# Patient Record
Sex: MALE | Race: WHITE | Employment: FULL TIME | URBAN - METROPOLITAN AREA
[De-identification: names, ages, dates, MRNs, and addresses within clinical notes are randomized per-mention and may not be internally consistent; named-entity substitution may affect disease eponyms.]

---

## 2017-11-02 ENCOUNTER — TRANSFERRED RECORDS (OUTPATIENT)
Dept: HEALTH INFORMATION MANAGEMENT | Facility: CLINIC | Age: 42
End: 2017-11-02

## 2018-02-17 ASSESSMENT — ENCOUNTER SYMPTOMS
MUSCLE CRAMPS: 1
SEIZURES: 0
TREMORS: 0
CHILLS: 1
MYALGIAS: 1
ARTHRALGIAS: 0
DECREASED APPETITE: 0
HEADACHES: 0
STIFFNESS: 1
TINGLING: 1
WEIGHT LOSS: 0
JOINT SWELLING: 0
NIGHT SWEATS: 0
ALTERED TEMPERATURE REGULATION: 1
WEIGHT GAIN: 0
SPEECH CHANGE: 0
FEVER: 0
NUMBNESS: 1
DIZZINESS: 0
INCREASED ENERGY: 1
FATIGUE: 0
BACK PAIN: 1
HALLUCINATIONS: 0
WEAKNESS: 1
MEMORY LOSS: 0
LOSS OF CONSCIOUSNESS: 0
POLYDIPSIA: 0
POLYPHAGIA: 0
PARALYSIS: 0
NECK PAIN: 0
DISTURBANCES IN COORDINATION: 0
MUSCLE WEAKNESS: 1

## 2018-02-27 ENCOUNTER — OFFICE VISIT (OUTPATIENT)
Dept: INTERNAL MEDICINE | Facility: CLINIC | Age: 43
End: 2018-02-27
Payer: COMMERCIAL

## 2018-02-27 VITALS
OXYGEN SATURATION: 99 % | SYSTOLIC BLOOD PRESSURE: 125 MMHG | BODY MASS INDEX: 23.48 KG/M2 | HEIGHT: 70 IN | DIASTOLIC BLOOD PRESSURE: 84 MMHG | HEART RATE: 93 BPM | RESPIRATION RATE: 16 BRPM | WEIGHT: 164 LBS

## 2018-02-27 DIAGNOSIS — M54.5 CHRONIC MIDLINE LOW BACK PAIN, WITH SCIATICA PRESENCE UNSPECIFIED: ICD-10-CM

## 2018-02-27 DIAGNOSIS — Z00.00 HEALTH CARE MAINTENANCE: ICD-10-CM

## 2018-02-27 DIAGNOSIS — R05.9 COUGH: Primary | ICD-10-CM

## 2018-02-27 DIAGNOSIS — G89.29 CHRONIC MIDLINE LOW BACK PAIN, WITH SCIATICA PRESENCE UNSPECIFIED: ICD-10-CM

## 2018-02-27 PROBLEM — M54.50 LOW BACK PAIN RADIATING TO RIGHT LOWER EXTREMITY: Status: ACTIVE | Noted: 2017-08-19

## 2018-02-27 PROBLEM — M79.604 LOW BACK PAIN RADIATING TO RIGHT LOWER EXTREMITY: Status: ACTIVE | Noted: 2017-08-19

## 2018-02-27 RX ORDER — MONTELUKAST SODIUM 10 MG/1
10 TABLET ORAL AT BEDTIME
Qty: 90 TABLET | Refills: 3 | Status: SHIPPED | OUTPATIENT
Start: 2018-02-27 | End: 2018-04-02

## 2018-02-27 RX ORDER — ALBUTEROL SULFATE 0.83 MG/ML
1 SOLUTION RESPIRATORY (INHALATION) EVERY 6 HOURS PRN
Qty: 120 VIAL | Refills: 4 | Status: SHIPPED | OUTPATIENT
Start: 2018-02-27 | End: 2018-03-30

## 2018-02-27 ASSESSMENT — ENCOUNTER SYMPTOMS
BOWEL INCONTINENCE: 0
MEMORY LOSS: 0
SEIZURES: 0
NAUSEA: 0
RECTAL PAIN: 0
LEG SWELLING: 0
WHEEZING: 0
DIFFICULTY URINATING: 0
CHILLS: 1
EXERCISE INTOLERANCE: 0
EYE REDNESS: 0
RECTAL BLEEDING: 0
SYNCOPE: 0
DECREASED CONCENTRATION: 0
SPEECH CHANGE: 0
ABDOMINAL PAIN: 0
TACHYCARDIA: 0
ARTHRALGIAS: 0
FATIGUE: 0
NUMBNESS: 1
PARALYSIS: 0
INSOMNIA: 0
ORTHOPNEA: 0
STIFFNESS: 1
HYPERTENSION: 0
HYPOTENSION: 0
DISTURBANCES IN COORDINATION: 0
SORE THROAT: 0
CONSTIPATION: 0
WEIGHT GAIN: 0
MUSCLE WEAKNESS: 1
SINUS CONGESTION: 0
DYSURIA: 0
FEVER: 0
SWOLLEN GLANDS: 0
DIARRHEA: 0
INCREASED ENERGY: 1
NAIL CHANGES: 0
LOSS OF CONSCIOUSNESS: 0
HEMATURIA: 0
FLANK PAIN: 0
DOUBLE VISION: 0
RESPIRATORY PAIN: 0
PALPITATIONS: 0
ALTERED TEMPERATURE REGULATION: 1
DYSPNEA ON EXERTION: 0
TREMORS: 0
JAUNDICE: 0
HEADACHES: 0
NERVOUS/ANXIOUS: 0
JOINT SWELLING: 0
HEARTBURN: 0
POOR WOUND HEALING: 0
BLOATING: 0
MYALGIAS: 1
SHORTNESS OF BREATH: 0
WEAKNESS: 1
EYE WATERING: 0
COUGH: 0
LEG PAIN: 0
NECK PAIN: 0
HOARSE VOICE: 0
BACK PAIN: 1
TROUBLE SWALLOWING: 0
BRUISES/BLEEDS EASILY: 0
SMELL DISTURBANCE: 0
EYE IRRITATION: 0
TINGLING: 1
CLAUDICATION: 0
PANIC: 0
HEMOPTYSIS: 0
SLEEP DISTURBANCES DUE TO BREATHING: 0
POSTURAL DYSPNEA: 0
SINUS PAIN: 0
TASTE DISTURBANCE: 0
HALLUCINATIONS: 0
WEIGHT LOSS: 0
DEPRESSION: 0
LIGHT-HEADEDNESS: 0
POLYDIPSIA: 0
SKIN CHANGES: 0
SPUTUM PRODUCTION: 0
NECK MASS: 0
DECREASED APPETITE: 0
POLYPHAGIA: 0
SNORES LOUDLY: 0
EYE PAIN: 0
COUGH DISTURBING SLEEP: 0
NIGHT SWEATS: 0
BLOOD IN STOOL: 0
VOMITING: 0
DIZZINESS: 0
MUSCLE CRAMPS: 1

## 2018-02-27 NOTE — MR AVS SNAPSHOT
"              After Visit Summary   2/27/2018    Frederick Nguyen    MRN: [id number]           Patient Information     Date Of Birth          1975        Visit Information        Provider Department      2/27/2018 3:10 PM Liliana Murray MD [facility]        Today's Diagnoses     Cough    -  1    Chronic midline low back pain, with sciatica presence unspecified        Health care maintenance           Follow-ups after your visit        Additional Services     PHYSICAL THERAPY REFERRAL       *This therapy referral will be filtered to a centralized scheduling office at [facility] and the patient will receive a call to schedule an appointment at a [facility] location most convenient for them. *     [facility] provides Physical Therapy evaluation and treatment and many specialty services across the [facility] system.  If requesting a specialty program, please choose from the list below.    If you have not heard from the scheduling office within 2 business days, please call [phone] for all locations, with the exception of [place], please call [phone] and [facility], please call [phone]  Treatment: Evaluation & Treatment  Special Instructions/Modalities: Pt has chronic low back pain  Special Programs: None    Please be aware that coverage of these services is subject to the terms and limitations of your health insurance plan.  Call member services at your health plan with any benefit or coverage questions.      **Note to Provider:  If you are referring outside of [facility] for the therapy appointment, please list the name of the location in the \"special instructions\" above, print the referral and give to the patient to schedule the appointment.                  Follow-up notes from your care team     Return in about 4 weeks (around 3/27/2018).      Your next 10 appointments already scheduled     Feb 28, 2018  4:40 PM CST   (Arrive " by 4:25 PM)   CT CHEST W/O & W CONTRAST with UCCT2   [facility] CT ([facility] [place])    [address] [address] [phone]           Please bring any scans or X-rays taken at other hospitals, if similar tests were done. Also bring a list of your medicines, including vitamins, minerals and over-the-counter drugs. It is safest to leave personal items at home.  Be sure to tell your doctor:   If you have any allergies.   If there s any chance you are pregnant.   If you are breastfeeding.    If you have diabetes as your medication may need to be adjusted for this exam.  You will have contrast for this exam. To prepare:   Do not eat or drink for 2 hours before your exam. If you need to take medicine, you may take it with small sips of water. (We may ask you to take liquid medicine as well.)   The day before your exam, drink extra fluids at least six 8-ounce glasses (unless your doctor tells you to restrict your fluids).  Patients over 70 or patients with diabetes or kidney problems:   If you haven t had a blood test (creatinine test) within the last 30 days, the Cardiologist/Radiologist may require you to get this test prior to your exam.  Please wear loose clothing, such as a sweat suit or jogging clothes. Avoid snaps, zippers and other metal. We may ask you to undress and put on a hospital gown.  If you have any questions, please call the Imaging Department where you will have your exam.            Feb 28, 2018  5:00 PM CST   (Arrive by 4:45 PM)   XR LUMBAR SPINE 2/3 VIEWS with UCXR1   [facility] [place] [facility] ([facility] [place])    [address] [id number] [phone]           Please bring a list of your current medicines to your exam. (Include vitamins, minerals and over-thecounter medicines.) Leave your valuables at home.  Tell your doctor if there is a chance you may be pregnant.  You  do not need to do anything special for this exam.            Mar 01, 2018  5:00 PM CST   LAB with  LAB   [facility] ([facility])    [address]  1st Floor  [facility] [id number] [phone]           Please do not eat 10-12 hours before your appointment if you are coming in fasting for labs on lipids, cholesterol, or glucose (sugar). This does not apply to pregnant women. Water, hot tea and black coffee (with nothing added) are okay. Do not drink other fluids, diet soda or chew gum.            Mar 02, 2018  4:30 PM CST   FULL PULMONARY FUNCTION with  PFL D   [facility] Pulmonary Function Testing ([facility])    [address]  3rd Floor  [facility] [id number] [phone]              Future tests that were ordered for you today     Open Future Orders        Priority Expected Expires Ordered    HIV Antigen Antibody Combo Routine  2/27/2019 2/27/2018    CBC with platelets and differential Routine  2/27/2019 2/27/2018    XR Lumbar Spine 2-3 Views Routine 2/27/2018 2/27/2019 2/27/2018    General PFT Lab (Please always keep checked) Routine  2/27/2019 2/27/2018    Comprehensive metabolic panel Routine 2/27/2018 3/13/2018 2/27/2018    Erythrocyte sedimentation rate Routine 2/27/2018 2/27/2019 2/27/2018    CRP inflammation Routine 2/27/2018 2/27/2019 2/27/2018    M Tuberculosis by Quantiferon Routine 2/27/2018 2/27/2019 2/27/2018    Blood culture Routine 2/27/2018 3/13/2018 2/27/2018    Blood Culture AFB Routine  2/28/2019 2/27/2018    CT Chest w/o Contrast Routine  2/28/2019 2/27/2018            Who to contact     Please call your clinic at [phone] to:    Ask questions about your health    Make or cancel appointments    Discuss your medicines    Learn about your test results    Speak to your doctor            Additional Information About Your Visit        MyChart Information     [employer] gives you secure access to your electronic health  "record. If you see a primary care provider, you can also send messages to your care team and make appointments. If you have questions, please call your primary care clinic.  If you do not have a primary care provider, please call [phone] and they will assist you.      [employer] is an electronic gateway that provides easy, online access to your medical records. With [employer], you can request a clinic appointment, read your test results, renew a prescription or communicate with your care team.     To access your existing account, please contact your [facility] or call [phone] for assistance.        Care EveryWhere ID     This is your Care EveryWhere ID. This could be used by other organizations to access your [facility] medical records  [id number]        Your Vitals Were     Pulse Respirations Height Pulse Oximetry BMI (Body Mass Index)       93 16 1.765 m (5' 9.5\") 99% 23.87 kg/m2        Blood Pressure from Last 3 Encounters:   02/27/18 125/84    Weight from Last 3 Encounters:   02/27/18 74.4 kg (164 lb)              We Performed the Following     Bordetella per/paraper PCR     PHYSICAL THERAPY REFERRAL          Today's Medication Changes          These changes are accurate as of 2/27/18 11:59 PM.  If you have any questions, ask your nurse or doctor.               Start taking these medicines.        Dose/Directions    albuterol (2.5 MG/3ML) 0.083% neb solution   Used for:  Cough   Started by:  Liliana Murray MD        Dose:  1 vial   Take 1 vial (2.5 mg) by nebulization every 6 hours as needed for shortness of breath / dyspnea or wheezing   Quantity:  120 vial   Refills:  4       montelukast 10 MG tablet   Commonly known as:  SINGULAIR   Used for:  Cough   Started by:  Liliana Murray MD        Dose:  10 mg   Take 1 tablet (10 mg) by mouth At Bedtime   Quantity:  90 tablet   Refills:  3       order for DME   Used for:  Cough   Started by:  Liliana Murray" MD Ginger        Equipment being ordered: Nebulizer   Quantity:  1 Units   Refills:  0            Where to get your medicines      These medications were sent to [facility] - [place], [place] - [address]  [address], [facility] [address]    Hours:  TRANSPLANT PHONE NUMBER [phone] Phone:  [phone]     albuterol (2.5 MG/3ML) 0.083% neb solution    montelukast 10 MG tablet         Some of these will need a paper prescription and others can be bought over the counter.  Ask your nurse if you have questions.     Bring a paper prescription for each of these medications     order for DME                Primary Care Provider Office Phone # Fax #    Liliana Ginger Murray [phone]       [address] [address]        Equal Access to Services     HILTON QUINTERO : Hadii kathia mir hadasho Sokalyn, waaxda luqadaha, qaybta kaalmada adedorayanadja, keya melgar . So [facility] [phone].    ATENCIÓN: Si habla español, tiene a chaves disposición servicios gratuitos de asistencia lingüística. Llame al [phone].    We comply with applicable federal civil rights laws and [place] laws. We do not discriminate on the basis of race, color, national origin, age, disability, sex, sexual orientation, or gender identity.            Thank you!     Thank you for choosing [facility]  for your care. Our goal is always to provide you with excellent care. Hearing back from our patients is one way we can continue to improve our services. Please take a few minutes to complete the written survey that you may receive in the mail after your visit with us. Thank you!             Your Updated Medication List - Protect others around you: Learn how to safely use, store and throw away your medicines at www.disposemymeds.org.          This list is accurate as of 2/27/18 11:59 PM.  Always use your most recent  med list.                   Brand Name Dispense Instructions for use Diagnosis    albuterol (2.5 MG/3ML) 0.083% neb solution     120 vial    Take 1 vial (2.5 mg) by nebulization every 6 hours as needed for shortness of breath / dyspnea or wheezing    Cough       montelukast 10 MG tablet    SINGULAIR    90 tablet    Take 1 tablet (10 mg) by mouth At Bedtime    Cough       order for DME     1 Units    Equipment being ordered: Nebulizer    Cough

## 2018-02-27 NOTE — PROGRESS NOTES
PRIMARY CARE CENTER         HPI:       Frederick Nguyen is a 42 year old male w/PMHx of chronic back pain, cough and night sweats who presents due to evaluation of chronic lower back pain and return of chronic cough and night sweats and loss of appetite.     HPI:  The patient states that last summer(August), he woke up and stretched one morning, there was a pop, and since then he has had back pain that has been chronic. He was evaluated by physicians at [facility], had an MRI of the lumbar region of the spine that did show some chronic changes but did not have significant changes that would explain the chronic back pain. He did see a back specialist that recommended PT (but he could not attend this due to working with [employer]). He was also treated with meloxicam and gabapentin, which did not help his back pain. Since Aug, he has not had improvement of back pain and is limited physically. His other treatment included baclofen tabs, diclofenac, hydrocodone APAP, naproxen, methylprednisolone, tizandine, all of which did not help his back pain.       Around that time, he also developed cough and fevers/chills/night sweats, loss of appetite and a 15 lb weight loss in 2 weeks due to loss of appetite. He was extensively evaluated and found to be negative for pertussis, indeterminate TB (after which was found to be negative after ID evaluation), and two negative chest xrays. He was also treated with azithromycin, QVAR, ventolin, and vitrussin. He received one nebulizer treatment which he said helped the cough. Then his cough mysteriously stopped along with the fevers/night sweats. He says that he initially made the appointment to see us for back pain, but then recently in the past month, re-developed the cough, night sweats, and lack of appetite. He has severe pain with coughing due to his back pain and finds it hard to take deep breaths without coughing spasms.      He has never been diagnosed with asthma or COPD  and does not have a family hx of this.     He has not been homeless or been incarcerated, or had contact with people who have been homeless or incarcerated. He has no hx of IVDU. He does not have hemoptysis and cough is rarely productive.     PMHx:  Dwayes Syndrome  Brachial Neuritis/radiculitis  Cervical Spondylosis without myelopathy  Lumbago  MV prolapse and regurgitation-for repeat u/s in 2019  Neck pain  Muscle spasm  Tension headache    PSHx:  Appendectomy    Medications:  Not taking any medicines right now.     Family History:  Heart disease in uncle who was a heavy smoker  No fam Hx of lung problems.    Allergies:  NKDA    Social History:  Tob: never  EtOH: 1 glass per night in past, has not had anything to drink for a long time  Drugs: nothing  Work: [employer]; works for sponsored projects for grants.   Cat at home (healthy).  Used to work for [employer].     Problem, Medication and Allergy Lists were reviewed and are current.  Patient is a new patient to this clinic and so  I reviewed/updated the Past Medical History, the Family History and the Social History.          Review of Systems:   Review of Systems     Constitutional:  Positive for chills and increased energy. Negative for fever, weight loss, weight gain, fatigue, decreased appetite, night sweats, recent stressors, height loss, post-operative complications, incisional pain, hallucinations, hyperactivity and confused.   HENT:  Negative for ear pain, hearing loss, tinnitus, nosebleeds, trouble swallowing, hoarse voice, mouth sores, sore throat, ear discharge, tooth pain, gum tenderness, taste disturbance, smell disturbance, hearing aid, bleeding gums, dry mouth, sinus pain, sinus congestion and neck mass.    Eyes:  Negative for double vision, pain, redness, eye pain, decreased vision, eye watering, eye bulging, eye dryness, flashing lights, spots, floaters, strabismus, tunnel vision, jaundice and eye irritation.   Respiratory:   Negative for cough,  hemoptysis, sputum production, shortness of breath, wheezing, sleep disturbances due to breathing, snores loudly, respiratory pain, dyspnea on exertion, cough disturbing sleep and postural dyspnea.    Cardiovascular:  Negative for chest pain, dyspnea on exertion, palpitations, orthopnea, claudication, leg swelling, fingers/toes turn blue, hypertension, hypotension, syncope, history of heart murmur, chest pain on exertion, chest pain at rest, pacemaker, few scattered varicosities, leg pain, sleep disturbances due to breathing, tachycardia, light-headedness, exercise intolerance and edema.   Gastrointestinal:  Negative for heartburn, nausea, vomiting, abdominal pain, diarrhea, constipation, blood in stool, melena, rectal pain, bloating, hemorrhoids, bowel incontinence, jaundice, rectal bleeding, coffee ground emesis and change in stool.   Genitourinary:  Negative for bladder incontinence, dysuria, urgency, hematuria, flank pain, difficulty urinating, nocturia, voiding less frequently, scrotal pain, ulcerations, penile discharge, male genitourinary complaint and reduced libido.   Musculoskeletal:  Positive for myalgias, back pain, stiffness, muscle cramps and muscle weakness. Negative for joint swelling, arthralgias, neck pain, bone pain and fracture.   Skin:  Negative for nail changes, itching, poor wound healing, rash, hair changes, skin changes, acne, warts, poor wound healing, scarring, flaky skin, Raynaud's phenomenon, sensitivity to sunlight and skin thickening.   Neurological:  Positive for tingling, weakness, numbness and difficulty walking. Negative for dizziness, tremors, speech change, seizures, loss of consciousness, light-headedness, headaches, disturbances in coordination, memory loss and paralysis.   Endo/Heme:  Negative for anemia, swollen glands and bruises/bleeds easily.   Psychiatric/Behavioral:  Negative for depression, hallucinations, memory loss, decreased concentration, mood swings and panic  "attacks.    Endocrine:  Positive for altered temperature regulation.Negative for polyphagia, polydipsia, unwanted hair growth and change in facial hair.    I have personally reviewed and updated the complete ROS on the day of the visit.           Physical Exam:   /84 (BP Location: Right arm, Patient Position: Sitting, Cuff Size: Adult Regular)  Pulse 93  Resp 16  Ht 1.765 m (5' 9.5\")  Wt 74.4 kg (164 lb)  SpO2 99%  BMI 23.87 kg/m2  Body mass index is 23.87 kg/(m^2).  Vitals were reviewed       GENERAL APPEARANCE: healthy, alert and no distress     EYES: EOMI,  PERRL     HENT: mouth without ulcers or lesions     NECK: no adenopathy, no asymmetry, masses, or scars and thyroid normal to palpation     RESP: lungs clear to auscultation - no rales, rhonchi or wheezes, although was not able to deeply inspire due to cough.     CV: RRR; has holosystolic murmur grade III/VI     ABDOMEN:  soft, nontender, no HSM or masses and bowel sounds normal     MS: extremities normal- no gross deformities noted, no evidence of inflammation in joints, FROM in all extremities.     BACK: not tender to palpation along spine, no step offs. No parasternal muscle tenderness. There is limited ROM of back and pain with movement to the R and forward and     backwards.      SKIN: no suspicious lesions or rashes     NEURO: Normal strength and tone, sensory exam grossly normal, mentation intact and speech normal     PSYCH: mentation appears normal. and affect normal/bright     LYMPHATICS: No cervical adenopathy        Results:   Outside hospital imaging and lab results reviewed.     Assessment and Plan     Frederick was seen today for chronic back pain, cough, night sweats, loss of appetite.    Diagnoses and all orders for this visit:    Cough  Pt is having cough, night sweats, loss of appetite. This occurred in the past, also accompanied with a 15 lb weight loss in two weeks due to loss of appetite.   Differential includes interstitial " lung disease, versus TB or atypical mycobacterial infection, vs pertusis, versus other form of infection.  He has been worked up by OSH with two indeterminant TB gold quantiferon tests. He did have an elevated WBC count of 11.7 when one CBC was obtained.    Our plan is to start the patient on albuterol nebulizer (since he thinks this helped him in the past), and also monteleukast.     We will obtain PFTs and a chest CT with and without contrast to look for structural lung disease.    We will also obtain a blood culture, AFB blood culture, quantiferon gold, CBC with differential, CMP, ESR, CRP, bordetella pertusis, HIV ag/abody combo. We have asked the patient to RTC in one week; however, with his current employment, he says this would be challenging. He will try to get an after-hours appointment.     The following orders were placed.   -     albuterol (2.5 MG/3ML) 0.083% neb solution; Take 1 vial (2.5 mg) by nebulization every 6 hours as needed for shortness of breath / dyspnea or wheezing  -     order for DME; Equipment being ordered: Nebulizer  -     montelukast (SINGULAIR) 10 MG tablet; Take 1 tablet (10 mg) by mouth At Bedtime  -     General PFT Lab (Please always keep checked); Future  -     CT Chest w/o & w Contrast; Future  -     Cancel: CBC with platelets; Future  -     Erythrocyte sedimentation rate; Future  -     CRP inflammation; Future  -     M Tuberculosis by Quantiferon; Future  -     Blood culture; Future  -     Blood Culture AFB; Future  -     Bordetella per/paraper PCR  -     CBC with platelets and differential  -     HIV Antigen Antibody Combo    Chronic midline low back pain, with sciatica presence unspecified  It is possible that this is related to the cough and that he has an atypical mycobacterial infection of the spine. We are obtaining infectious work up as above (blood cultures, ESR, CRP). We will also obtain an Xray of lumbar spine to see if there are signs of infection. If there are, we  will obtain an MRI. We have also referred the patient to physical therapy. He did not want any pain medications for his spine, given these did not help him in the past.   -     XR Lumbar Spine 2-3 Views; Future  -     PHYSICAL THERAPY REFERRAL        Options for treatment and follow-up care were reviewed with the patient. Frederick Nguyen engaged in the decision making process and verbalized understanding of the options discussed and agreed with the final plan.    Liliana Murray MD  Feb 27, 2018    Pt was seen and plan of care discussed with Dr. Lopez.     Pt was seen and examined with Dr. Murray.  I agree with her documentation as noted above.    My additional comments: None    Gomez Lopez    Pt's one blood culture came back positive for gram + cocci in chains and groups. I have called the patient, left a voicemail, and asked him to return for repeat blood cultures x2. I let the patient know that if he feels unwell to go to the ED.

## 2018-02-27 NOTE — NURSING NOTE
Chief Complaint   Patient presents with     Gastrointestinal Problem     Patient is here to discuss GI concerns      Back Pain     Ongoing back pain     Lorraine Ram CMA 3:44 PM on 2/27/2018.

## 2018-02-28 ENCOUNTER — RADIANT APPOINTMENT (OUTPATIENT)
Dept: GENERAL RADIOLOGY | Facility: CLINIC | Age: 43
End: 2018-02-28
Attending: INTERNAL MEDICINE
Payer: COMMERCIAL

## 2018-02-28 ENCOUNTER — RADIANT APPOINTMENT (OUTPATIENT)
Dept: CT IMAGING | Facility: CLINIC | Age: 43
End: 2018-02-28
Attending: INTERNAL MEDICINE
Payer: COMMERCIAL

## 2018-02-28 DIAGNOSIS — G89.29 CHRONIC MIDLINE LOW BACK PAIN, WITH SCIATICA PRESENCE UNSPECIFIED: ICD-10-CM

## 2018-02-28 DIAGNOSIS — Z00.00 HEALTH CARE MAINTENANCE: ICD-10-CM

## 2018-02-28 DIAGNOSIS — R05.9 COUGH: ICD-10-CM

## 2018-02-28 DIAGNOSIS — M54.5 CHRONIC MIDLINE LOW BACK PAIN, WITH SCIATICA PRESENCE UNSPECIFIED: ICD-10-CM

## 2018-02-28 LAB
ALBUMIN SERPL-MCNC: 3.5 G/DL (ref 3.4–5)
ALP SERPL-CCNC: 52 U/L (ref 40–150)
ALT SERPL W P-5'-P-CCNC: 12 U/L (ref 0–70)
ANION GAP SERPL CALCULATED.3IONS-SCNC: 7 MMOL/L (ref 3–14)
AST SERPL W P-5'-P-CCNC: 13 U/L (ref 0–45)
B PERT+PARAPERT DNA PNL SPEC NAA+PROBE: NEGATIVE
BASOPHILS # BLD AUTO: 0.1 10E9/L (ref 0–0.2)
BASOPHILS NFR BLD AUTO: 0.7 %
BILIRUB SERPL-MCNC: 0.6 MG/DL (ref 0.2–1.3)
BUN SERPL-MCNC: 15 MG/DL (ref 7–30)
CALCIUM SERPL-MCNC: 8.8 MG/DL (ref 8.5–10.1)
CHLORIDE SERPL-SCNC: 103 MMOL/L (ref 94–109)
CO2 SERPL-SCNC: 25 MMOL/L (ref 20–32)
CREAT SERPL-MCNC: 0.94 MG/DL (ref 0.66–1.25)
CRP SERPL-MCNC: 54.6 MG/L (ref 0–8)
DIFFERENTIAL METHOD BLD: NORMAL
EOSINOPHIL # BLD AUTO: 0.1 10E9/L (ref 0–0.7)
EOSINOPHIL NFR BLD AUTO: 1 %
ERYTHROCYTE [DISTWIDTH] IN BLOOD BY AUTOMATED COUNT: 13.2 % (ref 10–15)
ERYTHROCYTE [SEDIMENTATION RATE] IN BLOOD BY WESTERGREN METHOD: 25 MM/H (ref 0–15)
GFR SERPL CREATININE-BSD FRML MDRD: 89 ML/MIN/1.7M2
GLUCOSE SERPL-MCNC: 85 MG/DL (ref 70–99)
HCT VFR BLD AUTO: 41.3 % (ref 40–53)
HGB BLD-MCNC: 13.5 G/DL (ref 13.3–17.7)
IMM GRANULOCYTES # BLD: 0.1 10E9/L (ref 0–0.4)
IMM GRANULOCYTES NFR BLD: 1 %
LYMPHOCYTES # BLD AUTO: 1.6 10E9/L (ref 0.8–5.3)
LYMPHOCYTES NFR BLD AUTO: 23.8 %
MCH RBC QN AUTO: 28.3 PG (ref 26.5–33)
MCHC RBC AUTO-ENTMCNC: 32.7 G/DL (ref 31.5–36.5)
MCV RBC AUTO: 87 FL (ref 78–100)
MONOCYTES # BLD AUTO: 0.6 10E9/L (ref 0–1.3)
MONOCYTES NFR BLD AUTO: 8.2 %
NEUTROPHILS # BLD AUTO: 4.5 10E9/L (ref 1.6–8.3)
NEUTROPHILS NFR BLD AUTO: 65.3 %
NRBC # BLD AUTO: 0 10*3/UL
NRBC BLD AUTO-RTO: 0 /100
PLATELET # BLD AUTO: 249 10E9/L (ref 150–450)
POTASSIUM SERPL-SCNC: 4.1 MMOL/L (ref 3.4–5.3)
PROT SERPL-MCNC: 7.9 G/DL (ref 6.8–8.8)
RBC # BLD AUTO: 4.77 10E12/L (ref 4.4–5.9)
SODIUM SERPL-SCNC: 136 MMOL/L (ref 133–144)
SPECIMEN SOURCE: NORMAL
WBC # BLD AUTO: 6.9 10E9/L (ref 4–11)

## 2018-02-28 NOTE — LETTER
Patient:  Frederick Nguyen  :   1975  MRN:     [id number]        Mr. Frederick Nguyen  [address] [place] [id number]        2018    Dear Mr. Nguyen,    Thank you for choosing the [facility] for your healthcare needs.  We appreciate the opportunity to serve you.    The following are your recent test results.     Dear Audi,     It was nice to see you in clinic.     Your blood culture is positive for bacteria. While this could be a contaminant, I am worried that this is not. I have put in an order for repeat blood cultures into the system. I would like you to return to the lab for a repeat blood draw for two blood cultures. If we see the same result, we would need you to come to the ED. If you feel ill (rapid hear rate, dizziness, fever, or overall worse) please go directly to the ED. I left a voicemail on your phone. Please do not hesitate to call me at the number I called you if you have questions or concerns.     Regarding your other results, you have an elevated CRP and ESR, indicating that you have elevated inflammation indicating potential infection vs autoimmune process. Let's figure out what's going on with the blood culture, and then come up with a plan to figure out why the inflammation markers are elevated.       Results for orders placed or performed in visit on 18   Comprehensive metabolic panel   Result Value Ref Range    Sodium 136 133 - 144 mmol/L    Potassium 4.1 3.4 - 5.3 mmol/L    Chloride 103 94 - 109 mmol/L    Carbon Dioxide 25 20 - 32 mmol/L    Anion Gap 7 3 - 14 mmol/L    Glucose 85 70 - 99 mg/dL    Urea Nitrogen 15 7 - 30 mg/dL    Creatinine 0.94 0.66 - 1.25 mg/dL    GFR Estimate 89 >60 mL/min/1.7m2    GFR Estimate If Black >90 >60 mL/min/1.7m2    Calcium 8.8 8.5 - 10.1 mg/dL    Bilirubin Total 0.6 0.2 - 1.3 mg/dL    Albumin 3.5 3.4 - 5.0 g/dL    Protein Total 7.9 6.8 - 8.8 g/dL    Alkaline Phosphatase 52 40 -  150 U/L    ALT 12 0 - 70 U/L    AST 13 0 - 45 U/L   Erythrocyte sedimentation rate   Result Value Ref Range    Sed Rate 25 (H) 0 - 15 mm/h   CRP inflammation   Result Value Ref Range    CRP Inflammation 54.6 (H) 0.0 - 8.0 mg/L   HIV Antigen Antibody Combo   Result Value Ref Range    HIV Antigen Antibody Combo Nonreactive NR^Nonreactive       CBC with platelets and differential   Result Value Ref Range    WBC 6.9 4.0 - 11.0 10e9/L    RBC Count 4.77 4.4 - 5.9 10e12/L    Hemoglobin 13.5 13.3 - 17.7 g/dL    Hematocrit 41.3 40.0 - 53.0 %    MCV 87 78 - 100 fl    MCH 28.3 26.5 - 33.0 pg    MCHC 32.7 31.5 - 36.5 g/dL    RDW 13.2 10.0 - 15.0 %    Platelet Count 249 150 - 450 10e9/L    Diff Method Automated Method     % Neutrophils 65.3 %    % Lymphocytes 23.8 %    % Monocytes 8.2 %    % Eosinophils 1.0 %    % Basophils 0.7 %    % Immature Granulocytes 1.0 %    Nucleated RBCs 0 0 /100    Absolute Neutrophil 4.5 1.6 - 8.3 10e9/L    Absolute Lymphocytes 1.6 0.8 - 5.3 10e9/L    Absolute Monocytes 0.6 0.0 - 1.3 10e9/L    Absolute Eosinophils 0.1 0.0 - 0.7 10e9/L    Absolute Basophils 0.1 0.0 - 0.2 10e9/L    Abs Immature Granulocytes 0.1 0 - 0.4 10e9/L    Absolute Nucleated RBC 0.0    Blood culture   Result Value Ref Range    Specimen Description Blood Left Hand     Culture Micro (A)      Cultured on the 1st day of incubation:  Gram positive cocci in pairs and chains      Culture Micro       Critical Value/Significant Value, preliminary result only, called to and read back by  Swati Oswald RN, @ 3125 3.1.2018 BL      Culture Micro       (Note)  POSITIVE for STREPTOCOCCUS SPECIES OTHER THAN pneumococcus, anginosus  group, S. pyogenes and S. agalactiae. Performed using [employer] nucleic acid test. Final identification and antimicrobial  susceptibility testing will be verified by standard methods.    Specimen tested with [employer] multiplex, gram-positive blood culture  nucleic acid test for the following targets: Staph  aureus, Staph  epidermidis, Staph lugdunensis, other Staph species, Enterococcus  faecalis, Enterococcus faecium, Streptococcus species, S. agalactiae,  S. anginosus grp., S. pneumoniae, S. pyogenes, Listeria sp., mecA  (methicillin resistance) and Faustina/B (vancomycin resistance).    Critical Value/Significant Value called to and read back by Dr. Murray on 3.1.18 at 1105. bw       Blood Culture AFB   Result Value Ref Range    Specimen Description Blood Unspecified Site     Special Requests SPS     Culture Micro Culture negative monitoring continues        Please contact your provider if you have any questions or concerns.  We look forward to serving your needs in the future.    Sincerely,   Dr. Murray/SAMANTHA

## 2018-03-01 DIAGNOSIS — R05.9 COUGH: ICD-10-CM

## 2018-03-01 LAB — HIV 1+2 AB+HIV1 P24 AG SERPL QL IA: NONREACTIVE

## 2018-03-01 NOTE — PROGRESS NOTES
Dear Audi,    It was nice to see you in clinic.    Your blood culture is positive for bacteria. While this could be a contaminant, I am worried that this is not. I have put in an order for repeat blood cultures into the system. I would like you to return to the lab for a repeat blood draw for two blood cultures. If we see the same result, we would need you to come to the ED. If you feel ill (rapid hear rate, dizziness, fever, or overall worse) please go directly to the ED. I left a voicemail on your phone. Please do not hesitate to call me at the number I called you if you have questions or concerns.    Regarding your other results, you have an elevated CRP and ESR, indicating that you have elevated inflammation indicating potential infection vs autoimmune process. Let's figure out what's going on with the blood culture, and then come up with a plan to figure out why the inflammation markers are elevated.    Sincerely,  Dr. Murray

## 2018-03-02 ENCOUNTER — NURSE TRIAGE (OUTPATIENT)
Dept: NURSING | Facility: CLINIC | Age: 43
End: 2018-03-02

## 2018-03-02 ENCOUNTER — TELEPHONE (OUTPATIENT)
Dept: INTERNAL MEDICINE | Facility: CLINIC | Age: 43
End: 2018-03-02

## 2018-03-02 ENCOUNTER — APPOINTMENT (OUTPATIENT)
Dept: GENERAL RADIOLOGY | Facility: CLINIC | Age: 43
DRG: 871 | End: 2018-03-02
Attending: EMERGENCY MEDICINE
Payer: COMMERCIAL

## 2018-03-02 ENCOUNTER — HOSPITAL ENCOUNTER (INPATIENT)
Facility: CLINIC | Age: 43
LOS: 5 days | Discharge: HOME HEALTH | DRG: 871 | End: 2018-03-07
Attending: EMERGENCY MEDICINE | Admitting: PEDIATRICS
Payer: COMMERCIAL

## 2018-03-02 ENCOUNTER — APPOINTMENT (OUTPATIENT)
Dept: CARDIOLOGY | Facility: CLINIC | Age: 43
DRG: 871 | End: 2018-03-02
Payer: COMMERCIAL

## 2018-03-02 DIAGNOSIS — A49.8 CLOSTRIDIUM DIFFICILE INFECTION: ICD-10-CM

## 2018-03-02 DIAGNOSIS — R78.81 GRAM-POSITIVE BACTEREMIA: ICD-10-CM

## 2018-03-02 DIAGNOSIS — M46.20 OSTEOMYELITIS OF SPINE (H): Primary | ICD-10-CM

## 2018-03-02 LAB
ALBUMIN SERPL-MCNC: 3.3 G/DL (ref 3.4–5)
ALP SERPL-CCNC: 45 U/L (ref 40–150)
ALT SERPL W P-5'-P-CCNC: 9 U/L (ref 0–70)
ANION GAP SERPL CALCULATED.3IONS-SCNC: 9 MMOL/L (ref 3–14)
AST SERPL W P-5'-P-CCNC: 16 U/L (ref 0–45)
BASOPHILS # BLD AUTO: 0 10E9/L (ref 0–0.2)
BASOPHILS NFR BLD AUTO: 0.2 %
BILIRUB SERPL-MCNC: 0.9 MG/DL (ref 0.2–1.3)
BUN SERPL-MCNC: 13 MG/DL (ref 7–30)
CALCIUM SERPL-MCNC: 8.2 MG/DL (ref 8.5–10.1)
CHLORIDE SERPL-SCNC: 103 MMOL/L (ref 94–109)
CO2 BLDCOV-SCNC: 20 MMOL/L (ref 21–28)
CO2 SERPL-SCNC: 21 MMOL/L (ref 20–32)
CREAT SERPL-MCNC: 1.06 MG/DL (ref 0.66–1.25)
DIFFERENTIAL METHOD BLD: ABNORMAL
EOSINOPHIL # BLD AUTO: 0 10E9/L (ref 0–0.7)
EOSINOPHIL NFR BLD AUTO: 0 %
ERYTHROCYTE [DISTWIDTH] IN BLOOD BY AUTOMATED COUNT: 13.8 % (ref 10–15)
GFR SERPL CREATININE-BSD FRML MDRD: 77 ML/MIN/1.7M2
GLUCOSE SERPL-MCNC: 94 MG/DL (ref 70–99)
HCT VFR BLD AUTO: 36.1 % (ref 40–53)
HGB BLD-MCNC: 12 G/DL (ref 13.3–17.7)
IMM GRANULOCYTES # BLD: 0.1 10E9/L (ref 0–0.4)
IMM GRANULOCYTES NFR BLD: 0.6 %
LACTATE BLD-SCNC: 0.8 MMOL/L (ref 0.7–2.1)
LYMPHOCYTES # BLD AUTO: 0.9 10E9/L (ref 0.8–5.3)
LYMPHOCYTES NFR BLD AUTO: 9.6 %
M TB TUBERC IFN-G BLD QL: NEGATIVE
M TB TUBERC IFN-G/MITOGEN IGNF BLD: 0 IU/ML
MCH RBC QN AUTO: 28.6 PG (ref 26.5–33)
MCHC RBC AUTO-ENTMCNC: 33.2 G/DL (ref 31.5–36.5)
MCV RBC AUTO: 86 FL (ref 78–100)
MONOCYTES # BLD AUTO: 0.6 10E9/L (ref 0–1.3)
MONOCYTES NFR BLD AUTO: 6.8 %
NEUTROPHILS # BLD AUTO: 7.4 10E9/L (ref 1.6–8.3)
NEUTROPHILS NFR BLD AUTO: 82.8 %
NRBC # BLD AUTO: 0 10*3/UL
NRBC BLD AUTO-RTO: 0 /100
PCO2 BLDV: 22 MM HG (ref 40–50)
PH BLDV: 7.58 PH (ref 7.32–7.43)
PLATELET # BLD AUTO: 168 10E9/L (ref 150–450)
PLATELET # BLD AUTO: 191 10E9/L (ref 150–450)
PO2 BLDV: 37 MM HG (ref 25–47)
POTASSIUM SERPL-SCNC: 4 MMOL/L (ref 3.4–5.3)
PROT SERPL-MCNC: 7.3 G/DL (ref 6.8–8.8)
RBC # BLD AUTO: 4.2 10E12/L (ref 4.4–5.9)
SAO2 % BLDV FROM PO2: 82 %
SODIUM SERPL-SCNC: 133 MMOL/L (ref 133–144)
WBC # BLD AUTO: 8.9 10E9/L (ref 4–11)

## 2018-03-02 PROCEDURE — 99285 EMERGENCY DEPT VISIT HI MDM: CPT | Mod: Z6 | Performed by: EMERGENCY MEDICINE

## 2018-03-02 PROCEDURE — 25000128 H RX IP 250 OP 636: Performed by: PEDIATRICS

## 2018-03-02 PROCEDURE — 12000001 ZZH R&B MED SURG/OB UMMC

## 2018-03-02 PROCEDURE — 93306 TTE W/DOPPLER COMPLETE: CPT

## 2018-03-02 PROCEDURE — 96366 THER/PROPH/DIAG IV INF ADDON: CPT | Performed by: EMERGENCY MEDICINE

## 2018-03-02 PROCEDURE — 99285 EMERGENCY DEPT VISIT HI MDM: CPT | Mod: 25 | Performed by: EMERGENCY MEDICINE

## 2018-03-02 PROCEDURE — 82803 BLOOD GASES ANY COMBINATION: CPT

## 2018-03-02 PROCEDURE — 80053 COMPREHEN METABOLIC PANEL: CPT | Performed by: EMERGENCY MEDICINE

## 2018-03-02 PROCEDURE — 12000008 ZZH R&B INTERMEDIATE UMMC

## 2018-03-02 PROCEDURE — 96365 THER/PROPH/DIAG IV INF INIT: CPT | Performed by: EMERGENCY MEDICINE

## 2018-03-02 PROCEDURE — 93306 TTE W/DOPPLER COMPLETE: CPT | Mod: 26 | Performed by: INTERNAL MEDICINE

## 2018-03-02 PROCEDURE — 99223 1ST HOSP IP/OBS HIGH 75: CPT | Mod: AI | Performed by: PEDIATRICS

## 2018-03-02 PROCEDURE — 87077 CULTURE AEROBIC IDENTIFY: CPT | Performed by: EMERGENCY MEDICINE

## 2018-03-02 PROCEDURE — 71046 X-RAY EXAM CHEST 2 VIEWS: CPT

## 2018-03-02 PROCEDURE — 83605 ASSAY OF LACTIC ACID: CPT

## 2018-03-02 PROCEDURE — 87040 BLOOD CULTURE FOR BACTERIA: CPT | Performed by: EMERGENCY MEDICINE

## 2018-03-02 PROCEDURE — 36415 COLL VENOUS BLD VENIPUNCTURE: CPT | Performed by: STUDENT IN AN ORGANIZED HEALTH CARE EDUCATION/TRAINING PROGRAM

## 2018-03-02 PROCEDURE — 93005 ELECTROCARDIOGRAM TRACING: CPT

## 2018-03-02 PROCEDURE — 85025 COMPLETE CBC W/AUTO DIFF WBC: CPT | Performed by: EMERGENCY MEDICINE

## 2018-03-02 PROCEDURE — 25000128 H RX IP 250 OP 636: Performed by: EMERGENCY MEDICINE

## 2018-03-02 PROCEDURE — 93010 ELECTROCARDIOGRAM REPORT: CPT | Performed by: INTERNAL MEDICINE

## 2018-03-02 PROCEDURE — 25000128 H RX IP 250 OP 636: Performed by: STUDENT IN AN ORGANIZED HEALTH CARE EDUCATION/TRAINING PROGRAM

## 2018-03-02 PROCEDURE — 85049 AUTOMATED PLATELET COUNT: CPT | Performed by: STUDENT IN AN ORGANIZED HEALTH CARE EDUCATION/TRAINING PROGRAM

## 2018-03-02 PROCEDURE — 25000132 ZZH RX MED GY IP 250 OP 250 PS 637: Performed by: STUDENT IN AN ORGANIZED HEALTH CARE EDUCATION/TRAINING PROGRAM

## 2018-03-02 RX ORDER — ONDANSETRON 4 MG/1
4 TABLET, ORALLY DISINTEGRATING ORAL EVERY 6 HOURS PRN
Status: DISCONTINUED | OUTPATIENT
Start: 2018-03-02 | End: 2018-03-07 | Stop reason: HOSPADM

## 2018-03-02 RX ORDER — CODEINE PHOSPHATE AND GUAIFENESIN 10; 100 MG/5ML; MG/5ML
5-10 SOLUTION ORAL EVERY 4 HOURS PRN
Status: DISCONTINUED | OUTPATIENT
Start: 2018-03-02 | End: 2018-03-07 | Stop reason: HOSPADM

## 2018-03-02 RX ORDER — MULTIVITAMIN,THERAPEUTIC
1 TABLET ORAL DAILY
Status: DISCONTINUED | OUTPATIENT
Start: 2018-03-02 | End: 2018-03-07 | Stop reason: HOSPADM

## 2018-03-02 RX ORDER — ONDANSETRON 2 MG/ML
4 INJECTION INTRAMUSCULAR; INTRAVENOUS EVERY 6 HOURS PRN
Status: DISCONTINUED | OUTPATIENT
Start: 2018-03-02 | End: 2018-03-07 | Stop reason: HOSPADM

## 2018-03-02 RX ORDER — CODEINE PHOSPHATE AND GUAIFENESIN 10; 100 MG/5ML; MG/5ML
1-2 SOLUTION ORAL EVERY 4 HOURS PRN
COMMUNITY
End: 2018-04-02

## 2018-03-02 RX ORDER — MULTIVITAMIN,THERAPEUTIC
1 TABLET ORAL DAILY
COMMUNITY
End: 2019-11-11

## 2018-03-02 RX ORDER — ALBUTEROL SULFATE 0.83 MG/ML
1 SOLUTION RESPIRATORY (INHALATION) EVERY 6 HOURS PRN
Status: DISCONTINUED | OUTPATIENT
Start: 2018-03-02 | End: 2018-03-07 | Stop reason: HOSPADM

## 2018-03-02 RX ORDER — LIDOCAINE 40 MG/G
CREAM TOPICAL
Status: DISCONTINUED | OUTPATIENT
Start: 2018-03-02 | End: 2018-03-02

## 2018-03-02 RX ORDER — AMOXICILLIN 250 MG
1 CAPSULE ORAL 2 TIMES DAILY PRN
Status: DISCONTINUED | OUTPATIENT
Start: 2018-03-02 | End: 2018-03-07 | Stop reason: HOSPADM

## 2018-03-02 RX ORDER — NALOXONE HYDROCHLORIDE 0.4 MG/ML
.1-.4 INJECTION, SOLUTION INTRAMUSCULAR; INTRAVENOUS; SUBCUTANEOUS
Status: DISCONTINUED | OUTPATIENT
Start: 2018-03-02 | End: 2018-03-07 | Stop reason: HOSPADM

## 2018-03-02 RX ORDER — HEPARIN SODIUM 5000 [USP'U]/.5ML
5000 INJECTION, SOLUTION INTRAVENOUS; SUBCUTANEOUS EVERY 12 HOURS
Status: DISCONTINUED | OUTPATIENT
Start: 2018-03-02 | End: 2018-03-07 | Stop reason: HOSPADM

## 2018-03-02 RX ORDER — AMOXICILLIN 250 MG
2 CAPSULE ORAL 2 TIMES DAILY PRN
Status: DISCONTINUED | OUTPATIENT
Start: 2018-03-02 | End: 2018-03-07 | Stop reason: HOSPADM

## 2018-03-02 RX ADMIN — VANCOMYCIN HYDROCHLORIDE 1500 MG: 10 INJECTION, POWDER, LYOPHILIZED, FOR SOLUTION INTRAVENOUS at 21:37

## 2018-03-02 RX ADMIN — HEPARIN SODIUM 5000 UNITS: 5000 INJECTION, SOLUTION INTRAVENOUS; SUBCUTANEOUS at 17:06

## 2018-03-02 RX ADMIN — SODIUM CHLORIDE 1000 ML: 9 INJECTION, SOLUTION INTRAVENOUS at 09:59

## 2018-03-02 RX ADMIN — THERA TABS 1 TABLET: TAB at 17:06

## 2018-03-02 RX ADMIN — VANCOMYCIN HYDROCHLORIDE 1500 MG: 10 INJECTION, POWDER, LYOPHILIZED, FOR SOLUTION INTRAVENOUS at 10:24

## 2018-03-02 RX ADMIN — SODIUM CHLORIDE 1000 ML: 9 INJECTION, SOLUTION INTRAVENOUS at 09:33

## 2018-03-02 ASSESSMENT — ENCOUNTER SYMPTOMS
COUGH: 1
VOMITING: 0
APPETITE CHANGE: 1
NAUSEA: 0
DIFFICULTY URINATING: 0
FATIGUE: 1
CONFUSION: 0
SHORTNESS OF BREATH: 0
ARTHRALGIAS: 0
ABDOMINAL PAIN: 0
HEADACHES: 0
EYE REDNESS: 0
NECK STIFFNESS: 0
COLOR CHANGE: 0
CHILLS: 1
FEVER: 1

## 2018-03-02 NOTE — SUMMARY OF CARE
Patient belongings: Black jacket, black rimmed glasses, black shoes, jeans, belt, button up shirt, black tshirt, two duffle bags - one tan with more clothing and one maroon, keys, medications - sent to security, heating pad, and wallet.

## 2018-03-02 NOTE — ED PROVIDER NOTES
History     Chief Complaint   Patient presents with     Cough     HPI  Frederick Nguyen is a 42 year old male with a history of chronic lower back pain, mitral valve prolapse, and chronic cough who presents for evaluation of a cough and abnormal laboratory results. Patient was instructed to present here by the Internal Medicine Clinic for further evaluation of two positive blood cultures. In review of clinic records, patient was seen and evaluated at the Internal Medicine Clinic on 02/27/18, three days ago for a dry cough with associated fevers, chills, and night sweats which have been ongoing for the past three weeks. Following this visit, patient had laboratory studies drawn including one blood culture on 02/28/18, two days ago, which was positive for gram positive cocci in pairs and chains. A second blood culture was drawn yesterday 03/01/18, for follow-up and was again positive for gram positive cocci in pairs and chains. Patient also had a Chest CT (results below).    Today, patient complains of a persistent dry cough as well as fatigue, subjective fevers, shaking chills, and night sweats. He also complains of decreased appetite, stating he has no hunger and has only been able to eat one meal today. He denies chest pain, abdominal pain, shortness of breath, nausea, or vomiting. On arrival he is febrile with a temperature of 100.5  F. Patient reports his current symptoms are similar to symptoms he had last August for which he was seen and evaluated through the [facility] system. He states his symptoms in August were somewhat worse, but did resolve after antibiotics. Per Care Everywhere, testing at that time including pertussis, TB and ID evaluation were all negative, and he had two negative chest x-rays. He denies known history of bacteremia, and he reports his blood cultures in August were negative.       CT Chest (02/28/18) Impression:  1. No acute findings in the chest to explain patient's cough.  2.  Scattered punctate 2 to 3 mm pulmonary nodules. No follow-up  required for low-risk patient. Recommend optional follow-up CT in 12  months if patient has history of smoking or cancer.  3. Sequela of prior granulomatous disease  4. Question splenomegaly in the partially visualized upper abdomen.     I have reviewed the Medications, Allergies, Past Medical and Surgical History, and Social History in the [facility] system.  History reviewed. No pertinent past medical history.    History reviewed. No pertinent surgical history.    No family history on file.    Social History   Substance Use Topics     Smoking status: Never Smoker     Smokeless tobacco: Never Used     Alcohol use No       Current Facility-Administered Medications   Medication     lidocaine 1 % 1 mL     lidocaine (LMX4) kit     sodium chloride (PF) 0.9% PF flush 3 mL     sodium chloride (PF) 0.9% PF flush 3 mL     vancomycin (VANCOCIN) 1,500 mg in sodium chloride 0.9 % 250 mL intermittent infusion     Current Outpatient Prescriptions   Medication     guaiFENesin-codeine (ROBITUSSIN AC) 100-10 MG/5ML SOLN solution     multivitamin, therapeutic (THERA-VIT) TABS tablet     Camphor-Menthol (TIGER BALM EX)     montelukast (SINGULAIR) 10 MG tablet     albuterol (2.5 MG/3ML) 0.083% neb solution     order for DME      No Known Allergies    Review of Systems   Constitutional: Positive for appetite change (decreased), chills, fatigue and fever (100.5 on arrival).   HENT: Negative for congestion.    Eyes: Negative for redness.   Respiratory: Positive for cough (dry). Negative for shortness of breath.    Cardiovascular: Negative for chest pain.   Gastrointestinal: Negative for abdominal pain, nausea and vomiting.   Genitourinary: Negative for difficulty urinating.   Musculoskeletal: Negative for arthralgias and neck stiffness.   Skin: Negative for color change.   Neurological: Negative for headaches.   Psychiatric/Behavioral: Negative for confusion.   All other systems  "reviewed and are negative.      Physical Exam   BP: 133/77  Pulse: 111  Heart Rate: 98  Temp: 100.5  F (38.1  C)  Resp: 22  Height: 175.3 cm (5' 9\")  Weight: 75.6 kg (166 lb 10.7 oz)  SpO2: 99 %      Physical Exam   Constitutional: No distress.   HENT:   Head: Atraumatic.   Mouth/Throat: Oropharynx is clear and moist. No oropharyngeal exudate.   Eyes: Pupils are equal, round, and reactive to light. No scleral icterus.   Cardiovascular: Intact distal pulses.    Murmur heard.   Decrescendo systolic murmur is present   Pulmonary/Chest: Breath sounds normal. No respiratory distress.   Abdominal: Soft. Bowel sounds are normal. There is no tenderness.   Musculoskeletal: He exhibits no edema or tenderness.   Skin: Skin is warm. No rash noted. He is not diaphoretic.   Nursing note and vitals reviewed.      ED Course     ED Course     Procedures       9:29 AM  The patient was seen and examined by Dr. Valenzuela in Room 21.          Critical Care time:  none             Labs Ordered and Resulted from Time of ED Arrival Up to the Time of Departure from the ED   COMPREHENSIVE METABOLIC PANEL - Abnormal; Notable for the following:        Result Value    Calcium 8.2 (*)     Albumin 3.3 (*)     All other components within normal limits   CBC WITH PLATELETS DIFFERENTIAL - Abnormal; Notable for the following:     RBC Count 4.20 (*)     Hemoglobin 12.0 (*)     Hematocrit 36.1 (*)     All other components within normal limits   ISTAT  GASES LACTATE LOUIS POCT - Abnormal; Notable for the following:     Ph Venous 7.58 (*)     PCO2 Venous 22 (*)     Bicarbonate Venous 20 (*)     All other components within normal limits   PERIPHERAL IV CATHETER   PULSE OXIMETRY NURSING   CARDIAC CONTINUOUS MONITORING   NURSING DRAW AND HOLD   NURSING DRAW AND HOLD   NURSING DRAW AND HOLD   ISTAT CG4 GASES LACTATE LOUIS NURSING POCT   BLOOD CULTURE   BLOOD CULTURE            Assessments & Plan (with Medical Decision Making)   42-year-old male presents with 2 week " history of cough, malaise, low-grade temperatures and anorexia.  Differential included medication side effect, infection, other.  Initial exam revealed blowing left sided systolic murmur.  Laboratories revealed a CBC with normal white count and slightly low hemoglobin and hematocrit revealing anemia.  Comprehensive metabolic panel revealed slightly low calcium and albumin.  Blood culture was obtained an i-STAT gases revealed respiratory alkalosis with elevated pH and low PCO2.  Blood cultures ×2 obtained yesterday and 2 days ago revealed gram-positive organism in cocci in chains.  Vancomycin was initiated and the case was discussed at length with internal medicine.  The patient will be admitted for further workup to include an echocardiogram, ongoing antibiotics and other treatment as deemed appropriate.    I have reviewed the nursing notes.    I have reviewed the findings, diagnosis, plan and need for follow up with the patient.    Current Discharge Medication List          Final diagnoses:   Gram-positive bacteremia   IIsabel, am serving as a trained medical scribe to document services personally performed by Sharad Valenzuela MD, based on the provider's statements to me.   Sharad RODRIGUEZ MD, was physically present and have reviewed and verified the accuracy of this note documented by Isabel Buckley.      3/2/2018   [facility], [place], EMERGENCY DEPARTMENT     Terry Valenzuela MD  03/02/18 3503

## 2018-03-02 NOTE — LETTER
Transition Communication Hand-off for Care Transitions to Next Level of Care Provider    Name: Frederick Nguyen  MRN #: [id number]  Primary Care Provider: Liliana Murray     Primary Clinic: [address] [address]     Reason for Hospitalization:  Gram-positive bacteremia [R78.81]  Admit Date/Time: 3/2/2018  9:09 AM  Discharge Date: 3/7/18  Payor Source: Payor: MEDICA / Plan: MEDICA VANTAGEPLUS / Product Type: HMO /            Reason for Communication Hand-off Referral: Fragility    Discharge Plan: home with [facility] for iv pcn at home       Concern for non-adherence with plan of care:   Y/N n  Discharge Needs Assessment:  Needs       Most Recent Value    Home Infusion Provider [facility] [phone], Fax: [phone]          Already enrolled in Tele-monitoring program and name of program:    Follow-up specialty is recommended: Yes    Follow-up plan:  Future Appointments  Date Time Provider Department Center   3/7/2018 11:30 AM Salena Spangler RN [facility]       Any outstanding tests or procedures:        Referrals     Future Labs/Procedures    Home infusion referral     Comments:    Your provider has referred you to: [facility]: [facility] [place] [phone]   [email or web address]    Local Address (if different from home address): N/A    Anticipated Length of Therapy: per md order    Home Infusion Pharmacist to adjust therapy based on labs and clinical assessments: Yes    Labs:  May draw labs from Venous Catheter: Yes  Home Infusion Pharmacist to order labs based on therapy type and clinical assessments: Yes  Call/Fax Lab Results to: pcp    Agency Staff to assess nursing needs for Infusion Therapy.    Access Device Management:  IV Access Type: PICC  Flush with Heparin and Normal Saline IVP PRN and routine site care (per agency protocol) to maintain access device? Yes            Key Recommendations:       Abena Lora    AVS/Discharge Summary is the source of truth; this is a helpful guide for improved communication of patient story

## 2018-03-02 NOTE — PROGRESS NOTES
Patient arrived to unit via stretcher, able to make needs known, call light within reach, admission process started, patient to be placed on telemetry, ECHO being done at this time, MD paged to clarify patient diet order. Patient denies pain, alert and oriented x 4. Continue to monitor.

## 2018-03-02 NOTE — PROGRESS NOTES
"  History and Physical  Internal Medicine      Date of Service: 18  Date of Admission: 3/2/2018  Patient Name: Frederick Nguyen  : 1975  MRN: [id number]  Att Prov: Kobi Bose       Chief complaint:   Sent from Clinic with Gram Positive Bacteremia - strep mitis      History of Present Illness:   Frederick Nguyenis a 42 year old year old with PMH of MVP, Dwaye's syndrome, Cervical spondylosis, Tension headache who presents with Gram positive bacteremia found to be strep mitis.     Patient  was seen in OP primary care clinic ion  initially for subacute shortness of breath and cough and at the time was treated for tuberculosis with quant gold as well as blood cultures, afb cultures, bordetella, hiv all of which were negative. In addition he was given a referral for PFTs. Ultimately, his initial set of blood cultures returned +2 days prior drawn on 18).  A second set of blood cultures was drawn on 18 and these 2 returned positive thus he was sent to the emergency department for further evaluation.      The patient himself states that he has overall thin feeling poorly for the past 3 weeks.  He endorses generalized fatigue, malaise, nonproductive cough, shortness of breath but denies any significant chest pain.  Ongoing shaking chills as well as night sweats which he describes as needing to \"tell off at night\".  Also endorses decreased appetite stating he has no interest in food and only eats approximately one meal per day.He cannot state a specific inciting factor that occurred 3 weeks prior that caused his cough and generalized fatigue.  Denies any travel, exposures to animals, plants, new substances or supplements used.  He denies any specific dental work that has occurred over the past month.  Does endorse he has poor dental hygiene has numerous cavities as well as one \"ingrown tooth.\" He last saw a dentist approximately 9 months prior for a cleaning.   Also endorses back " pain that began in august and has been worked up extensively and been trialled on numerous medications. Patient states exercise and PT has been helping. MRI obtained on 11/02/17 showing mild to moderate degenerative disease     Temperature 100.5, blood pressure 133/77. In the ED the patient's vitals were tachycardic to 111, respirations were 22 satting 99% on room air.  Patient underwent a CXR and was subsequenty admitted to medicine service. Repeat blood culture was obtained in the ED today as well.      Review of Symptoms:   Review Of Systems  Skin: negative for, rash  Eyes: negative for, visual blurring  Ears/Nose/Throat: negative for, nasal congestion  Respiratory: Cough- not overtly productive, No shortness of breath and No dyspnea on exertion  Cardiovascular: negative for, palpitations, tachycardia, irregular heart beat and chest pain, known heart murmur   Gastrointestinal: negative for, vomiting, heartburn, dyspepsia, reflux and abdominal pain  Genitourinary: negative for, dysuria, frequency, urgency, hesitancy and hematuria  Musculoskeletal: positive for back pain, negative for, joint pain and joint swelling  Neurologic: positive for Xhronic headaches      Past Medical History:   PMHx:  Dwayes Syndrome  Brachial Neuritis/radiculitis  Cervical Spondylosis without myelopathy  Lumbago  MV prolapse and regurgitation-for repeat u/s in 2019  Neck pain  Muscle spasm  Tension headache     PSHx:  Appendectomy     Medications:  Not on chronic medications      Family History:  Heart disease in uncle who was a heavy smoker  No fam Hx of lung issues     Allergies:     No Known Allergies     Outpatient Medications:       No current facility-administered medications on file prior to encounter.   Current Outpatient Prescriptions on File Prior to Encounter:  montelukast (SINGULAIR) 10 MG tablet Take 1 tablet (10 mg) by mouth At Bedtime   albuterol (2.5 MG/3ML) 0.083% neb solution Take 1 vial (2.5 mg) by nebulization every 6  "hours as needed for shortness of breath / dyspnea or wheezing   order for DME Equipment being ordered: Nebulizer        Family History:   No family history on file.     Social History:   Tobacco Use: denies using smokeless tobacco and smoking.  Illicit Drug Use: Denies  Alcohol Use: Denies   Sex and Protection: ; likely sexually active with wife. 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 rate 22, height 1.753 m (5' 9\"), weight 75.8 kg (167 lb 3.2 oz), SpO2 98 %.  Temp (24hrs), Av.5  F (38.1  C), Min:100.4  F (38  C), Max:100.5  F (38.1  C)    Gen: In no acute distress. Patient comfortably lying in bed  HEENT: No scleral icterus, Moist mucous membranes. No nasal discharge. Poor dentition with numerous cavities although most have been treated with crowns. One tooth in left posterior aspect appears embedded and another appears with cavity however without crown.  CV: Normal rate,3/6 blowing systolic murmus appreciable throughout precordium radiating to axilla   Resp: Clear to auscultation bilaterally. Without wheeze or crackles  Abdomen: Soft, non tender abdomen, normal active bowel sounds,   Extremities: No peripheral edema, warm, capillary refill < 2 seconds  Skin: without rash or trauma, noosler nodes or janeway lesions   Neuro:   Oriented to situation, moving all four extremities appropriately.      Data:   CMP  Recent Labs  Lab 18  0934 18  1732    136   POTASSIUM 4.0 4.1   CHLORIDE 103 103   CO2 21 25   ANIONGAP 9 7   GLC 94 85   BUN 13 15   CR 1.06 0.94   GFRESTIMATED 77 89   GFRESTBLACK >90 >90   JEANNINE 8.2* 8.8   PROTTOTAL 7.3 7.9   ALBUMIN 3.3* 3.5   BILITOTAL 0.9 0.6   ALKPHOS 45 52   AST 16 13   ALT 9 12     CBC  Recent Labs  Lab 18  1728 18  0934 18  1732   WBC  --  8.9 6.9   RBC  " --  4.20* 4.77   HGB  --  12.0* 13.5   HCT  --  36.1* 41.3   MCV  --  86 87   MCH  --  28.6 28.3   MCHC  --  33.2 32.7   RDW  --  13.8 13.2    191 249     INRNo lab results found in last 7 days.  Arterial Blood GasNo lab results found in last 7 days.     EKG:      Imaging:    CT CHest w/o contrast - 02/28/18   IMPRESSION:   1. No acute findings in the chest to explain patient's cough.  2. Scattered punctate 2 to 3 mm pulmonary nodules. No follow-up  required for low-risk patient. Recommend optional follow-up CT in 12  months if patient has history of smoking or cancer.  3. Sequela of prior granulomatous disease  4. Question splenomegaly in the partially visualized upper abdomen.     CXR - 03/02/18  IMPRESSION: Hazy right middle lobe and left lower lobe opacities.  Findings may indicate infection or atelectasis.    Echocardiogram - 2/2015  Moderate prolapse of the posterior mitral valve leaflet.   Mild to moderate , eccentric , anteriorly directed mitral regurgitation jet noted. ( due to   eccentricity of the jet, exact quantification is not   possible) .   Normal LV size and systolic function with estimated ejection fraction of 60-65%.   Normal left ventricular filling pressures (E/E' < 8).   When compared to the previous echocardiographic report of 11/2012 there has been no   significant change. DEEPAK is recomended to   quantify the MR   Estimated EF: 60-65%     Assessment/Plan:   Frederick LAMAS Arabella a 42 year old year old with PMH of MVP, Dwaye's syndrome, Cervical spondylosis, Tension headache who presents with Gram positive bacteremia found to be strep mitis.     Strep mitis bacteremia  Patient presenting with incidentally found strep mitis bacteremia to clinic appointment for subacute shortness of breath and generalized fatigue.  Sources include oropharyngeal in nature including dental abscess, however patient appears to have relatively good oral hygiene.    - Surveillance blood cultures  -  Vancomycin  -Transthoracic echocardiogram  - Surveillance EKG is every 2-3 days to ensure no heart block  - Infectious disease consult; appreciate recs     Subacute cough  ?prior granulomatous disease  CT scan showing evidence of such.  Patient not endorsing significant parenchymal lung disease in the past.  Not endorsing smoking either.  Does however endorse 3 weeks of subacute cough as well as fatigue.  Tuberculosis (quant gold) as well as bordetella were considered at primary clinic however both returned negative; AFB cultures are pending. IN addition pFTS were recommended. We will continue to monitor respiratory status however an acute workup or treatment indicated at this time.    Back Pain  Previously evaluated with MRI (care everywhere 11/2/17) and found to be degenerative disease. Patient endorsing significantly better than in AUgust at onset. States he went to PT and has been performing back exersices. Low suspicion for seeing in the setting of bacteremia however if were to develop acutely worsening, can consider repeat imaging.     Chronic Issues:  Mild Anemia ; normocytic: Will continue to monitor. No priors in chart.   MVP: Last echo as above showing prolapse and mild to moderate MR.     FEN: No maintenance fluids  WIll replete manually  regular diet   PPX: DVT:Heparin 5000 units SubQ q8h  , Bowel: Hold for now   CODE:  Full Code    A  was not used during this exam.    Patient seen and discussed with Dr Bose. Please see attending addendum for changes to plan.     Micaela SoumyaUNC Health Wayne   Internal Medicine PGY1  Pager: [phone]    Physician Attestation   I, Kobi Bose, saw this patient with the resident and agree with the resident s findings and plan of care as documented in the resident s note.      I personally reviewed vital signs, medications, labs and imaging.    Key findings:   42 year old male admitted with concern for endocarditis and strep mitis bacteremia. Will  continue vancomycin. Repeat and follow up blood cultures. TTE. ID consultation.     Kobi Bose  Date of Service (when I saw the patient): 3/2/18

## 2018-03-02 NOTE — ED NOTES
Pt presents ambulatory to triage from home. PT states has had productive cough, SOB, fatigue, and decreased appetite for past 3 weeks. Had CT and blood cultures done this past wednesday and was instructed  To come to ED for further tx and evaluation.

## 2018-03-02 NOTE — TELEPHONE ENCOUNTER
Left a detailed message to the pt regarding the result and recommendation or call me back with questions/concerns.    Soon-Mi  --------------------------------------------------------------------------    Notes Recorded by Liliana Murray MD on 3/2/2018 at 7:30 AM  Dear Audi,    Your second set of blood cultures is positive for the same bacteria. We need you to come to the Emergency Department. It is likely that you have a blood born infection that could be dangerous.    Please call if you have questions/concerns about this.    Sincerely,  Dr. Murray

## 2018-03-02 NOTE — ED NOTES
[facility], [place]   ED Nurse to Floor Handoff     Frederick Nguyen is a 42 year old male who speaks English and lives alone,  in a home  They arrived in the ED by car from home    ED Chief Complaint: Cough    ED Dx;   Final diagnoses:   Gram-positive bacteremia         Needed?: No    Allergies: No Known Allergies.  Past Medical Hx: History reviewed. No pertinent past medical history.   Baseline Mental status: WDL  Current Mental Status changes: at basesline    Infection: Yes  Sepsis suspected: No  Isolation type: No active isolations     Activity level - Baseline/Home:  Independent  Activity Level - Current:   Independent    Bariatric equipment needed?: No    In the ED these meds were given:   Medications   lidocaine 1 % 1 mL (not administered)   lidocaine (LMX4) kit (not administered)   sodium chloride (PF) 0.9% PF flush 3 mL (not administered)   sodium chloride (PF) 0.9% PF flush 3 mL (3 mLs Intravenous Given 3/2/18 0934)   vancomycin (VANCOCIN) 1,500 mg in sodium chloride 0.9 % 250 mL intermittent infusion (0 mg Intravenous Stopped 3/2/18 1211)   0.9% sodium chloride BOLUS (0 mLs Intravenous Stopped 3/2/18 1000)   0.9% sodium chloride BOLUS (0 mLs Intravenous Stopped 3/2/18 1210)       Drips running?  No    Home pump or pre-existing LDA's present? No    Labs results:   Labs Ordered and Resulted from Time of ED Arrival Up to the Time of Departure from the ED   COMPREHENSIVE METABOLIC PANEL - Abnormal; Notable for the following:        Result Value    Calcium 8.2 (*)     Albumin 3.3 (*)     All other components within normal limits   CBC WITH PLATELETS DIFFERENTIAL - Abnormal; Notable for the following:     RBC Count 4.20 (*)     Hemoglobin 12.0 (*)     Hematocrit 36.1 (*)     All other components within normal limits   ISTAT  GASES LACTATE LOUIS POCT - Abnormal; Notable for the following:     Ph Venous 7.58 (*)     PCO2 Venous 22 (*)     Bicarbonate Venous 20 (*)      "All other components within normal limits   PERIPHERAL IV CATHETER   PULSE OXIMETRY NURSING   CARDIAC CONTINUOUS MONITORING   NURSING DRAW AND HOLD   NURSING DRAW AND HOLD   NURSING DRAW AND HOLD   ISTAT CG4 GASES LACTATE LOUIS NURSING POCT   BLOOD CULTURE   BLOOD CULTURE       Imaging Studies:   Recent Results (from the past 24 hour(s))   XR Chest 2 Views    Narrative    Preliminary    Impression    IMPRESSION: Hazy right middle lobe and left lower lobe opacities.  Findings may indicate infection or atelectasis.       Recent vital signs:   /68  Pulse 111  Temp 100.5  F (38.1  C) (Oral)  Resp 22  Ht 1.753 m (5' 9\")  Wt 75.6 kg (166 lb 10.7 oz)  SpO2 96%  BMI 24.61 kg/m2    Cardiac Rhythm: Tachycardia  Pt needs tele? No  Skin/wound Issues: None    Code Status: Full Code    Pain control: good    Nausea control: pt had none    Abnormal labs/tests/findings requiring intervention:     Family present during ED course? No   Family Comments/Social Situation comments:     Tasks needing completion: None    Kobi Luna, RN    [phone] [facility]      "

## 2018-03-02 NOTE — PHARMACY-VANCOMYCIN DOSING SERVICE
Pharmacy Vancomycin Initial Note  Date of Service 2018  Patient's  1975  42 year old, male    Indication: Bacteremia    Current estimated CrCl = Estimated Creatinine Clearance: 111.7 mL/min (based on Cr of 0.94).    Creatinine for last 3 days  2018:  5:32 PM Creatinine 0.94 mg/dL    Recent Vancomycin Level(s) for last 3 days  No results found for requested labs within last 72 hours.      Vancomycin IV Administrations (past 72 hours)      No vancomycin orders with administrations in past 72 hours.                Nephrotoxins and other renal medications (Future)    Start     Dose/Rate Route Frequency Ordered Stop    18 0944  vancomycin (VANCOCIN) 1,500 mg in sodium chloride 0.9 % 250 mL intermittent infusion      1,500 mg  over 90 Minutes Intravenous EVERY 12 HOURS 18 0943            Contrast Orders - past 72 hours     None                Plan:  1.  Start vancomycin  1500 mg IV q12h. (20 mg/kg)   2.  Goal Trough Level: 15-20 mg/L   3.  Pharmacy will check trough levels as appropriate in 1-3 Days.    4. Serum creatinine levels will be ordered daily for the first week of therapy and at least twice weekly for subsequent weeks.    5. [place] method utilized to dose vancomycin therapy: Method 2    Kar Marques

## 2018-03-02 NOTE — IP AVS SNAPSHOT
"    [address] [facility]: [phone]                                              INTERAGENCY TRANSFER FORM - PHYSICIAN ORDERS   3/2/2018                    Hospital Admission Date: 3/2/2018  ALEKSANDER NEWTON   : 1975  Sex: Male        Attending Provider: Citlalli Panda MD     Allergies:  No Known Allergies    Infection:  None   Service:  INTERNAL MED    Ht:  1.753 m (5' 9\")   Wt:  74 kg (163 lb 2.3 oz)   Admission Wt:  75.6 kg (166 lb 10.7 oz)    BMI:  24.09 kg/m 2   BSA:  1.9 m 2            Patient PCP Information     Provider PCP Type    Liliana Murray MD General      ED Clinical Impression     Diagnosis Description Comment Added By Time Added    Gram-positive bacteremia [R78.81] Gram-positive bacteremia [R78.81]  Terry Valenzuela MD 3/2/2018 11:20 AM      Hospital Problems as of 3/7/2018              Priority Class Noted POA    Mitral valve prolapse Medium  12/3/2012 Yes    Mitral valve regurgitation Medium  12/3/2012 Yes    Low back pain Medium  2017 Yes    Bacteremia Medium  3/2/2018 Yes    * (Principal)Infective endocarditis Medium  3/3/2018 Yes    Infection by Streptococcus, viridans group Medium  3/3/2018 Yes      Non-Hospital Problems as of 3/7/2018              Priority Class Noted    Duane syndrome of left eye Medium  1975    Spasm of muscle Medium  2008    Cervicalgia Medium  2008    Cervical spondylosis without myelopathy Medium  2009    Nonallopathic lesion of thoracic region Medium  2009    Lumbago Medium  5/10/2011    Nonallopathic lesion of lumbar region Medium  5/10/2011      Code Status History     Date Active Date Inactive Code Status Order ID Comments User Context    This patient has a current code status but no historical code status.         Medication Review      START taking        Dose / Directions Comments    penicillin G potassium 4 Million Units   Indication:  Bacteria in the Blood   Used for:  Gram-positive bacteremia, " Osteomyelitis of spine (H)        Dose:  4 Million Units   Inject 4 Million Units into the vein every 4 hours   Quantity:  52 Dose   Refills:  0    For  8 weeks total course ending April 28         CONTINUE these medications which have NOT CHANGED        Dose / Directions Comments    albuterol (2.5 MG/3ML) 0.083% neb solution   Used for:  Cough        Dose:  1 vial   Take 1 vial (2.5 mg) by nebulization every 6 hours as needed for shortness of breath / dyspnea or wheezing   Quantity:  120 vial   Refills:  4        guaiFENesin-codeine 100-10 MG/5ML Soln solution   Commonly known as:  ROBITUSSIN AC        Dose:  1-2 tsp.   Take 1-2 tsp. by mouth every 4 hours as needed for cough   Refills:  0        montelukast 10 MG tablet   Commonly known as:  SINGULAIR   Used for:  Cough        Dose:  10 mg   Take 1 tablet (10 mg) by mouth At Bedtime   Quantity:  90 tablet   Refills:  3        multivitamin, therapeutic Tabs tablet        Dose:  1 tablet   Take 1 tablet by mouth daily   Refills:  0        order for DME   Used for:  Cough        Equipment being ordered: Nebulizer   Quantity:  1 Units   Refills:  0        TIGER BALM EX        As needed for back pain   Refills:  0                Summary of Visit     Reason for your hospital stay       Positive Blood Cultures             After Care     Discharge Instructions       You were admitted for positive blood cultures which was found to be Strep Oralis Bacteremia. This bacterial infection in your blood can cause pathologies like endocarditis which is when the bacteria attached to your heart valves. We had high suspicion for this given your known mitral valve prolapse and worsening of your mitral regurgitation on transthoracic echocardiogram but there is no way to confirm this unless we do a transesophageal ultrasound (DEEPAK) which you declined to do when offered. There is also a high possibility of you having an infection of your spine from this bacteria that was in your blood. We  are therefore treating you with penicillin G for a total course of 8-weeks, which we feel gives us the best chance of treating this adequately, after discussing with our Infectious disease consultants. Please be sure to be very compliant with your medications and follow up with your primary care provider (you should get bloodwork done weekly at the recommendation of the infectious disease doctors - CBC, CMP and CRP), infectious disease clinic and your cardiologist.             Referrals     Home infusion referral       Your provider has referred you to: [facility]: [facility] [place] [phone]   [email or web address]    Local Address (if different from home address): N/A    Anticipated Length of Therapy: per md order    Home Infusion Pharmacist to adjust therapy based on labs and clinical assessments: Yes    Labs:  May draw labs from Venous Catheter: Yes  Home Infusion Pharmacist to order labs based on therapy type and clinical assessments: Yes  Call/Fax Lab Results to: pcp    Agency Staff to assess nursing needs for Infusion Therapy.    Access Device Management:  IV Access Type: PICC  Flush with Heparin and Normal Saline IVP PRN and routine site care (per agency protocol) to maintain access device? Yes             Your next 10 appointments already scheduled     Mar 07, 2018 11:30 AM CST   Peripherally Inserted Central Catheter (PICC) & IV Med - [address] [place] [address] with Salena Spangler RN   [facility], [place], [facility] ([facility], [facility])    [address] [facility] [id number]              Appointment is located at [address] [place] [address]              Follow-Up Appointment Instructions     Future Labs/Procedures    Follow Up and recommended labs and tests     Comments:    Follow up with primary care provider, Liliana Murray, within 7 days for hospital follow up.  The  following labs/tests are recommended: CBC, CMP and CRP weekly.    Follow up with Infectious Disease clinic in 2-3 weeks   Please make an appointment to see your cardiologist as soon as you can, preferably in the next 1-2  Weeks.      Follow-Up Appointment Instructions     Follow Up and recommended labs and tests       Follow up with primary care provider, Liliana Murray, within 7 days for hospital follow up.  The following labs/tests are recommended: CBC, CMP and CRP weekly.    Follow up with Infectious Disease clinic in 2-3 weeks   Please make an appointment to see your cardiologist as soon as you can, preferably in the next 1-2  Weeks.             Statement of Approval     Ordered          03/07/18 1102  I have reviewed and agree with all the recommendations and orders detailed in this document.  EFFECTIVE NOW     Approved and electronically signed by:  Tristen Sanchez MD

## 2018-03-02 NOTE — IP AVS SNAPSHOT
MRN:[id number]                      After Visit Summary   3/2/2018    Frederick Nguyen    MRN: [id number]           Thank you!     Thank you for choosing [facility] for your care. Our goal is always to provide you with excellent care. Hearing back from our patients is one way we can continue to improve our services. Please take a few minutes to complete the written survey that you may receive in the mail after you visit with us. Thank you!        Patient Information     Date Of Birth          1975        Designated Caregiver       Most Recent Value    Caregiver    Will someone help with your care after discharge? yes    Name of designated caregiver Zohra Nguyen    Phone number of caregiver [phone]    Caregiver address [address] [facility]      About your hospital stay     You were admitted on:  March 2, 2018 You last received care in the:  [address] [facility]    You were discharged on:  March 7, 2018        Reason for your hospital stay       Positive Blood Cultures                  Who to Call     For medical emergencies, please call 911.  For non-urgent questions about your medical care, please call your primary care provider or clinic, [phone]          Attending Provider     Provider Specialty    Terry Valenzuela MD Emergency Medicine    [facility], Kobi Altman MD Internal Medicine    Citlalli Panda MD Internal Medicine       Primary Care Provider Office Phone # Fax #    Liliana Ginger Murray [phone]      After Care Instructions     Activity       Your activity upon discharge: activity as tolerated            Diet       Follow this diet upon discharge: Orders Placed This Encounter      Room Service      Regular Diet Adult            Discharge Instructions       You were admitted for positive blood cultures which was found to be Strep Oralis Bacteremia. This bacterial infection in your blood can cause pathologies like endocarditis which  is when the bacteria attached to your heart valves. We had high suspicion for this given your known mitral valve prolapse and worsening of your mitral regurgitation on transthoracic echocardiogram but there is no way to confirm this unless we do a transesophageal ultrasound (DEEPAK) which you declined to do when offered. There is also a high possibility of you having an infection of your spine from this bacteria that was in your blood. We are therefore treating you with Ceftriaxone for a total course of 8-weeks, which we feel gives us the best chance of treating this adequately, after discussing with our Infectious disease consultants. Please be sure to be very compliant with your medications and follow up with your primary care provider (you should get bloodwork done weekly at the recommendation of the infectious disease doctors - CBC, CMP and CRP), infectious disease clinic and your cardiologist.                  Follow-up Appointments     Follow Up and recommended labs and tests       Follow up with primary care provider, Liliana Murray, within 7 days for hospital follow up.  The following labs/tests are recommended: CBC, CMP and CRP weekly.    Follow up with Infectious Disease clinic in 2-3 weeks   Please make an appointment to see your cardiologist as soon as you can, preferably in the next 1-2  Weeks.                  Additional Services     Home infusion referral       Your provider has referred you to: [facility]: [facility] [phone]   [email or web address]    Local Address (if different from home address): N/A    Anticipated Length of Therapy: per md order    Home Infusion Pharmacist to adjust therapy based on labs and clinical assessments: Yes    Labs:  May draw labs from Venous Catheter: Yes  Home Infusion Pharmacist to order labs based on therapy type and clinical assessments: Yes  Call/Fax Lab Results to: pcp    Agency Staff to assess nursing  "needs for Infusion Therapy.    Access Device Management:  IV Access Type: PICC  Flush with Heparin and Normal Saline IVP PRN and routine site care (per agency protocol) to maintain access device? Yes                  Pending Results     Date and Time Order Name Status Description    3/7/2018 1417 XR Chest 1 View In process     3/7/2018 0001 Blood culture Preliminary     3/6/2018 0001 Blood culture Preliminary     3/5/2018 0000 Blood culture Preliminary     3/4/2018 0649 Blood culture Preliminary     3/4/2018 0649 Blood culture Preliminary     3/1/2018 1328 BLOOD CULTURE AFB Preliminary     2/28/2018 1704 BLOOD CULTURE AFB Preliminary             Statement of Approval     Ordered          03/07/18 1102  I have reviewed and agree with all the recommendations and orders detailed in this document.  EFFECTIVE NOW     Approved and electronically signed by:  Tristen Sanchez MD             Admission Information     Date & Time Provider Department Dept. Phone    3/2/2018 Citlalli Panda MD [address] [facility] [phone]      Your Vitals Were     Blood Pressure Pulse Temperature Respirations Height Weight    114/69 (BP Location: Right arm) 90 97.1  F (36.2  C) (Oral) 18 1.753 m (5' 9\") 74 kg (163 lb 2.3 oz)    Pulse Oximetry BMI (Body Mass Index)                99% 24.09 kg/m2          [employer] Information     [employer] gives you secure access to your electronic health record. If you see a primary care provider, you can also send messages to your care team and make appointments. If you have questions, please call your primary care clinic.  If you do not have a primary care provider, please call [phone] and they will assist you.        Care EveryWhere ID     This is your Care EveryWhere ID. This could be used by other organizations to access your [facility] medical records  [id number]        Equal Access to Services     HILTON QUINTERO AH: elza Acuna, keya lowry " pranay schroederdora winslow'aan ah. So [facility] [phone].    ATENCIÓN: Si jamey bass, tiene a chaves disposición servicios gratuitos de asistencia lingüística. [place] [phone].    We comply with applicable federal civil rights laws and [place] laws. We do not discriminate on the basis of race, color, national origin, age, disability, sex, sexual orientation, or gender identity.               Review of your medicines      START taking        Dose / Directions    cefTRIAXone 1 GM vial   Commonly known as:  ROCEPHIN   Used for:  Gram-positive bacteremia, Osteomyelitis of spine (H)        Dose:  2000 mg   Inject 2 g (2,000 mg) into the vein daily   Quantity:  260 mL   Refills:  1         CONTINUE these medicines which have NOT CHANGED        Dose / Directions    albuterol (2.5 MG/3ML) 0.083% neb solution   Used for:  Cough        Dose:  1 vial   Take 1 vial (2.5 mg) by nebulization every 6 hours as needed for shortness of breath / dyspnea or wheezing   Quantity:  120 vial   Refills:  4       guaiFENesin-codeine 100-10 MG/5ML Soln solution   Commonly known as:  ROBITUSSIN AC        Dose:  1-2 tsp.   Take 1-2 tsp. by mouth every 4 hours as needed for cough   Refills:  0       montelukast 10 MG tablet   Commonly known as:  SINGULAIR   Used for:  Cough        Dose:  10 mg   Take 1 tablet (10 mg) by mouth At Bedtime   Quantity:  90 tablet   Refills:  3       multivitamin, therapeutic Tabs tablet        Dose:  1 tablet   Take 1 tablet by mouth daily   Refills:  0       order for DME   Used for:  Cough        Equipment being ordered: Nebulizer   Quantity:  1 Units   Refills:  0       TIGER BALM EX        As needed for back pain   Refills:  0            Where to get your medicines      These medications were sent to [facility] - [place], [place] - [address]  [address], [facility] [address]     Phone:  [phone]     cefTRIAXone 1 GM vial                Protect others around you:  Learn how to safely use, store and throw away your medicines at www.disposemymeds.org.        ANTIBIOTIC INSTRUCTION     You've Been Prescribed an Antibiotic - Now What?  Your healthcare team thinks that you or your loved one might have an infection. Some infections can be treated with antibiotics, which are powerful, life-saving drugs. Like all medications, antibiotics have side effects and should only be used when necessary. There are some important things you should know about your antibiotic treatment.      Your healthcare team may run tests before you start taking an antibiotic.    Your team may take samples (e.g., from your blood, urine or other areas) to run tests to look for bacteria. These test can be important to determine if you need an antibiotic at all and, if you do, which antibiotic will work best.      Within a few days, your healthcare team might change or even stop your antibiotic.    Your team may start you on an antibiotic while they are working to find out what is making you sick.    Your team might change your antibiotic because test results show that a different antibiotic would be better to treat your infection.    In some cases, once your team has more information, they learn that you do not need an antibiotic at all. They may find out that you don't have an infection, or that the antibiotic you're taking won't work against your infection. For example, an infection caused by a virus can't be treated with antibiotics. Staying on an antibiotic when you don't need it is more likely to be harmful than helpful.      You may experience side effects from your antibiotic.    Like all medications, antibiotics have side effects. Some of these can be serious.    Let you healthcare team know if you have any known allergies when you are admitted to the hospital.    One significant side effect of nearly all antibiotics is the risk of severe and sometimes deadly diarrhea caused by Clostridium difficile (C.  Difficile). This occurs when a person takes antibiotics because some good germs are destroyed. Antibiotic use allows C. diificile to take over, putting patients at high risk for this serious infection.    As a patient or caregiver, it is important to understand your or your loved one's antibiotic treatment. It is especially important for caregivers to speak up when patients can't speak for themselves. Here are some important questions to ask your healthcare team.    What infection is this antibiotic treating and how do you know I have that infection?    What side effects might occur from this antibiotic?    How long will I need to take this antibiotic?    Is it safe to take this antibiotic with other medications or supplements (e.g., vitamins) that I am taking?     Are there any special directions I need to know about taking this antibiotic? For example, should I take it with food?    How will I be monitored to know whether my infection is responding to the antibiotic?    What tests may help to make sure the right antibiotic is prescribed for me?      Information provided by:  www.cdc.gov/getsmart  U.S. Department of Health and Human Services  Centers for disease Control and Prevention  National Center for Emerging and Zoonotic Infectious Diseases  Division of Healthcare Quality Promotion             Medication List: This is a list of all your medications and when to take them. Check marks below indicate your daily home schedule. Keep this list as a reference.      Medications           Morning Afternoon Evening Bedtime As Needed    albuterol (2.5 MG/3ML) 0.083% neb solution   Take 1 vial (2.5 mg) by nebulization every 6 hours as needed for shortness of breath / dyspnea or wheezing                                cefTRIAXone 1 GM vial   Commonly known as:  ROCEPHIN   Inject 2 g (2,000 mg) into the vein daily   Last time this was given:  2 g on 3/7/2018  2:37 PM                                guaiFENesin-codeine  100-10 MG/5ML Soln solution   Commonly known as:  ROBITUSSIN AC   Take 1-2 tsp. by mouth every 4 hours as needed for cough                                montelukast 10 MG tablet   Commonly known as:  SINGULAIR   Take 1 tablet (10 mg) by mouth At Bedtime                                multivitamin, therapeutic Tabs tablet   Take 1 tablet by mouth daily   Last time this was given:  1 tablet on 3/3/2018 10:07 AM                                order for DME   Equipment being ordered: Nebulizer                                TIGER BALM EX   As needed for back pain

## 2018-03-02 NOTE — IP AVS SNAPSHOT
[address] [address]    Phone:  [phone]                                       After Visit Summary   3/2/2018    Frederick Nguyen    MRN: [id number]           After Visit Summary Signature Page     I have received my discharge instructions, and my questions have been answered. I have discussed any challenges I see with this plan with the nurse or doctor.    ..........................................................................................................................................  Patient/Patient Representative Signature      ..........................................................................................................................................  Patient Representative Print Name and Relationship to Patient    ..................................................               ................................................  Date                                            Time    ..........................................................................................................................................  Reviewed by Signature/Title    ...................................................              ..............................................  Date                                                            Time

## 2018-03-02 NOTE — PHARMACY-ADMISSION MEDICATION HISTORY
Admission medication history interview status for the 3/2/2018 admission is complete. See Epic admission navigator for allergy information, pharmacy, prior to admission medications and immunization status.     Medication history interview sources:  patient    Changes made to PTA medication list (reason)  Added: Multivitamin, Tiger Balm  Deleted: none  Changed: none    Additional medication history information (including reliability of information, actions taken by pharmacist):  -Patient appeared to be a reliable historian. Confirmed no medication allergies and recevied flu shot 9/2017. Preferred pharmacy would be [facility]  -Patient has not yet started taking albuterol nebs, and took first dose of montelukast last night.     Prior to Admission medications    Medication Sig Last Dose Taking? Auth Provider   guaiFENesin-codeine (ROBITUSSIN AC) 100-10 MG/5ML SOLN solution Take 1-2 tsp. by mouth every 4 hours as needed for cough 3/2/2018 at AM Yes Reported, Patient   multivitamin, therapeutic (THERA-VIT) TABS tablet Take 1 tablet by mouth daily 3/1/2018 at AM Yes Unknown, Entered By History   Camphor-Menthol (TIGER BALM EX) As needed for back pain Past Month at Unknown time Yes Unknown, Entered By History   montelukast (SINGULAIR) 10 MG tablet Take 1 tablet (10 mg) by mouth At Bedtime 3/1/2018 at bedtime Yes Gomez Lopez MD   albuterol (2.5 MG/3ML) 0.083% neb solution Take 1 vial (2.5 mg) by nebulization every 6 hours as needed for shortness of breath / dyspnea or wheezing Unknown at not yet started  Gomez Lopez MD   order for DME Equipment being ordered: Nebulizer Unknown at Unknown time  Gomez Lopez MD     Medication history completed by:     Veronica Burns, JimyD

## 2018-03-02 NOTE — TELEPHONE ENCOUNTER
Anitha Boyd  is calling from Microbiology Lab in [facility] in [place].  Critical value is from blood culture drawn on 3/1/2018 from left arm gram positive cocci in pairs and chains from a blood culture.  [phone].  FNA paged on call MD Jasmyn Dolan at 6:50am to phone FNA back.  MD Dolan phoned back and is going to call clinic direct and speak with her nurse.   FNA tried  To contact clinic but no answer.  FNA  paged MD Dolan through page  at 7:05 am.    FNA repaged MD at 7:26 am.   MD phoned back at 7:31am and advised FNA to call Frederick and leave message if no answer to go  To ED.  FNA phoned patient back at 7:33 am and left message on voice mail.  MD Dolan is going to phone clinic back and inform clinic of this decision to send to ED also.

## 2018-03-03 PROBLEM — A49.1 INFECTION BY STREPTOCOCCUS, VIRIDANS GROUP: Status: ACTIVE | Noted: 2018-03-03

## 2018-03-03 PROBLEM — I33.0 INFECTIVE ENDOCARDITIS: Status: ACTIVE | Noted: 2018-03-03

## 2018-03-03 LAB
ALBUMIN SERPL-MCNC: 2.9 G/DL (ref 3.4–5)
ALP SERPL-CCNC: 43 U/L (ref 40–150)
ALT SERPL W P-5'-P-CCNC: 9 U/L (ref 0–70)
ANION GAP SERPL CALCULATED.3IONS-SCNC: 7 MMOL/L (ref 3–14)
AST SERPL W P-5'-P-CCNC: 18 U/L (ref 0–45)
BILIRUB SERPL-MCNC: 0.6 MG/DL (ref 0.2–1.3)
BUN SERPL-MCNC: 10 MG/DL (ref 7–30)
CALCIUM SERPL-MCNC: 8.2 MG/DL (ref 8.5–10.1)
CHLORIDE SERPL-SCNC: 107 MMOL/L (ref 94–109)
CO2 SERPL-SCNC: 25 MMOL/L (ref 20–32)
CREAT SERPL-MCNC: 0.91 MG/DL (ref 0.66–1.25)
ERYTHROCYTE [DISTWIDTH] IN BLOOD BY AUTOMATED COUNT: 14.2 % (ref 10–15)
GFR SERPL CREATININE-BSD FRML MDRD: >90 ML/MIN/1.7M2
GLUCOSE SERPL-MCNC: 106 MG/DL (ref 70–99)
HCT VFR BLD AUTO: 37.8 % (ref 40–53)
HGB BLD-MCNC: 11.9 G/DL (ref 13.3–17.7)
MCH RBC QN AUTO: 27.9 PG (ref 26.5–33)
MCHC RBC AUTO-ENTMCNC: 31.5 G/DL (ref 31.5–36.5)
MCV RBC AUTO: 89 FL (ref 78–100)
PLATELET # BLD AUTO: 190 10E9/L (ref 150–450)
POTASSIUM SERPL-SCNC: 4.1 MMOL/L (ref 3.4–5.3)
PROT SERPL-MCNC: 7.1 G/DL (ref 6.8–8.8)
RBC # BLD AUTO: 4.27 10E12/L (ref 4.4–5.9)
SODIUM SERPL-SCNC: 139 MMOL/L (ref 133–144)
WBC # BLD AUTO: 6.9 10E9/L (ref 4–11)

## 2018-03-03 PROCEDURE — 36415 COLL VENOUS BLD VENIPUNCTURE: CPT | Performed by: STUDENT IN AN ORGANIZED HEALTH CARE EDUCATION/TRAINING PROGRAM

## 2018-03-03 PROCEDURE — 80053 COMPREHEN METABOLIC PANEL: CPT | Performed by: STUDENT IN AN ORGANIZED HEALTH CARE EDUCATION/TRAINING PROGRAM

## 2018-03-03 PROCEDURE — 25000128 H RX IP 250 OP 636: Performed by: STUDENT IN AN ORGANIZED HEALTH CARE EDUCATION/TRAINING PROGRAM

## 2018-03-03 PROCEDURE — 85027 COMPLETE CBC AUTOMATED: CPT | Performed by: STUDENT IN AN ORGANIZED HEALTH CARE EDUCATION/TRAINING PROGRAM

## 2018-03-03 PROCEDURE — 87040 BLOOD CULTURE FOR BACTERIA: CPT | Performed by: STUDENT IN AN ORGANIZED HEALTH CARE EDUCATION/TRAINING PROGRAM

## 2018-03-03 PROCEDURE — 12000008 ZZH R&B INTERMEDIATE UMMC

## 2018-03-03 PROCEDURE — 12000001 ZZH R&B MED SURG/OB UMMC

## 2018-03-03 PROCEDURE — 99233 SBSQ HOSP IP/OBS HIGH 50: CPT | Mod: GC | Performed by: PEDIATRICS

## 2018-03-03 PROCEDURE — 87077 CULTURE AEROBIC IDENTIFY: CPT | Performed by: STUDENT IN AN ORGANIZED HEALTH CARE EDUCATION/TRAINING PROGRAM

## 2018-03-03 PROCEDURE — 25000132 ZZH RX MED GY IP 250 OP 250 PS 637: Performed by: STUDENT IN AN ORGANIZED HEALTH CARE EDUCATION/TRAINING PROGRAM

## 2018-03-03 RX ADMIN — PENICILLIN G SODIUM 4 MILLION UNITS: 5000000 INJECTION, POWDER, FOR SOLUTION INTRAMUSCULAR; INTRAVENOUS at 15:50

## 2018-03-03 RX ADMIN — Medication 1 MG: at 23:31

## 2018-03-03 RX ADMIN — PENICILLIN G SODIUM 4 MILLION UNITS: 5000000 INJECTION, POWDER, FOR SOLUTION INTRAMUSCULAR; INTRAVENOUS at 20:07

## 2018-03-03 RX ADMIN — HEPARIN SODIUM 5000 UNITS: 5000 INJECTION, SOLUTION INTRAVENOUS; SUBCUTANEOUS at 15:50

## 2018-03-03 RX ADMIN — THERA TABS 1 TABLET: TAB at 10:07

## 2018-03-03 RX ADMIN — PENICILLIN G SODIUM 4 MILLION UNITS: 5000000 INJECTION, POWDER, FOR SOLUTION INTRAMUSCULAR; INTRAVENOUS at 12:31

## 2018-03-03 RX ADMIN — HEPARIN SODIUM 5000 UNITS: 5000 INJECTION, SOLUTION INTRAVENOUS; SUBCUTANEOUS at 05:11

## 2018-03-03 RX ADMIN — PENICILLIN G SODIUM 4 MILLION UNITS: 5000000 INJECTION, POWDER, FOR SOLUTION INTRAMUSCULAR; INTRAVENOUS at 23:31

## 2018-03-03 NOTE — PLAN OF CARE
Problem: Patient Care Overview  Goal: Plan of Care/Patient Progress Review  Outcome: No Change  A:  Patient states resting well.  Up independently to bathroom.  Denies pain.  No changes.  R:  Continue to monitor and treat per plan of care.

## 2018-03-03 NOTE — PLAN OF CARE
Problem: Patient Care Overview  Goal: Plan of Care/Patient Progress Review  Outcome: No Change  Patient denies pain. Has flat affect. Not happy being in a double room (per MDs) and requesting private room. Will try to move in one when available. Up independently. IV Vanco discontinued and changed to IV Penicillin. Family came to visit. Will continue to assist and follow plan of care.

## 2018-03-03 NOTE — PLAN OF CARE
Problem: Patient Care Overview  Goal: Plan of Care/Patient Progress Review  A:  Patient denies pain.  Up to bathroom independently.  Vancomycin infusing at 2200 and reviewed with patient what to look for if IV not working.  Patient alert and oriented.  R:  Continue to monitor and treat per plan of care.

## 2018-03-03 NOTE — PROVIDER NOTIFICATION
Call received from lab.  Blood cultures drawn on 3/2  For left and right arms positive for gram positive cocci in pairs and chains.  [facility] team paged with info.

## 2018-03-03 NOTE — PROGRESS NOTES
[facility] - Internal Medicine Resident Progress Note  Date of Service: 03/03/2018    Patient: Frederick Nguyen  MRN: [id number]  Admission Date: 3/2/2018  Hospital Day # 1    Changes Today March 3, 2018:   - Continue surveillance blood cultures Qd  - Penicillin G, discontinue vancomycin   - Infectious disease consult; appreciate recs   -  Please attempt to move patient to private room; communicated to staff.     Dispo: Pending clearance of blood cultures for 48-72h, plan for IV Abx.   Follow up: PCP, Infectious Disease          Assessment and Plan:   Frederick Nguyen is a 42 year old year old with PMH of MVP, Dwaye's syndrome, Cervical spondylosis, Tension headache who presents with Gram positive bacteremia found to be strep mitis with high suspicion for endocarditis .      Strep mitis bacteremia  Suspicion for Mitral Valve Endocarditis   Patient presenting with incidentally found strep mitis bacteremia to clinic appointment for subacute shortness of breath and generalized fatigue. Likely etiology is oropharyngeal in nature.   - Surveillance blood cultures Qday  - Vancomycin (3/2 - 3/3), Penicillin G (3/3 - *)   - Cultures remain positive as of 3/2. Will need documented clearance for 48-72h  and subsequent plans for IV Abx   -Transthoracic echocardiogram showing moderate to severe MR and   - Surveillance EKG is every 2-3 days to ensure no heart block   - If evidence of blocks, juani purcell need to pursue DEEPAK.   - Infectious disease consult; appreciate recs       Back Pain  Previously evaluated with MRI (care everywhere 11/2/17) and found to be degenerative disease. Patient endorsing significantly better than in August at onset. States he went to PT and has been performing back exersices. Low suspicion for seeing in the setting of bacteremia however if were to develop acutely worsening, can consider repeat imaging.      Lung Granuloma  Per infectious disease may be self resolved prior histo  infection. No further diagnostic evaluation at this time.     Anemia ; normocytic: Will continue to monitor. Prior in care everywhere normal as of 2015 ~15-16 however significant downtrend now to ~11-12. May simply be in the setting of acute illness.   - Retic count and peripheral smear     Chronic Issues:  MVP: Last echo as above showing prolapse and mild to moderate MR, now with increased severity on comparison. Pt endorses has a cardiologist on outpatient basis; will need to follow up on outpatient basis.       FEN: No maintenance fluids  WIll replete manually  regular diet   PPX: DVT:Heparin 5000 units SubQ q8h , Bowel: Hold for now   CODE:  Full Code     A  was not used during this exam.     Patient seen and discussed with Dr Bose.       Micaela Villagomez   Internal Medicine PGY1  Pager: [phone]     Interval History:   Nursing notes reviewed.  Pt states he feels better, less night sweats and fevers. Not wishing to undergo DEEPAK as does not wish for sedation. Informed patient about plans for Infectious disease service to visit him today and he is amenable. Mentions desire for a private room.   Denies other issues.      Medications:     Current Facility-Administered Medications   Medication     penicillin G potassium 4 Million Units in sodium chloride 0.9 % 100 mL intermittent infusion     albuterol neb solution 2.5 mg     guaiFENesin-codeine (ROBITUSSIN AC) 100-10 MG/5ML solution 5-10 mL     multivitamin, therapeutic (THERA-VIT) tablet 1 tablet     naloxone (NARCAN) injection 0.1-0.4 mg     melatonin tablet 1 mg     heparin sodium PF injection 5,000 Units     senna-docusate (SENOKOT-S;PERICOLACE) 8.6-50 MG per tablet 1 tablet    Or     senna-docusate (SENOKOT-S;PERICOLACE) 8.6-50 MG per tablet 2 tablet     ondansetron (ZOFRAN-ODT) ODT tab 4 mg    Or     ondansetron (ZOFRAN) injection 4 mg         Physical Exam:   Vitals were reviewed  Blood pressure 116/76, pulse 111, temperature  "98.2  F (36.8  C), temperature source Oral, resp. rate 20, height 1.753 m (5' 9\"), weight 75.8 kg (167 lb 3.2 oz), SpO2 99 %.    Intake/Output Summary (Last 24 hours) at 03/03/18 1303  Last data filed at 03/02/18 2030   Gross per 24 hour   Intake              240 ml   Output                0 ml   Net              240 ml     Vitals:    03/02/18 0909 03/02/18 1610   Weight: 75.6 kg (166 lb 10.7 oz) 75.8 kg (167 lb 3.2 oz)       Physical Exam:   Gen: In no acute distress. Patient comfortably lying in bed  CV: Blowing systolic murmur appreciable throughout precordium   Resp: CTA   Abd: deferred          Data:   ROUTINE LABS (Last four results)  CMP  Recent Labs  Lab 03/03/18  0543 03/02/18  0934 02/28/18  1732    133 136   POTASSIUM 4.1 4.0 4.1   CHLORIDE 107 103 103   CO2 25 21 25   ANIONGAP 7 9 7   * 94 85   BUN 10 13 15   CR 0.91 1.06 0.94   GFRESTIMATED >90 77 89   GFRESTBLACK >90 >90 >90   JEANNINE 8.2* 8.2* 8.8   PROTTOTAL 7.1 7.3 7.9   ALBUMIN 2.9* 3.3* 3.5   BILITOTAL 0.6 0.9 0.6   ALKPHOS 43 45 52   AST 18 16 13   ALT 9 9 12     CBC  Recent Labs  Lab 03/03/18  0543 03/02/18  1728 03/02/18  0934 02/28/18  1732   WBC 6.9  --  8.9 6.9   RBC 4.27*  --  4.20* 4.77   HGB 11.9*  --  12.0* 13.5   HCT 37.8*  --  36.1* 41.3   MCV 89  --  86 87   MCH 27.9  --  28.6 28.3   MCHC 31.5  --  33.2 32.7   RDW 14.2  --  13.8 13.2    168 191 249     INRNo lab results found in last 7 days.  Arterial Blood GasNo lab results found in last 7 days.  I&O's: I/O last 3 completed shifts:  In: 240 [P.O.:240]  Out: -               "

## 2018-03-03 NOTE — CONSULTS
[facility] ID SERVICE: NEW CONSULTATION   Frederick Nguyen : 1975 Sex: male:   Medical record number [id number]  Date of Admission: 3/2/2018  Consult Requester:Kobi Bose MD  Date of Service: March 3, 2018    REASON FOR CONSULTATION: Strep mitis bacteremia    PROBLEM LIST: (New and established)  #1.  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  While he has moderate to severe mitral regurgitation, he fortunately lacks clinical symptoms of heart failure that would prompt urgent consideration for surgery.  I suspect the source of this infection was transient bacteremia from an oral source.  The presence of back pain is curious, and suggest that at some point during the likely prolonged prodrome he may have seated musculoskeletal areas in his back.  However, he currently lacks symptoms to prompt imaging, since the likelihood of abscess or other intervenable lesion is quite low, and thus our management would not be changed.  A GI source is also a possibility, however he lacks gastrointestinal symptoms and has no family members with a history of colon cancer.  #2.  History of mitral valve prolapse  #3.  Incidental finding of tiny granulomata in his lungs.  Given his outdoor exposures, this may represent self resolved prior infection to histoplasma.  No further diagnostic or therapeutic interventions indicated at this time.  RECOMMENDATIONS:   1.  Would change from vancomycin to IV penicillin.  While we do not have confirmation that his isolate is fully penicillin susceptible, the likelihood of reduced penicillin susceptibility is low in my assessment.    2.  Continue daily blood cultures as you are doing, after these are clear for 48-72 hours could place parenteral access for prolonged therapy.  3.  Agree with intermittent EKGs to assess intervals.  If interval prolongation or symptoms of heart failure appear, would pursue DEEPAK.  Otherwise, I do not believe that DEEPAK  "would  at this time.  4.  The patient disclosed to me his dissatisfaction with having a semiprivate room.  Appreciate efforts to move him into a private room.  Thanks for this consult.  Discussed with the [facility] medicine team.  Infectious disease service will continue to follow with you.    Deb Brito MD   of Medicine, Division of Infectious Diseases  [facility] [phone]     HPI: (Extended HPI: Requires four HPI elements or the status of three chronic problems)  This is a pleasant 42-year-old man with past history mitral valve prolapse who is presenting after several months intermittent malaise and back pain, found to have strep mitis bacteremia.  His symptoms began suddenly in August 2017.  He felt a sudden \"pop\" in his back while he was stretching.  About 2 weeks later, he noticed the onset of cough, fever, chills, anorexia with 15 pound unintentional weight loss, and general malaise.  He had a relatively extensive workup at that point at another healthcare facility which included MRI of his back which revealed degenerative changes, and blood cultures which were negative.  He reports that at some point during his evaluation he received steroids, and felt improved on this therapy.  However, his back pain persisted into 2018.  He made an appointment to be seen at [facility] on 2/27/18.  2 weeks prior to his appointment, he noted the return of malaise and night sweats.  In clinic on 2/27, blood cultures were checked and are growing strep mitis.  CRP was 54.6 and ESR was 25, white blood cell count was normal.  He was notified by telephone and admitted to the hospital on 3/2/18.  His blood cultures continue to show strep mitis.  Transthoracic echocardiogram showed moderate to severe mitral regurgitation and a thickened valve.  Comparing this study to TTE obtained at [facility] in 2015.  Mitral regurgitation is worse, previously having been mild to moderate.  Thickened mitral valve is " "also a new finding.  Chest CT was also performed which reveals 2-3 mm granulomas suggesting prior burden of disease that has resolved.  Questionable splenomegaly was also observed.  He was placed on empiric vancomycin.  Infectious disease consult was requested to assist with his management.  All available records were reviewed and summarized above.  This ID consultation question represents a new problem, with additional work-up planned and described in the recommendations section above.  ANTI-INFECTIVES:   Current: vancomycin   Previous: none PTA  Other current medications were reviewed with an eye on potential drug-drug interactions.  ROS: (Recommend ? 10 systems)   A ten point review of systems was obtained and was negative with the exception of that which is described in the HPI.  PMH: (At least ONE specific item from THREE of the three components of PFSH must be documented for a Complete PFSH)  MVP. Follows with cardiology, last TTE as above in 2015. Never any cardiac symptoms.  Medication allergies were reviewed. Notable allergies include: none  SOCIAL HISTORY AND RISK FACTORS   Social History   Substance Use Topics     Smoking status: Never Smoker     Smokeless tobacco: Never Used     Alcohol use No     History   Sexual Activity     Sexual activity: Not on file     .  Works at a desk job.  Denies illicit substances.  Has outdoor hobbies that were curtailed last year due to the onset of his back pain.  FAMILY HISTORY:   Reviewed and non-contributory  No family history on file.    EXAMINATION: (Recommend ? 9 systems; 2 items each)   /76 (BP Location: Left arm)  Pulse 111  Temp 98.2  F (36.8  C) (Oral)  Resp 20  Ht 1.753 m (5' 9\")  Wt 75.8 kg (167 lb 3.2 oz)  SpO2 99%  BMI 24.69 kg/m2  GENERAL:  well-developed, well-nourished, in bed in no acute distress.   EYES:  Eyes have anicteric sclerae without conjunctival injection or stigmata of endocarditis.    ENT:  Atraumatic. Oropharynx is moist " without exudates or ulcers. Normal hard and soft palate  NECK:  Supple. No cervical lymphadenopathy  LUNGS:  Clear to auscultation bilateral. Respiratory effort is normal  CARDIOVASCULAR:  Regular rate and rhythm.  4 out of 6 systolic murmur.  ABDOMEN:  Normal bowel sounds, soft, nontender. No appreciable hepatosplenomegaly  SKIN:  No acute rashes.  Line(s) are in place without any surrounding erythema or exudate. No stigmata of endocarditis.  NEUROLOGIC:  Grossly nonfocal. Active x4 extremities  PSYCH: Appropriate affect, alert and oriented to person, place and time  CURRENT LINES: Right upper extremity peripheral IV.  RELEVANT DATA:   Reviewed medicine test (PFTs, EKG, cardiac echo or cath): YES See HPI for review of ECHO findings  BASIC LABS   The following basic labs were personally reviewed:  Inflammatory Markers    Recent Labs   Lab Test  02/28/18   1732   SED  25*   CRP  54.6*       Hematology Studies    Recent Labs   Lab Test  03/03/18   0543  03/02/18   1728  03/02/18   0934  02/28/18   1732   WBC  6.9   --   8.9  6.9   ANEU   --    --   7.4  4.5   AEOS   --    --   0.0  0.1   HGB  11.9*   --   12.0*  13.5   MCV  89   --   86  87   PLT  190  168  191  249       Immune Globulin Studies  No lab results found.    Metabolic Studies     Recent Labs   Lab Test  03/03/18   0543  03/02/18   0934  02/28/18   1732   NA  139  133  136   POTASSIUM  4.1  4.0  4.1   CHLORIDE  107  103  103   CO2  25  21  25   BUN  10  13  15   CR  0.91  1.06  0.94   GFRESTIMATED  >90  77  89       Hepatic Studies    Recent Labs   Lab Test  03/03/18   0543  03/02/18   0934  02/28/18   1732   BILITOTAL  0.6  0.9  0.6   ALKPHOS  43  45  52   ALBUMIN  2.9*  3.3*  3.5   AST  18  16  13   ALT  9  9  12       Thyroid Studies  No lab results found.    Invalid input(s): FT2    MICROBIOLOGY LABS  The following microbiology studies were personally reviewed:  Culture Micro   Date Value Ref Range Status   03/03/2018 No growth after 1 hour   Preliminary   03/02/2018 (A)  Preliminary    Cultured on the 1st day of incubation:  Gram positive cocci in pairs and chains     03/02/2018   Preliminary    Critical Value/Significant Value, preliminary result only, called to and read back by  Sandy Moscoso RN 0050 3/3/18. MS     03/02/2018 (A)  Preliminary    Cultured on the 1st day of incubation:  Gram positive cocci in pairs and chains     03/02/2018   Preliminary    Critical Value/Significant Value, preliminary result only, called to and read back by  Sandy Moscoso RN 0050 3/3/18. MS     03/01/2018 (A)  Preliminary    Cultured on the 1st day of incubation:  Gram positive cocci in pairs and chains     03/01/2018   Preliminary    Critical Value/Significant Value, preliminary result only, called to and read back by  Maryam Duran RN from [facility]. 3.2.18 at 0649. GR.     03/01/2018 Culture negative monitoring continues  Preliminary   02/28/2018 (A)  Preliminary    Cultured on the 1st day of incubation:  Streptococcus mitis group  Speciation in progress     02/28/2018   Preliminary    Critical Value/Significant Value, preliminary result only, called to and read back by  Swati Oswald RN, @ 0827 3.1.2018 BL     02/28/2018 Susceptibility testing in progress  Preliminary   02/28/2018   Preliminary    (Note)  POSITIVE for STREPTOCOCCUS SPECIES OTHER THAN pneumococcus, anginosus  group, S. pyogenes and S. agalactiae. Performed using [place] nucleic acid test. Final identification and antimicrobial  susceptibility testing will be verified by standard methods.    Specimen tested with GeeYeeigene multiplex, gram-positive blood culture  nucleic acid test for the following targets: Staph aureus, Staph  epidermidis, Staph lugdunensis, other Staph species, Enterococcus  faecalis, Enterococcus faecium, Streptococcus species, S. agalactiae,  S. anginosus grp., S. pneumoniae, S. pyogenes, Listeria sp., mecA  (methicillin resistance) and Faustina/B (vancomycin  resistance).    Critical Value/Significant Value called to and read back by Dr. Murray on 3.1.18 at 1105. bw       02/28/2018 Culture negative monitoring continues  Preliminary       Urine Studies  No lab results found.    Vancomycin Levels  No lab results found.    Invalid input(s): VANCO    Serostatus:  No results found for: CMVG, CMIGG, CMIG, CMIM, CMVIGM, CMVM, CMLTX, HSVG1, HSVG2, HSVTP1, WT0389, HS12M, HS12GR, HS1GR, HS2GR, HERPES, HSIM, HSIG, HSIGR, HSVIGMAB, HSVG1, VARICZOSAB, EBVIGG, EBIGG, EBVAGN, UD0724  No results found for: EBIG2, EBIGM, TOXG  No lab results found.    Invalid input(s): HSV12, VZVG, JHG065    Toxoplasma Testing  No lab results found.    Invalid input(s): TOXPL, TOXABM, TOXPCR    Virology:  CMV viral loads  No lab results found.  No results found for: CMQNT, CMVQ  EBV viral loads   No lab results found.  No results found for: EBVDN, EBRES, EBVDN, EBVSP, EBVPC, EBVPCR  BK viral loads No lab results found.    Invalid input(s): BKDN, BKVPC, BKVPCR  HSV testing  No lab results found.    Hepatitis B Testing No lab results found.  Hepatitis C Testing   No results found for: HCVAB, HQTG, HCGENO, HCPCR, HQTRNA, HEPRNA  Respiratory Virus Testing    No results found for: RS, FLUAG    IMAGING RESULTS  The following imaging studies were independently reviewed:    CT chest - see HPI for description.

## 2018-03-04 LAB
BASOPHILS # BLD AUTO: 0 10E9/L (ref 0–0.2)
BASOPHILS NFR BLD AUTO: 0.6 %
DIFFERENTIAL METHOD BLD: ABNORMAL
EOSINOPHIL # BLD AUTO: 0.1 10E9/L (ref 0–0.7)
EOSINOPHIL NFR BLD AUTO: 1.3 %
ERYTHROCYTE [DISTWIDTH] IN BLOOD BY AUTOMATED COUNT: 14 % (ref 10–15)
GLUCOSE BLDC GLUCOMTR-MCNC: 129 MG/DL (ref 70–99)
HCT VFR BLD AUTO: 39.6 % (ref 40–53)
HGB BLD-MCNC: 12.7 G/DL (ref 13.3–17.7)
IMM GRANULOCYTES # BLD: 0.1 10E9/L (ref 0–0.4)
IMM GRANULOCYTES NFR BLD: 1.1 %
LYMPHOCYTES # BLD AUTO: 1.3 10E9/L (ref 0.8–5.3)
LYMPHOCYTES NFR BLD AUTO: 25 %
MCH RBC QN AUTO: 28 PG (ref 26.5–33)
MCHC RBC AUTO-ENTMCNC: 32.1 G/DL (ref 31.5–36.5)
MCV RBC AUTO: 87 FL (ref 78–100)
MONOCYTES # BLD AUTO: 0.5 10E9/L (ref 0–1.3)
MONOCYTES NFR BLD AUTO: 10.3 %
NEUTROPHILS # BLD AUTO: 3.2 10E9/L (ref 1.6–8.3)
NEUTROPHILS NFR BLD AUTO: 61.7 %
NRBC # BLD AUTO: 0 10*3/UL
NRBC BLD AUTO-RTO: 0 /100
PLATELET # BLD AUTO: 206 10E9/L (ref 150–450)
RBC # BLD AUTO: 4.54 10E12/L (ref 4.4–5.9)
RETICS # AUTO: 78.5 10E9/L (ref 25–95)
RETICS/RBC NFR AUTO: 1.7 % (ref 0.5–2)
WBC # BLD AUTO: 5.2 10E9/L (ref 4–11)

## 2018-03-04 PROCEDURE — 12000008 ZZH R&B INTERMEDIATE UMMC

## 2018-03-04 PROCEDURE — 00000146 ZZHCL STATISTIC GLUCOSE BY METER IP

## 2018-03-04 PROCEDURE — 25000128 H RX IP 250 OP 636: Performed by: STUDENT IN AN ORGANIZED HEALTH CARE EDUCATION/TRAINING PROGRAM

## 2018-03-04 PROCEDURE — 36415 COLL VENOUS BLD VENIPUNCTURE: CPT | Performed by: STUDENT IN AN ORGANIZED HEALTH CARE EDUCATION/TRAINING PROGRAM

## 2018-03-04 PROCEDURE — 87040 BLOOD CULTURE FOR BACTERIA: CPT | Performed by: STUDENT IN AN ORGANIZED HEALTH CARE EDUCATION/TRAINING PROGRAM

## 2018-03-04 PROCEDURE — 99233 SBSQ HOSP IP/OBS HIGH 50: CPT | Mod: GC | Performed by: PEDIATRICS

## 2018-03-04 PROCEDURE — 85045 AUTOMATED RETICULOCYTE COUNT: CPT | Performed by: STUDENT IN AN ORGANIZED HEALTH CARE EDUCATION/TRAINING PROGRAM

## 2018-03-04 PROCEDURE — 85025 COMPLETE CBC W/AUTO DIFF WBC: CPT | Performed by: STUDENT IN AN ORGANIZED HEALTH CARE EDUCATION/TRAINING PROGRAM

## 2018-03-04 PROCEDURE — 12000001 ZZH R&B MED SURG/OB UMMC

## 2018-03-04 PROCEDURE — 40000611 ZZHCL STATISTIC MORPHOLOGY W/INTERP HEMEPATH TC 85060: Performed by: STUDENT IN AN ORGANIZED HEALTH CARE EDUCATION/TRAINING PROGRAM

## 2018-03-04 RX ADMIN — PENICILLIN G SODIUM 4 MILLION UNITS: 5000000 INJECTION, POWDER, FOR SOLUTION INTRAMUSCULAR; INTRAVENOUS at 20:26

## 2018-03-04 RX ADMIN — HEPARIN SODIUM 5000 UNITS: 5000 INJECTION, SOLUTION INTRAVENOUS; SUBCUTANEOUS at 04:40

## 2018-03-04 RX ADMIN — PENICILLIN G SODIUM 4 MILLION UNITS: 5000000 INJECTION, POWDER, FOR SOLUTION INTRAMUSCULAR; INTRAVENOUS at 12:00

## 2018-03-04 RX ADMIN — HEPARIN SODIUM 5000 UNITS: 5000 INJECTION, SOLUTION INTRAVENOUS; SUBCUTANEOUS at 16:02

## 2018-03-04 RX ADMIN — PENICILLIN G SODIUM 4 MILLION UNITS: 5000000 INJECTION, POWDER, FOR SOLUTION INTRAMUSCULAR; INTRAVENOUS at 08:17

## 2018-03-04 RX ADMIN — PENICILLIN G SODIUM 4 MILLION UNITS: 5000000 INJECTION, POWDER, FOR SOLUTION INTRAMUSCULAR; INTRAVENOUS at 04:36

## 2018-03-04 RX ADMIN — PENICILLIN G SODIUM 4 MILLION UNITS: 5000000 INJECTION, POWDER, FOR SOLUTION INTRAMUSCULAR; INTRAVENOUS at 16:02

## 2018-03-04 NOTE — PLAN OF CARE
Problem: Patient Care Overview  Goal: Plan of Care/Patient Progress Review  Outcome: No Change  [id number]: Patient is alert and oriented x 4, up ad devon in room, patient quiet and flat at times but pleasant and able to make needs known, patient receiving IV antibiotics intermittently. Continue with plan of care.

## 2018-03-04 NOTE — PLAN OF CARE
Problem: Patient Care Overview  Goal: Plan of Care/Patient Progress Review  Outcome: No Change  A:  Patient resting with earphones on.  Denies pain.  Reviewed every 4 hour antibiotic.  Reminded melatonin available if needed for sleep.  Patient up independently in room.  States voiding well and had stool today.  R:  Continue to monitor and treat per plan of care.

## 2018-03-04 NOTE — PROGRESS NOTES
[facility] - Internal Medicine Resident Progress Note  Date of Service: 03/04/2018    Patient: Frederick Nguyen  MRN: [id number]  Admission Date: 3/2/2018  Hospital Day # 2    Changes Today March 3, 2018:   - Continue surveillance blood cultures Qd  - Penicillin G  -  Please attempt to move patient to private room; communicated to staff.     Dispo: Pending clearance of blood cultures for 48-72h, plan for IV Abx.   Follow up: PCP, Infectious Disease          Assessment and Plan:   Frederick Nguyen is a 42 year old year old with PMH of MVP, Dwaye's syndrome, Cervical spondylosis, Tension headache who presents with Gram positive bacteremia found to be strep mitis with high suspicion for endocarditis .      Strep mitis bacteremia  Suspicion for Mitral Valve Endocarditis   Patient presenting with incidentally found strep mitis bacteremia to clinic appointment for subacute shortness of breath and generalized fatigue. Likely etiology is oropharyngeal in nature.   - Surveillance blood cultures Qday  - Vancomycin (3/2 - 3/3), Penicillin G (3/3 - present)   - Cultures remain positive as of 3/3. Will need documented clearance for 48-72h  and subsequent plans for IV Abx   -Transthoracic echocardiogram showing moderate to severe MR and   - Surveillance EKG is every 2-3 days to ensure no heart block   - If evidence of blocks, juani purcell need to pursue DEEPAK.   - Infectious disease consult; appreciate recs       Back Pain  Previously evaluated with MRI (care everywhere 11/2/17) and found to be degenerative disease. Patient endorsing significantly better than in August at onset. States he went to PT and has been performing back exersices. Low suspicion for seeing in the setting of bacteremia however if were to develop acutely worsening, can consider repeat imaging.      Lung Granuloma  Per infectious disease may be self resolved prior histo infection. No further diagnostic evaluation at this time.     Anemia ;  "normocytic: Will continue to monitor. Prior in care everywhere normal as of 2015 ~15-16 however significant downtrend now to ~11-12. May simply be in the setting of acute illness.   - Retic count and peripheral smear     Chronic Issues:  MVP: Last echo as above showing prolapse and mild to moderate MR, now with increased severity on comparison. Pt endorses has a cardiologist on outpatient basis; will need to follow up on outpatient basis.       FEN: No maintenance fluids  WIll replete manually  regular diet   PPX: DVT:Heparin 5000 units SubQ q8h , Bowel: Hold for now   CODE:  Full Code     A  was not used during this exam.     Patient seen and discussed with Dr Bose.       Martinez Sanchez  PGY-2 Internal Medicine  Pager: [phone]       Interval History:   Nursing notes reviewed. Pt states he feels better, less night sweats and fevers. Denies other issues.      Medications:     Current Facility-Administered Medications   Medication     penicillin G potassium 4 Million Units in sodium chloride 0.9 % 100 mL intermittent infusion     albuterol neb solution 2.5 mg     guaiFENesin-codeine (ROBITUSSIN AC) 100-10 MG/5ML solution 5-10 mL     multivitamin, therapeutic (THERA-VIT) tablet 1 tablet     naloxone (NARCAN) injection 0.1-0.4 mg     melatonin tablet 1 mg     heparin sodium PF injection 5,000 Units     senna-docusate (SENOKOT-S;PERICOLACE) 8.6-50 MG per tablet 1 tablet    Or     senna-docusate (SENOKOT-S;PERICOLACE) 8.6-50 MG per tablet 2 tablet     ondansetron (ZOFRAN-ODT) ODT tab 4 mg    Or     ondansetron (ZOFRAN) injection 4 mg         Physical Exam:   Vitals were reviewed  Blood pressure 114/74, pulse 73, temperature 98.1  F (36.7  C), temperature source Oral, resp. rate 18, height 1.753 m (5' 9\"), weight 72.3 kg (159 lb 8 oz), SpO2 100 %.    Intake/Output Summary (Last 24 hours) at 03/03/18 1303  Last data filed at 03/02/18 2030   Gross per 24 hour   Intake              240 ml   Output     "            0 ml   Net              240 ml     Vitals:    03/02/18 0909 03/02/18 1610 03/03/18 1600   Weight: 75.6 kg (166 lb 10.7 oz) 75.8 kg (167 lb 3.2 oz) 72.3 kg (159 lb 8 oz)       Physical Exam:   Gen: In no acute distress. Patient comfortably lying in bed  CV: 3/6 systolic murmur appreciable throughout precordium   Resp: CTA   Abd: deferred          Data:   ROUTINE LABS (Last four results)  CMP    Recent Labs  Lab 03/03/18  0543 03/02/18  0934 02/28/18  1732    133 136   POTASSIUM 4.1 4.0 4.1   CHLORIDE 107 103 103   CO2 25 21 25   ANIONGAP 7 9 7   * 94 85   BUN 10 13 15   CR 0.91 1.06 0.94   GFRESTIMATED >90 77 89   GFRESTBLACK >90 >90 >90   JEANNINE 8.2* 8.2* 8.8   PROTTOTAL 7.1 7.3 7.9   ALBUMIN 2.9* 3.3* 3.5   BILITOTAL 0.6 0.9 0.6   ALKPHOS 43 45 52   AST 18 16 13   ALT 9 9 12     CBC    Recent Labs  Lab 03/04/18  0700 03/03/18  0543 03/02/18  1728 03/02/18  0934 02/28/18  1732   WBC 5.2 6.9  --  8.9 6.9   RBC 4.54 4.27*  --  4.20* 4.77   HGB 12.7* 11.9*  --  12.0* 13.5   HCT 39.6* 37.8*  --  36.1* 41.3   MCV 87 89  --  86 87   MCH 28.0 27.9  --  28.6 28.3   MCHC 32.1 31.5  --  33.2 32.7   RDW 14.0 14.2  --  13.8 13.2    190 168 191 249     INRNo lab results found in last 7 days.  Arterial Blood GasNo lab results found in last 7 days.  I&O's:

## 2018-03-04 NOTE — PLAN OF CARE
Problem: Patient Care Overview  Goal: Plan of Care/Patient Progress Review  Outcome: No Change  [id number]: Patient is alert and oriented x 4, up ad devon in room, able to make needs known, received IV antibiotic this shift, no new changes at this time. Continue with plan of care.

## 2018-03-04 NOTE — PROGRESS NOTES
[facility] ID SERVICE: ONGOING CONSULTATION   Frederick Nguyen : 1975 Sex: male:   Medical record number [id number] Attending Physician: Kobi Bose MD  Date of Service: 2018  PROBLEM LIST: (New and established)  #1.  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  While he has moderate to severe mitral regurgitation, he fortunately lacks clinical symptoms of heart failure that would prompt urgent consideration for surgery.  I suspect the source of this infection was transient bacteremia from an oral source.  The presence of back pain is curious, and suggest that at some point during the likely prolonged prodrome he may have seeded musculoskeletal areas in his back.  However, he currently lacks symptoms to prompt imaging, since the likelihood of abscess or other intervenable lesion is quite low, and thus our management would not be changed.  A GI source is also a possibility, however he lacks gastrointestinal symptoms and has no family members with a history of colon cancer.  #2.  History of mitral valve prolapse. Possibly progressive in recent years - follows with cards as outpt.  #3.  Incidental finding of tiny granulomata in his lungs.  Given his outdoor exposures, this may represent self resolved prior infection to histoplasma or other environmentally-occurring fungus.  No further diagnostic or therapeutic interventions indicated at this time.  RECOMMENDATIONS:   1.  Continue IV penicillin. .    2.  Continue daily blood cultures as you are doing, after these are clear for 48-72 hours could place parenteral access for prolonged therapy.  3.  Agree with intermittent EKGs to assess intervals.  If interval prolongation or symptoms of heart failure appear, would pursue DEEPAK. Could also pursue DEEPAK if cultures fail to clear in 7 days, though given how well he looks even persistent culture positivity would not be an indication for surgical consideration in my  "eyes.  4.  Appreciate ongoing efforts to move him into a private room.  Thanks for this consult. Dr. Giles will assume coverage tomorrow.     Deb Brito MD   of Medicine, Division of Infectious Diseases  [facility] [phone]    INTERVAL HISTORY: (Extended HPI requires four HPI elements or the status of three chronic problems)  Continues to feel well - at/near his baseline. Appears frustrated he is still in semiprivate room. No vision change. No pain.  ROS: (Recommend ? 2systems)   A five-point review of systems was obtained and was negative with the exception of that which is described above.  No Known Allergies  Allergies were reviewed.  No current outpatient prescriptions on file.     CURRENT ANTI-INFECTIVES:   Penicillin G  EXAMINATION: (Recommend at least 12 bullets from any organ systems)   Vital Signs: /81 (BP Location: Right arm)  Pulse 100  Temp 99  F (37.2  C) (Oral)  Resp 18  Ht 1.753 m (5' 9\")  Wt 72.4 kg (159 lb 11.2 oz)  SpO2 97%  BMI 23.58 kg/m2   Awake, alert. Interactive  Breathing normal  HR regular, normal  +Murmur  No abd distension  No skin rash when I saw him earlier  NEW DATA/RESULTS:   All interval basic labs, microbiology results and imaging were personally reviewed.  Reviewed medicine test (PFTs, EKG, cardiac echo or cath): NO    Culture Micro   Date Value Ref Range Status   03/04/2018 No growth after 4 hours  Preliminary   03/04/2018 No growth after 4 hours  Preliminary   03/03/2018 (A)  Preliminary    Cultured on the 1st day of incubation:  Streptococcus mitis group  Speciation in progress  Susceptibility testing done on previous specimen     03/03/2018   Preliminary    Critical Value/Significant Value, preliminary result only, called to and read back by  FCO MULLER RN (5B).  03.04.18 0533 GJS     03/02/2018 (A)  Preliminary    Cultured on the 1st day of incubation:  Streptococcus mitis group  Speciation in progress  Susceptibility testing done on previous " specimen     03/02/2018   Preliminary    Critical Value/Significant Value, preliminary result only, called to and read back by  Sandy Moscoso, RN 0050 3/3/18. MS         Recent Labs   Lab Test  02/28/18   1732   CRP  54.6*     Recent Labs   Lab Test  03/04/18   0700  03/03/18   0543  03/02/18   0934  02/28/18   1732   WBC  5.2  6.9  8.9  6.9     Recent Labs   Lab Test  03/03/18   0543  03/02/18   0934  02/28/18   1732   CR  0.91  1.06  0.94   GFRESTIMATED  >90  77  89       Hematology Studies  Recent Labs   Lab Test  03/04/18   0700  03/03/18   0543  03/02/18   1728  03/02/18   0934  02/28/18   1732   WBC  5.2  6.9   --   8.9  6.9   ANEU  3.2   --    --   7.4  4.5   AEOS  0.1   --    --   0.0  0.1   HCT  39.6*  37.8*   --   36.1*  41.3   PLT  206  190  168  191  249       Metabolic  Recent Labs   Lab Test  03/03/18   0543  03/02/18   0934  02/28/18   1732   NA  139  133  136   BUN  10  13  15   CO2  25  21  25   CR  0.91  1.06  0.94   GFRESTIMATED  >90  77  89       Hepatic Studies  Recent Labs   Lab Test  03/03/18   0543  03/02/18   0934  02/28/18   1732   BILITOTAL  0.6  0.9  0.6   ALKPHOS  43  45  52   ALBUMIN  2.9*  3.3*  3.5   AST  18  16  13   ALT  9  9  12       Immunologlobulins  Recent Labs   Lab Test  02/28/18   1732   SED  25*

## 2018-03-04 NOTE — PLAN OF CARE
Problem: Patient Care Overview  Goal: Plan of Care/Patient Progress Review  D: Temperature =99, other vitals stable.  Up ad devon to bathroom and showered this am.  IV antibiotics as ordered.  Denies pain.  Has rash on back of neck, patient not sure if it is new,  MD notified. P: continue with plan of care.

## 2018-03-04 NOTE — PROVIDER NOTIFICATION
[facility] GREGORIO paged: Pt has positive blood culture from 3/3/18 0539; growing gram + cocci in pairs and chains.

## 2018-03-04 NOTE — PLAN OF CARE
Problem: Patient Care Overview  Goal: Plan of Care/Patient Progress Review  Outcome: No Change  Pt alert and oriented X4. Vitals wnl on ra. Up ad devon. Denies pain. Scheduled heparin and antibiotics administered. Resulted positive blood culture (see previous note); CC team updated.  R: Continue to monitor and implement poc

## 2018-03-05 ENCOUNTER — HOME INFUSION (PRE-WILLOW HOME INFUSION) (OUTPATIENT)
Dept: PHARMACY | Facility: CLINIC | Age: 43
End: 2018-03-05

## 2018-03-05 LAB
BACTERIA SPEC CULT: ABNORMAL
COPATH REPORT: NORMAL
INTERPRETATION ECG - MUSE: NORMAL
PLATELET # BLD AUTO: 218 10E9/L (ref 150–450)
SPECIMEN SOURCE: ABNORMAL

## 2018-03-05 PROCEDURE — 12000001 ZZH R&B MED SURG/OB UMMC

## 2018-03-05 PROCEDURE — 40000141 ZZH STATISTIC PERIPHERAL IV START W/O US GUIDANCE

## 2018-03-05 PROCEDURE — 40000802 ZZH SITE CHECK

## 2018-03-05 PROCEDURE — 93010 ELECTROCARDIOGRAM REPORT: CPT | Performed by: INTERNAL MEDICINE

## 2018-03-05 PROCEDURE — 93005 ELECTROCARDIOGRAM TRACING: CPT

## 2018-03-05 PROCEDURE — 36415 COLL VENOUS BLD VENIPUNCTURE: CPT | Performed by: STUDENT IN AN ORGANIZED HEALTH CARE EDUCATION/TRAINING PROGRAM

## 2018-03-05 PROCEDURE — 99233 SBSQ HOSP IP/OBS HIGH 50: CPT | Mod: GC | Performed by: PEDIATRICS

## 2018-03-05 PROCEDURE — 25000128 H RX IP 250 OP 636: Performed by: STUDENT IN AN ORGANIZED HEALTH CARE EDUCATION/TRAINING PROGRAM

## 2018-03-05 PROCEDURE — 85049 AUTOMATED PLATELET COUNT: CPT | Performed by: STUDENT IN AN ORGANIZED HEALTH CARE EDUCATION/TRAINING PROGRAM

## 2018-03-05 PROCEDURE — 87040 BLOOD CULTURE FOR BACTERIA: CPT | Performed by: STUDENT IN AN ORGANIZED HEALTH CARE EDUCATION/TRAINING PROGRAM

## 2018-03-05 RX ADMIN — PENICILLIN G SODIUM 4 MILLION UNITS: 5000000 INJECTION, POWDER, FOR SOLUTION INTRAMUSCULAR; INTRAVENOUS at 08:30

## 2018-03-05 RX ADMIN — PENICILLIN G SODIUM 4 MILLION UNITS: 5000000 INJECTION, POWDER, FOR SOLUTION INTRAMUSCULAR; INTRAVENOUS at 05:28

## 2018-03-05 RX ADMIN — PENICILLIN G SODIUM 4 MILLION UNITS: 5000000 INJECTION, POWDER, FOR SOLUTION INTRAMUSCULAR; INTRAVENOUS at 15:55

## 2018-03-05 RX ADMIN — HEPARIN SODIUM 5000 UNITS: 5000 INJECTION, SOLUTION INTRAVENOUS; SUBCUTANEOUS at 05:28

## 2018-03-05 RX ADMIN — HEPARIN SODIUM 5000 UNITS: 5000 INJECTION, SOLUTION INTRAVENOUS; SUBCUTANEOUS at 15:55

## 2018-03-05 RX ADMIN — PENICILLIN G SODIUM 4 MILLION UNITS: 5000000 INJECTION, POWDER, FOR SOLUTION INTRAMUSCULAR; INTRAVENOUS at 20:17

## 2018-03-05 RX ADMIN — PENICILLIN G SODIUM 4 MILLION UNITS: 5000000 INJECTION, POWDER, FOR SOLUTION INTRAMUSCULAR; INTRAVENOUS at 12:12

## 2018-03-05 RX ADMIN — PENICILLIN G SODIUM 4 MILLION UNITS: 5000000 INJECTION, POWDER, FOR SOLUTION INTRAMUSCULAR; INTRAVENOUS at 00:25

## 2018-03-05 NOTE — PROGRESS NOTES
Therapy: IV ABX  Insurance: [employer]  No ded or oop - covered 100% as long as pt stays within [facility] network.    Please contact Intake with any questions, [phone] or In Basket pool, [facility] ([phone]).    [facility] 5B: in reference to admission date 03/02/2018 to check IV coverage.

## 2018-03-05 NOTE — PROGRESS NOTES
[facility] - Internal Medicine Resident Progress Note  Date of Service: 03/05/2018    Patient: Frederick Nguyen  MRN: [id number]  Admission Date: 3/2/2018  Hospital Day # 3    Changes Today March 5, 2018:   - Cultures negative since 03/04; pending clear cultures for 48-72h    Dispo: Pending negative cultures, PICC placement and IV Abx plan  Follow up: Infectious disease, PCP          Assessment and Plan:   Frederick Nguyen is a 42 year old year old with PMH of MVP, Dwaye's syndrome, Cervical spond ylosis, Tension headache who presents with Gram positive bacteremia found to be strep mitis with high suspicion for endocarditis .       Strep oralis bacteremia  Suspicion for Mitral Valve Endocarditis   Patient presenting with incidentally found strep mitis bacteremia to clinic appointment for subacute shortness of breath and generalized fatigue. Likely etiology is oropharyngeal in nature.   - Surveillance blood cultures Qday; negative as of 03/04   - Vancomycin (3/2 - 3/3), Penicillin G Q4h (3/3 - *)  -Transthoracic echocardiogram showing moderate to severe MR and   - Surveillance EKG is every 2-3 days to ensure no heart block. If evidence of heart block, will binh need to pursue DEEPAK.   - Infectious disease consult; appreciate recs        Chronic Issues:  MVP: Last echo as above showing prolapse and mild to moderate MR, now with increased severity on comparison. Pt endorses has a cardiologist on outpatient basis; will need to follow up on outpatient basis.        FEN: No maintenance fluids  WIll replete manually  regular diet   PPX: DVT:Heparin 5000 units SubQ q8h , Bowel: Hold for now   CODE:  Full Code       Patient seen and discussed with Dr Bose.        Micaela Villagomez   Internal Medicine PGY1  Pager: [phone]     Interval History:   Nursing notes reviewed.  Feels same as yesterday no other issues. Asking if his test is negative. Not wanting to speak any further. Wantng Abx  To be on  "track per nursing staff.      Medications:     Current Facility-Administered Medications   Medication     penicillin G potassium 4 Million Units in sodium chloride 0.9 % 100 mL intermittent infusion     albuterol neb solution 2.5 mg     guaiFENesin-codeine (ROBITUSSIN AC) 100-10 MG/5ML solution 5-10 mL     multivitamin, therapeutic (THERA-VIT) tablet 1 tablet     naloxone (NARCAN) injection 0.1-0.4 mg     melatonin tablet 1 mg     heparin sodium PF injection 5,000 Units     senna-docusate (SENOKOT-S;PERICOLACE) 8.6-50 MG per tablet 1 tablet    Or     senna-docusate (SENOKOT-S;PERICOLACE) 8.6-50 MG per tablet 2 tablet     ondansetron (ZOFRAN-ODT) ODT tab 4 mg    Or     ondansetron (ZOFRAN) injection 4 mg         Physical Exam:   Vitals were reviewed  Blood pressure 108/75, pulse 78, temperature 96.8  F (36  C), temperature source Oral, resp. rate 18, height 1.753 m (5' 9\"), weight 72.4 kg (159 lb 11.2 oz), SpO2 99 %.  No intake or output data in the 24 hours ending 03/05/18 0842  Vitals:    03/02/18 1610 03/03/18 1600 03/04/18 1630   Weight: 75.8 kg (167 lb 3.2 oz) 72.3 kg (159 lb 8 oz) 72.4 kg (159 lb 11.2 oz)       Physical Exam:   Gen: In no acute distress. Patient comfortably lying in bed  CV: Blowing systolic murmur appreciable throughout precordium   Lungs: CTA on anterior exam  Abd: deferred          Data:   ROUTINE LABS (Last four results)  CMP  Recent Labs  Lab 03/03/18  0543 03/02/18  0934 02/28/18  1732    133 136   POTASSIUM 4.1 4.0 4.1   CHLORIDE 107 103 103   CO2 25 21 25   ANIONGAP 7 9 7   * 94 85   BUN 10 13 15   CR 0.91 1.06 0.94   GFRESTIMATED >90 77 89   GFRESTBLACK >90 >90 >90   JEANNINE 8.2* 8.2* 8.8   PROTTOTAL 7.1 7.3 7.9   ALBUMIN 2.9* 3.3* 3.5   BILITOTAL 0.6 0.9 0.6   ALKPHOS 43 45 52   AST 18 16 13   ALT 9 9 12     CBC  Recent Labs  Lab 03/05/18  0601 03/04/18  0700 03/03/18  0543 03/02/18  1728 03/02/18  0934 02/28/18  1732   WBC  --  5.2 6.9  --  8.9 6.9   RBC  --  4.54 4.27*  --  " 4.20* 4.77   HGB  --  12.7* 11.9*  --  12.0* 13.5   HCT  --  39.6* 37.8*  --  36.1* 41.3   MCV  --  87 89  --  86 87   MCH  --  28.0 27.9  --  28.6 28.3   MCHC  --  32.1 31.5  --  33.2 32.7   RDW  --  14.0 14.2  --  13.8 13.2    206 190 168 191 249     INRNo lab results found in last 7 days.  Arterial Blood GasNo lab results found in last 7 days.  I&O's:

## 2018-03-05 NOTE — PLAN OF CARE
Problem: Patient Care Overview  Goal: Plan of Care/Patient Progress Review  Outcome: No Change  Alert and oriented X4. Vitally stable on ra. Up ad devon. PIV running tko following antibiotics administration. Denies pain and n/v. Continent of bowel and bladder. Slept through the night. Able to make needs known.  R: Continue to monitor. F/u with blood cultures/labs. Implement poc.

## 2018-03-05 NOTE — PLAN OF CARE
Problem: Patient Care Overview  Goal: Plan of Care/Patient Progress Review  Outcome: No Change  VSS, A&O x4. Independent. PIV replaced this shift, saline locked in RUE. Good intake, using bathroom independently. PLC appointment scheduled for 3/7/18 at 1130 for PICC/home antibiotics, no order to place PICC currently d/t previous positive blood cultures. Will continue to monitor.

## 2018-03-05 NOTE — PROGRESS NOTES
Care Coordinator Progress Note     Admission Date/Time:  3/2/2018  Attending MD:  Kobi Bose MD     Data  Chart reviewed, discussed with interdisciplinary team.   Patient was admitted for:    Gram-positive bacteremia  Clostridium difficile infection.    Concerns with insurance coverage for discharge needs: None.  Current Living Situation: Patient lives with spouse.  Support System: Supportive and Involved  Services Involved: none prior to admission  Transportation: Family or Friend will provide  Barriers to Discharge: medical course, PICC line placement    Coordination of Care and Referrals: Provided patient/family with options for Home Infusion.        Assessment  Patient admitted with gram positive bacteremia found to be strep mitis. History of MVP, Dwayes syndrome, cervical spondylosis, and tension headache. Per md, patient will need IV PCN every 4 hours continued at discharge. Met with patient at bedside to discuss home infusion. Patient agreeable to doing IVs at home. Benefit check completed with [facility] and as long as patient stays within , he will have 100% coverage. Referral placed. Order in for PLC if appointment available before discharge. PICC line to still be placed. [facility] liaison to meet with patient. Hopeful for discharge tomorrow. RNCC to continue to follow.     Plan  Anticipated Discharge Date:  Tomorrow  Anticipated Discharge Plan:  Home with home infusion    Abena Lora RN

## 2018-03-06 ENCOUNTER — APPOINTMENT (OUTPATIENT)
Dept: MRI IMAGING | Facility: CLINIC | Age: 43
DRG: 871 | End: 2018-03-06
Payer: COMMERCIAL

## 2018-03-06 LAB
ANION GAP SERPL CALCULATED.3IONS-SCNC: 6 MMOL/L (ref 3–14)
BACTERIA SPEC CULT: ABNORMAL
BACTERIA SPEC CULT: ABNORMAL
BACTERIA SPEC CULT: NORMAL
BASOPHILS # BLD AUTO: 0.1 10E9/L (ref 0–0.2)
BASOPHILS NFR BLD AUTO: 1 %
BUN SERPL-MCNC: 12 MG/DL (ref 7–30)
CALCIUM SERPL-MCNC: 8.7 MG/DL (ref 8.5–10.1)
CHLORIDE SERPL-SCNC: 107 MMOL/L (ref 94–109)
CO2 SERPL-SCNC: 27 MMOL/L (ref 20–32)
CREAT SERPL-MCNC: 0.94 MG/DL (ref 0.66–1.25)
DIFFERENTIAL METHOD BLD: NORMAL
EOSINOPHIL # BLD AUTO: 0.1 10E9/L (ref 0–0.7)
EOSINOPHIL NFR BLD AUTO: 1.4 %
ERYTHROCYTE [DISTWIDTH] IN BLOOD BY AUTOMATED COUNT: 13.8 % (ref 10–15)
GFR SERPL CREATININE-BSD FRML MDRD: 88 ML/MIN/1.7M2
GLUCOSE SERPL-MCNC: 96 MG/DL (ref 70–99)
HCT VFR BLD AUTO: 41.6 % (ref 40–53)
HGB BLD-MCNC: 13.6 G/DL (ref 13.3–17.7)
IMM GRANULOCYTES # BLD: 0.1 10E9/L (ref 0–0.4)
IMM GRANULOCYTES NFR BLD: 2.1 %
INTERPRETATION ECG - MUSE: NORMAL
LYMPHOCYTES # BLD AUTO: 1.7 10E9/L (ref 0.8–5.3)
LYMPHOCYTES NFR BLD AUTO: 35.1 %
MCH RBC QN AUTO: 28.6 PG (ref 26.5–33)
MCHC RBC AUTO-ENTMCNC: 32.7 G/DL (ref 31.5–36.5)
MCV RBC AUTO: 87 FL (ref 78–100)
MONOCYTES # BLD AUTO: 0.5 10E9/L (ref 0–1.3)
MONOCYTES NFR BLD AUTO: 10.1 %
NEUTROPHILS # BLD AUTO: 2.4 10E9/L (ref 1.6–8.3)
NEUTROPHILS NFR BLD AUTO: 50.3 %
NRBC # BLD AUTO: 0 10*3/UL
NRBC BLD AUTO-RTO: 0 /100
PLATELET # BLD AUTO: 251 10E9/L (ref 150–450)
POTASSIUM SERPL-SCNC: 4.5 MMOL/L (ref 3.4–5.3)
RBC # BLD AUTO: 4.76 10E12/L (ref 4.4–5.9)
SODIUM SERPL-SCNC: 140 MMOL/L (ref 133–144)
SPECIMEN SOURCE: ABNORMAL
SPECIMEN SOURCE: NORMAL
WBC # BLD AUTO: 4.9 10E9/L (ref 4–11)

## 2018-03-06 PROCEDURE — 80048 BASIC METABOLIC PNL TOTAL CA: CPT | Performed by: STUDENT IN AN ORGANIZED HEALTH CARE EDUCATION/TRAINING PROGRAM

## 2018-03-06 PROCEDURE — A9585 GADOBUTROL INJECTION: HCPCS | Performed by: HOSPITALIST

## 2018-03-06 PROCEDURE — 25000125 ZZHC RX 250: Performed by: STUDENT IN AN ORGANIZED HEALTH CARE EDUCATION/TRAINING PROGRAM

## 2018-03-06 PROCEDURE — 87040 BLOOD CULTURE FOR BACTERIA: CPT | Performed by: STUDENT IN AN ORGANIZED HEALTH CARE EDUCATION/TRAINING PROGRAM

## 2018-03-06 PROCEDURE — 99233 SBSQ HOSP IP/OBS HIGH 50: CPT | Mod: GC | Performed by: HOSPITALIST

## 2018-03-06 PROCEDURE — 36415 COLL VENOUS BLD VENIPUNCTURE: CPT | Performed by: STUDENT IN AN ORGANIZED HEALTH CARE EDUCATION/TRAINING PROGRAM

## 2018-03-06 PROCEDURE — 25000128 H RX IP 250 OP 636: Performed by: HOSPITALIST

## 2018-03-06 PROCEDURE — 25000128 H RX IP 250 OP 636: Performed by: STUDENT IN AN ORGANIZED HEALTH CARE EDUCATION/TRAINING PROGRAM

## 2018-03-06 PROCEDURE — 85025 COMPLETE CBC W/AUTO DIFF WBC: CPT | Performed by: STUDENT IN AN ORGANIZED HEALTH CARE EDUCATION/TRAINING PROGRAM

## 2018-03-06 PROCEDURE — 12000001 ZZH R&B MED SURG/OB UMMC

## 2018-03-06 PROCEDURE — 72158 MRI LUMBAR SPINE W/O & W/DYE: CPT

## 2018-03-06 RX ORDER — GADOBUTROL 604.72 MG/ML
7.5 INJECTION INTRAVENOUS ONCE
Status: COMPLETED | OUTPATIENT
Start: 2018-03-06 | End: 2018-03-06

## 2018-03-06 RX ADMIN — PENICILLIN G SODIUM 4 MILLION UNITS: 5000000 INJECTION, POWDER, FOR SOLUTION INTRAMUSCULAR; INTRAVENOUS at 08:17

## 2018-03-06 RX ADMIN — PENICILLIN G SODIUM 4 MILLION UNITS: 5000000 INJECTION, POWDER, FOR SOLUTION INTRAMUSCULAR; INTRAVENOUS at 16:35

## 2018-03-06 RX ADMIN — PENICILLIN G SODIUM 4 MILLION UNITS: 5000000 INJECTION, POWDER, FOR SOLUTION INTRAMUSCULAR; INTRAVENOUS at 20:16

## 2018-03-06 RX ADMIN — PENICILLIN G SODIUM 4 MILLION UNITS: 5000000 INJECTION, POWDER, FOR SOLUTION INTRAMUSCULAR; INTRAVENOUS at 12:16

## 2018-03-06 RX ADMIN — HEPARIN SODIUM 5000 UNITS: 5000 INJECTION, SOLUTION INTRAVENOUS; SUBCUTANEOUS at 04:28

## 2018-03-06 RX ADMIN — HEPARIN SODIUM 5000 UNITS: 5000 INJECTION, SOLUTION INTRAVENOUS; SUBCUTANEOUS at 18:49

## 2018-03-06 RX ADMIN — PENICILLIN G SODIUM 4 MILLION UNITS: 5000000 INJECTION, POWDER, FOR SOLUTION INTRAMUSCULAR; INTRAVENOUS at 00:15

## 2018-03-06 RX ADMIN — PENICILLIN G SODIUM 4 MILLION UNITS: 5000000 INJECTION, POWDER, FOR SOLUTION INTRAMUSCULAR; INTRAVENOUS at 04:28

## 2018-03-06 RX ADMIN — GADOBUTROL 7.5 ML: 604.72 INJECTION INTRAVENOUS at 22:27

## 2018-03-06 NOTE — PLAN OF CARE
Problem: Patient Care Overview  Goal: Plan of Care/Patient Progress Review  D: VSS.  Alert and oriented.  Up ad devon.  Denies pain.  IV antibiotics as ordered.  New PIV placed this evening.  P; Continue with antibiotics as ordered.  PICC placement after negative cultures.

## 2018-03-06 NOTE — PROGRESS NOTES
"  [facility] INFECTIOUS DISEASES : PROGRESS NOTE  Frederick Nguyen : 1975 Sex: male:   Medical record number [id number] Attending Physician: Citlalli Panda MD  Date of Service: 2018    ASSESSMENT :  42-year-old man with past history mitral valve prolapse who is presenting after several months intermittent malaise and back pain, found to have strep mitis bacteremia.  His symptoms began suddenly in 2017.  He felt a sudden \"pop\" in his back while he was stretching.  About 2 weeks later, he noticed the onset of cough, fever, chills, anorexia with 15 pound unintentional weight loss, and general malaise.  He had a relatively extensive workup at that point at another healthcare facility which included MRI of his back which revealed degenerative changes, and blood cultures which were negative.  He reports that at some point during his evaluation he received steroids, and felt improved on this therapy.  However, his back pain persisted into .  He made an appointment to be seen at [facility] on 18.  2 weeks prior to his appointment, he noted the return of malaise and night sweats.  In clinic on , blood cultures were checked and are growing strep mitis.  CRP was 54.6 and ESR was 25, white blood cell count was normal.  He was notified by telephone and admitted to the hospital on 3/2/18.  His blood cultures continue to show strep mitis.  Transthoracic echocardiogram showed moderate to severe mitral regurgitation and a thickened valve.  Comparing this study to TTE obtained at [facility] in 2015.  Mitral regurgitation is worse, previously having been mild to moderate.  Thickened mitral valve is also a new finding.  Chest CT was also performed which reveals 2-3 mm granulomas suggesting prior burden of disease that has resolved.  Questionable splenomegaly was also observed.     1. Streptococcus mitis bacteremia   - blood cx positive on 18, 3/1/18, 3/2/18,  3/3/18  (PCN sensitive <0.03)  - " "negative so far on 3/4, 3/5.   2. History of mitral valve prolapse   3. CRP 54.6, ESR 25  (2/28/18)     PLAN:  - refused DEEPAK at this time  - recommend getting MRI of lumbar spine to make sure no seeding of infection   - follow-up blood cultures   - if spine is involved, duration of antibiotic may need to be extended to 6 weeks     ID will continue to follow.     Mark Salamanca MD, M.Med.Sc.  Staff, Infectious Diseases  Pager: [phone]     SUBJECTIVE:   Feels better, no fever, chills, night sweats, shortness of breath. Appetite is good. Energy level is good. No diarrhea . Low back pain is better     ROS:  A five-point review of systems was obtained and was negative with the exception of that which is described above.  No Known Allergies  CURRENT ANTI-INFECTIVES:   PCN G 4 MU IV q4 hr (3/3/18-->     EXAMINATION: (Recommend ? 5 systems)   Vital Signs: /84 (BP Location: Right arm)  Pulse 83  Temp 97.8  F (36.6  C) (Oral)  Resp 16  Ht 1.753 m (5' 9\")  Wt 72.3 kg (159 lb 6.4 oz)  SpO2 98%  BMI 23.54 kg/m2   GENERAL:  Alert, oriented, in bed in no acute distress.  HEENT:  Head is normocephalic, atraumatic   EYES:  Eyes have anicteric sclerae without conjunctival injection  ENT:  Oropharynx is moist without exudates or ulcers. Tongue is midline  NECK:  Supple. No  Cervical lymphadenopathy  LUNGS:  Clear to auscultation bilateral.   CARDIOVASCULAR:  Regular rate and rhythm with holosystolic murmur loudest at apex  ABDOMEN:  Normal bowel sounds, soft, nontender. No appreciable hepatosplenomegaly  SKIN:  No acute rashes.  Line(s) are in place without any surrounding erythema or exudate. No stigmata of endocarditis.  NEUROLOGIC:  Grossly nonfocal. Active x4 extremities  Lumbar spine - low lumbar - mildly tender   CURRENT LINES: PIV     NEW DATA/RESULTS:   Culture Micro   Date Value Ref Range Status   03/06/2018 PENDING  Preliminary   03/05/2018 No growth after 1 day  Preliminary   03/04/2018 No " growth after 2 days  Preliminary   03/04/2018 No growth after 2 days  Preliminary   03/03/2018 (A)  Final    Cultured on the 1st day of incubation:  Streptococcus oralis  Susceptibility testing done on previous specimen     03/03/2018   Final    Critical Value/Significant Value, preliminary result only, called to and read back by  FCO MULLER RN (5B).  03.04.18 0533 GJS         Recent Labs   Lab Test  02/28/18   1732   CRP  54.6*     Recent Labs   Lab Test  03/06/18   0628  03/04/18   0700  03/03/18   0543  03/02/18   0934  02/28/18   1732   WBC  4.9  5.2  6.9  8.9  6.9     Recent Labs   Lab Test  03/06/18   0628 03/03/18   0543  03/02/18   0934  02/28/18   1732   CR  0.94  0.91  1.06  0.94   GFRESTIMATED  88  >90  77  89       Hematology Studies  Recent Labs   Lab Test  03/06/18   0628 03/05/18   0601  03/04/18   0700  03/03/18   0543   03/02/18   0934  02/28/18   1732   WBC  4.9   --   5.2  6.9   --   8.9  6.9   ANEU  2.4   --   3.2   --    --   7.4  4.5   AEOS  0.1   --   0.1   --    --   0.0  0.1   HCT  41.6   --   39.6*  37.8*   --   36.1*  41.3   PLT  251  218  206  190   < >  191  249    < > = values in this interval not displayed.       Metabolic  Recent Labs   Lab Test  03/06/18 0628 03/03/18   0543  03/02/18   0934   NA  140  139  133   BUN  12  10  13   CO2  27  25  21   CR  0.94  0.91  1.06   GFRESTIMATED  88  >90  77       Hepatic Studies  Recent Labs   Lab Test  03/03/18   0543  03/02/18   0934  02/28/18   1732   BILITOTAL  0.6  0.9  0.6   ALKPHOS  43  45  52   ALBUMIN  2.9*  3.3*  3.5   AST  18  16  13   ALT  9  9  12       Immunologlobulins  Recent Labs   Lab Test  02/28/18   1732   SED  25*

## 2018-03-06 NOTE — PLAN OF CARE
Problem: Patient Care Overview  Goal: Plan of Care/Patient Progress Review  Outcome: No Change  Pt alert and oriented X4. Vitally stable on ra. Denies pain. Ind in room. Slept through then the night. PIV SL post antibiotic administration. No significant change overnight.   R: Continue to monitor and implement poc

## 2018-03-06 NOTE — DISCHARGE SUMMARY
[facility]  Medicine Discharge Summary  Frederick Nguyen MRN: [id number]  1975  Primary care provider: Liliana Murray  ___________________________________          Date of Admission:  3/2/2018  Date of Discharge:  03/07/18  Admitting Physician:  Kobi Bose MD  Discharge Physician:  Citlalli Panda MD  Discharging Service:  [place] [id number]     Primary Provider: Liliana Murray         Reason for Admission:   Fever          Discharge Diagnosis:   Strep Oralis Bacteremia   High Suspicion for Endocarditis   Possible osteomyelitis         Follow Up:   Primary Care physician in 7-10 days for hospital follow up   Infectious disease in 2-3 weeks  Cardiology follow up in 1-3 weeks         Pending Results:   Last positive blood culture - 3/3  Blood cultures that are pending: no growth till date    03/04/2018 - 03/07/18         Hospital Course by Problem:    Frederick Nguyen is a 42 year old year old with PMH of MVP, Cervical spondylosis, tension headache who presents with Gram positive bacteremia found to be strep oralis with high suspicion for endocarditis.     Strep Oralis Bacteremia   High Suspicion for Native Mitral Valve Endocarditis   Possible discitis/osteomyelitis.  Patient presenting with subacute fatigue, weight loss and blood cultures which were positive from primary care clinic appointment. Etiology likely oropharyngeal in nature. Infectious disease was consulted on admission and recommended treatment with penicillin G. Surveillance cultures remain negative since 3/3/2018. The patient also underwent a TTE showing moderate to severe MR slightly worse than priors; concern for endocarditis remained very high however patient refused to undergo a DEEPAK as he did not want to undergo anesthesia. Surveillance EKGs showed no concern for heart blocks. In addition given history of subacute back pain, an MRI Limbar spine was completed for concern of  "seeding which was suggestive of a possible discitis/osteomyelitis.    A PICC was therefore placed and ID recommended discharge on Ceftriaxone 2g IV daily for 8 weeks through to Apil 28, 2018 (verbal instruction, despite documentation of 6 weeks). He will be followed by Dr. Brito or Dr. Salamanca in 2-r3 weeks in the infectious disease clinic. He was also advised to follow up closely with his cardiologist and with his primary care provider      Mitral Valve Prolapse   Mitral regurgitation  Previous echocardiograms showing mild to moderate prolapse however this admission mitral regurgitation appearing worse than previous imaging. Likely in the setting of endocarditis however will require close cardiology follow up. Patient has been advised to follow up with his cardiologist.     Physical Exam on day of Discharge:  Blood pressure 114/69, pulse 90, temperature 97.1  F (36.2  C), temperature source Oral, resp. rate 18, height 1.753 m (5' 9\"), weight 74 kg (163 lb 2.3 oz), SpO2 99 %.  General: Alert, not in respiratory or painful distress, Not toxic looking, afebrile, not pale, not dehydrated,   HEENT: anicteric sclera, PERRL, EOMI, MMM, OP unobstructed, w/o erythema/discharge; no cervical LAD, FROM  CV: RRR, systolic murmur, loudest at the apex. no S3/S4 appreciated, no r/g; intact & symmetric 3+ pulses (radial, DP); JVD   Lungs: CTAB, no wheezing/crackles, no cough  Abd: Obese/full/flat, soft/tense, moves with respiration, BS+/-, not tender, not distended, no palpable organomegaly, no masses   Ext: WWP,  BLE edema  Skin: no rashes, cyanosis, or jaundice  Neuro:  AOx3, CN II-XII intact and symmetric, No focal neurologic deficits    Lines/Tubes:  PICC line placed on 3/7 prior to discharge         Procedures & Significant Findings:   Transthoracic Echocardiogram - 03/02/18  Global and regional left ventricular function is normal with an EF of 60-65%.  Global right ventricular function is normal. The right " ventricle is normal  size.  Significant prolapse of the posterior mitral valve leaflet is present.  It is difficult to quantify MR severity due to tachycardia, but based on ERO  (0.4 cm2) moderate to severe anteriorly directed eccentric mitral  insufficiency is present.  Pulmonary artery systolic pressure is normal.  The inferior vena cava was normal in size with preserved respiratory  variability.  Estimated mean right atrial pressure is 3 mmHg.  No pericardial effusion is present.  No vegetation seen. DEEPAK is recommended to quantify MR severity.  Previous study not available for comparison.    MRI Lumbar Spine With and Without Contrast - 03/07/18  Bone marrow edema involving L5 and S1 with enhancement of the  vertebral bodies as well as the intervertebral disc, given patient  history these findings are concerning for discitis/osteomyelitis.         Consultations:   Infectious Disease          Discharge Medications:     Current Discharge Medication List      START taking these medications    Details   cefTRIAXone (ROCEPHIN) 1 GM vial Inject 2 g (2,000 mg) into the vein daily  Qty: 260 mL, Refills: 1    Associated Diagnoses: Gram-positive bacteremia; Osteomyelitis of spine (H)         CONTINUE these medications which have NOT CHANGED    Details   guaiFENesin-codeine (ROBITUSSIN AC) 100-10 MG/5ML SOLN solution Take 1-2 tsp. by mouth every 4 hours as needed for cough      multivitamin, therapeutic (THERA-VIT) TABS tablet Take 1 tablet by mouth daily      Camphor-Menthol (TIGER BALM EX) As needed for back pain      montelukast (SINGULAIR) 10 MG tablet Take 1 tablet (10 mg) by mouth At Bedtime  Qty: 90 tablet, Refills: 3    Associated Diagnoses: Cough      albuterol (2.5 MG/3ML) 0.083% neb solution Take 1 vial (2.5 mg) by nebulization every 6 hours as needed for shortness of breath / dyspnea or wheezing  Qty: 120 vial, Refills: 4    Associated Diagnoses: Cough      order for DME Equipment being ordered: Nebulizer  Qty: 1  Units, Refills: 0    Associated Diagnoses: Cough                  Discharge Instructions and Follow-Up:     Discharge Procedure Orders  Home infusion referral     Reason for your hospital stay   Order Comments: Positive Blood Cultures     Follow Up and recommended labs and tests   Order Comments: Follow up with primary care provider, Liliana Murray, within 7 days for hospital follow up.  The following labs/tests are recommended: CBC, CMP and CRP weekly.    Follow up with Infectious Disease clinic in 2-3 weeks   Please make an appointment to see your cardiologist as soon as you can, preferably in the next 1-2  Weeks.     Discharge Instructions   Order Comments: You were admitted for positive blood cultures which was found to be Strep Oralis Bacteremia. This bacterial infection in your blood can cause pathologies like endocarditis which is when the bacteria attached to your heart valves. We had high suspicion for this given your known mitral valve prolapse and worsening of your mitral regurgitation on transthoracic echocardiogram but there is no way to confirm this unless we do a transesophageal ultrasound (DEEPAK) which you declined to do when offered. There is also a high possibility of you having an infection of your spine from this bacteria that was in your blood. We are therefore treating you with Ceftriaxone for a total course of 8-weeks, which we feel gives us the best chance of treating this adequately, after discussing with our Infectious disease consultants. Please be sure to be very compliant with your medications and follow up with your primary care provider (you should get bloodwork done weekly at the recommendation of the infectious disease doctors - CBC, CMP and CRP), infectious disease clinic and your cardiologist.     Activity   Order Comments: Your activity upon discharge: activity as tolerated   Order Specific Question Answer Comments   Is discharge order? Yes      Diet   Order Comments: Follow  this diet upon discharge: Orders Placed This Encounter     Room Service     Regular Diet Adult   Order Specific Question Answer Comments   Is discharge order? Yes         IV access PICC line placed on 3/7/2018         Discharge Disposition:   Home with home infusion    PCP follow up  Infectious disease follow up  Encouraged to make Cardiology follow up appointment.         Condition on Discharge:   Discharge condition: Stable   Code status on discharge: Full Code        Date of service: 3/7/2018    The patient was discussed with Dr. Amarilys Sanchez  PGY-2 Internal Medicine  Pager: [phone]    Physician Attestation     I, Citlalli Panda, personally examined and evaluated this patient.  I discussed the patient with the resident and care team, and agree with the assessment and plan of care as documented in the resident s note of 3/7/18 [date].       I personally reviewed vital signs, medications, labs and imaging.     Whaley findings: Mr. Nguyen is doing well today.  His MRI this morning shows evidence of osteomyelitis/ discitis in addition to the known streptococcus oralis blood stream infection and endocarditis.  Thus we will treat with 6 - 8 weeks of antibiotic therapy.  Appreciate ID assistance.  We will arrange for follow up in clinic along with labs.  The patient is otherwise stable to dismiss from the hospital today.    Citlalli Panda  Date of Service (when I saw the patient): 03/07/18

## 2018-03-06 NOTE — PLAN OF CARE
Problem: Patient Care Overview  Goal: Plan of Care/Patient Progress Review  Outcome: No Change  VSS, A&O x4. Up independently to bathroom, in halls. PIV intact, infusing TKO. Pt to receive MRI of lumbar spine this evening pending availability, checklist completed and tubed to MRI around 1400. Will continue to monitor.

## 2018-03-06 NOTE — PROGRESS NOTES
[facility] - Internal Medicine Resident Progress Note  Date of Service: 03/06/2018    Patient: Frederick Nguyen  MRN: [id number]  Admission Date: 3/2/2018  Hospital Day # 4    Changes Today March 6, 2018:   - MRI L spine to ensure no seeding    Dispo: Likely PICC placement on 3/7 and subsequent discharge to home with home IV Abx . WIll touch base regarding course of Abx in AM on 3/7 based on MRI Lspine.   Follow up: PCP and Infectious Disease          Assessment and Plan:   Frederick Nguyen is a 42 year old year old with PMH of MVP, Cervical spondylosis, Tension headache who presents with Gram positive bacteremia found to be strep mitis with high suspicion for endocarditis .       Strep oralis bacteremia  Suspicion for Mitral Valve Endocarditis   Patient presenting with incidentally found strep mitis bacteremia to clinic appointment for subacute shortness of breath and generalized fatigue. Likely etiology is oropharyngeal in nature.   - Surveillance blood cultures Qday; negative as of 03/04   - Vancomycin (3/2 - 3/3), Penicillin G Q4h (3/3 - *)  -Transthoracic echocardiogram showing moderate to severe MR (interval worsening from prior echocardiogram in system); patient on initial encounter with providers refusing DEEPAK stating he does not want to undergo anesthesia.    - Surveillance EKG is every 2-3 days to ensure no heart block. If evidence of heart block, will binh need to pursue DEEPAK more aggressively.   - Given chronic back pain which is slowly improving, will obtain MRI L spine to ensure no significant seeing. Prior image in care everywhere for comparison.   - Infectious disease consult; appreciate recs       Chronic Issues:  MVP: Last echo as above showing prolapse and mild to moderate MR, now with increased severity on comparison. Pt endorses has a cardiologist on outpatient basis; will need to follow up on outpatient basis.        FEN: No maintenance fluids  Will replete manually   "regular diet   PPX: DVT:Heparin 5000 units SubQ q8h , Bowel: Hold for now   CODE:  Full Code      Micaela Villagomez   Internal Medicine PGY1  Pager: [phone]     Interval History:   Nursing notes reviewed.  Patient stating no new symptoms today. Denies new fevers or chills, stating back pain is improving and when explained need for an MRI, patient agreeable to undergoing study however endorsing he had one previously. I explained that the need for a repeat was to assess interval change in the setting of his bacterial infection; he agreed to undergo the scan to me.      Medications:     Current Facility-Administered Medications   Medication     penicillin G potassium 4 Million Units in sodium chloride 0.9 % 100 mL intermittent infusion     albuterol neb solution 2.5 mg     guaiFENesin-codeine (ROBITUSSIN AC) 100-10 MG/5ML solution 5-10 mL     multivitamin, therapeutic (THERA-VIT) tablet 1 tablet     naloxone (NARCAN) injection 0.1-0.4 mg     melatonin tablet 1 mg     heparin sodium PF injection 5,000 Units     senna-docusate (SENOKOT-S;PERICOLACE) 8.6-50 MG per tablet 1 tablet    Or     senna-docusate (SENOKOT-S;PERICOLACE) 8.6-50 MG per tablet 2 tablet     ondansetron (ZOFRAN-ODT) ODT tab 4 mg    Or     ondansetron (ZOFRAN) injection 4 mg         Physical Exam:   Vitals were reviewed  Blood pressure 120/84, pulse 83, temperature 97.8  F (36.6  C), temperature source Oral, resp. rate 16, height 1.753 m (5' 9\"), weight 72.3 kg (159 lb 6.4 oz), SpO2 98 %.  No intake or output data in the 24 hours ending 03/06/18 1317  Vitals:    03/03/18 1600 03/04/18 1630 03/05/18 2106   Weight: 72.3 kg (159 lb 8 oz) 72.4 kg (159 lb 11.2 oz) 72.3 kg (159 lb 6.4 oz)       Physical Exam:   Gen: In no acute distress. Patient comfortably lying in bed  CV: blowing systolic murmur appreciable throughout precordium extending to axilla.   Lung: CTA on anterior exam only   Abd: Deferred          Data:   ROUTINE LABS (Last four " results)  CMP  Recent Labs  Lab 03/06/18  0628 03/03/18  0543 03/02/18  0934 02/28/18  1732    139 133 136   POTASSIUM 4.5 4.1 4.0 4.1   CHLORIDE 107 107 103 103   CO2 27 25 21 25   ANIONGAP 6 7 9 7   GLC 96 106* 94 85   BUN 12 10 13 15   CR 0.94 0.91 1.06 0.94   GFRESTIMATED 88 >90 77 89   GFRESTBLACK >90 >90 >90 >90   JEANNINE 8.7 8.2* 8.2* 8.8   PROTTOTAL  --  7.1 7.3 7.9   ALBUMIN  --  2.9* 3.3* 3.5   BILITOTAL  --  0.6 0.9 0.6   ALKPHOS  --  43 45 52   AST  --  18 16 13   ALT  --  9 9 12     CBC  Recent Labs  Lab 03/06/18  0628 03/05/18  0601 03/04/18  0700 03/03/18  0543  03/02/18  0934   WBC 4.9  --  5.2 6.9  --  8.9   RBC 4.76  --  4.54 4.27*  --  4.20*   HGB 13.6  --  12.7* 11.9*  --  12.0*   HCT 41.6  --  39.6* 37.8*  --  36.1*   MCV 87  --  87 89  --  86   MCH 28.6  --  28.0 27.9  --  28.6   MCHC 32.7  --  32.1 31.5  --  33.2   RDW 13.8  --  14.0 14.2  --  13.8    218 206 190  < > 191   < > = values in this interval not displayed.  INRNo lab results found in last 7 days.  Arterial Blood GasNo lab results found in last 7 days.  I&O's:

## 2018-03-07 ENCOUNTER — HOME INFUSION (PRE-WILLOW HOME INFUSION) (OUTPATIENT)
Dept: PHARMACY | Facility: CLINIC | Age: 43
End: 2018-03-07

## 2018-03-07 ENCOUNTER — APPOINTMENT (OUTPATIENT)
Dept: GENERAL RADIOLOGY | Facility: CLINIC | Age: 43
DRG: 871 | End: 2018-03-07
Attending: HOSPITALIST
Payer: COMMERCIAL

## 2018-03-07 VITALS
HEIGHT: 69 IN | OXYGEN SATURATION: 99 % | TEMPERATURE: 97.1 F | BODY MASS INDEX: 24.16 KG/M2 | DIASTOLIC BLOOD PRESSURE: 69 MMHG | RESPIRATION RATE: 18 BRPM | HEART RATE: 90 BPM | SYSTOLIC BLOOD PRESSURE: 114 MMHG | WEIGHT: 163.14 LBS

## 2018-03-07 LAB
BASOPHILS # BLD AUTO: 0.1 10E9/L (ref 0–0.2)
BASOPHILS NFR BLD AUTO: 0.8 %
CRP SERPL-MCNC: 12 MG/L (ref 0–8)
DIFFERENTIAL METHOD BLD: NORMAL
EOSINOPHIL # BLD AUTO: 0.1 10E9/L (ref 0–0.7)
EOSINOPHIL NFR BLD AUTO: 1.7 %
ERYTHROCYTE [DISTWIDTH] IN BLOOD BY AUTOMATED COUNT: 13.8 % (ref 10–15)
HCT VFR BLD AUTO: 41.4 % (ref 40–53)
HGB BLD-MCNC: 13.3 G/DL (ref 13.3–17.7)
IMM GRANULOCYTES # BLD: 0.2 10E9/L (ref 0–0.4)
IMM GRANULOCYTES NFR BLD: 4 %
LYMPHOCYTES # BLD AUTO: 2.1 10E9/L (ref 0.8–5.3)
LYMPHOCYTES NFR BLD AUTO: 34.9 %
MCH RBC QN AUTO: 28.1 PG (ref 26.5–33)
MCHC RBC AUTO-ENTMCNC: 32.1 G/DL (ref 31.5–36.5)
MCV RBC AUTO: 88 FL (ref 78–100)
MONOCYTES # BLD AUTO: 0.6 10E9/L (ref 0–1.3)
MONOCYTES NFR BLD AUTO: 9.2 %
NEUTROPHILS # BLD AUTO: 3 10E9/L (ref 1.6–8.3)
NEUTROPHILS NFR BLD AUTO: 49.4 %
NRBC # BLD AUTO: 0 10*3/UL
NRBC BLD AUTO-RTO: 0 /100
PLATELET # BLD AUTO: 235 10E9/L (ref 150–450)
RBC # BLD AUTO: 4.73 10E12/L (ref 4.4–5.9)
WBC # BLD AUTO: 6 10E9/L (ref 4–11)

## 2018-03-07 PROCEDURE — 87040 BLOOD CULTURE FOR BACTERIA: CPT | Performed by: STUDENT IN AN ORGANIZED HEALTH CARE EDUCATION/TRAINING PROGRAM

## 2018-03-07 PROCEDURE — 27210995 ZZH RX 272: Performed by: STUDENT IN AN ORGANIZED HEALTH CARE EDUCATION/TRAINING PROGRAM

## 2018-03-07 PROCEDURE — 27210577 ZZ H INTRODUCER MICRO SET

## 2018-03-07 PROCEDURE — 40000986 XR CHEST 1 VW

## 2018-03-07 PROCEDURE — 99238 HOSP IP/OBS DSCHRG MGMT 30/<: CPT | Mod: GC | Performed by: HOSPITALIST

## 2018-03-07 PROCEDURE — 36569 INSJ PICC 5 YR+ W/O IMAGING: CPT

## 2018-03-07 PROCEDURE — 36415 COLL VENOUS BLD VENIPUNCTURE: CPT | Performed by: STUDENT IN AN ORGANIZED HEALTH CARE EDUCATION/TRAINING PROGRAM

## 2018-03-07 PROCEDURE — 25000125 ZZHC RX 250: Performed by: HOSPITALIST

## 2018-03-07 PROCEDURE — 25000128 H RX IP 250 OP 636: Performed by: STUDENT IN AN ORGANIZED HEALTH CARE EDUCATION/TRAINING PROGRAM

## 2018-03-07 PROCEDURE — 86140 C-REACTIVE PROTEIN: CPT | Performed by: STUDENT IN AN ORGANIZED HEALTH CARE EDUCATION/TRAINING PROGRAM

## 2018-03-07 PROCEDURE — 85025 COMPLETE CBC W/AUTO DIFF WBC: CPT | Performed by: STUDENT IN AN ORGANIZED HEALTH CARE EDUCATION/TRAINING PROGRAM

## 2018-03-07 PROCEDURE — 27210196 ZZH KIT POWER PICC SINGLE LUMEN

## 2018-03-07 RX ORDER — CEFTRIAXONE 1 G/1
2000 INJECTION, POWDER, FOR SOLUTION INTRAMUSCULAR; INTRAVENOUS DAILY
Qty: 260 ML | Refills: 1 | Status: SHIPPED | OUTPATIENT
Start: 2018-03-07 | End: 2018-04-24

## 2018-03-07 RX ORDER — HEPARIN SODIUM,PORCINE 10 UNIT/ML
2-5 VIAL (ML) INTRAVENOUS
Status: DISCONTINUED | OUTPATIENT
Start: 2018-03-07 | End: 2018-03-07 | Stop reason: HOSPADM

## 2018-03-07 RX ORDER — CEFTRIAXONE 1 G/1
2000 INJECTION, POWDER, FOR SOLUTION INTRAMUSCULAR; INTRAVENOUS DAILY
Qty: 20 ML | Refills: 0 | Status: SHIPPED | OUTPATIENT
Start: 2018-03-07 | End: 2018-03-07

## 2018-03-07 RX ORDER — LIDOCAINE 40 MG/G
CREAM TOPICAL
Status: DISCONTINUED | OUTPATIENT
Start: 2018-03-07 | End: 2018-03-07 | Stop reason: HOSPADM

## 2018-03-07 RX ADMIN — PENICILLIN G SODIUM 4 MILLION UNITS: 5000000 INJECTION, POWDER, FOR SOLUTION INTRAMUSCULAR; INTRAVENOUS at 11:55

## 2018-03-07 RX ADMIN — PENICILLIN G SODIUM 4 MILLION UNITS: 5000000 INJECTION, POWDER, FOR SOLUTION INTRAMUSCULAR; INTRAVENOUS at 08:53

## 2018-03-07 RX ADMIN — PENICILLIN G SODIUM 4 MILLION UNITS: 5000000 INJECTION, POWDER, FOR SOLUTION INTRAMUSCULAR; INTRAVENOUS at 04:02

## 2018-03-07 RX ADMIN — PENICILLIN G SODIUM 4 MILLION UNITS: 5000000 INJECTION, POWDER, FOR SOLUTION INTRAMUSCULAR; INTRAVENOUS at 00:11

## 2018-03-07 RX ADMIN — LIDOCAINE HYDROCHLORIDE 5 ML: 10 INJECTION, SOLUTION INFILTRATION; PERINEURAL at 14:16

## 2018-03-07 RX ADMIN — HEPARIN SODIUM 5000 UNITS: 5000 INJECTION, SOLUTION INTRAVENOUS; SUBCUTANEOUS at 04:03

## 2018-03-07 RX ADMIN — CEFTRIAXONE SODIUM 2 G: 10 INJECTION, POWDER, FOR SOLUTION INTRAVENOUS at 14:37

## 2018-03-07 NOTE — PLAN OF CARE
Problem: Patient Care Overview  Goal: Discharge Needs Assessment  03/07/18 2021     Salena Spangler-Registered Nurse (Nursing)  Patient seen at bedside for PICC/IV medication class. PICC not placed at time of class. IV med changed to Rocephin qd. Pt. RD correctly on model with flushing, cap change and IV push method of administration. Received written material related to all content taught. Asked many relevant questions. Answered all teach-back questions appropriately. States he feels safe going home with all skills. Explained difference be valved and non-valved PICC. Home care support appreciated for review of all skills.

## 2018-03-07 NOTE — PROGRESS NOTES
Pt discharged from unit to home per own transport. Discharge instructions given and explained, questions answered.

## 2018-03-07 NOTE — PROGRESS NOTES
Care Coordinator- Discharge Planning     Admission Date/Time:  3/2/2018  Attending MD:  Citlalli Panda MD     Data  Date of initial CC assessment:    Chart reviewed, discussed with interdisciplinary team.   Patient was admitted for:   1. Osteomyelitis of spine (H)    2. Gram-positive bacteremia    3. Clostridium difficile infection         Assessment  Full assessment completed in previous note     1212: Patient will now be discharged on IV Rocephin daily. To get todays dose prior to discharge. Home infusion to visit him at home tomorrow.    Coordination of Care and Referrals: Provided patient/family with options for Home Infusion.  Patient medically ready to discharge home today after PICC line placed. [facility] liaison notified and will meet with patient. Orders completed. Will need to be hooked up to CAD pump prior to discharge as he is on every 4 hour PCN.       Plan  Anticipated Discharge Date:  Today  Anticipated Discharge Plan:  Home with home infusion        Abena Lora RN

## 2018-03-07 NOTE — PLAN OF CARE
Problem: Patient Care Overview  Goal: Plan of Care/Patient Progress Review  Outcome: No Change  Alert and oriented. Vitally stable on ra. Slept through the night. Scheduled antibiotics administered. No significant changes overnight. Continue to monitor and implement poc.

## 2018-03-07 NOTE — PLAN OF CARE
Problem: Patient Care Overview  Goal: Plan of Care/Patient Progress Review  Pt A&O x4. VSS. Pt independently walks to bathroom and in halls. PIV SL. Pt had MRI today for lumber spine. Results in process. Pt has penicillin every four hours.

## 2018-03-07 NOTE — PROGRESS NOTES
"  [facility] INFECTIOUS DISEASES : PROGRESS NOTE  Frederick Nguyen : 1975 Sex: male:   Medical record number [id number] Attending Physician: Citlalli Panda MD  Date of Service: 2018    ASSESSMENT :  42-year-old man with past history mitral valve prolapse who is presenting after several months intermittent malaise and back pain, found to have strep mitis bacteremia.  His symptoms began suddenly in 2017.  He felt a sudden \"pop\" in his back while he was stretching.  About 2 weeks later, he noticed the onset of cough, fever, chills, anorexia with 15 pound unintentional weight loss, and general malaise.  He had a relatively extensive workup at that point at another healthcare facility which included MRI of his back which revealed degenerative changes, and blood cultures which were negative.  He reports that at some point during his evaluation he received steroids, and felt improved on this therapy.  However, his back pain persisted into .  He made an appointment to be seen at [facility] on 18.  2 weeks prior to his appointment, he noted the return of malaise and night sweats.  In clinic on , blood cultures were checked and are growing strep mitis.  CRP was 54.6 and ESR was 25, white blood cell count was normal.  He was notified by telephone and admitted to the hospital on 3/2/18.  His blood cultures continue to show strep mitis.  Transthoracic echocardiogram showed moderate to severe mitral regurgitation and a thickened valve.  Comparing this study to TTE obtained at [facility] in 2015.  Mitral regurgitation is worse, previously having been mild to moderate.  Thickened mitral valve is also a new finding.  Chest CT was also performed which reveals 2-3 mm granulomas suggesting prior burden of disease that has resolved.  Questionable splenomegaly was also observed.     1. Streptococcus mitis bacteremia   - blood cx positive on 18, 3/1/18, 3/2/18,  3/3/18  (PCN sensitive <0.03)  - " "negative so far on 3/4, 3/5.   2. History of mitral valve prolapse   3. CRP 54.6, ESR 25  (2/28/18)   4. L5-S1 discitis /osteomyelitis - MRI (3/6/18)   MRI   Impression:   Bone marrow edema involving L5 and S1 with enhancement of the vertebral bodies as well as the intervertebral disc, given patient history these findings are concerning for discitis/osteomyelitis.    PLAN:  - refused DEEPAK at this time  - follow-up blood cultures   - due to persistent bacteremia and L5-S1 discitis/osteomyelitis - will need at least 6 weeks of antibiotic Penicillin  IV or Ceftriaxone 2 gram IV qday   - weekly lab - CMP, CBC , CRP  - follow-up with Dr Brito  Or me in 2 -3 weeks   - recommend probiotic daily. Discussed potential side effects of antibiotic/s    ID will sign off. Plan discussed with primary team     Mark Salamanca MD, M.Med.Sc.  Staff, Infectious Diseases  Pager: [phone]     SUBJECTIVE:   Feels better, no fever, chills, night sweats, shortness of breath. Appetite is good. Energy level is good. No diarrhea . Low back pain is better     ROS:  A five-point review of systems was obtained and was negative with the exception of that which is described above.  No Known Allergies  CURRENT ANTI-INFECTIVES:   PCN G 4 MU IV q4 hr (3/3/18-->     EXAMINATION: (Recommend ? 5 systems)   Vital Signs: /69 (BP Location: Right arm)  Pulse 90  Temp 97.1  F (36.2  C) (Oral)  Resp 18  Ht 1.753 m (5' 9\")  Wt 74 kg (163 lb 2.3 oz)  SpO2 99%  BMI 24.09 kg/m2   GENERAL:  Alert, oriented, in bed in no acute distress.  HEENT:  Head is normocephalic, atraumatic   EYES:  Eyes have anicteric sclerae without conjunctival injection  ENT:  Oropharynx is moist without exudates or ulcers. Tongue is midline  NECK:  Supple. No  Cervical lymphadenopathy  LUNGS:  Clear to auscultation bilateral.   CARDIOVASCULAR:  Regular rate and rhythm with 3/6 holosystolic murmur loudest at apex  ABDOMEN:  Normal bowel sounds, soft, nontender. No " appreciable hepatosplenomegaly  SKIN:  No acute rashes.  Line(s) are in place without any surrounding erythema or exudate. No stigmata of endocarditis.  NEUROLOGIC:  Grossly nonfocal. Active x4 extremities  Lumbar spine - low lumbar - non tender today   CURRENT LINES: PIV     NEW DATA/RESULTS:   Culture Micro   Date Value Ref Range Status   03/07/2018 No growth after 3 hours  Preliminary   03/06/2018 No growth after 1 day  Preliminary   03/05/2018 No growth after 2 days  Preliminary   03/04/2018 No growth after 3 days  Preliminary   03/04/2018 No growth after 3 days  Preliminary     Recent Labs   Lab Test  03/07/18 0523 02/28/18   1732   CRP  12.0*  54.6*     Recent Labs   Lab Test  03/07/18 0523 03/06/18 0628 03/04/18   0700 03/03/18 0543  03/02/18 0934  02/28/18   1732   WBC  6.0  4.9  5.2  6.9  8.9  6.9     Recent Labs   Lab Test  03/06/18 0628 03/03/18 0543  03/02/18 0934  02/28/18   1732   CR  0.94  0.91  1.06  0.94   GFRESTIMATED  88  >90  77  89     Hematology Studies  Recent Labs   Lab Test  03/07/18 0523 03/06/18 0628 03/05/18   0601  03/04/18   0700 03/03/18   0543   03/02/18 0934  02/28/18   1732   WBC  6.0  4.9   --   5.2  6.9   --   8.9  6.9   ANEU  3.0  2.4   --   3.2   --    --   7.4  4.5   AEOS  0.1  0.1   --   0.1   --    --   0.0  0.1   HCT  41.4  41.6   --   39.6*  37.8*   --   36.1*  41.3   PLT  235  251  218  206  190   < >  191  249    < > = values in this interval not displayed.     Metabolic  Recent Labs   Lab Test  03/06/18 0628 03/03/18 0543  03/02/18   0934   NA  140  139  133   BUN  12  10  13   CO2  27  25  21   CR  0.94  0.91  1.06   GFRESTIMATED  88  >90  77     Hepatic Studies  Recent Labs   Lab Test  03/03/18 0543  03/02/18   0934  02/28/18   1732   BILITOTAL  0.6  0.9  0.6   ALKPHOS  43  45  52   ALBUMIN  2.9*  3.3*  3.5   AST  18  16  13   ALT  9  9  12     Immunologlobulins  Recent Labs   Lab Test  02/28/18   1732   SED  25*

## 2018-03-08 ENCOUNTER — HOME INFUSION (PRE-WILLOW HOME INFUSION) (OUTPATIENT)
Dept: PHARMACY | Facility: CLINIC | Age: 43
End: 2018-03-08

## 2018-03-08 ENCOUNTER — CARE COORDINATION (OUTPATIENT)
Dept: CARE COORDINATION | Facility: CLINIC | Age: 43
End: 2018-03-08

## 2018-03-09 ENCOUNTER — HOME INFUSION (PRE-WILLOW HOME INFUSION) (OUTPATIENT)
Dept: PHARMACY | Facility: CLINIC | Age: 43
End: 2018-03-09

## 2018-03-10 LAB
BACTERIA SPEC CULT: NO GROWTH
BACTERIA SPEC CULT: NO GROWTH
SPECIMEN SOURCE: NORMAL
SPECIMEN SOURCE: NORMAL

## 2018-03-11 LAB
BACTERIA SPEC CULT: NO GROWTH
SPECIMEN SOURCE: NORMAL

## 2018-03-12 LAB
BACTERIA SPEC CULT: NO GROWTH
SPECIMEN SOURCE: NORMAL

## 2018-03-13 ENCOUNTER — OFFICE VISIT (OUTPATIENT)
Dept: INTERNAL MEDICINE | Facility: CLINIC | Age: 43
End: 2018-03-13
Payer: COMMERCIAL

## 2018-03-13 VITALS
SYSTOLIC BLOOD PRESSURE: 129 MMHG | WEIGHT: 164 LBS | BODY MASS INDEX: 24.22 KG/M2 | HEART RATE: 82 BPM | RESPIRATION RATE: 20 BRPM | OXYGEN SATURATION: 98 % | TEMPERATURE: 98.4 F | DIASTOLIC BLOOD PRESSURE: 84 MMHG

## 2018-03-13 DIAGNOSIS — I33.0 ACUTE BACTERIAL ENDOCARDITIS: Primary | ICD-10-CM

## 2018-03-13 DIAGNOSIS — M54.50 ACUTE LOW BACK PAIN WITHOUT SCIATICA, UNSPECIFIED BACK PAIN LATERALITY: ICD-10-CM

## 2018-03-13 LAB
BACTERIA SPEC CULT: NO GROWTH
SPECIMEN SOURCE: NORMAL

## 2018-03-13 RX ORDER — CYCLOBENZAPRINE HCL 10 MG
10 TABLET ORAL 3 TIMES DAILY PRN
Qty: 42 TABLET | Refills: 0 | Status: SHIPPED | OUTPATIENT
Start: 2018-03-13 | End: 2018-04-02

## 2018-03-13 RX ORDER — HYDROCODONE BITARTRATE AND ACETAMINOPHEN 5; 325 MG/1; MG/1
1 TABLET ORAL EVERY 4 HOURS PRN
Qty: 12 TABLET | Refills: 0 | Status: SHIPPED | OUTPATIENT
Start: 2018-03-13 | End: 2018-04-09

## 2018-03-13 ASSESSMENT — ENCOUNTER SYMPTOMS
COUGH: 1
JOINT SWELLING: 0
POLYPHAGIA: 0
NECK PAIN: 0
SNORES LOUDLY: 0
FEVER: 1
WEIGHT LOSS: 1
COUGH DISTURBING SLEEP: 1
INCREASED ENERGY: 1
SPUTUM PRODUCTION: 0
MUSCLE WEAKNESS: 0
ALTERED TEMPERATURE REGULATION: 0
HALLUCINATIONS: 0
STIFFNESS: 0
POLYDIPSIA: 0
HEMOPTYSIS: 0
NIGHT SWEATS: 1
DECREASED APPETITE: 1
WEIGHT GAIN: 0
FATIGUE: 1
POSTURAL DYSPNEA: 0
BACK PAIN: 1
DYSPNEA ON EXERTION: 0
SHORTNESS OF BREATH: 0
MYALGIAS: 0
WHEEZING: 0
CHILLS: 1
MUSCLE CRAMPS: 0
ARTHRALGIAS: 0

## 2018-03-13 ASSESSMENT — PAIN SCALES - GENERAL: PAINLEVEL: MODERATE PAIN (5)

## 2018-03-13 NOTE — PROGRESS NOTES
[facility]  Primary Care Center   Nolvia Nguyen MD  03/13/2018      Chief Complaint:   Hospital F/U (hospital follow up ( 3/2/18-3/7/18) strep oralis bacteremia, endocarditis) and Pain (extreme back pain ( became worse because of being in bed in hospital))       History of Present Illness:   Frederick Nguyen is a 42 year old male with a history of MVP, Cervical spondylosis, tension headache, and recent strep oralis bacteremia with infective endocarditis of the mitral valve and probable L5-S1 osteomyelitis who presents for a hospital follow up and evaluation of back pain.    Hospitalization:  The patient presented after several months of low back pain that had been improving somewhat, but had a fever and cough.  He was admitted from the primary care clinic where blood cultures were positive for Streptococcus oralis.  He had a trans-thoracic echocardiogram while in the hospital, which showed possible endocarditis on the mitral valve which previously had shown mitral valve prolapse. He has an appointment this Saturday with the cardiologist to check for this and because he was told that his mitral valve regurgitation had possibly gotten worse. Moreover, because of his extreme back pain while in the hospital, he had an MRI which showed possible osteomyelitis.  He was treated with broad-spectrum antibiotics in the hospital and discharged home.  He now has a PICC line and is receiving ceftriaxone 2 g IV daily at home. He states that now, all the symptoms that sent him to the hospital are gone but he has new, worse back pain.  He attributes this to lying in the hospital on his back for 5 days.   He does not have numbness tingling or weakness of the legs.  He has no bowel or bladder symptoms.  He denies wheezing, shortness of breath, fevers, chills, diaphoresis, cough, chest tightness, palpitations, and chest pain.    His home care nurse will be seeing him tomorrow, but has been having troubles getting in to see an  "Infectious Disease doctor as when he calls to set this up they do not know who he is.  He has a cardiology appointment on Saturday    Back pain:  Frederick complains of \"extreme\" back pain, aggravated after being in bed in the hospital for five days. He now is experiencing a lack of mobility and is unable to do much. After being in the hospital, he describes his current situation as a \"step backwards\" in his recovery from his back injury in August 2017. When he first injured his back, he needed a cane for any kind of movement and was in PT with muscle relaxer's and pain medications. He had improved but reached a plateau until his hospitalization. Originally, he had presented to the hospital, prior to admission, because his cough was aggravating his back pain. Still, at that time, his pain was a 1-2/10. Now, his pain is much worse again at a 7/10 with his decreased mobility secondary to being confined to a bed in the hospital.  He denies weakness in his legs, but the pain in the right flank and hip is causing him an inability to walk. There is no radiation of pain, or numbness in his legs.     He has tried heat and ice at home with a TENS unit without alleviation for his back pain. His pain is aggravated the most with laying on a flat surface, so sleeping is difficult. He had two Oxycodone left over from September, which he took two days ago to sleep. He has no other medications that he has been using.      Review of Systems:   10 point ROS negative other than the symptoms noted above in the HPI.     Active Medications:      cefTRIAXone (ROCEPHIN) 1 GM vial, Inject 2 g (2,000 mg) into the vein daily, Disp: 260 mL, Rfl: 1     multivitamin, therapeutic (THERA-VIT) TABS tablet, Take 1 tablet by mouth daily, Disp: , Rfl:       Allergies:   No Known Drug Allergies     Past Medical History:  Cervical spondylosis without myelopathy  Cervicalgia  Duane syndrome of left eye  Low back pain  Lumbago  Mitral valve " prolapse  Mitral valve regurgitation  Nonallopathic lesion of lumbar region  Spasm of muscle  Nonallopathic lesion of thoracic region  Bacteremia  Infective endocarditis  Infection by Streptococcus, viridans group     Past Surgical History:  History reviewed. No pertinent past surgical history.    Family History:    History reviewed. No pertinent family history.      Social History:  The patient was alone.  Smoking Status: Never  Smokeless Tobacco: Never  Alcohol Use: No     Physical Exam:   /84 (BP Location: Right arm, Patient Position: Sitting, Cuff Size: Adult Regular)  Pulse 82  Resp 20  Wt 74.4 kg (164 lb)  SpO2 98%  BMI 24.22 kg/m2     Constitutional: Healthy, alert, no distress and cooperative  ENT:  throat normal without erythema or exudate, no cervical lymphadenopathy  Cardiovascular: JVP 8cm. RRR, S1,S2, clicks, rubs, or gallops. No pretibial edema.  No carotid bruits. Grade 3 or 4 holosystolic murmur heard best at the lower left sternal border and radiating to the left axilla, and no S3 or S4 .   Respiratory: Clear to auscultation and percussion bilaterally.   Gastrointestinal: BS NA. Soft, nontender, no hepatosplenomegaly or mass. Liver is 8 centimeters at the midclavicular line.  There is no CVA tenderness bilaterally.  Back:  Tenderness in the right superior-posterior iliac crest. No tenderness to the center of his back.  No paraspinous muscle tenderness but the right paraspinous muscle is very stiff.  Skin: No splinter hemorrhages, purpura or rashes  Neurologic: Gait limited and stiff secondary to his back pain. Reflexes knees and ankles normal bilaterally and symmetric. Strength is intact bilaterally in lower extremities.      Laboratory Tests (3/7/18):  Component      Latest Ref Rng & Units 3/7/2018   WBC      4.0 - 11.0 10e9/L 6.0   RBC Count      4.4 - 5.9 10e12/L 4.73   Hemoglobin      13.3 - 17.7 g/dL 13.3   Hematocrit      40.0 - 53.0 % 41.4   MCV      78 - 100 fl 88   MCH      26.5  - 33.0 pg 28.1   MCHC      31.5 - 36.5 g/dL 32.1   RDW      10.0 - 15.0 % 13.8   Platelet Count      150 - 450 10e9/L 235   Diff Method       Automated Method   % Neutrophils      % 49.4   % Lymphocytes      % 34.9   % Monocytes      % 9.2   % Eosinophils      % 1.7   % Basophils      % 0.8   % Immature Granulocytes      % 4.0   Nucleated RBCs      0 /100 0   Absolute Neutrophil      1.6 - 8.3 10e9/L 3.0   Absolute Lymphocytes      0.8 - 5.3 10e9/L 2.1   Absolute Monocytes      0.0 - 1.3 10e9/L 0.6   Absolute Eosinophils      0.0 - 0.7 10e9/L 0.1   Absolute Basophils      0.0 - 0.2 10e9/L 0.1   Abs Immature Granulocytes      0 - 0.4 10e9/L 0.2   Absolute Nucleated RBC       0.0   Specimen Description       Blood Left Arm   Culture Micro       No growth   CRP Inflammation      0.0 - 8.0 mg/L 12.0 (H)     Blood culture drawn March 7, 2018 is negative to date    Imaging at Hospital:    Exam:  Chest X-ray 3/7/2018 2:27 PM     History: RN placed PICC - verify tip placement;       Impression:   1. Left upper extremity PICC with tip projecting over the mid SVC.  2. No acute cardiopulmonary abnormalities.     I have personally reviewed the examination and initial interpretation  and I agree with the findings.     RAJINDER HUDSON MD    MR LUMBAR SPINE W/O & W CONTRAST 3/6/2018 10:27 PM     Provided History: Pt with back pain, now with strep oralis bacteremia  concerning: evaluation for seeding in lumbar spine; .     Impression:   Bone marrow edema involving L5 and S1 with enhancement of the  vertebral bodies as well as the intervertebral disc, given patient  history these findings are concerning for discitis/osteomyelitis.     I have personally reviewed the examination and initial interpretation  and I agree with the findings.     ANGELO NAIR MD    EXAMINATION: Chest CT  2/28/2018 7:39 PM     CLINICAL HISTORY: ; Cough      IMPRESSION:   1. No acute findings in the chest to explain patient's cough.  2. Scattered punctate 2  to 3 mm pulmonary nodules. No follow-up  required for low-risk patient. Recommend optional follow-up CT in 12  months if patient has history of smoking or cancer.  3. Sequela of prior granulomatous disease  4. Question splenomegaly in the partially visualized upper abdomen.     I have personally reviewed the examination and initial interpretation  and I agree with the findings.     JESUS CONNER MD ([place])    Procedure  Complete Portable Echo Adult. 03/02/18     Interpretation Summary  Global and regional left ventricular function is normal with an EF of 60-65%.  Global right ventricular function is normal. The right ventricle is normal  size.  Significant prolapse of the posterior mitral valve leaflet is present.  It is difficult to quantify MR severity due to tachycardia, but based on ERO  (0.4 cm2) moderate to severe anteriorly directed eccentric mitral  insufficiency is present.  Pulmonary artery systolic pressure is normal.  The inferior vena cava was normal in size with preserved respiratory  variability.  Estimated mean right atrial pressure is 3 mmHg.  No pericardial effusion is present.  No vegetation seen. DEEPAK is recommended to quantify MR severity.  Previous study not available for comparison.     Assessment and Plan:  Frederick was seen today for hospital f/u and pain.    Diagnoses and all orders for this visit:    Acute bacterial endocarditis secondary to Streptococcus oralis  He is received appropriate therapy to which the organism is sensitive.  His fevers, chills, and dyspnea have resolved.  He no longer has a cough.  His physical exam is remarkable for a grade 3-4 holosystolic murmur consistent with mitral regurgitation which sounds like what was heard in the hospital.  He has not had a transesophageal echocardiogram as he did not want to undergo anesthesia in the hospital.      His home health nurse is coming tomorrow and I will make sure that he has a CMP, CBC and blood culture drawn tomorrow.    He  has a cardiology appointment on Saturday but has been unable to make his ID appointment and therefore I will refer Frederick to the Infectious Disease clinic as he has been unable to follow up with them after his discharge from the hospital.   -     INFECTIOUS DISEASE REFERRAL    low back pain without sciatica, right-sided without neurologic compromise  Seems to be muscular rather than disc related as it is at the level of the posterior superior iliac crest on the right rather than L5-S1.  However, I did not repeat the straight leg test today which was positive in the hospital.  Infectious discitis or vertebral osteomyelitis remains a distinct possibility.      For the pain currently I will give him a muscle relaxant and a small dose of Hydrocodone for his pain. He was counseled on how to use these, and to not drive or drink alcohol while using his relaxants and Hydrocodone. Frederick agrees to this. He does not need an order for PT as he already has one in, and Frederick agrees that once he is able he will fulfill this order and go back to PT for his back pain. We also discussed gradually increasing his activity at home as he is able to help in his recovery; this including walking at work and outside. I encouraged him to keep moving as he can, and to continue his stretches on the yoga mat at home. I also recommended a foam mattress topper, which Frederick will look for at [facility] or [facility], to help with his sleeping.   -     cyclobenzaprine (FLEXERIL) 10 MG tablet; Take 1 tablet (10 mg) by mouth 3 times daily as needed for muscle spasms  -     HYDROcodone-acetaminophen (NORCO) 5-325 MG per tablet; Take 1 tablet by mouth every 4 hours as needed for pain maximum 3 tablet(s) per day       Frederick will follow up in 3-4 weeks for a recheck on his back.         Scribe Disclosure:   I, Sherita Lora, am serving as a scribe to document services personally performed by Nolvia Nguyen MD at this visit, based upon the  provider's statements to me. All documentation has been reviewed by the aforementioned provider prior to being entered into the official medical record.     Portions of this medical record were completed by a scribe. UPON MY REVIEW AND AUTHENTICATION BY ELECTRONIC SIGNATURE, this confirms (a) I performed the applicable clinical services, and (b) the record is accurate.     Nolvia Nguyen MD

## 2018-03-13 NOTE — NURSING NOTE
Chief Complaint   Patient presents with     Hospital F/U     hospital follow up ( 3/2/18-3/7/18) strep oralis bactermia, endocarditis     Pain     extreme back pain ( became worse because of being in bed in hospital)   Margareth Gaytan LPN 1:28 PM on 3/13/2018    Rooming Note  Health Maintenance   Health Maintenance Due   Topic Date Due     LIPID SCREEN Q5 YR MALE (SYSTEM ASSIGNED)  08/28/2012    All health maintenance items discussed and pended.  Margareth Gaytan LPN 1:29 PM on 3/13/2018

## 2018-03-13 NOTE — PATIENT INSTRUCTIONS
[facility]: [phone]     [facility] Medication Refill Request Information:  * Please contact your pharmacy regarding ANY request for medication refills.  ** [facility] Prescription Fax = [phone]  * Please allow 3 business days for routine medication refills.  * Please allow 5 business days for controlled substance medication refills.     [facility] Test Result notification information:  *You will be notified with in 7-10 days of your appointment day regarding the results of your test.  If you are on MyChart you will be notified as soon as the provider has reviewed the results and signed off on them.    Patient must get into the infectious disease clinic within the next week!  Post hospitalization follow-up for endocarditis and osteomyelitis of the L5-S1 vertebrae      Labs for home health nurse to draw weekly:  CBC, CMP and CRP

## 2018-03-14 ENCOUNTER — HOME INFUSION (PRE-WILLOW HOME INFUSION) (OUTPATIENT)
Dept: PHARMACY | Facility: CLINIC | Age: 43
End: 2018-03-14

## 2018-03-14 LAB
ALBUMIN SERPL-MCNC: 2.8 G/DL (ref 3.4–5)
ALP SERPL-CCNC: 48 U/L (ref 40–150)
ALT SERPL W P-5'-P-CCNC: 20 U/L (ref 0–70)
ANION GAP SERPL CALCULATED.3IONS-SCNC: 3 MMOL/L (ref 3–14)
AST SERPL W P-5'-P-CCNC: 16 U/L (ref 0–45)
BASOPHILS # BLD AUTO: 0.1 10E9/L (ref 0–0.2)
BASOPHILS NFR BLD AUTO: 1 %
BILIRUB SERPL-MCNC: 0.3 MG/DL (ref 0.2–1.3)
BUN SERPL-MCNC: 20 MG/DL (ref 7–30)
CALCIUM SERPL-MCNC: 8.9 MG/DL (ref 8.5–10.1)
CHLORIDE SERPL-SCNC: 109 MMOL/L (ref 94–109)
CO2 SERPL-SCNC: 30 MMOL/L (ref 20–32)
CREAT SERPL-MCNC: 0.78 MG/DL (ref 0.66–1.25)
CRP SERPL-MCNC: 4.5 MG/L (ref 0–8)
DIFFERENTIAL METHOD BLD: NORMAL
EOSINOPHIL # BLD AUTO: 0.2 10E9/L (ref 0–0.7)
EOSINOPHIL NFR BLD AUTO: 2.3 %
ERYTHROCYTE [DISTWIDTH] IN BLOOD BY AUTOMATED COUNT: 15 % (ref 10–15)
GFR SERPL CREATININE-BSD FRML MDRD: >90 ML/MIN/1.7M2
GLUCOSE SERPL-MCNC: 65 MG/DL (ref 70–99)
HCT VFR BLD AUTO: 41.5 % (ref 40–53)
HGB BLD-MCNC: 13.4 G/DL (ref 13.3–17.7)
IMM GRANULOCYTES # BLD: 0.1 10E9/L (ref 0–0.4)
IMM GRANULOCYTES NFR BLD: 0.7 %
LYMPHOCYTES # BLD AUTO: 2 10E9/L (ref 0.8–5.3)
LYMPHOCYTES NFR BLD AUTO: 29.7 %
MCH RBC QN AUTO: 28.7 PG (ref 26.5–33)
MCHC RBC AUTO-ENTMCNC: 32.3 G/DL (ref 31.5–36.5)
MCV RBC AUTO: 89 FL (ref 78–100)
MONOCYTES # BLD AUTO: 0.5 10E9/L (ref 0–1.3)
MONOCYTES NFR BLD AUTO: 6.9 %
NEUTROPHILS # BLD AUTO: 4.1 10E9/L (ref 1.6–8.3)
NEUTROPHILS NFR BLD AUTO: 59.4 %
NRBC # BLD AUTO: 0 10*3/UL
NRBC BLD AUTO-RTO: 0 /100
PLATELET # BLD AUTO: 219 10E9/L (ref 150–450)
POTASSIUM SERPL-SCNC: 4.4 MMOL/L (ref 3.4–5.3)
PROT SERPL-MCNC: 7.8 G/DL (ref 6.8–8.8)
RBC # BLD AUTO: 4.67 10E12/L (ref 4.4–5.9)
SODIUM SERPL-SCNC: 142 MMOL/L (ref 133–144)
WBC # BLD AUTO: 6.8 10E9/L (ref 4–11)

## 2018-03-14 PROCEDURE — 85025 COMPLETE CBC W/AUTO DIFF WBC: CPT | Performed by: INTERNAL MEDICINE

## 2018-03-14 PROCEDURE — 80053 COMPREHEN METABOLIC PANEL: CPT | Performed by: INTERNAL MEDICINE

## 2018-03-14 PROCEDURE — 86140 C-REACTIVE PROTEIN: CPT | Performed by: INTERNAL MEDICINE

## 2018-03-15 ENCOUNTER — HOME INFUSION (PRE-WILLOW HOME INFUSION) (OUTPATIENT)
Dept: PHARMACY | Facility: CLINIC | Age: 43
End: 2018-03-15

## 2018-03-16 NOTE — PROGRESS NOTES
This is a recent snapshot of the patient's [facility] medical record.  For current drug dose and complete information and questions, call [phone] or In Basket pool, [facility] home infusion ([id number])  CSN Number:  [id number]

## 2018-03-17 ENCOUNTER — OFFICE VISIT (OUTPATIENT)
Dept: CARDIOLOGY | Facility: CLINIC | Age: 43
End: 2018-03-17
Attending: INTERNAL MEDICINE
Payer: COMMERCIAL

## 2018-03-17 VITALS
OXYGEN SATURATION: 97 % | SYSTOLIC BLOOD PRESSURE: 129 MMHG | HEART RATE: 93 BPM | HEIGHT: 69 IN | DIASTOLIC BLOOD PRESSURE: 87 MMHG | BODY MASS INDEX: 24.62 KG/M2 | WEIGHT: 166.2 LBS

## 2018-03-17 DIAGNOSIS — I33.0 ACUTE BACTERIAL ENDOCARDITIS: ICD-10-CM

## 2018-03-17 DIAGNOSIS — I34.1 MVP (MITRAL VALVE PROLAPSE): Primary | ICD-10-CM

## 2018-03-17 PROCEDURE — G0463 HOSPITAL OUTPT CLINIC VISIT: HCPCS | Mod: ZF

## 2018-03-17 PROCEDURE — 99203 OFFICE O/P NEW LOW 30 MIN: CPT | Mod: 25 | Performed by: INTERNAL MEDICINE

## 2018-03-17 ASSESSMENT — PAIN SCALES - GENERAL: PAINLEVEL: NO PAIN (0)

## 2018-03-17 NOTE — MR AVS SNAPSHOT
After Visit Summary   3/17/2018    Frederick Nguyen    MRN: [id number]           Patient Information     Date Of Birth          1975        Visit Information        Provider Department      3/17/2018 8:30 AM CARLOS Bush MD [facility]        Today's Diagnoses     MVP (mitral valve prolapse)    -  1    Acute bacterial endocarditis          Care Instructions    Thank you for your visit today.  Please call me with any questions or concerns.   Yifan Hunter  RN  Cardiology Care Coordinator  [phone], press option 1 then option 3    Follow up in 2 months            Follow-ups after your visit        Additional Services     Follow-Up with Cardiologist                 Follow-up notes from your care team     Return in about 6 weeks (around 4/28/2018).      Your next 10 appointments already scheduled     Apr 13, 2018 11:00 AM CDT   (Arrive by 10:45 AM)   New Patient Visit with Deb Brito MD   [facility] Infectious Diseases ([facility])    [address] [place] [address]  [address]  [facility] [address] [phone]            May 22, 2018  3:30 PM CDT   (Arrive by 3:15 PM)   Return Visit with CARLOS Bush MD   [facility] ([facility])    [address] [place] [address]  [address]  [facility] [address] [phone]              Future tests that were ordered for you today     Open Future Orders        Priority Expected Expires Ordered    Follow-Up with Cardiologist Routine 3/17/2018 6/15/2018 3/17/2018    Transesophageal Echocardiogram Routine  3/17/2019 3/17/2018            Who to contact     If you have questions or need follow up information about today's clinic visit or your schedule please contact [facility] directly at [phone].  Normal or non-critical lab and imaging results will be communicated to you by MyChart, letter or phone within 4 business days after the clinic has received  "the results. If you do not hear from us within 7 days, please contact the clinic through [email or web address] or phone. If you have a critical or abnormal lab result, we will notify you by phone as soon as possible.  Submit refill requests through [email or web address] or call your pharmacy and they will forward the refill request to us. Please allow 3 business days for your refill to be completed.          Additional Information About Your Visit        [employer] Information     [email or web address] gives you secure access to your electronic health record. If you see a primary care provider, you can also send messages to your care team and make appointments. If you have questions, please call your primary care clinic.  If you do not have a primary care provider, please call [phone] and they will assist you.        Care EveryWhere ID     This is your Care EveryWhere ID. This could be used by other organizations to access your [facility] medical records  [id number]        Your Vitals Were     Pulse Height Pulse Oximetry BMI (Body Mass Index)          93 1.753 m (5' 9\") 97% 24.54 kg/m2         Blood Pressure from Last 3 Encounters:   03/17/18 129/87   03/13/18 129/84   03/07/18 114/69    Weight from Last 3 Encounters:   03/17/18 75.4 kg (166 lb 3.2 oz)   03/13/18 74.4 kg (164 lb)   03/06/18 74 kg (163 lb 2.3 oz)               Primary Care Provider Office Phone # Fax #    Liliana Ginger Murray [phone]       [place] [address]        Equal Access to Services     HILTON QUINTERO : Hadii kathia londonoo Sokalyn, waaxda luqadaha, qaybta kaalmada keya arnett. So [facility] [phone].    ATENCIÓN: Si habla español, tiene a chaves disposición servicios gratuitos de asistencia lingüística. New al [phone].    We comply with applicable federal civil rights laws and [place] laws. We do not discriminate on the basis of race, color, national origin, age, disability, sex, " sexual orientation, or gender identity.            Thank you!     Thank you for choosing [facility]  for your care. Our goal is always to provide you with excellent care. Hearing back from our patients is one way we can continue to improve our services. Please take a few minutes to complete the written survey that you may receive in the mail after your visit with us. Thank you!             Your Updated Medication List - Protect others around you: Learn how to safely use, store and throw away your medicines at www.disposemymeds.org.          This list is accurate as of 3/17/18  9:07 AM.  Always use your most recent med list.                   Brand Name Dispense Instructions for use Diagnosis    albuterol (2.5 MG/3ML) 0.083% neb solution     120 vial    Take 1 vial (2.5 mg) by nebulization every 6 hours as needed for shortness of breath / dyspnea or wheezing    Cough       cefTRIAXone 1 GM vial    ROCEPHIN    260 mL    Inject 2 g (2,000 mg) into the vein daily    Gram-positive bacteremia, Osteomyelitis of spine (H)       cyclobenzaprine 10 MG tablet    FLEXERIL    42 tablet    Take 1 tablet (10 mg) by mouth 3 times daily as needed for muscle spasms    Acute low back pain without sciatica, unspecified back pain laterality       guaiFENesin-codeine 100-10 MG/5ML Soln solution    ROBITUSSIN AC     Take 1-2 tsp. by mouth every 4 hours as needed for cough        HYDROcodone-acetaminophen 5-325 MG per tablet    NORCO    12 tablet    Take 1 tablet by mouth every 4 hours as needed for pain maximum 3 tablet(s) per day    Acute low back pain without sciatica, unspecified back pain laterality       montelukast 10 MG tablet    SINGULAIR    90 tablet    Take 1 tablet (10 mg) by mouth At Bedtime    Cough       multivitamin, therapeutic Tabs tablet      Take 1 tablet by mouth daily        order for DME     1 Units    Equipment being ordered: Nebulizer    Cough       TIGER BALM EX      As needed for back pain

## 2018-03-17 NOTE — NURSING NOTE
Chief Complaint   Patient presents with     New Patient     mitral valve      Vitals were taken and medications were reconciled.     Mary Beth Bustillo RMGERONIMO  8:22 AM

## 2018-03-17 NOTE — LETTER
3/17/2018    RE: Frederick Nguyen  [address] [facility] [address]       Dear Colleague,    Thank you for the opportunity to participate in the care of your patient, Frederick Nguyen, at the [facility] at [facility]. Please see a copy of my visit note below.       SUBJECTIVE:  Frederick Nguyen is a 42 year old male who presents for evaluation of MVP/worsening MR.    Work at the [email or web address] at [employer].  Had back ache after stretching. Came to clinic for a Physical therapy referral. Patient had a cough which was making back pain worse.By this time blood work showed Strp Oralis bacteriemia. MRI spine consistent with Osteomyelitis/Discitis.Finished 1 week of 6 week antibiotic treatment.    Known MVP with mild to moderate MR since 2012. No symptoms..  No significant cardiac risk factors.    Currently starting second week of 6 week Ceftriaxone IV.Patient being treated as endocarditis-Strep viridans.  Patient Active Problem List    Diagnosis Date Noted     Infective endocarditis 03/03/2018     Priority: Medium     Infection by Streptococcus, viridans group 03/03/2018     Priority: Medium     Bacteremia 03/02/2018     Priority: Medium     Low back pain 08/19/2017     Priority: Medium     Mitral valve prolapse 12/03/2012     Priority: Medium     Mitral valve regurgitation 12/03/2012     Priority: Medium     Lumbago 05/10/2011     Priority: Medium     Overview:   Latent effects of sprain.       Nonallopathic lesion of lumbar region 05/10/2011     Priority: Medium     Nonallopathic lesion of thoracic region 07/24/2009     Priority: Medium     Cervical spondylosis without myelopathy 07/21/2009     Priority: Medium     Cervicalgia 07/08/2008     Priority: Medium     Spasm of muscle 04/23/2008     Priority: Medium     Duane syndrome of left eye 1977     Priority: Medium    .  Current Outpatient Prescriptions   Medication Sig     cyclobenzaprine (FLEXERIL) 10  MG tablet Take 1 tablet (10 mg) by mouth 3 times daily as needed for muscle spasms     cefTRIAXone (ROCEPHIN) 1 GM vial Inject 2 g (2,000 mg) into the vein daily     multivitamin, therapeutic (THERA-VIT) TABS tablet Take 1 tablet by mouth daily     Camphor-Menthol (TIGER BALM EX) As needed for back pain     HYDROcodone-acetaminophen (NORCO) 5-325 MG per tablet Take 1 tablet by mouth every 4 hours as needed for pain maximum 3 tablet(s) per day (Patient not taking: Reported on 3/17/2018)     guaiFENesin-codeine (ROBITUSSIN AC) 100-10 MG/5ML SOLN solution Take 1-2 tsp. by mouth every 4 hours as needed for cough     albuterol (2.5 MG/3ML) 0.083% neb solution Take 1 vial (2.5 mg) by nebulization every 6 hours as needed for shortness of breath / dyspnea or wheezing (Patient not taking: Reported on 3/13/2018)     order for DME Equipment being ordered: Nebulizer (Patient not taking: Reported on 3/13/2018)     montelukast (SINGULAIR) 10 MG tablet Take 1 tablet (10 mg) by mouth At Bedtime (Patient not taking: Reported on 3/13/2018)     No current facility-administered medications for this visit.      No past medical history on file.  No past surgical history on file.  No Known Allergies  Social History     Social History     Marital status:      Spouse name: N/A     Number of children: N/A     Years of education: N/A     Occupational History     Not on file.     Social History Main Topics     Smoking status: Never Smoker     Smokeless tobacco: Never Used     Alcohol use No     Drug use: No     Sexual activity: Not on file     Other Topics Concern     Not on file     Social History Narrative     No family history on file.       REVIEW OF SYSTEMS:  General: negative, fever, chills, night sweats  Skin: negative, acne, rash and scaling  Eyes: negative, double vision, eye pain and photophobia  Ears/Nose/Throat: negative, nasal congestion and purulent rhinorrhea  Respiratory: No dyspnea on exertion, No cough, No hemoptysis  "and negative  Cardiovascular: negative, palpitations, tachycardia, irregular heart beat, chest pain, exertional chest pain or pressure, paroxysmal nocturnal dyspnea, dyspnea on exertion and orthopnea  Gastrointestinal: negative, dysphagia, nausea and vomiting  Genitourinary: negative, nocturia, dysuria and frequency  Musculoskeletal: negative, fracture, arthritis, joint pain and joint swelling  Neurologic: negative, headaches, syncope, stroke and seizures  Psychiatric: negative, nervous breakdown, thoughts of self-harm and thoughts of hurting someone else  Hematologic/Lymphatic/Immunologic: negative, bleeding disorder, chills and fever  Endocrine: negative, cold intolerance, heat intolerance and hot flashes       OBJECTIVE:  Blood pressure 129/87, pulse 93, height 1.753 m (5' 9\"), weight 75.4 kg (166 lb 3.2 oz), SpO2 97 %.  General Appearance: healthy, alert, active and no distress  Head: Normocephalic. No masses, lesions, tenderness or abnormalities  Eyes: conjuctiva clear, PERRL, EOM intact  Ears: External ears normal. Canals clear. TM's normal.  Nose: Nares normal  Mouth: normal  Neck: Supple, no cervical adenopathy, no thyromegaly  Lungs: clear to auscultation  Cardiac: regular rate and rhythm, normal S1 and S2, PSM.  Abdomen: Soft, nontender.  Normal bowel sounds.  No hepatosplenomegaly or abnormal masses  Extremities: no peripheral edema, peripheral pulses normal  Musculoskeletal: negative  Neurological: Cranial nerves 2-12 intact, motor strength intact       ASSESSMENT/PLAN:  42 yr old male with known Myxomatous mitral valve,MVP and mild to moderate MR.  Recent strep Viridans bacteremia ,Lumbar Osteomyelitis and possible endocarditis.  Reviewed TTE. Severe MT. Possible flail posterior leaflet. Dat 40.  Patient asymptomatic.  Will plan for a DEEPAK to assess mitral valve.  If valve flail/MR severe, will get surgical consideration if cultures negative and Osteomyelitis treated adequately.  Plan discussed with " patient.   Per orders.   Return to Clinic 6 weeks.  Answers for HPI/ROS submitted by the patient on 3/13/2018   General Symptoms: Yes  Skin Symptoms: No  HENT Symptoms: No  EYE SYMPTOMS: No  HEART SYMPTOMS: No  LUNG SYMPTOMS: Yes  INTESTINAL SYMPTOMS: No  URINARY SYMPTOMS: No  REPRODUCTIVE SYMPTOMS: No  SKELETAL SYMPTOMS: Yes  BLOOD SYMPTOMS: No  NERVOUS SYSTEM SYMPTOMS: No  MENTAL HEALTH SYMPTOMS: No  Fever: Yes  Loss of appetite: Yes  Weight loss: Yes  Weight gain: No  Fatigue: Yes  Night sweats: Yes  Chills: Yes  Increased stress: No  Excessive hunger: No  Excessive thirst: No  Feeling hot or cold when others believe the temperature is normal: No  Loss of height: No  Post-operative complications: No  Surgical site pain: No  Hallucinations: No  Change in or Loss of Energy: Yes  Hyperactivity: No  Confusion: No  Cough: Yes  Sputum or phlegm: No  Coughing up blood: No  Difficulty breating or shortness of breath: No  Snoring: No  Wheezing: No  Difficulty breathing on exertion: No  Nighttime Cough: Yes  Difficulty breathing when lying flat: No  Back pain: Yes  Muscle aches: No  Neck pain: No  Swollen joints: No  Joint pain: No  Bone pain: No  Muscle cramps: No  Muscle weakness: No  Joint stiffness: No  Bone fracture: No    Please do not hesitate to contact me if you have any questions/concerns.     Sincerely,     CARLOS Bush MD

## 2018-03-17 NOTE — PROGRESS NOTES
SUBJECTIVE:  Frederick Nguyen is a 42 year old male who presents for evaluation of MVP/worsening MR.    Work at the [employer] at [employer].  Had back ache after stretching. Came to clinic for a Physical therapy referral. Patient had a cough which was making back pain worse.By this time blood work showed Strp Oralis bacteriemia. MRI spine consistent with Osteomyelitis/Discitis.Finished 1 week of 6 week antibiotic treatment.    Known MVP with mild to moderate MR since 2012. No symptoms..  No significant cardiac risk factors.    Currently starting second week of 6 week Ceftriaxone IV.Patient being treated as endocarditis-Strep viridans.  Patient Active Problem List    Diagnosis Date Noted     Infective endocarditis 03/03/2018     Priority: Medium     Infection by Streptococcus, viridans group 03/03/2018     Priority: Medium     Bacteremia 03/02/2018     Priority: Medium     Low back pain 08/19/2017     Priority: Medium     Mitral valve prolapse 12/03/2012     Priority: Medium     Mitral valve regurgitation 12/03/2012     Priority: Medium     Lumbago 05/10/2011     Priority: Medium     Overview:   Latent effects of sprain.       Nonallopathic lesion of lumbar region 05/10/2011     Priority: Medium     Nonallopathic lesion of thoracic region 07/24/2009     Priority: Medium     Cervical spondylosis without myelopathy 07/21/2009     Priority: Medium     Cervicalgia 07/08/2008     Priority: Medium     Spasm of muscle 04/23/2008     Priority: Medium     Duane syndrome of left eye 1977     Priority: Medium    .  Current Outpatient Prescriptions   Medication Sig     cyclobenzaprine (FLEXERIL) 10 MG tablet Take 1 tablet (10 mg) by mouth 3 times daily as needed for muscle spasms     cefTRIAXone (ROCEPHIN) 1 GM vial Inject 2 g (2,000 mg) into the vein daily     multivitamin, therapeutic (THERA-VIT) TABS tablet Take 1 tablet by mouth daily     Camphor-Menthol (TIGER BALM EX) As needed for back pain      HYDROcodone-acetaminophen (NORCO) 5-325 MG per tablet Take 1 tablet by mouth every 4 hours as needed for pain maximum 3 tablet(s) per day (Patient not taking: Reported on 3/17/2018)     guaiFENesin-codeine (ROBITUSSIN AC) 100-10 MG/5ML SOLN solution Take 1-2 tsp. by mouth every 4 hours as needed for cough     albuterol (2.5 MG/3ML) 0.083% neb solution Take 1 vial (2.5 mg) by nebulization every 6 hours as needed for shortness of breath / dyspnea or wheezing (Patient not taking: Reported on 3/13/2018)     order for DME Equipment being ordered: Nebulizer (Patient not taking: Reported on 3/13/2018)     montelukast (SINGULAIR) 10 MG tablet Take 1 tablet (10 mg) by mouth At Bedtime (Patient not taking: Reported on 3/13/2018)     No current facility-administered medications for this visit.      No past medical history on file.  No past surgical history on file.  No Known Allergies  Social History     Social History     Marital status:      Spouse name: N/A     Number of children: N/A     Years of education: N/A     Occupational History     Not on file.     Social History Main Topics     Smoking status: Never Smoker     Smokeless tobacco: Never Used     Alcohol use No     Drug use: No     Sexual activity: Not on file     Other Topics Concern     Not on file     Social History Narrative     No family history on file.       REVIEW OF SYSTEMS:  General: negative, fever, chills, night sweats  Skin: negative, acne, rash and scaling  Eyes: negative, double vision, eye pain and photophobia  Ears/Nose/Throat: negative, nasal congestion and purulent rhinorrhea  Respiratory: No dyspnea on exertion, No cough, No hemoptysis and negative  Cardiovascular: negative, palpitations, tachycardia, irregular heart beat, chest pain, exertional chest pain or pressure, paroxysmal nocturnal dyspnea, dyspnea on exertion and orthopnea  Gastrointestinal: negative, dysphagia, nausea and vomiting  Genitourinary: negative, nocturia, dysuria and  "frequency  Musculoskeletal: negative, fracture, arthritis, joint pain and joint swelling  Neurologic: negative, headaches, syncope, stroke and seizures  Psychiatric: negative, nervous breakdown, thoughts of self-harm and thoughts of hurting someone else  Hematologic/Lymphatic/Immunologic: negative, bleeding disorder, chills and fever  Endocrine: negative, cold intolerance, heat intolerance and hot flashes       OBJECTIVE:  Blood pressure 129/87, pulse 93, height 1.753 m (5' 9\"), weight 75.4 kg (166 lb 3.2 oz), SpO2 97 %.  General Appearance: healthy, alert, active and no distress  Head: Normocephalic. No masses, lesions, tenderness or abnormalities  Eyes: conjuctiva clear, PERRL, EOM intact  Ears: External ears normal. Canals clear. TM's normal.  Nose: Nares normal  Mouth: normal  Neck: Supple, no cervical adenopathy, no thyromegaly  Lungs: clear to auscultation  Cardiac: regular rate and rhythm, normal S1 and S2, PSM.  Abdomen: Soft, nontender.  Normal bowel sounds.  No hepatosplenomegaly or abnormal masses  Extremities: no peripheral edema, peripheral pulses normal  Musculoskeletal: negative  Neurological: Cranial nerves 2-12 intact, motor strength intact       ASSESSMENT/PLAN:  42 yr old male with known Myxomatous mitral valve,MVP and mild to moderate MR.  Recent strep Viridans bacteremia ,Lumbar Osteomyelitis and possible endocarditis.  Reviewed TTE. Severe MT. Possible flail posterior leaflet. Dat 40.  Patient asymptomatic.  Will plan for a DEEPAK to assess mitral valve.  If valve flail/MR severe, will get surgical consideration if cultures negative and Osteomyelitis treated adequately.  Plan discussed with patient.   Per orders.   Return to Clinic 6 weeks.  Answers for HPI/ROS submitted by the patient on 3/13/2018   General Symptoms: Yes  Skin Symptoms: No  HENT Symptoms: No  EYE SYMPTOMS: No  HEART SYMPTOMS: No  LUNG SYMPTOMS: Yes  INTESTINAL SYMPTOMS: No  URINARY SYMPTOMS: No  REPRODUCTIVE SYMPTOMS: " No  SKELETAL SYMPTOMS: Yes  BLOOD SYMPTOMS: No  NERVOUS SYSTEM SYMPTOMS: No  MENTAL HEALTH SYMPTOMS: No  Fever: Yes  Loss of appetite: Yes  Weight loss: Yes  Weight gain: No  Fatigue: Yes  Night sweats: Yes  Chills: Yes  Increased stress: No  Excessive hunger: No  Excessive thirst: No  Feeling hot or cold when others believe the temperature is normal: No  Loss of height: No  Post-operative complications: No  Surgical site pain: No  Hallucinations: No  Change in or Loss of Energy: Yes  Hyperactivity: No  Confusion: No  Cough: Yes  Sputum or phlegm: No  Coughing up blood: No  Difficulty breating or shortness of breath: No  Snoring: No  Wheezing: No  Difficulty breathing on exertion: No  Nighttime Cough: Yes  Difficulty breathing when lying flat: No  Back pain: Yes  Muscle aches: No  Neck pain: No  Swollen joints: No  Joint pain: No  Bone pain: No  Muscle cramps: No  Muscle weakness: No  Joint stiffness: No  Bone fracture: No

## 2018-03-17 NOTE — PATIENT INSTRUCTIONS
Thank you for your visit today.  Please call me with any questions or concerns.   Yifan Hunter RN  Cardiology Care Coordinator  [phone], press option 1 then option 3    Follow up in 2 months

## 2018-03-19 ENCOUNTER — HOME INFUSION (PRE-WILLOW HOME INFUSION) (OUTPATIENT)
Dept: PHARMACY | Facility: CLINIC | Age: 43
End: 2018-03-19

## 2018-03-20 ENCOUNTER — THERAPY VISIT (OUTPATIENT)
Dept: PHYSICAL THERAPY | Facility: CLINIC | Age: 43
End: 2018-03-20
Payer: COMMERCIAL

## 2018-03-20 DIAGNOSIS — M54.50 LOW BACK PAIN: Primary | ICD-10-CM

## 2018-03-20 PROCEDURE — 97530 THERAPEUTIC ACTIVITIES: CPT | Mod: GP | Performed by: PHYSICAL THERAPIST

## 2018-03-20 PROCEDURE — 97162 PT EVAL MOD COMPLEX 30 MIN: CPT | Mod: GP | Performed by: PHYSICAL THERAPIST

## 2018-03-20 NOTE — PROGRESS NOTES
This is a recent snapshot of the patient's [facility] medical record.  For current drug dose and complete information and questions, call [phone] or In [facility] pool, fv home infusion ([id number])  CSN Number:  [id number]

## 2018-03-20 NOTE — PROGRESS NOTES
KEY PT FINDINGS:  The symptoms do not appear consistent with a musculoskeletal condition. His pain levels are unchanged with positioning and constant in nature. In supine which is a resting, unloaded position for the spine, he continues to have 7/10 pain. He does not have a change in symptoms with ice, heat, massage or TENS. Finally, he has a relatively benign mechanism - 5 days of supine lying in the hospital - to cause a high level of pain. It is recommended that patient return to his MD for further evaluation of the symptoms especially with imaging findings that may be consistent with a bone infection.     Physical Therapy Initial Evaluation: Subjective History     Injury/Condition Details:  Presenting Complaint Low back pain   Onset Timing/Date August 2017   Mechanism The cause is unknown.   Initial injury was unable to stand, unable to move - was managed with medication with stretches and exercises. Last til December.   Spent 5.5 days in the hospital due to an infection and has been getting worse every since.   Has been given flexeril and hydrocodone - does not notice any changes with the medication.   Has not tried the stretches or the exercises as previous.   Was given antibiotics in the hospital for the infection - 2 weeks ago.      Symptom Behavior Details    Primary Symptoms Constant symptoms; worsen with activity, pain (Location: Right side - all on the right side, Quality: Sharp and Aching/Throbbing), stiffness, denies all radicular symptoms.    Worst Pain 7/10 (with moving in general)   Symptom Provocators Reports sleep disturbances - on the side primarily.    Best Pain 7/10    Symptom Relievers No positions of relief, does not notice any difference with ice or heat. TENS does not provide relief.    Time of day dependent? No   Recent symptom change? 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   Has been active in the past   Orthopaedic history See Epic Chart   Notable medical history See [facility] Initial Evaluation  Subjective:  HPI                    Objective:  Standing Alignment:        Lumbar:  Lordosis decr            Gait:  Limited tolerance to weight bearing, limited stance phase on the right limb. No arm swing present.                    Lumbar/SI Evaluation  ROM:    AROM Lumbar:   Flexion:          To knees, used hands to return to neutral.   Ext:                    Severe limitation in motion   Side Bend:        Left:  Moderate limitation    Right:  Moderate limitation  Rotation:           Left:     Right:   Side Glide:        Left:     Right:         Strength: Unable to tolerate assessment to the TA  Lumbar Myotomes:  not assessed                Lumbar Dermtomes:  not assessed                Neural Tension/Mobility:      Left side:SLR or SLR w/DF  negative.   Right side:   SLR w/DF and SLR positive.  Lumbar Palpation:  Palpation (lumbar): Increased tone noted at right paraspinals.                                                         General     ROS    Assessment/Plan:    Patient is a 42 year old male with lumbar complaints.    Patient has the following significant findings with corresponding treatment plan.                Diagnosis 1:  Acute Low Back Pain  Pain -  hot/cold therapy, electric stimulation, self management and education  Decreased ROM/flexibility - therapeutic exercise and home program  Impaired gait - home program  Impaired muscle performance - neuro re-education and home program  Decreased function - therapeutic activities and home program    Therapy Evaluation Codes:   1) History comprised of:   Personal factors that impact the plan of care:      Overall behavior pattern and Time since onset of  symptoms.    Comorbidity factors that impact the plan of care are:      Implanted device, Pain at night/rest and Weakness.     Medications impacting care: Pain and Antibiotics.  2) Examination of Body Systems comprised of:   Body structures and functions that impact the plan of care:      Lumbar spine.   Activity limitations that impact the plan of care are:      Bathing, Bending, Driving, Dressing, Lifting, Sitting, Squatting/kneeling, Stairs, Standing, Walking, Sleeping and Laying down.  3) Clinical presentation characteristics are:   Evolving/Changing.  4) Decision-Making    Moderate complexity using standardized patient assessment instrument and/or measureable assessment of functional outcome.  Cumulative Therapy Evaluation is: Moderate complexity.    Previous and current functional limitations:  (See Goal Flow Sheet for this information)    Short term and Long term goals: (See Goal Flow Sheet for this information)     Communication ability:  Patient appears to be able to clearly communicate and understand verbal and written communication and follow directions correctly.  Treatment Explanation - The following has been discussed with the patient:   RX ordered/plan of care  Anticipated outcomes  Possible risks and side effects  This patient would benefit from PT intervention to resume normal activities.   Rehab potential is good.    Frequency:  1 X week, once daily  Duration:  for 1 visits  Discharge Plan:  Achieve all LTG.  Independent in home treatment program.  Reach maximal therapeutic benefit.    Please refer to the daily flowsheet for treatment today, total treatment time and time spent performing 1:1 timed codes.

## 2018-03-21 ENCOUNTER — HOME INFUSION (PRE-WILLOW HOME INFUSION) (OUTPATIENT)
Dept: PHARMACY | Facility: CLINIC | Age: 43
End: 2018-03-21

## 2018-03-21 PROBLEM — M54.50 LOW BACK PAIN: Status: RESOLVED | Noted: 2017-08-19 | Resolved: 2018-03-21

## 2018-03-21 LAB
ALBUMIN SERPL-MCNC: 3.7 G/DL (ref 3.4–5)
ALP SERPL-CCNC: 50 U/L (ref 40–150)
ALT SERPL W P-5'-P-CCNC: 22 U/L (ref 0–70)
ANION GAP SERPL CALCULATED.3IONS-SCNC: 5 MMOL/L (ref 3–14)
AST SERPL W P-5'-P-CCNC: 24 U/L (ref 0–45)
BASOPHILS # BLD AUTO: 0.1 10E9/L (ref 0–0.2)
BASOPHILS NFR BLD AUTO: 0.9 %
BILIRUB SERPL-MCNC: 0.3 MG/DL (ref 0.2–1.3)
BUN SERPL-MCNC: 21 MG/DL (ref 7–30)
CALCIUM SERPL-MCNC: 9 MG/DL (ref 8.5–10.1)
CHLORIDE SERPL-SCNC: 107 MMOL/L (ref 94–109)
CO2 SERPL-SCNC: 27 MMOL/L (ref 20–32)
CREAT SERPL-MCNC: 0.85 MG/DL (ref 0.66–1.25)
CRP SERPL-MCNC: 4 MG/L (ref 0–8)
DIFFERENTIAL METHOD BLD: NORMAL
EOSINOPHIL # BLD AUTO: 0.2 10E9/L (ref 0–0.7)
EOSINOPHIL NFR BLD AUTO: 2.7 %
ERYTHROCYTE [DISTWIDTH] IN BLOOD BY AUTOMATED COUNT: 14.9 % (ref 10–15)
GFR SERPL CREATININE-BSD FRML MDRD: >90 ML/MIN/1.7M2
GLUCOSE SERPL-MCNC: 109 MG/DL (ref 70–99)
HCT VFR BLD AUTO: 42.7 % (ref 40–53)
HGB BLD-MCNC: 13.7 G/DL (ref 13.3–17.7)
IMM GRANULOCYTES # BLD: 0 10E9/L (ref 0–0.4)
IMM GRANULOCYTES NFR BLD: 0.3 %
LYMPHOCYTES # BLD AUTO: 1.8 10E9/L (ref 0.8–5.3)
LYMPHOCYTES NFR BLD AUTO: 27.3 %
MCH RBC QN AUTO: 28.7 PG (ref 26.5–33)
MCHC RBC AUTO-ENTMCNC: 32.1 G/DL (ref 31.5–36.5)
MCV RBC AUTO: 90 FL (ref 78–100)
MONOCYTES # BLD AUTO: 0.5 10E9/L (ref 0–1.3)
MONOCYTES NFR BLD AUTO: 7.5 %
NEUTROPHILS # BLD AUTO: 3.9 10E9/L (ref 1.6–8.3)
NEUTROPHILS NFR BLD AUTO: 61.3 %
NRBC # BLD AUTO: 0 10*3/UL
NRBC BLD AUTO-RTO: 0 /100
PLATELET # BLD AUTO: 189 10E9/L (ref 150–450)
POTASSIUM SERPL-SCNC: 4.2 MMOL/L (ref 3.4–5.3)
PROT SERPL-MCNC: 7.9 G/DL (ref 6.8–8.8)
RBC # BLD AUTO: 4.77 10E12/L (ref 4.4–5.9)
SODIUM SERPL-SCNC: 139 MMOL/L (ref 133–144)
WBC # BLD AUTO: 6.4 10E9/L (ref 4–11)

## 2018-03-21 PROCEDURE — 85025 COMPLETE CBC W/AUTO DIFF WBC: CPT | Performed by: INTERNAL MEDICINE

## 2018-03-21 PROCEDURE — 86140 C-REACTIVE PROTEIN: CPT | Performed by: INTERNAL MEDICINE

## 2018-03-21 PROCEDURE — 80053 COMPREHEN METABOLIC PANEL: CPT | Performed by: INTERNAL MEDICINE

## 2018-03-22 ENCOUNTER — HOME INFUSION (PRE-WILLOW HOME INFUSION) (OUTPATIENT)
Dept: PHARMACY | Facility: CLINIC | Age: 43
End: 2018-03-22

## 2018-03-22 RX ORDER — KETOROLAC TROMETHAMINE 10 MG/1
10 TABLET, FILM COATED ORAL 3 TIMES DAILY PRN
Qty: 30 TABLET | Refills: 1 | Status: SHIPPED | OUTPATIENT
Start: 2018-03-22 | End: 2018-04-02

## 2018-03-22 NOTE — PROGRESS NOTES
This is a recent snapshot of the patient's [facility] medical record.  For current drug dose and complete information and questions, call [phone] or In Basket pool, fv home infusion ([id number])  CSN Number:  [id number]

## 2018-03-23 NOTE — PROGRESS NOTES
This is a recent snapshot of the patient's [facility] medical record.  For current drug dose and complete information and questions, call [phone] or In Basket pool, fv home infusion ([id number])  CSN Number:  [id number]

## 2018-03-27 ENCOUNTER — HOME INFUSION (PRE-WILLOW HOME INFUSION) (OUTPATIENT)
Dept: PHARMACY | Facility: CLINIC | Age: 43
End: 2018-03-27

## 2018-03-27 LAB
ALBUMIN SERPL-MCNC: 3.8 G/DL (ref 3.4–5)
ALP SERPL-CCNC: 56 U/L (ref 40–150)
ALT SERPL W P-5'-P-CCNC: 33 U/L (ref 0–70)
ANION GAP SERPL CALCULATED.3IONS-SCNC: 5 MMOL/L (ref 3–14)
AST SERPL W P-5'-P-CCNC: 26 U/L (ref 0–45)
BASOPHILS # BLD AUTO: 0.1 10E9/L (ref 0–0.2)
BASOPHILS NFR BLD AUTO: 1.2 %
BILIRUB SERPL-MCNC: 0.3 MG/DL (ref 0.2–1.3)
BUN SERPL-MCNC: 19 MG/DL (ref 7–30)
CALCIUM SERPL-MCNC: 8.7 MG/DL (ref 8.5–10.1)
CHLORIDE SERPL-SCNC: 107 MMOL/L (ref 94–109)
CO2 SERPL-SCNC: 28 MMOL/L (ref 20–32)
CREAT SERPL-MCNC: 0.91 MG/DL (ref 0.66–1.25)
CRP SERPL-MCNC: <2.9 MG/L (ref 0–8)
DIFFERENTIAL METHOD BLD: NORMAL
EOSINOPHIL # BLD AUTO: 0.2 10E9/L (ref 0–0.7)
EOSINOPHIL NFR BLD AUTO: 2.7 %
ERYTHROCYTE [DISTWIDTH] IN BLOOD BY AUTOMATED COUNT: 14.4 % (ref 10–15)
GFR SERPL CREATININE-BSD FRML MDRD: >90 ML/MIN/1.7M2
GLUCOSE SERPL-MCNC: 80 MG/DL (ref 70–99)
HCT VFR BLD AUTO: 42.5 % (ref 40–53)
HGB BLD-MCNC: 14 G/DL (ref 13.3–17.7)
IMM GRANULOCYTES # BLD: 0 10E9/L (ref 0–0.4)
IMM GRANULOCYTES NFR BLD: 0.6 %
LYMPHOCYTES # BLD AUTO: 1.7 10E9/L (ref 0.8–5.3)
LYMPHOCYTES NFR BLD AUTO: 26 %
MCH RBC QN AUTO: 29 PG (ref 26.5–33)
MCHC RBC AUTO-ENTMCNC: 32.9 G/DL (ref 31.5–36.5)
MCV RBC AUTO: 88 FL (ref 78–100)
MONOCYTES # BLD AUTO: 0.5 10E9/L (ref 0–1.3)
MONOCYTES NFR BLD AUTO: 7.8 %
NEUTROPHILS # BLD AUTO: 4.1 10E9/L (ref 1.6–8.3)
NEUTROPHILS NFR BLD AUTO: 61.7 %
NRBC # BLD AUTO: 0 10*3/UL
NRBC BLD AUTO-RTO: 0 /100
PLATELET # BLD AUTO: 170 10E9/L (ref 150–450)
POTASSIUM SERPL-SCNC: 4.2 MMOL/L (ref 3.4–5.3)
PROT SERPL-MCNC: 7.7 G/DL (ref 6.8–8.8)
RBC # BLD AUTO: 4.82 10E12/L (ref 4.4–5.9)
SODIUM SERPL-SCNC: 140 MMOL/L (ref 133–144)
WBC # BLD AUTO: 6.7 10E9/L (ref 4–11)

## 2018-03-27 PROCEDURE — 80053 COMPREHEN METABOLIC PANEL: CPT | Performed by: INTERNAL MEDICINE

## 2018-03-27 PROCEDURE — 86140 C-REACTIVE PROTEIN: CPT | Performed by: INTERNAL MEDICINE

## 2018-03-27 PROCEDURE — 85025 COMPLETE CBC W/AUTO DIFF WBC: CPT | Performed by: INTERNAL MEDICINE

## 2018-03-28 NOTE — PROGRESS NOTES
This is a recent snapshot of the patient's [facility] medical record.  For current drug dose and complete information and questions, call [phone] or In Basket pool, fv home infusion ([id number])  CSN Number:  [id number]

## 2018-03-29 ENCOUNTER — HOME INFUSION (PRE-WILLOW HOME INFUSION) (OUTPATIENT)
Dept: PHARMACY | Facility: CLINIC | Age: 43
End: 2018-03-29

## 2018-03-29 NOTE — PROGRESS NOTES
This is a recent snapshot of the patient's [facility] medical record.  For current drug dose and complete information and questions, call [phone] or In Basket pool, fv home infusion ([id number])  CSN Number:  [id number]

## 2018-03-30 ENCOUNTER — HOSPITAL ENCOUNTER (OUTPATIENT)
Dept: CARDIOLOGY | Facility: CLINIC | Age: 43
Discharge: HOME | End: 2018-03-30
Attending: INTERNAL MEDICINE | Admitting: INTERNAL MEDICINE
Payer: COMMERCIAL

## 2018-03-30 VITALS
DIASTOLIC BLOOD PRESSURE: 95 MMHG | OXYGEN SATURATION: 97 % | RESPIRATION RATE: 16 BRPM | HEART RATE: 97 BPM | SYSTOLIC BLOOD PRESSURE: 137 MMHG

## 2018-03-30 DIAGNOSIS — I34.1 MVP (MITRAL VALVE PROLAPSE): ICD-10-CM

## 2018-03-30 PROCEDURE — 93325 DOPPLER ECHO COLOR FLOW MAPG: CPT | Mod: 26 | Performed by: INTERNAL MEDICINE

## 2018-03-30 PROCEDURE — 25000125 ZZHC RX 250: Performed by: INTERNAL MEDICINE

## 2018-03-30 PROCEDURE — 93320 DOPPLER ECHO COMPLETE: CPT | Mod: 26 | Performed by: INTERNAL MEDICINE

## 2018-03-30 PROCEDURE — 99152 MOD SED SAME PHYS/QHP 5/>YRS: CPT

## 2018-03-30 PROCEDURE — 25000128 H RX IP 250 OP 636: Performed by: INTERNAL MEDICINE

## 2018-03-30 PROCEDURE — 99153 MOD SED SAME PHYS/QHP EA: CPT

## 2018-03-30 PROCEDURE — 93312 ECHO TRANSESOPHAGEAL: CPT | Mod: 26 | Performed by: INTERNAL MEDICINE

## 2018-03-30 PROCEDURE — 93312 ECHO TRANSESOPHAGEAL: CPT

## 2018-03-30 PROCEDURE — 76376 3D RENDER W/INTRP POSTPROCES: CPT

## 2018-03-30 RX ORDER — NALOXONE HYDROCHLORIDE 0.4 MG/ML
.1-.4 INJECTION, SOLUTION INTRAMUSCULAR; INTRAVENOUS; SUBCUTANEOUS
Status: DISCONTINUED | OUTPATIENT
Start: 2018-03-30 | End: 2018-03-31 | Stop reason: HOSPADM

## 2018-03-30 RX ORDER — FENTANYL CITRATE 50 UG/ML
25-50 INJECTION, SOLUTION INTRAMUSCULAR; INTRAVENOUS
Status: DISCONTINUED | OUTPATIENT
Start: 2018-03-30 | End: 2018-03-31 | Stop reason: HOSPADM

## 2018-03-30 RX ORDER — SODIUM CHLORIDE 9 MG/ML
INJECTION, SOLUTION INTRAVENOUS CONTINUOUS PRN
Status: DISCONTINUED | OUTPATIENT
Start: 2018-03-30 | End: 2018-03-31 | Stop reason: HOSPADM

## 2018-03-30 RX ORDER — FLUMAZENIL 0.1 MG/ML
0.2 INJECTION, SOLUTION INTRAVENOUS
Status: DISCONTINUED | OUTPATIENT
Start: 2018-03-30 | End: 2018-03-31 | Stop reason: HOSPADM

## 2018-03-30 RX ORDER — FENTANYL CITRATE 50 UG/ML
50-100 INJECTION, SOLUTION INTRAMUSCULAR; INTRAVENOUS
Status: DISCONTINUED | OUTPATIENT
Start: 2018-03-30 | End: 2018-03-31 | Stop reason: HOSPADM

## 2018-03-30 RX ORDER — LIDOCAINE 40 MG/G
CREAM TOPICAL
Status: DISCONTINUED | OUTPATIENT
Start: 2018-03-30 | End: 2018-03-31 | Stop reason: HOSPADM

## 2018-03-30 RX ADMIN — SODIUM CHLORIDE 200 ML: 9 INJECTION, SOLUTION INTRAVENOUS at 11:52

## 2018-03-30 RX ADMIN — TOPICAL ANESTHETIC 0.5 ML: 200 SPRAY DENTAL; PERIODONTAL at 10:42

## 2018-03-30 RX ADMIN — MIDAZOLAM 6 MG: 1 INJECTION INTRAMUSCULAR; INTRAVENOUS at 11:11

## 2018-03-30 RX ADMIN — LIDOCAINE HYDROCHLORIDE 30 ML: 20 SOLUTION ORAL; TOPICAL at 10:40

## 2018-03-30 RX ADMIN — FENTANYL CITRATE 200 MCG: 50 INJECTION, SOLUTION INTRAMUSCULAR; INTRAVENOUS at 11:11

## 2018-03-30 NOTE — PROGRESS NOTES
Pt arrived in ECHO department for scheduled DEEPAK.   Procedure explained, questions answered and consent signed. Discharge instructions discussed with patient and given written copy.  Pt's throat sprayed at 10:40, therefore pt will not be able to eat or drink until 2 hours after at 12:40pm. Informed pt of this time and encouraged to start with warm fluids and soft foods.    Pt tolerated procedure well, and was given a total of 200 mcg IV fentanyl and 6 mg IV versed for conscious sedation. Pt denied throat or chest pain after DEEPAK complete. VSS throughout procedure.   DEEPAK probe #52 used for procedure and inserted without difficulty.  Pt monitored until fully awake and discharged to home with spouse. Escorted out to [facility] waiting room via wheelchair where spouse, Zohra, was waiting.

## 2018-04-02 ENCOUNTER — OFFICE VISIT (OUTPATIENT)
Dept: INTERNAL MEDICINE | Facility: CLINIC | Age: 43
End: 2018-04-02
Payer: COMMERCIAL

## 2018-04-02 VITALS
SYSTOLIC BLOOD PRESSURE: 143 MMHG | TEMPERATURE: 98 F | WEIGHT: 166.7 LBS | BODY MASS INDEX: 24.62 KG/M2 | RESPIRATION RATE: 18 BRPM | DIASTOLIC BLOOD PRESSURE: 92 MMHG | HEART RATE: 79 BPM

## 2018-04-02 DIAGNOSIS — M54.50 ACUTE LOW BACK PAIN WITHOUT SCIATICA, UNSPECIFIED BACK PAIN LATERALITY: ICD-10-CM

## 2018-04-02 DIAGNOSIS — M46.20 OSTEOMYELITIS OF SPINE (H): Primary | ICD-10-CM

## 2018-04-02 RX ORDER — CYCLOBENZAPRINE HCL 10 MG
10 TABLET ORAL 3 TIMES DAILY PRN
Qty: 42 TABLET | Refills: 0 | Status: SHIPPED | OUTPATIENT
Start: 2018-04-02 | End: 2018-04-25

## 2018-04-02 ASSESSMENT — PAIN SCALES - GENERAL: PAINLEVEL: MILD PAIN (2)

## 2018-04-02 NOTE — NURSING NOTE
Chief Complaint   Patient presents with     Home Infusion     Patient is here for [facility] follow up     Back Pain     Also here to follow up on back pain     Atif Mayfield CMA ([facility]) at 1:41 PM on 4/2/2018

## 2018-04-02 NOTE — PATIENT INSTRUCTIONS
[facility] Medication Refill Request Information:  * Please contact your pharmacy regarding ANY request for medication refills.  ** [facility] Prescription Fax = [phone]  * Please allow 3 business days for routine medication refills.  * Please allow 5 business days for controlled substance medication refills.     [facility] Test Result notification information:  *You will be notified with in 7-10 days of your appointment day regarding the results of your test.  If you are on MyChart you will be notified as soon as the provider has reviewed the results and signed off on them.    [facility] [phone]     Take alleve 2 tablets before bed at night  Take Ibuprofen 200 mg, two tablets every 4-6  Hours during the day    Frederick was seen today for home infusion and back pain.    Diagnoses and all orders for this visit:    Osteomyelitis of spine (H)  -     NEUROSURGERY REFERRAL    Acute low back pain without sciatica, unspecified back pain laterality  -     cyclobenzaprine (FLEXERIL) 10 MG tablet; Take 1 tablet (10 mg) by mouth 3 times daily as needed for muscle spasms

## 2018-04-02 NOTE — PROGRESS NOTES
[facility]  Primary Care Center   Nolvia Nguyen MD  04/02/2018      Chief Complaint:   Home Infusion and Back Pain       History of Present Illness:   Frederick Nguyen is a 42 year old male with a history of MVP, Cervical spondylosis, tension headache, and recent strep oralis bacteremia with infective endocarditis of the mitral valve and probable L5-S1 osteomyelitis who presents for complaints of back pain. The patient was hospitalized on 3/2 for acute bacterial endocarditis. He has about 2-3 weeks of home infusion left and his tests reveal the infection is subsiding. Since we last saw him on 3/13, he has had a DEEPAK  on 3/30 and since then has been having more and more back pain. He states that is has gotten worse over the past few days especially when he tries to get up after lying down on his back. He says his pain can go between a 2-7, getting up all the way to a 7 when he is moving a walking around a lot. He states that the pain goes all the way from his right lower back, right anterior superior iliac crest, to his mid thigh posteriorly.  This is relieved when he gets up.  In the past I have given him Toradol which was tremendously helpful but were unable to continue that.  He has no leg weakness, numbness or tingling.  He has no bowel or bladder symptoms.  In regard to his endocarditis, we reviewed the results of his echocardiogram which preliminarily showed severe mitral regurgitation see the report for details.  There were no vegetations.  Interestingly, the LVEF was 75%.  He denies shortness of breath, cough, wheeze, orthopnea and edema.  He has no chest pain.  He said no fevers chills or night sweats.    He continues to receive ceftriaxone intravenously daily per home health nurse and will complete his course for treatment of endocarditis in 2 and half weeks.  His infectious disease appointment is scheduled for late April.  He will be seeing cardiology this week.      Health Maintenance:  [place]  Cancer screening (age 50 - 75): yearly FIT or colonoscopy every 5 - 10 years - Not applicable  Dexa (females age ? 65, males age ? 70): every 2 years - Not applicable  Glucose: every 5 years, yearly if risk factors? - Up to Date 3/27/18 (within normal limits)  Lipid panel: every 5 years, yearly if risk factors - never done  HIV (ages 13 - 64): once per lifetime, yearly if MSM or other risk factors - Not applicable  Immunizations (Tetanus every 10 years, Influenza yearly, Pneumonia PPV-23 and PCV-13 age ? 65 or sooner if risk factors) - Up to Date   Immunization History   Administered Date(s) Administered     Influenza (IIV3) PF 12/20/2004, 01/28/2015     Influenza Vaccine IM 3yrs+ 4 Valent IIV4 10/31/2016, 09/11/2017     Pneumococcal 23 valent 06/07/2013     TDAP Vaccine (Boostrix) 03/08/2010       The following health maintenance issues were also reviewed and addressed as needed:  Recent Comprehensive labs done on 3/27/18       Review of Systems:   Pertinent items are noted in HPI, remainder of complete ROS is negative.      Active Medications:     Current Outpatient Prescriptions:      cyclobenzaprine (FLEXERIL) 10 MG tablet, Take 1 tablet (10 mg) by mouth 3 times daily as needed for muscle spasms, Disp: 42 tablet, Rfl: 0     cefTRIAXone (ROCEPHIN) 1 GM vial, Inject 2 g (2,000 mg) into the vein daily, Disp: 260 mL, Rfl: 1     multivitamin, therapeutic (THERA-VIT) TABS tablet, Take 1 tablet by mouth daily, Disp: , Rfl:      HYDROcodone-acetaminophen (NORCO) 5-325 MG per tablet, Take 1 tablet by mouth every 4 hours as needed for pain maximum 3 tablet(s) per day (Patient not taking: Reported on 3/17/2018), Disp: 12 tablet, Rfl: 0     order for DME, Equipment being ordered: Nebulizer (Patient not taking: Reported on 4/2/2018), Disp: 1 Units, Rfl: 0      Allergies:   No drug allergies to note.     Past Medical History:  Cervical spondylosis without myelopathy   Cervicalgia  Duane syndrome of left eye  Lumbago   Mitral  valve prolapse and regurgitation  Nonallopathic lesion of lumbar region  Spasm of muscle   Nonallopathic lesion of thoracic region  Bacteremia  Infective endocarditis  Infection of streptococcus      Past Surgical History:  No past medical surgeries to note.     Family History:   No family history on file.      Social History:   Tobacco Use: none  Alcohol Use: none  PCP: Liliana Murray      Physical Exam:   BP (!) 143/92 (BP Location: Right arm, Patient Position: Chair, Cuff Size: Adult Regular)  Pulse 79  Temp 98  F (36.7  C) (Oral)  Resp 18  Wt 75.6 kg (166 lb 11.2 oz)  BMI 24.62 kg/m2   Constitutional: Healthy, alert, no distress and cooperative  Cardiovascular: JVP 7 cm. RRR, S1,S2 4/6 systolic murmur, clicks, rubs, or gallops. No pretibial edema.  No carotid bruits.  Respiratory: Clear to auscultation and percussion bilaterally.   Back: Pain on palpation of the right anterior superior iliac crest, but no paraspinous muscle tenderness.  No posterior vertebral process tenderness.  No L5 or S1 localized tenderness or warmth.  Straight leg test normal bilaterally,  JENN test on right induces right low back pain.  Strength is intact.  I did not recheck the reflexes.  Skin: No petechiae or splinter hemorrhages     LABS:  C-reactive protein <2.7  CBC normal  Last Basic Metabolic Panel:  Lab Results   Component Value Date     03/27/2018      Lab Results   Component Value Date    POTASSIUM 4.2 03/27/2018     Lab Results   Component Value Date    CHLORIDE 107 03/27/2018     Lab Results   Component Value Date    JEANNINE 8.7 03/27/2018     Lab Results   Component Value Date    CO2 28 03/27/2018     Lab Results   Component Value Date    BUN 19 03/27/2018     Lab Results   Component Value Date    CR 0.91 03/27/2018     Lab Results   Component Value Date    GLC 80 03/27/2018     Last blood culture March 7 is negative.  Assessment and Plan:  Acute bacterial endocarditis, mitral valve   This isdue to Streptococcus  oralis, and is being treated with ceftriaxone successfully.  Origin is on the mitral valve, patient asymptomatic.  He is followed up with cardiology this week.  We will continue the ceftriaxone and continue to monitor CBC, C-reactive protein and BMP.  I am awaiting the infectious disease consultation for any additional advice.    Severe low back pain  The patient has L5-S1 discitis/osteomyelitis and is being treated.  As noted above, the inflammatory markers and CBC are have normalized.  Treatment is appropriate however he he continues to have quite severe low back pain.  He is seen physical therapy once who recommended against continuing physical therapy for now.    By physical examination today, the pain localizes into the right lower back and superior iliac crest, I believe this is musculoskeletal pain of the ligamentum flavum and erector spinae muscles.  In the past he is had some response to Flexeril and NSAIDs so we will try that again for now.  Because of the active osteomyelitis however I will refer him to neurosurgery for opinion about management of his low back pain.  - cyclobenzaprine (FLEXERIL) 10 MG tablet  Dispense: 42 tablet; Refill: 0  -Take Aleve 2 tablets at night before bed, and ibuprofen 400 mg every 4-6 hours during the day for back pain as needed.  Watch for epigastric pain.    Osteomyelitis of spine (H)  Due to the persistent pain of the lower back a referral to neurosurgery was given for another opinion of what is causing this pain.   - NEUROSURGERY REFERRAL        Follow-up: Follow up in 4 weeks.         Scribe Disclosure:   IDeya, am serving as a scribe to document services personally performed by Nolvia Nguyen MD at this visit, based upon the provider's statements to me. All documentation has been reviewed by the aforementioned provider prior to being entered into the official medical record.     Portions of this medical record were completed by a scribe. UPON MY REVIEW AND  AUTHENTICATION BY ELECTRONIC SIGNATURE, this confirms (a) I performed the applicable clinical services, and (b) the record is accurate.     Nolvia Nguyen

## 2018-04-02 NOTE — MR AVS SNAPSHOT
After Visit Summary   4/2/2018    Frederick Nguyen    MRN: [id number]           Patient Information     Date Of Birth          1975        Visit Information        Provider Department      4/2/2018 1:40 PM Nolvia Nguyen MD [facility]        Today's Diagnoses     Osteomyelitis of spine (H)    -  1    Acute low back pain without sciatica, unspecified back pain laterality          Care Instructions    Primary Care Center Medication Refill Request Information:  * Please contact your pharmacy regarding ANY request for medication refills.  ** [facility] Prescription Fax = [phone]  * Please allow 3 business days for routine medication refills.  * Please allow 5 business days for controlled substance medication refills.     Primary Care Center Test Result notification information:  *You will be notified with in 7-10 days of your appointment day regarding the results of your test.  If you are on MyChart you will be notified as soon as the provider has reviewed the results and signed off on them.    Primary Care Center [phone]     Take alleve 2 tablets before bed at night  Take Ibuprofen 200 mg, two tablets every 4-6  Hours during the day    Frederick was seen today for home infusion and back pain.    Diagnoses and all orders for this visit:    Osteomyelitis of spine (H)  -     NEUROSURGERY REFERRAL    Acute low back pain without sciatica, unspecified back pain laterality  -     cyclobenzaprine (FLEXERIL) 10 MG tablet; Take 1 tablet (10 mg) by mouth 3 times daily as needed for muscle spasms                  Follow-ups after your visit        Additional Services     NEUROSURGERY REFERRAL       Your provider has referred you to: [facility]: Neurosurgery Clinic [facility] [phone]   [email or web address]    Please be aware that coverage of these services is subject to the terms and limitations of your health insurance plan.  Call member  services at your health plan with any benefit or coverage questions.      Please bring the following with you to your appointment:    (1) Any X-Rays, CTs or MRIs which have been performed.  Contact the facility where they were done to arrange for  prior to your scheduled appointment.   (2) List of current medications  (3) This referral request   (4) Any documents/labs given to you for this referral                  Follow-up notes from your care team     Return in about 4 weeks (around 4/30/2018).      Your next 10 appointments already scheduled     Apr 13, 2018 11:00 AM CDT   (Arrive by 10:45 AM)   New Patient Visit with Deb Brito MD   [facility] ([facility])    [address] [place] [address]  [address]  [facility] [address] [phone]            May 22, 2018  3:30 PM CDT   (Arrive by 3:15 PM)   Return Visit with CARLOS Bush MD   [facility] ([facility])    [address] [place] [address]  [address]  [facility] [address] [phone]              Who to contact     Please call your clinic at [phone] to:    Ask questions about your health    Make or cancel appointments    Discuss your medicines    Learn about your test results    Speak to your doctor            Additional Information About Your Visit        [employer] Information     [employer] gives you secure access to your electronic health record. If you see a primary care provider, you can also send messages to your care team and make appointments. If you have questions, please call your primary care clinic.  If you do not have a primary care provider, please call [phone] and they will assist you.      [employer] is an electronic gateway that provides easy, online access to your medical records. With [employer], you can request a clinic appointment, read your test results, renew a prescription or communicate with your care team.     To access your  existing account, please contact your [facility] or call [phone] for assistance.        Care EveryWhere ID     This is your Care EveryWhere ID. This could be used by other organizations to access your [facility] medical records  [id number]        Your Vitals Were     Pulse Temperature Respirations BMI (Body Mass Index)          79 98  F (36.7  C) (Oral) 18 24.62 kg/m2         Blood Pressure from Last 3 Encounters:   04/02/18 (!) 143/92   03/30/18 (!) 137/95   03/17/18 129/87    Weight from Last 3 Encounters:   04/02/18 75.6 kg (166 lb 11.2 oz)   03/17/18 75.4 kg (166 lb 3.2 oz)   03/13/18 74.4 kg (164 lb)              We Performed the Following     NEUROSURGERY REFERRAL          Today's Medication Changes          These changes are accurate as of 4/2/18 11:59 PM.  If you have any questions, ask your nurse or doctor.               Stop taking these medicines if you haven't already. Please contact your care team if you have questions.     guaiFENesin-codeine 100-10 MG/5ML Soln solution   Commonly known as:  ROBITUSSIN AC   Stopped by:  Nolvia Nguyen MD           ketorolac 10 MG tablet   Commonly known as:  TORADOL   Stopped by:  Nolvia Nguyen MD           montelukast 10 MG tablet   Commonly known as:  SINGULAIR   Stopped by:  Nolvia Nguyen MD           TIGER BALM EX   Stopped by:  Nolvia Nguyen MD                Where to get your medicines      These medications were sent to [facility] [phone]  [address] [address]    Hours:  TRANSPLANT PHONE NUMBER [phone] Phone:  [phone]     cyclobenzaprine 10 MG tablet                Primary Care Provider Office Phone # Fax #    Liliana Murray [phone]       [address] [address]        Equal Access to Services     HILTON QUINTERO AH: Farooq Coelho,  wajulianada lufilipeadaha, qaybta kalena arnett, keya fernandezaanena ah. So [facility] [phone].    ATENCIÓN: Si jamey bass, tiene a chaves disposición servicios gratuitos de asistencia lingüística. New al [phone].    We comply with applicable federal civil rights laws and [place] laws. We do not discriminate on the basis of race, color, national origin, age, disability, sex, sexual orientation, or gender identity.            Thank you!     Thank you for choosing [facility]  for your care. Our goal is always to provide you with excellent care. Hearing back from our patients is one way we can continue to improve our services. Please take a few minutes to complete the written survey that you may receive in the mail after your visit with us. Thank you!             Your Updated Medication List - Protect others around you: Learn how to safely use, store and throw away your medicines at www.disposemymeds.org.          This list is accurate as of 4/2/18 11:59 PM.  Always use your most recent med list.                   Brand Name Dispense Instructions for use Diagnosis    cefTRIAXone 1 GM vial    ROCEPHIN    260 mL    Inject 2 g (2,000 mg) into the vein daily    Gram-positive bacteremia, Osteomyelitis of spine (H)       cyclobenzaprine 10 MG tablet    FLEXERIL    42 tablet    Take 1 tablet (10 mg) by mouth 3 times daily as needed for muscle spasms    Acute low back pain without sciatica, unspecified back pain laterality       HYDROcodone-acetaminophen 5-325 MG per tablet    NORCO    12 tablet    Take 1 tablet by mouth every 4 hours as needed for pain maximum 3 tablet(s) per day    Acute low back pain without sciatica, unspecified back pain laterality       multivitamin, therapeutic Tabs tablet      Take 1 tablet by mouth daily        order for DME     1 Units    Equipment being ordered: Nebulizer    Cough

## 2018-04-03 ASSESSMENT — ENCOUNTER SYMPTOMS
MUSCLE WEAKNESS: 0
ARTHRALGIAS: 0
JOINT SWELLING: 0
MYALGIAS: 1
STIFFNESS: 0
MUSCLE CRAMPS: 0
NECK PAIN: 0
BACK PAIN: 1

## 2018-04-04 ENCOUNTER — HOME INFUSION (PRE-WILLOW HOME INFUSION) (OUTPATIENT)
Dept: PHARMACY | Facility: CLINIC | Age: 43
End: 2018-04-04

## 2018-04-04 ENCOUNTER — PRE VISIT (OUTPATIENT)
Dept: NEUROSURGERY | Facility: CLINIC | Age: 43
End: 2018-04-04

## 2018-04-04 ENCOUNTER — HOSPITAL ENCOUNTER (OUTPATIENT)
Facility: CLINIC | Age: 43
Setting detail: SPECIMEN
Discharge: HOME | End: 2018-04-04
Admitting: INTERNAL MEDICINE
Payer: COMMERCIAL

## 2018-04-04 LAB
ALBUMIN SERPL-MCNC: 3.9 G/DL (ref 3.4–5)
ALP SERPL-CCNC: 55 U/L (ref 40–150)
ALT SERPL W P-5'-P-CCNC: 17 U/L (ref 0–70)
ANION GAP SERPL CALCULATED.3IONS-SCNC: 7 MMOL/L (ref 3–14)
AST SERPL W P-5'-P-CCNC: 19 U/L (ref 0–45)
BASOPHILS # BLD AUTO: 0.1 10E9/L (ref 0–0.2)
BASOPHILS NFR BLD AUTO: 2.4 %
BILIRUB SERPL-MCNC: 0.3 MG/DL (ref 0.2–1.3)
BUN SERPL-MCNC: 18 MG/DL (ref 7–30)
CALCIUM SERPL-MCNC: 8.8 MG/DL (ref 8.5–10.1)
CHLORIDE SERPL-SCNC: 108 MMOL/L (ref 94–109)
CO2 SERPL-SCNC: 25 MMOL/L (ref 20–32)
CREAT SERPL-MCNC: 0.82 MG/DL (ref 0.66–1.25)
CRP SERPL-MCNC: <2.9 MG/L (ref 0–8)
DIFFERENTIAL METHOD BLD: NORMAL
EOSINOPHIL # BLD AUTO: 0.2 10E9/L (ref 0–0.7)
EOSINOPHIL NFR BLD AUTO: 3.4 %
ERYTHROCYTE [DISTWIDTH] IN BLOOD BY AUTOMATED COUNT: 14 % (ref 10–15)
GFR SERPL CREATININE-BSD FRML MDRD: >90 ML/MIN/1.7M2
GLUCOSE SERPL-MCNC: 68 MG/DL (ref 70–99)
HCT VFR BLD AUTO: 42.8 % (ref 40–53)
HGB BLD-MCNC: 14.1 G/DL (ref 13.3–17.7)
IMM GRANULOCYTES # BLD: 0 10E9/L (ref 0–0.4)
IMM GRANULOCYTES NFR BLD: 0.7 %
LYMPHOCYTES # BLD AUTO: 1.8 10E9/L (ref 0.8–5.3)
LYMPHOCYTES NFR BLD AUTO: 29.9 %
MCH RBC QN AUTO: 28.9 PG (ref 26.5–33)
MCHC RBC AUTO-ENTMCNC: 32.9 G/DL (ref 31.5–36.5)
MCV RBC AUTO: 88 FL (ref 78–100)
MONOCYTES # BLD AUTO: 0.5 10E9/L (ref 0–1.3)
MONOCYTES NFR BLD AUTO: 8 %
NEUTROPHILS # BLD AUTO: 3.3 10E9/L (ref 1.6–8.3)
NEUTROPHILS NFR BLD AUTO: 55.6 %
NRBC # BLD AUTO: 0 10*3/UL
NRBC BLD AUTO-RTO: 0 /100
PLATELET # BLD AUTO: 177 10E9/L (ref 150–450)
POTASSIUM SERPL-SCNC: 4.3 MMOL/L (ref 3.4–5.3)
PROT SERPL-MCNC: 7.3 G/DL (ref 6.8–8.8)
RBC # BLD AUTO: 4.88 10E12/L (ref 4.4–5.9)
SODIUM SERPL-SCNC: 140 MMOL/L (ref 133–144)
WBC # BLD AUTO: 5.9 10E9/L (ref 4–11)

## 2018-04-04 PROCEDURE — 80053 COMPREHEN METABOLIC PANEL: CPT | Performed by: INTERNAL MEDICINE

## 2018-04-04 PROCEDURE — 85025 COMPLETE CBC W/AUTO DIFF WBC: CPT | Performed by: INTERNAL MEDICINE

## 2018-04-04 PROCEDURE — 86140 C-REACTIVE PROTEIN: CPT | Performed by: INTERNAL MEDICINE

## 2018-04-05 ENCOUNTER — HOME INFUSION (PRE-WILLOW HOME INFUSION) (OUTPATIENT)
Dept: PHARMACY | Facility: CLINIC | Age: 43
End: 2018-04-05

## 2018-04-05 ASSESSMENT — ENCOUNTER SYMPTOMS
JOINT SWELLING: 0
BACK PAIN: 1
MYALGIAS: 0
NECK PAIN: 0
STIFFNESS: 0
MUSCLE CRAMPS: 0
ARTHRALGIAS: 0
MUSCLE WEAKNESS: 1

## 2018-04-05 NOTE — PROGRESS NOTES
This is a recent snapshot of the patient's [facility] medical record.  For current drug dose and complete information and questions, call [phone] or In Basket pool, [facility] home infusion ([id number])  CSN Number:  [id number]

## 2018-04-06 NOTE — PROGRESS NOTES
This is a recent snapshot of the patient's [facility] medical record.  For current drug dose and complete information and questions, call [phone] or In Basket pool, [facility] home infusion ([id number])  CSN Number:  [id number]

## 2018-04-09 ENCOUNTER — OFFICE VISIT (OUTPATIENT)
Dept: NEUROSURGERY | Facility: CLINIC | Age: 43
End: 2018-04-09
Payer: COMMERCIAL

## 2018-04-09 VITALS
BODY MASS INDEX: 25.07 KG/M2 | HEIGHT: 70 IN | WEIGHT: 175.1 LBS | HEART RATE: 101 BPM | DIASTOLIC BLOOD PRESSURE: 89 MMHG | SYSTOLIC BLOOD PRESSURE: 134 MMHG

## 2018-04-09 DIAGNOSIS — M53.3 CHRONIC RIGHT SACROILIAC PAIN: ICD-10-CM

## 2018-04-09 DIAGNOSIS — G89.29 CHRONIC RIGHT SACROILIAC PAIN: ICD-10-CM

## 2018-04-09 DIAGNOSIS — M46.46 DISCITIS OF LUMBAR REGION: Primary | ICD-10-CM

## 2018-04-09 ASSESSMENT — PAIN SCALES - GENERAL: PAINLEVEL: MILD PAIN (3)

## 2018-04-09 NOTE — LETTER
4/9/2018       RE: Frederick Nguyen  [address] [facility] [address]     Dear Colleague,    Thank you for referring your patient, Frederick Nguyen, to the [facility] NEUROSURGERY at [facility]. Please see a copy of my visit note below.      Neurosurgery Clinic Consult  Date of Visit: 4/9/2018    Referring Provider: Nolvia Nugyen    Dear Dr. Nguyen    We were happy to see Frederick Nguyen, a pleasant 42 year old year old male for right sided back pain    HPI:  Mr. 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    His current history begins in August of last year when he developed a midline and eccentric to the right low back pain without an inciting incident.  He was treated and evaluated by Dr. Olivarez,  An MRI of the lumbar spine was done in November indicating Mobic changes at L5/S1.  Dr. Olivarez believed his issues were primarily right SI arthropathy and pain.    He was hospitalized on 3/2/18 for the endocarditis.  Streptococcus oralis, and is being treated with ceftriaxone successfully.  Origin is on the mitral valve, patient asymptomatic.  He will be on antibiotics until about mid April.  His back pain was evaluated as part of his endocarditis workup. An MRI has been repeated and the findings from November have been worsened.  Given his history the radiologist declared this could be consistent with osteomyelitis/discitis but had no prior images to compare as they were not in our system.  The pain has been waxing and waning, but consistently stays on the  Right low back, sometimes radiating to the buttock, sometimes to the thigh. It is worse with moving around. Worse if he lies on his back and then tries to move around after that. He denies weakness numbness or tingling or bowel or bladder symptoms. Because of his back pain he was  referred for neurosurgical evaluation. There are plans to start injection therapy for him once his active infection is fully treated.    Current Symptom List:   Patient Supplied Answers To the [facility] Pain Questionnaire  [facility] Pain -  Patient Entered Questionnaire/Answers 4/5/2018   What number best describes your pain right now:  0 = No pain  to  10 = Worst pain imaginable 4   How would you describe the pain? sharp, dull, aching, throbbing   Which of the following worsen your pain? lying down, walking, coughing / sneezing   Which of the following improve or reduce your pain?  medication   What number best describes your average pain for the past week:  0 = No pain  to  10 = Worst pain imaginable 5   What number best describes your LOWEST pain in past 24 hours:  0 = No pain  to  10 = Worst pain imaginable 2   What number best describes your WORST pain in past 24 hours:  0 = No pain  to  10 = Worst pain imaginable 7   When is your pain worst? AM, PM   What non-medicine treatments have you already had for your pain? physical therapy, TENS (electrical stimulator), spine injections (shots), exercise, other   Have you tried treating your pain with medication?  Yes   Are you currently taking medications for your pain? Yes       Current Outpatient Prescriptions:      Naproxen Sodium (ALEVE PO), Take 220 mg by mouth daily, Disp: , Rfl:      cyclobenzaprine (FLEXERIL) 10 MG tablet, Take 1 tablet (10 mg) by mouth 3 times daily as needed for muscle spasms, Disp: 42 tablet, Rfl: 0     cefTRIAXone (ROCEPHIN) 1 GM vial, Inject 2 g (2,000 mg) into the vein daily, Disp: 260 mL, Rfl: 1     multivitamin, therapeutic (THERA-VIT) TABS tablet, Take 1 tablet by mouth daily, Disp: , Rfl:     No Known Allergies    History reviewed. No pertinent past medical history.    History reviewed. No pertinent surgical history.    History reviewed. No pertinent family history.    Social History     Social History     Marital status:      Spouse name:  "N/A     Number of children: N/A     Years of education: N/A     Occupational History     Not on file.     Social History Main Topics     Smoking status: Never Smoker     Smokeless tobacco: Never Used     Alcohol use No     Drug use: No     Sexual activity: Not on file     Other Topics Concern     Not on file     Social History Narrative     Problem list and 13 point review of systems: is reviewed in Epic and is negative with the exception of those symptoms associated with HPI and PMH.      OBJECTIVE:   /89 (BP Location: Right arm, Patient Position: Chair, Cuff Size: Adult Regular)  Pulse 101  Ht 1.79 m (5' 10.47\")  Wt 79.4 kg (175 lb 1.6 oz)  BMI 24.79 kg/m2    Imaging:  These are the pertinent radiologist's findings from:  MRI Lumbar W/WO 3/6/18 @ [facility].  Bone marrow edema involving L5 and S1 with enhancement of the  vertebral bodies as well as the intervertebral disc, given patient  history these findings are concerning for discitis/osteomyelitis.     There is a radiologist's report of an MRI from November 2, 2017 indicating mild to moderate Mobic changes at the endplates of L5/S1.  The images are not in our system  See the full report in [email or web address]/Media tab.  I personally reviewed the images with the patient.      Exam:  *   Well developed, well nourished male found seated comfortably in exam room chair.  Is able to sit and rise independently.    Unaccompanied.    *  Mental Status  A&O X3.  Bright, alert, affable, interactive. Language fluid, fund of knowledge intact. Good historian.  Mood and affect congruent and WNL.  *  Cranial Nerves  II: Able to read printed forms, VF full to gross confrontation.  III: IV, VI:  PERRLA, EOMI, No nystagmus, no ptosis.  V: Sensation intact in bilateral V1, V2, and V3. Jaw clench symmetric.    VII:  Intact to voice bilaterally.  IX:  Pushes tongue against bilateral cheeks.  X:  Palate elevates, uvula midline, phonation intact.  XI: Elevates shoulders, head turn intact.  XII: " Tongue midline. No fasciculations.  *  Lumbar Spine:    DTR's Patellar and Achilles 1/4 and symmetric.   No fasciculations.  Muscle bulk and tone WNL.  No Clonus.  Sensation intact.    Lower Extremity Strength                   RIGHT                   LEFT     Iliopsoas                    5/5                      5/5       Quad                    5/5                        5/5       Hamstring                      5/5                        5/5         Gastrocs                    5/5                        5/5       Tib. Anterior                     5/5                        5/5       EHL                      5/5                        5/5       Babinski                 Down                                      Down                   *  Structural Exam:   Head is balanced over the pelvis in the coronal and sagittal plane.    Lumbar ROM full. No spasming, no tenderness, no step offs. No SLR.  No SI tenderness. No trochanteric bursal tenderness. Positive right Carlin's.   Gait narrow, smooth, no scissoring. No antalgia.     ASSESSMENT/PLAN:  1. Discitis of lumbar region    2. Chronic right sacroiliac pain      He certainly has radiographic evidence for possible osteomyelitis discitis.   However his back pain has never been particularly severe and it appeared in August centered on the right.  All of that argues against infection which tends to be severe, axial, and aggressive.  He also had Mobic changes, although less severe, on an MRI in November of last year. This is by report only.   I did not have those images to review I will ask for the images to be pushed over.   But a smoldering discitis over the course of 5 or 6 months is extremely unlikely.    His back pain is actually more typical of SI joint pain, and Dr. Olivarez also felt this was the case. Injection therapy is useful once infection is gone.  Other diagnoses on the differential are facet pain.  I am less convinced.    At this point there are no  neurosurgical indications. His primary already has him on track with pain management, and is thinking of injection therapy, presumably the SI joint, and so the treatment plan is absolutely appropriate.   At most neurosurgery would recommend repeating the MRI if he declined neurologically. Otherwise we have offered a p.r.n. follow-up.     I answered all of his questions, which indicated good understanding of the situation.  He is willing to move forward with the recommendations. I supplied him with business cards and telephone numbers for ease of contact.   It's been a pleasure participating in the care of this nice patient. We thank you for your confidence in the [facility], Department of Neurosurgery.    Total time: 60 minutes with more than 30 minutes spent in direct face to face contact reviewing films, providing education, counseling, non-operative therapies,and indications for surgery as well as further follow up.    This note was generated using voice recognition software. While edited for content some inaccurate phrasing may be found.      Again, thank you for allowing me to participate in the care of your patient.      Sincerely,    Alessandra Brandt PA-C

## 2018-04-09 NOTE — MR AVS SNAPSHOT
After Visit Summary   4/9/2018    Frederick Nguyen    MRN: [id number]           Patient Information     Date Of Birth          1975        Visit Information        Provider Department      4/9/2018 3:00 PM Alessandra Brandt PA-C [facility] Neurosurgery        Today's Diagnoses     Discitis of lumbar region    -  1    Chronic right sacroiliac pain           Follow-ups after your visit        Follow-up notes from your care team     Return if symptoms worsen or fail to improve.      Your next 10 appointments already scheduled     Apr 13, 2018 11:00 AM CDT   (Arrive by 10:45 AM)   New Patient Visit with Deb Brito MD   [facility] ([facility])    [address] [place] [address]  [address]  [facility] [address] [phone]            May 22, 2018  3:30 PM CDT   (Arrive by 3:15 PM)   Return Visit with CARLOS Bush MD   [facility] ([facility])    [address] [place] [address]  [address]  [facility] [address] [phone]              Who to contact     Please call your clinic at [phone] to:    Ask questions about your health    Make or cancel appointments    Discuss your medicines    Learn about your test results    Speak to your doctor            Additional Information About Your Visit        [employer] Information     [employer] gives you secure access to your electronic health record. If you see a primary care provider, you can also send messages to your care team and make appointments. If you have questions, please call your primary care clinic.  If you do not have a primary care provider, please call [phone] and they will assist you.      [employer] is an electronic gateway that provides easy, online access to your medical records. With [employer], you can request a clinic appointment, read your test results, renew a prescription or communicate with your care team.     To access your existing  "account, please contact your [facility] or call [phone] for assistance.        Care EveryWhere ID     This is your Care EveryWhere ID. This could be used by other organizations to access your [facility] medical records  [id number]        Your Vitals Were     Pulse Height BMI (Body Mass Index)             101 1.79 m (5' 10.47\") 24.79 kg/m2          Blood Pressure from Last 3 Encounters:   04/09/18 134/89   04/02/18 (!) 143/92   03/30/18 (!) 137/95    Weight from Last 3 Encounters:   04/09/18 79.4 kg (175 lb 1.6 oz)   04/02/18 75.6 kg (166 lb 11.2 oz)   03/17/18 75.4 kg (166 lb 3.2 oz)              Today, you had the following     No orders found for display         Today's Medication Changes          These changes are accurate as of 4/9/18 11:59 PM.  If you have any questions, ask your nurse or doctor.               Stop taking these medicines if you haven't already. Please contact your care team if you have questions.     HYDROcodone-acetaminophen 5-325 MG per tablet   Commonly known as:  NORCO   Stopped by:  Alessandra Brandt PA-C           order for DME   Stopped by:  Alessandra Brandt PA-C                    Primary Care Provider Office Phone # Fax #    Liliana Ginger Murray [phone]       [place] [address]        Equal Access to Services     HILTON QUINTERO : Hadii kathia londonoo Sokalyn, waaxda luqadaha, qaybta kaalmada keya arnett. So [facility] [phone].    ATENCIÓN: Si habla shelia, tiene a chaves disposición servicios gratuitos de asistencia lingüística. Llame al [phone].    We comply with applicable federal civil rights laws and [place] laws. We do not discriminate on the basis of race, color, national origin, age, disability, sex, sexual orientation, or gender identity.            Thank you!     Thank you for choosing [facility]  for your care. Our goal is always " to provide you with excellent care. Hearing back from our patients is one way we can continue to improve our services. Please take a few minutes to complete the written survey that you may receive in the mail after your visit with us. Thank you!             Your Updated Medication List - Protect others around you: Learn how to safely use, store and throw away your medicines at www.disposemymeds.org.          This list is accurate as of 4/9/18 11:59 PM.  Always use your most recent med list.                   Brand Name Dispense Instructions for use Diagnosis    ALEVE PO      Take 220 mg by mouth daily        cefTRIAXone 1 GM vial    ROCEPHIN    260 mL    Inject 2 g (2,000 mg) into the vein daily    Gram-positive bacteremia, Osteomyelitis of spine (H)       cyclobenzaprine 10 MG tablet    FLEXERIL    42 tablet    Take 1 tablet (10 mg) by mouth 3 times daily as needed for muscle spasms    Acute low back pain without sciatica, unspecified back pain laterality       multivitamin, therapeutic Tabs tablet      Take 1 tablet by mouth daily

## 2018-04-09 NOTE — PROGRESS NOTES
Neurosurgery Clinic Consult  Date of Visit: 4/9/2018    Referring Provider: Nolvia Nguyen    Dear Dr. Nguyen    We were happy to see Frederick Nguyen, a pleasant 42 year old year old male for right sided back pain    HPI:  Mr. 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    His current history begins in August of last year when he developed a midline and eccentric to the right low back pain without an inciting incident.  He was treated and evaluated by Dr. Olivarez,  An MRI of the lumbar spine was done in November indicating Mobic changes at L5/S1.  Dr. Olivarez believed his issues were primarily right SI arthropathy and pain.    He was hospitalized on 3/2/18 for the endocarditis.  Streptococcus oralis, and is being treated with ceftriaxone successfully.  Origin is on the mitral valve, patient asymptomatic.  He will be on antibiotics until about mid April.  His back pain was evaluated as part of his endocarditis workup. An MRI has been repeated and the findings from November have been worsened.  Given his history the radiologist declared this could be consistent with osteomyelitis/discitis but had no prior images to compare as they were not in our system.  The pain has been waxing and waning, but consistently stays on the  Right low back, sometimes radiating to the buttock, sometimes to the thigh. It is worse with moving around. Worse if he lies on his back and then tries to move around after that. He denies weakness numbness or tingling or bowel or bladder symptoms. Because of his back pain he was referred for neurosurgical evaluation. There are plans to start injection therapy for him once his active infection is fully treated.    Current Symptom List:   Patient Supplied Answers To the [facility] Pain Questionnaire  [facility] Pain -  Patient Entered Questionnaire/Answers 4/5/2018   What number best  describes your pain right now:  0 = No pain  to  10 = Worst pain imaginable 4   How would you describe the pain? sharp, dull, aching, throbbing   Which of the following worsen your pain? lying down, walking, coughing / sneezing   Which of the following improve or reduce your pain?  medication   What number best describes your average pain for the past week:  0 = No pain  to  10 = Worst pain imaginable 5   What number best describes your LOWEST pain in past 24 hours:  0 = No pain  to  10 = Worst pain imaginable 2   What number best describes your WORST pain in past 24 hours:  0 = No pain  to  10 = Worst pain imaginable 7   When is your pain worst? AM, PM   What non-medicine treatments have you already had for your pain? physical therapy, TENS (electrical stimulator), spine injections (shots), exercise, other   Have you tried treating your pain with medication?  Yes   Are you currently taking medications for your pain? Yes       Current Outpatient Prescriptions:      Naproxen Sodium (ALEVE PO), Take 220 mg by mouth daily, Disp: , Rfl:      cyclobenzaprine (FLEXERIL) 10 MG tablet, Take 1 tablet (10 mg) by mouth 3 times daily as needed for muscle spasms, Disp: 42 tablet, Rfl: 0     cefTRIAXone (ROCEPHIN) 1 GM vial, Inject 2 g (2,000 mg) into the vein daily, Disp: 260 mL, Rfl: 1     multivitamin, therapeutic (THERA-VIT) TABS tablet, Take 1 tablet by mouth daily, Disp: , Rfl:     No Known Allergies    History reviewed. No pertinent past medical history.    History reviewed. No pertinent surgical history.    History reviewed. No pertinent family history.    Social History     Social History     Marital status:      Spouse name: N/A     Number of children: N/A     Years of education: N/A     Occupational History     Not on file.     Social History Main Topics     Smoking status: Never Smoker     Smokeless tobacco: Never Used     Alcohol use No     Drug use: No     Sexual activity: Not on file     Other Topics Concern  "    Not on file     Social History Narrative     Problem list and 13 point review of systems: is reviewed in Epic and is negative with the exception of those symptoms associated with HPI and PMH.      OBJECTIVE:   /89 (BP Location: Right arm, Patient Position: Chair, Cuff Size: Adult Regular)  Pulse 101  Ht 1.79 m (5' 10.47\")  Wt 79.4 kg (175 lb 1.6 oz)  BMI 24.79 kg/m2    Imaging:  These are the pertinent radiologist's findings from:  MRI Lumbar W/WO 3/6/18 @ [facility].  Bone marrow edema involving L5 and S1 with enhancement of the  vertebral bodies as well as the intervertebral disc, given patient  history these findings are concerning for discitis/osteomyelitis.     There is a radiologist's report of an MRI from November 2, 2017 indicating mild to moderate Mobic changes at the endplates of L5/S1.  The images are not in our system  See the full report in EPIC/Media tab.  I personally reviewed the images with the patient.      Exam:  *   Well developed, well nourished male found seated comfortably in exam room chair.  Is able to sit and rise independently.    Unaccompanied.    *  Mental Status  A&O X3.  Bright, alert, affable, interactive. Language fluid, fund of knowledge intact. Good historian.  Mood and affect congruent and WNL.  *  Cranial Nerves  II: Able to read printed forms, VF full to gross confrontation.  III: IV, VI:  PERRLA, EOMI, No nystagmus, no ptosis.  V: Sensation intact in bilateral V1, V2, and V3. Jaw clench symmetric.    VII:  Intact to voice bilaterally.  IX:  Pushes tongue against bilateral cheeks.  X:  Palate elevates, uvula midline, phonation intact.  XI: Elevates shoulders, head turn intact.  XII: Tongue midline. No fasciculations.  *  Lumbar Spine:    DTR's Patellar and Achilles 1/4 and symmetric.   No fasciculations.  Muscle bulk and tone WNL.  No Clonus.  Sensation intact.    Lower Extremity Strength                   RIGHT                   LEFT     Iliopsoas                    5/5 "                      5/5       Quad                    5/5                        5/5       Hamstring                      5/5                        5/5         Gastrocs                    5/5                        5/5       Tib. Anterior                     5/5                        5/5       EHL                      5/5                        5/5       Babinski                 Down                                      Down                   *  Structural Exam:   Head is balanced over the pelvis in the coronal and sagittal plane.    Lumbar ROM full. No spasming, no tenderness, no step offs. No SLR.  No SI tenderness. No trochanteric bursal tenderness. Positive right Carlin's.   Gait narrow, smooth, no scissoring. No antalgia.     ASSESSMENT/PLAN:  1. Discitis of lumbar region    2. Chronic right sacroiliac pain      He certainly has radiographic evidence for possible osteomyelitis discitis.   However his back pain has never been particularly severe and it appeared in August centered on the right.  All of that argues against infection which tends to be severe, axial, and aggressive.  He also had Mobic changes, although less severe, on an MRI in November of last year. This is by report only.   I did not have those images to review I will ask for the images to be pushed over.   But a smoldering discitis over the course of 5 or 6 months is extremely unlikely.    His back pain is actually more typical of SI joint pain, and Dr. Olivarez also felt this was the case. Injection therapy is useful once infection is gone.  Other diagnoses on the differential are facet pain.  I am less convinced.    At this point there are no neurosurgical indications. His primary already has him on track with pain management, and is thinking of injection therapy, presumably the SI joint, and so the treatment plan is absolutely appropriate.   At most neurosurgery would recommend repeating the MRI if he declined neurologically. Otherwise we  have offered a p.r.n. follow-up.     I answered all of his questions, which indicated good understanding of the situation.  He is willing to move forward with the recommendations. I supplied him with business cards and telephone numbers for ease of contact.   It's been a pleasure participating in the care of this nice patient. We thank you for your confidence in the [facility], Department of Neurosurgery.      Best Regards,    Alessandra Brandt PA-C  [facility]  Department of Neurosurgery  Phone: [phone]  Fax: [phone]        Total time: 60 minutes with more than 30 minutes spent in direct face to face contact reviewing films, providing education, counseling, non-operative therapies,and indications for surgery as well as further follow up.    This note was generated using voice recognition software. While edited for content some inaccurate phrasing may be found.

## 2018-04-11 ENCOUNTER — HOME INFUSION (PRE-WILLOW HOME INFUSION) (OUTPATIENT)
Dept: PHARMACY | Facility: CLINIC | Age: 43
End: 2018-04-11

## 2018-04-11 LAB
ALBUMIN SERPL-MCNC: 4 G/DL (ref 3.4–5)
ALP SERPL-CCNC: 55 U/L (ref 40–150)
ALT SERPL W P-5'-P-CCNC: 42 U/L (ref 0–70)
ANION GAP SERPL CALCULATED.3IONS-SCNC: 9 MMOL/L (ref 3–14)
AST SERPL W P-5'-P-CCNC: 37 U/L (ref 0–45)
BASOPHILS # BLD AUTO: 0.1 10E9/L (ref 0–0.2)
BASOPHILS NFR BLD AUTO: 1.6 %
BILIRUB SERPL-MCNC: 0.3 MG/DL (ref 0.2–1.3)
BUN SERPL-MCNC: 20 MG/DL (ref 7–30)
CALCIUM SERPL-MCNC: 9 MG/DL (ref 8.5–10.1)
CHLORIDE SERPL-SCNC: 107 MMOL/L (ref 94–109)
CO2 SERPL-SCNC: 24 MMOL/L (ref 20–32)
CREAT SERPL-MCNC: 0.85 MG/DL (ref 0.66–1.25)
CRP SERPL-MCNC: <2.9 MG/L (ref 0–8)
DIFFERENTIAL METHOD BLD: NORMAL
EOSINOPHIL # BLD AUTO: 0.1 10E9/L (ref 0–0.7)
EOSINOPHIL NFR BLD AUTO: 2.5 %
ERYTHROCYTE [DISTWIDTH] IN BLOOD BY AUTOMATED COUNT: 14 % (ref 10–15)
GFR SERPL CREATININE-BSD FRML MDRD: >90 ML/MIN/1.7M2
GLUCOSE SERPL-MCNC: 115 MG/DL (ref 70–99)
HCT VFR BLD AUTO: 45.4 % (ref 40–53)
HGB BLD-MCNC: 14.6 G/DL (ref 13.3–17.7)
IMM GRANULOCYTES # BLD: 0 10E9/L (ref 0–0.4)
IMM GRANULOCYTES NFR BLD: 0.4 %
LYMPHOCYTES # BLD AUTO: 1.4 10E9/L (ref 0.8–5.3)
LYMPHOCYTES NFR BLD AUTO: 25 %
MCH RBC QN AUTO: 28.6 PG (ref 26.5–33)
MCHC RBC AUTO-ENTMCNC: 32.2 G/DL (ref 31.5–36.5)
MCV RBC AUTO: 89 FL (ref 78–100)
MONOCYTES # BLD AUTO: 0.5 10E9/L (ref 0–1.3)
MONOCYTES NFR BLD AUTO: 8.2 %
NEUTROPHILS # BLD AUTO: 3.5 10E9/L (ref 1.6–8.3)
NEUTROPHILS NFR BLD AUTO: 62.3 %
NRBC # BLD AUTO: 0 10*3/UL
NRBC BLD AUTO-RTO: 0 /100
PLATELET # BLD AUTO: 158 10E9/L (ref 150–450)
POTASSIUM SERPL-SCNC: 4.6 MMOL/L (ref 3.4–5.3)
PROT SERPL-MCNC: 7.6 G/DL (ref 6.8–8.8)
RBC # BLD AUTO: 5.11 10E12/L (ref 4.4–5.9)
SODIUM SERPL-SCNC: 140 MMOL/L (ref 133–144)
WBC # BLD AUTO: 5.6 10E9/L (ref 4–11)

## 2018-04-11 PROCEDURE — 85025 COMPLETE CBC W/AUTO DIFF WBC: CPT | Performed by: INTERNAL MEDICINE

## 2018-04-11 PROCEDURE — 80053 COMPREHEN METABOLIC PANEL: CPT | Performed by: INTERNAL MEDICINE

## 2018-04-11 PROCEDURE — 86140 C-REACTIVE PROTEIN: CPT | Performed by: INTERNAL MEDICINE

## 2018-04-11 NOTE — PROGRESS NOTES
This is a recent snapshot of the patient's [facility] medical record.  For current drug dose and complete information and questions, call [phone] or In Basket pool, [facility] home infusion ([id number])  CSN Number:  [id number]

## 2018-04-12 ENCOUNTER — HOME INFUSION (PRE-WILLOW HOME INFUSION) (OUTPATIENT)
Dept: PHARMACY | Facility: CLINIC | Age: 43
End: 2018-04-12

## 2018-04-12 LAB
Lab: NORMAL
Lab: NORMAL
MYCOBACTERIUM SPEC CULT: NORMAL
MYCOBACTERIUM SPEC CULT: NORMAL
SPECIMEN SOURCE: NORMAL
SPECIMEN SOURCE: NORMAL

## 2018-04-12 NOTE — PROGRESS NOTES
This is a recent snapshot of the patient's [facility] medical record.  For current drug dose and complete information and questions, call [phone] or In Basket pool, [facility] home infusion ([id number])  CSN Number:  [id number]

## 2018-04-13 ENCOUNTER — OFFICE VISIT (OUTPATIENT)
Dept: INFECTIOUS DISEASES | Facility: CLINIC | Age: 43
End: 2018-04-13
Attending: INTERNAL MEDICINE
Payer: COMMERCIAL

## 2018-04-13 ENCOUNTER — HOME INFUSION (PRE-WILLOW HOME INFUSION) (OUTPATIENT)
Dept: PHARMACY | Facility: CLINIC | Age: 43
End: 2018-04-13

## 2018-04-13 VITALS
WEIGHT: 173.8 LBS | TEMPERATURE: 99 F | HEART RATE: 89 BPM | SYSTOLIC BLOOD PRESSURE: 136 MMHG | OXYGEN SATURATION: 96 % | DIASTOLIC BLOOD PRESSURE: 87 MMHG | HEIGHT: 70 IN | BODY MASS INDEX: 24.88 KG/M2

## 2018-04-13 DIAGNOSIS — I05.9 ENDOCARDITIS OF MITRAL VALVE: Primary | ICD-10-CM

## 2018-04-13 PROCEDURE — G0463 HOSPITAL OUTPT CLINIC VISIT: HCPCS | Mod: ZF

## 2018-04-13 ASSESSMENT — PAIN SCALES - GENERAL: PAINLEVEL: MILD PAIN (2)

## 2018-04-13 NOTE — LETTER
4/13/2018      RE: Frederick Dawsonkorina  [address] [facility] [address]       ID Clinic Return Patient Visit    Assessment:  1. 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 On 4/15 he will complete 6 weeks of therapy.  2. MV regurgitation. May require ultimate valve replacement. To see cardiology in several weeks. At this point I would not object to surgery when it is medically indicated. Could consider surveillance cultures pre-operatively looking specifically for S mitis (ie, if CONS grows would not treat this organism) but this would only be out of an abundance of caution. If surgery is pursued and tissues are questionable, would send operative cultures  3. Lumbago, pre-existing and likely now exacerbated. No objection to lumbar steroid injection as this will hopefully increase his mobility and make PT more possible    Plan  -stop ceftriaxone on 4/16 (last dose 4/15). Can pull PICC after this date.  -amoxicillin 2 g prior to dental procedures, now that he's had IE  -no need for ID f/u, but I am happy to field questions from his NSGY and cardiology providers if they arise    Visit length 45 min, >50% clinical counseling/care coordination.    Deb Brito MD   of Medicine, Division of Infectious Diseases  [facility] [phone]        HPI:   This is a 42-year-old man recently hospitalized from 3/1/18 until 3/7/18.  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  Please see my initial consult from 3/3/18 for additional detail.  He has underlying, pre-existing mitral valve prolapse, and the degree of his mitral regurgitation had progressed rapidly in the last several years.  Fortunately he lacked clinical symptoms of heart failure prompting urgent  "consideration for surgery.  He also had back pain and presumed osteomyelitis/discitis of L5-S1.  He received penicillin in the hospital and was discharged on ceftriaxone, to complete 6 weeks of therapy.  He has done well since discharge.  His primary complaint is of ongoing, but dramatically improved lumbar pain.  Particularly exacerbating maneuvers include lying flat on his back and attempting to rise from a recumbent position.  He has been seen by sports medicine, and is anxious to be off antibiotics so he can be eligible for lumbar steroid injection.  Specifically, he reports that his back pain is around 2 out of 10.  No diarrhea, urinary change.  No skin rash.    PMH:  Mitral valve prolapse as above, follows with cardiology.  DEEPAK on 3/30/18 reveals severe mitral regurgitation.  An upcoming appointment with cardiology is expected to entail discussion of surgical options.  Not having any symptoms of cardiac failure.      Current Outpatient Prescriptions on File Prior to Visit:  Naproxen Sodium (ALEVE PO) Take 220 mg by mouth daily   cyclobenzaprine (FLEXERIL) 10 MG tablet Take 1 tablet (10 mg) by mouth 3 times daily as needed for muscle spasms   cefTRIAXone (ROCEPHIN) 1 GM vial Inject 2 g (2,000 mg) into the vein daily   multivitamin, therapeutic (THERA-VIT) TABS tablet Take 1 tablet by mouth daily     No current facility-administered medications on file prior to visit.     Exam:  /87  Pulse 89  Temp 99  F (37.2  C)  Ht 1.79 m (5' 10.47\")  Wt 78.8 kg (173 lb 12.8 oz)  SpO2 96%  BMI 24.61 kg/m2  Awake, alert. Pleasant  4-5/6 systolic murmur  Lungs clear, breathing normal  Point tenderness at the paraspinous area just R of L5 area. No use of assistive devices for ambulation  No skin rash  LUE PICC with Secure-A-cath in place      Deb Brito MD      "

## 2018-04-13 NOTE — PROGRESS NOTES
ID Clinic Return Patient Visit    Assessment:  1. 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 On 4/15 he will complete 6 weeks of therapy.  2. MV regurgitation. May require ultimate valve replacement. To see cardiology in several weeks. At this point I would not object to surgery when it is medically indicated. Could consider surveillance cultures pre-operatively looking specifically for S mitis (ie, if CONS grows would not treat this organism) but this would only be out of an abundance of caution. If surgery is pursued and tissues are questionable, would send operative cultures  3. Lumbago, pre-existing and likely now exacerbated. No objection to lumbar steroid injection as this will hopefully increase his mobility and make PT more possible    Plan  -stop ceftriaxone on 4/16 (last dose 4/15). Can pull PICC after this date.  -amoxicillin 2 g prior to dental procedures, now that he's had IE  -no need for ID f/u, but I am happy to field questions from his NSGY and cardiology providers if they arise    Visit length 45 min, >50% clinical counseling/care coordination.    Deb Brito MD   of Medicine, Division of Infectious Diseases  [facility] [phone]        HPI:   This is a 42-year-old man recently hospitalized from 3/1/18 until 3/7/18.  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  Please see my initial consult from 3/3/18 for additional detail.  He has underlying, pre-existing mitral valve prolapse, and the degree of his mitral regurgitation had progressed rapidly in the last several years.  Fortunately he lacked clinical symptoms of heart failure prompting urgent consideration for surgery.  He also had back pain and presumed osteomyelitis/discitis  "of L5-S1.  He received penicillin in the hospital and was discharged on ceftriaxone, to complete 6 weeks of therapy.  He has done well since discharge.  His primary complaint is of ongoing, but dramatically improved lumbar pain.  Particularly exacerbating maneuvers include lying flat on his back and attempting to rise from a recumbent position.  He has been seen by sports medicine, and is anxious to be off antibiotics so he can be eligible for lumbar steroid injection.  Specifically, he reports that his back pain is around 2 out of 10.  No diarrhea, urinary change.  No skin rash.    PMH:  Mitral valve prolapse as above, follows with cardiology.  DEEPAK on 3/30/18 reveals severe mitral regurgitation.  An upcoming appointment with cardiology is expected to entail discussion of surgical options.  Not having any symptoms of cardiac failure.      Current Outpatient Prescriptions on File Prior to Visit:  Naproxen Sodium (ALEVE PO) Take 220 mg by mouth daily   cyclobenzaprine (FLEXERIL) 10 MG tablet Take 1 tablet (10 mg) by mouth 3 times daily as needed for muscle spasms   cefTRIAXone (ROCEPHIN) 1 GM vial Inject 2 g (2,000 mg) into the vein daily   multivitamin, therapeutic (THERA-VIT) TABS tablet Take 1 tablet by mouth daily     No current facility-administered medications on file prior to visit.     Exam:  /87  Pulse 89  Temp 99  F (37.2  C)  Ht 1.79 m (5' 10.47\")  Wt 78.8 kg (173 lb 12.8 oz)  SpO2 96%  BMI 24.61 kg/m2  Awake, alert. Pleasant  4-5/6 systolic murmur  Lungs clear, breathing normal  Point tenderness at the paraspinous area just R of L5 area. No use of assistive devices for ambulation  No skin rash  LUE PICC with Secure-A-cath in place      "

## 2018-04-13 NOTE — NURSING NOTE
"Chief Complaint   Patient presents with     New Patient     bacterial endocarditis       Initial /87  Pulse 89  Temp 99  F (37.2  C)  Ht 1.79 m (5' 10.47\")  Wt 78.8 kg (173 lb 12.8 oz)  SpO2 96%  BMI 24.61 kg/m2 Estimated body mass index is 24.61 kg/(m^2) as calculated from the following:    Height as of this encounter: 1.79 m (5' 10.47\").    Weight as of this encounter: 78.8 kg (173 lb 12.8 oz).  Medication Reconciliation: complete   KAELYN Maher    "

## 2018-04-13 NOTE — MR AVS SNAPSHOT
After Visit Summary   4/13/2018    Frederick Nguyen    MRN: [id number]           Patient Information     Date Of Birth          1975        Visit Information        Provider Department      4/13/2018 11:00 AM Deb Brito MD [facility] and Infectious Diseases        Today's Diagnoses     Endocarditis of mitral valve    -  1       Follow-ups after your visit        Your next 10 appointments already scheduled     May 22, 2018  3:30 PM CDT   (Arrive by 3:15 PM)   Return Visit with CARLOS Bush MD   [facility] ([email or web address] and Surgery [place])    [address] [place] [address]  [address] [place] [address] [phone]              Who to contact     If you have questions or need follow up information about today's clinic visit or your schedule please contact [facility] AND INFECTIOUS DISEASES directly at [phone].  Normal or non-critical lab and imaging results will be communicated to you by MyChart, letter or phone within 4 business days after the clinic has received the results. If you do not hear from us within 7 days, please contact the clinic through [employer] or phone. If you have a critical or abnormal lab result, we will notify you by phone as soon as possible.  Submit refill requests through [email or web address] or call your pharmacy and they will forward the refill request to us. Please allow 3 business days for your refill to be completed.          Additional Information About Your Visit        MyChart Information     [email or web address] gives you secure access to your electronic health record. If you see a primary care provider, you can also send messages to your care team and make appointments. If you have questions, please call your primary care clinic.  If you do not have a primary care provider, please call [phone] and they will assist you.        Care EveryWhere ID     This is your Care EveryWhere ID. This could be used by other organizations  "to access your [place] medical records  [id number]        Your Vitals Were     Pulse Temperature Height Pulse Oximetry BMI (Body Mass Index)       89 99  F (37.2  C) 1.79 m (5' 10.47\") 96% 24.61 kg/m2        Blood Pressure from Last 3 Encounters:   04/13/18 136/87   04/09/18 134/89   04/02/18 (!) 143/92    Weight from Last 3 Encounters:   04/13/18 78.8 kg (173 lb 12.8 oz)   04/09/18 79.4 kg (175 lb 1.6 oz)   04/02/18 75.6 kg (166 lb 11.2 oz)              Today, you had the following     No orders found for display       Primary Care Provider Office Phone # Fax #    Liliana Ginger Murray [phone]       [address] [address]        Equal Access to Services     HILTON QUINTERO : Hadii aad ku hadasho Sokalyn, waaxda luqadaha, qaybta kaalmada adeegyada, keya melgar . So [facility] [phone].    ATENCIÓN: Si habla español, tiene a chaves disposición servicios gratuitos de asistencia lingüística. New al [phone].    We comply with applicable federal civil rights laws and [place] laws. We do not discriminate on the basis of race, color, national origin, age, disability, sex, sexual orientation, or gender identity.            Thank you!     Thank you for choosing [facility] AND INFECTIOUS DISEASES  for your care. Our goal is always to provide you with excellent care. Hearing back from our patients is one way we can continue to improve our services. Please take a few minutes to complete the written survey that you may receive in the mail after your visit with us. Thank you!             Your Updated Medication List - Protect others around you: Learn how to safely use, store and throw away your medicines at www.disposemymeds.org.          This list is accurate as of 4/13/18 12:00 PM.  Always use your most recent med list.                   Brand Name Dispense Instructions for use Diagnosis    ALEVE PO      Take 220 mg by mouth " daily        cefTRIAXone 1 GM vial    ROCEPHIN    260 mL    Inject 2 g (2,000 mg) into the vein daily    Gram-positive bacteremia, Osteomyelitis of spine (H)       cyclobenzaprine 10 MG tablet    FLEXERIL    42 tablet    Take 1 tablet (10 mg) by mouth 3 times daily as needed for muscle spasms    Acute low back pain without sciatica, unspecified back pain laterality       multivitamin, therapeutic Tabs tablet      Take 1 tablet by mouth daily

## 2018-04-13 NOTE — PROGRESS NOTES
This is a recent snapshot of the patient's [facility] medical record.  For current drug dose and complete information and questions, call [phone] or In Basket pool, fv home infusion ([id number])  CSN Number:  [id number]

## 2018-04-16 ENCOUNTER — HOME INFUSION (PRE-WILLOW HOME INFUSION) (OUTPATIENT)
Dept: PHARMACY | Facility: CLINIC | Age: 43
End: 2018-04-16

## 2018-04-18 NOTE — PROGRESS NOTES
This is a recent snapshot of the patient's [facility] medical record.  For current drug dose and complete information and questions, call [phone] or In Basket pool, fv home infusion ([id number])  CSN Number:  [id number]

## 2018-04-20 ENCOUNTER — TELEPHONE (OUTPATIENT)
Dept: INTERNAL MEDICINE | Facility: CLINIC | Age: 43
End: 2018-04-20

## 2018-04-20 NOTE — TELEPHONE ENCOUNTER
[facility] Call Center    Phone Message    May a detailed message be left on voicemail: yes    Reason for Call: Other: Please call Pt back to schedule his injection ordered by Dr. Alonso.     Action Taken: Message routed to:  Other: Pain

## 2018-04-20 NOTE — PROGRESS NOTES
This is a recent snapshot of the patient's [facility] medical record.  For current drug dose and complete information and questions, call [phone] or In Basket pool, fv home infusion ([id number])  CSN Number:  [id number]

## 2018-04-23 DIAGNOSIS — M53.3 SACROILIAC JOINT PAIN: Primary | ICD-10-CM

## 2018-04-23 NOTE — TELEPHONE ENCOUNTER
Audi called today to set up his procedure.     I have him scheduled on 5/9/2018 with Dr. Williamson.     He is aware that he will need to have a .

## 2018-04-23 NOTE — PROGRESS NOTES
This is a recent snapshot of the patient's [facility] medical record.  For current drug dose and complete information and questions, call [phone] or In Basket pool, fv home infusion ([id number])  CSN Number:  [id number]

## 2018-04-24 ENCOUNTER — OFFICE VISIT (OUTPATIENT)
Dept: INTERNAL MEDICINE | Facility: CLINIC | Age: 43
End: 2018-04-24
Payer: COMMERCIAL

## 2018-04-24 VITALS
TEMPERATURE: 98 F | WEIGHT: 176.4 LBS | DIASTOLIC BLOOD PRESSURE: 89 MMHG | SYSTOLIC BLOOD PRESSURE: 134 MMHG | RESPIRATION RATE: 16 BRPM | BODY MASS INDEX: 24.97 KG/M2 | HEART RATE: 90 BPM

## 2018-04-24 DIAGNOSIS — R11.0 NAUSEA: ICD-10-CM

## 2018-04-24 DIAGNOSIS — Z86.79 HISTORY OF ENDOCARDITIS: Primary | ICD-10-CM

## 2018-04-24 DIAGNOSIS — Z86.79 HISTORY OF ENDOCARDITIS: ICD-10-CM

## 2018-04-24 DIAGNOSIS — I33.0 ACUTE BACTERIAL ENDOCARDITIS: ICD-10-CM

## 2018-04-24 LAB
ALBUMIN SERPL-MCNC: 4.1 G/DL (ref 3.4–5)
ALP SERPL-CCNC: 56 U/L (ref 40–150)
ALT SERPL W P-5'-P-CCNC: 43 U/L (ref 0–70)
ANION GAP SERPL CALCULATED.3IONS-SCNC: 7 MMOL/L (ref 3–14)
AST SERPL W P-5'-P-CCNC: 32 U/L (ref 0–45)
BASOPHILS # BLD AUTO: 0.1 10E9/L (ref 0–0.2)
BASOPHILS NFR BLD AUTO: 1.1 %
BILIRUB SERPL-MCNC: 0.4 MG/DL (ref 0.2–1.3)
BUN SERPL-MCNC: 19 MG/DL (ref 7–30)
CALCIUM SERPL-MCNC: 8.9 MG/DL (ref 8.5–10.1)
CHLORIDE SERPL-SCNC: 103 MMOL/L (ref 94–109)
CO2 SERPL-SCNC: 27 MMOL/L (ref 20–32)
CREAT SERPL-MCNC: 0.91 MG/DL (ref 0.66–1.25)
DIFFERENTIAL METHOD BLD: NORMAL
EOSINOPHIL # BLD AUTO: 0.1 10E9/L (ref 0–0.7)
EOSINOPHIL NFR BLD AUTO: 1.5 %
ERYTHROCYTE [DISTWIDTH] IN BLOOD BY AUTOMATED COUNT: 13.4 % (ref 10–15)
GFR SERPL CREATININE-BSD FRML MDRD: >90 ML/MIN/1.7M2
GLUCOSE SERPL-MCNC: 108 MG/DL (ref 70–99)
HCT VFR BLD AUTO: 47.2 % (ref 40–53)
HGB BLD-MCNC: 15.7 G/DL (ref 13.3–17.7)
IMM GRANULOCYTES # BLD: 0 10E9/L (ref 0–0.4)
IMM GRANULOCYTES NFR BLD: 0.5 %
LYMPHOCYTES # BLD AUTO: 2.3 10E9/L (ref 0.8–5.3)
LYMPHOCYTES NFR BLD AUTO: 26.9 %
MCH RBC QN AUTO: 28.9 PG (ref 26.5–33)
MCHC RBC AUTO-ENTMCNC: 33.3 G/DL (ref 31.5–36.5)
MCV RBC AUTO: 87 FL (ref 78–100)
MONOCYTES # BLD AUTO: 0.8 10E9/L (ref 0–1.3)
MONOCYTES NFR BLD AUTO: 9.6 %
NEUTROPHILS # BLD AUTO: 5.1 10E9/L (ref 1.6–8.3)
NEUTROPHILS NFR BLD AUTO: 60.4 %
NRBC # BLD AUTO: 0 10*3/UL
NRBC BLD AUTO-RTO: 0 /100
NT-PROBNP SERPL-MCNC: 38 PG/ML (ref 0–125)
PLATELET # BLD AUTO: 190 10E9/L (ref 150–450)
POTASSIUM SERPL-SCNC: 4 MMOL/L (ref 3.4–5.3)
PROT SERPL-MCNC: 7.8 G/DL (ref 6.8–8.8)
RBC # BLD AUTO: 5.44 10E12/L (ref 4.4–5.9)
SODIUM SERPL-SCNC: 136 MMOL/L (ref 133–144)
TSH SERPL DL<=0.005 MIU/L-ACNC: 2.53 MU/L (ref 0.4–4)
WBC # BLD AUTO: 8.4 10E9/L (ref 4–11)

## 2018-04-24 ASSESSMENT — PAIN SCALES - GENERAL: PAINLEVEL: MILD PAIN (3)

## 2018-04-24 NOTE — PATIENT INSTRUCTIONS
[facility]: [phone]     [facility] Medication Refill Request Information:  * Please contact your pharmacy regarding ANY request for medication refills.  ** [facility] Prescription Fax = [phone]  * Please allow 3 business days for routine medication refills.  * Please allow 5 business days for controlled substance medication refills.     [facility] Test Result notification information:  *You will be notified with in 7-10 days of your appointment day regarding the results of your test.  If you are on MyChart you will be notified as soon as the provider has reviewed the results and signed off on them.

## 2018-04-24 NOTE — NURSING NOTE
"Chief Complaint   Patient presents with     Consult     patient states he has had episodes of feeling 'flushed\" since Saturday     MILE DORADO at 1:48 PM on 4/24/2018.      "

## 2018-04-24 NOTE — MR AVS SNAPSHOT
After Visit Summary   4/24/2018    Frederick Nguyen    MRN: [id number]           Patient Information     Date Of Birth          1975        Visit Information        Provider Department      4/24/2018 1:30 PM Kim Oconnell MD [facility] Primary Care Clinic        Today's Diagnoses     History of endocarditis    -  1    Nausea          Care Instructions    Primary Care Center: [phone]     [facility] Medication Refill Request Information:  * Please contact your pharmacy regarding ANY request for medication refills.  ** [facility] Prescription Fax = [phone]  * Please allow 3 business days for routine medication refills.  * Please allow 5 business days for controlled substance medication refills.     Primary Care Center Test Result notification information:  *You will be notified with in 7-10 days of your appointment day regarding the results of your test.  If you are on MyChart you will be notified as soon as the provider has reviewed the results and signed off on them.            Follow-ups after your visit        Your next 10 appointments already scheduled     Apr 24, 2018  2:45 PM CDT   LAB with  LAB   [facility] Lab [facility])    [address] [place] [address] [phone]           Please do not eat 10-12 hours before your appointment if you are coming in fasting for labs on lipids, cholesterol, or glucose (sugar). This does not apply to pregnant women. Water, hot tea and black coffee (with nothing added) are okay. Do not drink other fluids, diet soda or chew gum.            Apr 30, 2018  3:40 PM CDT   (Arrive by 3:25 PM)   Return Visit with Nolvia Nguyen MD   [facility] Primary Care Clinic ([facility] [place])    [address] [place] [address] [phone]            May 09, 2018   Procedure with Thom Williamson MD   [facility] Surgery and Procedure Center (  [facility] and Surgery Center)    [address]  5th Floor  [facility] [id number] [phone]           Located in the Clinics and Surgery Center at [address], [place] [address].   parking is very convenient and highly recommended.  is a $6 flat rate fee.  Both  and self parkers should enter the main arrival plaza from [address] [facility] [facility]; parking attendants will direct you based on your parking preference.            May 22, 2018  3:30 PM CDT   (Arrive by 3:15 PM)   Return Visit with CARLOS Bush MD   [facility] ([facility] [place])    [address] [place] [address]  [address]  [facility] [id number] [phone]              Future tests that were ordered for you today     Open Future Orders        Priority Expected Expires Ordered    Blood culture Routine 4/26/2018 5/8/2018 4/24/2018    Blood culture Routine 4/25/2018 5/8/2018 4/24/2018    Blood culture Routine 4/24/2018 5/8/2018 4/24/2018    TSH with free T4 reflex Routine 4/24/2018 5/8/2018 4/24/2018    CBC with platelets differential Routine 4/24/2018 5/8/2018 4/24/2018    Comprehensive metabolic panel Routine 4/24/2018 5/8/2018 4/24/2018    N terminal pro BNP Routine 4/24/2018 4/24/2019 4/24/2018            Who to contact     Please call your clinic at [phone] to:    Ask questions about your health    Make or cancel appointments    Discuss your medicines    Learn about your test results    Speak to your doctor            Additional Information About Your Visit        [employer] Information     [employer] gives you secure access to your electronic health record. If you see a primary care provider, you can also send messages to your care team and make appointments. If you have questions, please call your primary care clinic.  If you do not have a primary care provider, please call [phone] and they will assist you.      [employer] is an electronic gateway that provides easy, online access to your  medical records. With [employer], you can request a clinic appointment, read your test results, renew a prescription or communicate with your care team.     To access your existing account, please contact your [facility] or call [phone] for assistance.        Care EveryWhere ID     This is your Care EveryWhere ID. This could be used by other organizations to access your [place] medical records  [id number]        Your Vitals Were     Pulse Temperature Respirations BMI (Body Mass Index)          90 98  F (36.7  C) 16 24.97 kg/m2         Blood Pressure from Last 3 Encounters:   04/24/18 134/89   04/13/18 136/87   04/09/18 134/89    Weight from Last 3 Encounters:   04/24/18 80 kg (176 lb 6.4 oz)   04/13/18 78.8 kg (173 lb 12.8 oz)   04/09/18 79.4 kg (175 lb 1.6 oz)                 Today's Medication Changes          These changes are accurate as of 4/24/18  2:25 PM.  If you have any questions, ask your nurse or doctor.               Start taking these medicines.        Dose/Directions    omeprazole 20 MG CR capsule   Commonly known as:  priLOSEC   Used for:  Nausea   Started by:  Kim Oconnell MD        Dose:  20 mg   Take 1 capsule (20 mg) by mouth daily   Quantity:  30 capsule   Refills:  3         Stop taking these medicines if you haven't already. Please contact your care team if you have questions.     cefTRIAXone 1 GM vial   Commonly known as:  ROCEPHIN   Stopped by:  Kim Oconnell MD                Where to get your medicines      These medications were sent to [facility] [place], [place] - [address]  [address] [address]    Hours:  TRANSPLANT PHONE NUMBER [phone] Phone:  [phone]     omeprazole 20 MG CR capsule                Primary Care Provider Office Phone # Fax #    Liliana Murray [phone]       [address]  [address]  [facility] [address]        Equal Access to Services     HILTON QUINTERO : Hadii aad ku hadsgamy Arakalyn, wajulianada luqadaha, qaandrésta kaalmanadja arnett, keya dixon. So [facility] [phone].    ATENCIÓN: Si habla español, tiene a chaves disposición servicios gratuitos de asistencia lingüística. Llame al [phone].    We comply with applicable federal civil rights laws and [place] laws. We do not discriminate on the basis of race, color, national origin, age, disability, sex, sexual orientation, or gender identity.            Thank you!     Thank you for choosing [facility] PRIMARY CARE CLINIC  for your care. Our goal is always to provide you with excellent care. Hearing back from our patients is one way we can continue to improve our services. Please take a few minutes to complete the written survey that you may receive in the mail after your visit with us. Thank you!             Your Updated Medication List - Protect others around you: Learn how to safely use, store and throw away your medicines at www.disposemymeds.org.          This list is accurate as of 4/24/18  2:25 PM.  Always use your most recent med list.                   Brand Name Dispense Instructions for use Diagnosis    ALEVE PO      Take 220 mg by mouth daily        cyclobenzaprine 10 MG tablet    FLEXERIL    42 tablet    Take 1 tablet (10 mg) by mouth 3 times daily as needed for muscle spasms    Acute low back pain without sciatica, unspecified back pain laterality       multivitamin, therapeutic Tabs tablet      Take 1 tablet by mouth daily        omeprazole 20 MG CR capsule    priLOSEC    30 capsule    Take 1 capsule (20 mg) by mouth daily    Nausea

## 2018-04-25 DIAGNOSIS — M54.50 ACUTE LOW BACK PAIN WITHOUT SCIATICA, UNSPECIFIED BACK PAIN LATERALITY: ICD-10-CM

## 2018-04-25 RX ORDER — CYCLOBENZAPRINE HCL 10 MG
10 TABLET ORAL 3 TIMES DAILY PRN
Qty: 42 TABLET | Refills: 0 | Status: SHIPPED | OUTPATIENT
Start: 2018-04-25 | End: 2018-09-18

## 2018-04-30 ENCOUNTER — OFFICE VISIT (OUTPATIENT)
Dept: INTERNAL MEDICINE | Facility: CLINIC | Age: 43
End: 2018-04-30
Payer: COMMERCIAL

## 2018-04-30 VITALS
DIASTOLIC BLOOD PRESSURE: 87 MMHG | SYSTOLIC BLOOD PRESSURE: 124 MMHG | BODY MASS INDEX: 25.51 KG/M2 | HEART RATE: 81 BPM | WEIGHT: 180.2 LBS

## 2018-04-30 DIAGNOSIS — M46.20 OSTEOMYELITIS OF SPINE (H): Primary | ICD-10-CM

## 2018-04-30 LAB
BACTERIA SPEC CULT: NO GROWTH
Lab: NORMAL
SPECIMEN SOURCE: NORMAL

## 2018-04-30 ASSESSMENT — PAIN SCALES - GENERAL: PAINLEVEL: MODERATE PAIN (4)

## 2018-04-30 NOTE — NURSING NOTE
Chief Complaint   Patient presents with     Hospital F/U     pt here following a hospital visit, bacterial infection     Back Pain     pt here to discuss back pain     Arianna Allen CMA at 3:42 PM on 4/30/2018.

## 2018-04-30 NOTE — MR AVS SNAPSHOT
After Visit Summary   4/30/2018    Frederick Nguyen    MRN: [id number]           Patient Information     Date Of Birth          1975        Visit Information        Provider Department      4/30/2018 3:40 PM Nolvia Nguyen MD [facility]        Today's Diagnoses     Osteomyelitis of spine (H)    -  1       Follow-ups after your visit        Your next 10 appointments already scheduled     May 09, 2018   Procedure with Thom Williamson MD   [facility] ([facility] [place])    [address] [place] [address]  5th Floor  [facility] [address] [phone]           Located in the Clinics and Surgery Center at [address] [place] [address].   parking is very convenient and highly recommended.  is a $6 flat rate fee.  Both  and self parkers should enter the main arrival plaza from [facility]; parking attendants will direct you based on your parking preference.            May 22, 2018  3:30 PM CDT   (Arrive by 3:15 PM)   Return Visit with CARLOS Bush MD   [facility] ([facility] [place])    [address] [place] [address]  [address] [place] [address] [phone]              Who to contact     Please call your clinic at [phone] to:    Ask questions about your health    Make or cancel appointments    Discuss your medicines    Learn about your test results    Speak to your doctor            Additional Information About Your Visit        MyChart Information     [employer] gives you secure access to your electronic health record. If you see a primary care provider, you can also send messages to your care team and make appointments. If you have questions, please call your primary care clinic.  If you do not have a primary care provider, please call [phone] and they will assist you.      [employer] is an electronic gateway that provides easy, online access to your medical records.  With [facility] [facility], you can request a clinic appointment, read your test results, renew a prescription or communicate with your care team.     To access your existing account, please contact your [facility] or call [phone] for assistance.        Care EveryWhere ID     This is your Care EveryWhere ID. This could be used by other organizations to access your [place] medical records  [id number]        Your Vitals Were     Pulse BMI (Body Mass Index)                81 25.51 kg/m2           Blood Pressure from Last 3 Encounters:   04/30/18 124/87   04/24/18 134/89   04/13/18 136/87    Weight from Last 3 Encounters:   04/30/18 81.7 kg (180 lb 3.2 oz)   04/24/18 80 kg (176 lb 6.4 oz)   04/13/18 78.8 kg (173 lb 12.8 oz)              Today, you had the following     No orders found for display       Primary Care Provider Office Phone # Fax #    Liliana Ginger Murray [phone]       [place] [address]        Equal Access to Services     HILTON QUINTERO : Hadii kathia mir hadasho Sokalyn, waaxda luqadaha, qaybta kaalmada melquiades, keya dixon. So [facility] [phone].    ATENCIÓN: Si habla español, tiene a chaves disposición servicios gratuitos de asistencia lingüística. New al [phone].    We comply with applicable federal civil rights laws and [place] laws. We do not discriminate on the basis of race, color, national origin, age, disability, sex, sexual orientation, or gender identity.            Thank you!     Thank you for choosing [facility]  for your care. Our goal is always to provide you with excellent care. Hearing back from our patients is one way we can continue to improve our services. Please take a few minutes to complete the written survey that you may receive in the mail after your visit with us. Thank you!             Your Updated Medication List - Protect others around you: Learn  how to safely use, store and throw away your medicines at www.disposemymeds.org.          This list is accurate as of 4/30/18  5:06 PM.  Always use your most recent med list.                   Brand Name Dispense Instructions for use Diagnosis    ALEVE PO      Take 220 mg by mouth daily        cyclobenzaprine 10 MG tablet    FLEXERIL    42 tablet    Take 1 tablet (10 mg) by mouth 3 times daily as needed for muscle spasms    Acute low back pain without sciatica, unspecified back pain laterality       multivitamin, therapeutic Tabs tablet      Take 1 tablet by mouth daily        omeprazole 20 MG CR capsule    priLOSEC    30 capsule    Take 1 capsule (20 mg) by mouth daily    Nausea

## 2018-04-30 NOTE — PROGRESS NOTES
"[facility]   Nolvia Nguyen MD  04/30/2018      Chief Complaint:   Hospital F/U and Back Pain     History of Present Illness:   Frederick Nguyen is a 42 year old male with a recent history of infective endocarditis of the mitral valve and osteomyelitis secondary to his endocarditis who presents for a hospital follow up and evaluation of back pain.     Hospital Follow up:  The patient presented to the hospital with gram positive bacteremia found to be strep oralis with a high suspicion for endocarditis, and L5-S1 discitis/osteomyelitis.  He was treated with intravenous Ceftriaxone 2g IV daily for eight weeks through to April 28th, 2018. He has been followed by Dr. Brito of infectious disease since then. He was recommended cardiologist follow up, as well, given the worsened mitral regurgitation on TTE.     As of about two weeks ago, he has finished his Ceftriaxone. Since then, he reports that he has been feeling fine. He has no cough, shortness of breath, fever, night sweats, or chills now. He still has back pain.  His PICC line was pulled 2 weeks ago.    Back pain:  Today, he describes his back pain as being \"different\" from previous. He does not have continuous pain in his back, but does have intermittent spasms; he had one with sitting today. These have not been alleviated by a muscle relaxer I prescribed; Aleve has not helped either. The spasms \"shoot across his entire back\" and his entire mid-section tightens in response. This lasts for a bout a second before his body relaxes. These spasms are movement based, and he finds that he becomes wholly tense with these and holds his breath. He denies radiation of pain to his legs, and has no numbness to the area. His pain alleviates with walking--he has been taking thirty minute walks outside, which have been good for his pain. However, he describes that his \"hips feel heavy\" with the spasms. These episodes occur everyday, but they are no " "longer waking him at night. Sleeping has become much better for him, overall. He has not started any new movement of activity.    Per his note from Neurosurgery on 04/09/2018, Alessandra Aviles, the PA, thought that his pain was \"more typical of SI joint pain\" She suggested injection therapy once infection is gone, and thought that, in terms of other diagnoses, facet pain was another possibility.      Review of Systems:   Pertinent items are noted in HPI, remainder of complete ROS is negative.      Active Medications:      cyclobenzaprine (FLEXERIL) 10 MG tablet, Take 1 tablet (10 mg) by mouth 3 times daily as needed for muscle spasms, Disp: 42 tablet, Rfl: 0     multivitamin, therapeutic (THERA-VIT) TABS tablet, Take 1 tablet by mouth daily, Disp: , Rfl:      Naproxen Sodium (ALEVE PO), Take 220 mg by mouth daily, Disp: , Rfl:      omeprazole (PRILOSEC) 20 MG CR capsule, Take 1 capsule (20 mg) by mouth daily, Disp: 30 capsule, Rfl: 3      Allergies:   Review of patient's allergies indicates no known allergies.      Past Medical History:  Endocarditis, mitral valve, strep mitis  Osteomyelitis secondary to endocarditis   Cervicalgia  Lumbago  Mitral valve prolapse   Nonallopathic lesion of lumbar region  Spasm of muscle  Bacteremia    Past Surgical History:  History reviewed. No pertinent past surgical history.     Family History:    History reviewed. No pertinent family history.       Social History:   The patient was alone.  Smoking Status: Never   Smokeless Tobacco: Never   Alcohol Use: No       Physical Exam:   /87  Pulse 81  Wt 81.7 kg (180 lb 3.2 oz)  BMI 25.51 kg/m2     Constitutional: Healthy, alert, no distress and cooperative  Head: Normocephalic.  Back:  No tenderness with percussion to the back. No insertional tenderness over the iliac crest, or over the Paraspinous muscles. No sacroiliac pain. JENN test with some pain to stretching of the iliopsoas muscle otherwise negative. Positive Dmitriy's on " the right. Straight leg test negative bilaterally.     Labs:  Lipids from [facility]  1/28/2015 6/7/2013     149 138   76 62   54 53   2.76 2.60   95 85   80 73   FASTING Fasting        Assessment and Plan:  L5-S1 osteomyelitis/discitis and streptococcal endocarditis, fully treated now and resolved.  He has residual back pain which is much improved.    I explained that his pain right now was not typical of L5-S1 pain, as this would typically radiate down his legs, and was not typical of pain from an infected bone. I performed a back exam today, at the patient's consent. I explained that I thought that he should stop his Flexeril and Aleve as these have not been helping. I offered to refer the patient to see Dr. Olivarez of pain medicine; the patient already has a pain clinic appointment on 05/09 for a corticosteroid injection. I encouraged him to keep this appointment. I reassured him that I had no concerns for infection at this time. I also encouraged him to continue with his walking and to try his physical therapy exercises as well.     I reviewed the patient's past lipid panels with him, and we deleted the standing order in the [employer] system for a lipid panel, as I was reassured from his past results taken from [facility] (see above).      Follow-up: As needed        Scribe Disclosure:   I, Sherita Lora, am serving as a scribe to document services personally performed by Nolvia Nguyen MD at this visit, based upon the provider's statements to me. All documentation has been reviewed by the aforementioned provider prior to being entered into the official medical record.     Portions of this medical record were completed by a scribe. UPON MY REVIEW AND AUTHENTICATION BY ELECTRONIC SIGNATURE, this confirms (a) I performed the applicable clinical services, and (b) the record is accurate.     Nolvia Nguyen

## 2018-05-01 NOTE — PROGRESS NOTES
Frederick is a 42 year old male with a past medical history of Endocarditis, mitral valve, strep mitis (03/15/2018) and Osteomyelitis, L5S1 (03/15/2018).     He was seen today for a constellation of symptoms, including fatigue, malaise, feeling flushed or warm, and nausea.      Total time spent 25 minutes.  More than 50% of the time spent with Mr. Nguyen on counseling / coordinating his care.      Symptoms began two days ago.  Low grade temps noted at home, 99.2.  He denies any ill contacts, no recent travel.  No chest pain, no shortness of breath.  No cough or URI symptoms. Denies any tooth pain, dental pain, recent dental work. No joint pain or skin rashes noted. He was able to go work.     On exam  B/P: 134/89, T: 98, P: 90, R: 16  Cor RRR, soft systolic murmur noted  Lungs CTA  Ext no rashes or active joints; no pedal edema        A/P  Symptoms are nonspecific but are somewhat concerned for a recurrence of endocarditis.  Given the fact that he will have upcoming valve surgery, blood cultures will be sent and as well as routine labs.  If all of this is negative, then a viral GI illness is more likely.   Will also recommend a PPI and consider EGD or gastric emptying study if persistent.    History of endocarditis  -     Blood culture; Future  -     TSH with free T4 reflex; Future  -     CBC with platelets differential; Future  -     Comprehensive metabolic panel; Future  -     N terminal pro BNP; Future  -     Blood culture; Future  -     Blood culture; Future    Nausea  -     omeprazole (PRILOSEC) 20 MG CR capsule; Take 1 capsule (20 mg) by mouth daily     Govind Johnson MD

## 2018-05-04 DIAGNOSIS — Z29.89 NEED FOR SBE (SUBACUTE BACTERIAL ENDOCARDITIS) PROPHYLAXIS: Primary | ICD-10-CM

## 2018-05-04 RX ORDER — AMOXICILLIN 500 MG/1
TABLET, FILM COATED ORAL
Qty: 4 TABLET | Refills: 3 | Status: SHIPPED | OUTPATIENT
Start: 2018-05-04 | End: 2018-05-22

## 2018-05-08 ASSESSMENT — ENCOUNTER SYMPTOMS
MUSCLE WEAKNESS: 0
RECTAL PAIN: 0
BOWEL INCONTINENCE: 0
MYALGIAS: 0
JAUNDICE: 0
ARTHRALGIAS: 1
HEARTBURN: 0
JOINT SWELLING: 0
VOMITING: 0
BLOATING: 0
DIARRHEA: 0
CONSTIPATION: 0
MUSCLE CRAMPS: 0
NAUSEA: 0
NECK PAIN: 0
STIFFNESS: 1
ABDOMINAL PAIN: 0
BACK PAIN: 1
BLOOD IN STOOL: 0

## 2018-05-09 ENCOUNTER — SURGERY (OUTPATIENT)
Age: 43
End: 2018-05-09

## 2018-05-09 ENCOUNTER — RADIANT APPOINTMENT (OUTPATIENT)
Dept: RADIOLOGY | Facility: AMBULATORY SURGERY CENTER | Age: 43
End: 2018-05-09
Payer: COMMERCIAL

## 2018-05-09 ENCOUNTER — HOSPITAL ENCOUNTER (OUTPATIENT)
Facility: AMBULATORY SURGERY CENTER | Age: 43
End: 2018-05-09
Payer: COMMERCIAL

## 2018-05-09 VITALS
DIASTOLIC BLOOD PRESSURE: 102 MMHG | HEIGHT: 69 IN | BODY MASS INDEX: 25.48 KG/M2 | SYSTOLIC BLOOD PRESSURE: 140 MMHG | WEIGHT: 172 LBS | TEMPERATURE: 98.5 F | RESPIRATION RATE: 16 BRPM | OXYGEN SATURATION: 97 %

## 2018-05-09 DIAGNOSIS — M54.50 LOW BACK PAIN: ICD-10-CM

## 2018-05-09 RX ORDER — LIDOCAINE HYDROCHLORIDE 10 MG/ML
INJECTION, SOLUTION EPIDURAL; INFILTRATION; INTRACAUDAL; PERINEURAL PRN
Status: DISCONTINUED | OUTPATIENT
Start: 2018-05-09 | End: 2018-05-09 | Stop reason: HOSPADM

## 2018-05-09 RX ORDER — BUPIVACAINE HYDROCHLORIDE 2.5 MG/ML
INJECTION, SOLUTION EPIDURAL; INFILTRATION; INTRACAUDAL PRN
Status: DISCONTINUED | OUTPATIENT
Start: 2018-05-09 | End: 2018-05-09 | Stop reason: HOSPADM

## 2018-05-09 RX ORDER — METHYLPREDNISOLONE ACETATE 40 MG/ML
INJECTION, SUSPENSION INTRA-ARTICULAR; INTRALESIONAL; INTRAMUSCULAR; SOFT TISSUE PRN
Status: DISCONTINUED | OUTPATIENT
Start: 2018-05-09 | End: 2018-05-09 | Stop reason: HOSPADM

## 2018-05-09 RX ADMIN — BUPIVACAINE HYDROCHLORIDE 2 ML: 2.5 INJECTION, SOLUTION EPIDURAL; INFILTRATION; INTRACAUDAL at 10:16

## 2018-05-09 RX ADMIN — LIDOCAINE HYDROCHLORIDE 3 ML: 10 INJECTION, SOLUTION EPIDURAL; INFILTRATION; INTRACAUDAL; PERINEURAL at 10:16

## 2018-05-09 RX ADMIN — METHYLPREDNISOLONE ACETATE 40 MG: 40 INJECTION, SUSPENSION INTRA-ARTICULAR; INTRALESIONAL; INTRAMUSCULAR; SOFT TISSUE at 10:16

## 2018-05-09 NOTE — IP AVS SNAPSHOT
[facility]    [address] [place] [address]    Phone:  [phone]    Fax:  [phone]                                       After Visit Summary   5/9/2018    Frederick Nguyen    MRN: [id number]           After Visit Summary Signature Page     I have received my discharge instructions, and my questions have been answered. I have discussed any challenges I see with this plan with the nurse or doctor.    ..........................................................................................................................................  Patient/Patient Representative Signature      ..........................................................................................................................................  Patient Representative Print Name and Relationship to Patient    ..................................................               ................................................  Date                                            Time    ..........................................................................................................................................  Reviewed by Signature/Title    ...................................................              ..............................................  Date                                                            Time

## 2018-05-09 NOTE — DISCHARGE INSTRUCTIONS
Home Care Instructions after a Sacroiliac Joint Injection    The sacroiliac joints lie next to the spine and connect the sacrum(the bottom of the spine) with the hip on both sides. There are two sacroiliac joints, one on the right and one on the left. Joint inflammation and/or dysfunction in this area can cause pain. In a sacroiliac joint injection, a local anesthetic (numbing medicine) is injected in or near the joint space. Steroids are often used to help with the anti-inflammatory process.     Activity  -You may resume most normal activity levels with the exception of strenuous activity. It is important for us to know if your pain with normal activity is relieved after this injection.  -DO NOT shower for 24 hours  -DO NOT remove bandaid for 24 hours    Pain  -You may experience soreness at the injection site for one or two days  -You may use an ice pack for 20 minutes every 2 hours for the first 24 hours  -You may use a heating pad after the first 24 hours  -You may use Tylenol (acetaminophen) every 4 hours or other pain medicines as     directed by your physician    You may experience numbness radiating into your legs. This numbness may last several hours. Until sensation returns to normal; please use caution in walking, climbing stairs, and stepping out of your vehicle, etc.    DID YOU RECEIVE SEDATION TODAY?  No    DID YOU RECEIVE STEROIDS TODAY?  Yes    Common side effects of steroids:  Not everyone will experience corticosteroid side effects. If side effects are experienced, they will gradually subside in the 7-10 day period following an injection. Most common side effects include:  -Flushed face and/or chest  -Feeling of warmth, particularly in the face but could be an overall feeling of warmth  -Increased blood sugar in diabetic patients  -Menstrual irregularities my occur. If taking hormone-based birth control an alternate method of birth control is recommended  -Sleep disturbances and/or mood swings are  possible  -Leg cramps      PLEASE KEEP TRACK OF YOUR SYMPTOMS AND NOTE YOUR IMPROVEMENT FOR YOUR DOCTOR.     Please contact us if you have:  -Severe pain  -Fever more than 101.5 degrees Fahrenheit  -Signs of infection at the injection site (redness, swelling, or drainage)    If you have questions, please contact our office at [phone] between the hours of 7:00 am and 3:00 pm Monday through Friday. After office hours you can contact the on call provider by dialing [phone]. If you need immediate attention, we recommend that you go to a hospital emergency room or dial [phone].

## 2018-05-09 NOTE — IP AVS SNAPSHOT
MRN:[id number]                      After Visit Summary   5/9/2018    Frederick Nguyen    MRN: [id number]           Thank you!     Thank you for choosing [facility] for your care. Our goal is always to provide you with excellent care. Hearing back from our patients is one way we can continue to improve our services. Please take a few minutes to complete the written survey that you may receive in the mail after you visit with us. Thank you!        Patient Information     Date Of Birth          1975        About your hospital stay     You were admitted on:  May 9, 2018 You last received care in the:  [facility]    You were discharged on:  May 9, 2018       Who to Call     For medical emergencies, please call 911.  For non-urgent questions about your medical care, please call your primary care provider or clinic, [phone]  For questions related to your surgery, please call your surgery clinic        Attending Provider     Provider Specialty    Thom Williamson MD Anesthesiology       Primary Care Provider Office Phone # Fax #    Liliana Ginger Murray [phone]      Your next 10 appointments already scheduled     May 22, 2018  3:30 PM CDT   (Arrive by 3:15 PM)   Return Visit with CARLOS Bush MD   [facility] ([facility])    [address] [place] [id number] [phone]              Further instructions from your care team       Home Care Instructions after a Sacroiliac Joint Injection    The sacroiliac joints lie next to the spine and connect the sacrum(the bottom of the spine) with the hip on both sides. There are two sacroiliac joints, one on the right and one on the left. Joint inflammation and/or dysfunction in this area can cause pain. In a sacroiliac joint injection, a local anesthetic (numbing medicine) is injected in or near the joint space. Steroids are often used  to help with the anti-inflammatory process.     Activity  -You may resume most normal activity levels with the exception of strenuous activity. It is important for us to know if your pain with normal activity is relieved after this injection.  -DO NOT shower for 24 hours  -DO NOT remove bandaid for 24 hours    Pain  -You may experience soreness at the injection site for one or two days  -You may use an ice pack for 20 minutes every 2 hours for the first 24 hours  -You may use a heating pad after the first 24 hours  -You may use Tylenol (acetaminophen) every 4 hours or other pain medicines as     directed by your physician    You may experience numbness radiating into your legs. This numbness may last several hours. Until sensation returns to normal; please use caution in walking, climbing stairs, and stepping out of your vehicle, etc.    DID YOU RECEIVE SEDATION TODAY?  No    DID YOU RECEIVE STEROIDS TODAY?  Yes    Common side effects of steroids:  Not everyone will experience corticosteroid side effects. If side effects are experienced, they will gradually subside in the 7-10 day period following an injection. Most common side effects include:  -Flushed face and/or chest  -Feeling of warmth, particularly in the face but could be an overall feeling of warmth  -Increased blood sugar in diabetic patients  -Menstrual irregularities my occur. If taking hormone-based birth control an alternate method of birth control is recommended  -Sleep disturbances and/or mood swings are possible  -Leg cramps      PLEASE KEEP TRACK OF YOUR SYMPTOMS AND NOTE YOUR IMPROVEMENT FOR YOUR DOCTOR.     Please contact us if you have:  -Severe pain  -Fever more than 101.5 degrees Fahrenheit  -Signs of infection at the injection site (redness, swelling, or drainage)    If you have questions, please contact our office at [phone] between the hours of 7:00 am and 3:00 pm Monday through Friday. After office hours you can contact the on call  "provider by dialing [phone]. If you need immediate attention, we recommend that you go to a hospital emergency room or dial 911.      Pending Results     Date and Time Order Name Status Description    5/9/2018 0950 XR SURGERY JULI FLUORO LESS THAN 5 MIN W STILLS In process             Admission Information     Date & Time Provider Department Dept. Phone    5/9/2018 Thom Williamson MD [facility] [phone]      Your Vitals Were     Blood Pressure Temperature Respirations Height Weight Pulse Oximetry    150/97 98.4  F (36.9  C) (Temporal) 16 1.753 m (5' 9\") 78 kg (172 lb) 98%    BMI (Body Mass Index)                   25.4 kg/m2           [employer] Information     [employer] gives you secure access to your electronic health record. If you see a primary care provider, you can also send messages to your care team and make appointments. If you have questions, please call your primary care clinic.  If you do not have a primary care provider, please call [phone] and they will assist you.      [employer] is an electronic gateway that provides easy, online access to your medical records. With [employer], you can request a clinic appointment, read your test results, renew a prescription or communicate with your care team.     To access your existing account, please contact your [facility] or call [phone] for assistance.        Care EveryWhere ID     This is your Care EveryWhere ID. This could be used by other organizations to access your [facility] medical records  [id number]        Equal Access to Services     HILTON QUINTERO : Hadii kathia londonoo Sokalyn, waaxda luqadaha, qaybta kaalmada jessikayanadja, keya melgar . So [facility] [phone].    ATENCIÓN: Si habla español, tiene a chaves disposición servicios gratuitos de asistencia lingüística. Llame al [phone].    We comply with applicable federal civil rights laws and [place] laws. " We do not discriminate on the basis of race, color, national origin, age, disability, sex, sexual orientation, or gender identity.               Review of your medicines      UNREVIEWED medicines. Ask your doctor about these medicines        Dose / Directions    ALEVE PO        Dose:  220 mg   Take 220 mg by mouth daily   Refills:  0       amoxicillin 500 MG tablet   Commonly known as:  AMOXIL   Used for:  Need for SBE (subacute bacterial endocarditis) prophylaxis        Take 4 tablets by mouth 30-60 minutes before dental procedures   Quantity:  4 tablet   Refills:  3       cyclobenzaprine 10 MG tablet   Commonly known as:  FLEXERIL   Used for:  Acute low back pain without sciatica, unspecified back pain laterality        Dose:  10 mg   Take 1 tablet (10 mg) by mouth 3 times daily as needed for muscle spasms   Quantity:  42 tablet   Refills:  0       multivitamin, therapeutic Tabs tablet        Dose:  1 tablet   Take 1 tablet by mouth daily   Refills:  0       omeprazole 20 MG CR capsule   Commonly known as:  priLOSEC   Used for:  Nausea        Dose:  20 mg   Take 1 capsule (20 mg) by mouth daily   Quantity:  30 capsule   Refills:  3                Protect others around you: Learn how to safely use, store and throw away your medicines at www.disposemymeds.org.             Medication List: This is a list of all your medications and when to take them. Check marks below indicate your daily home schedule. Keep this list as a reference.      Medications           Morning Afternoon Evening Bedtime As Needed    ALEVE PO   Take 220 mg by mouth daily                                amoxicillin 500 MG tablet   Commonly known as:  AMOXIL   Take 4 tablets by mouth 30-60 minutes before dental procedures                                cyclobenzaprine 10 MG tablet   Commonly known as:  FLEXERIL   Take 1 tablet (10 mg) by mouth 3 times daily as needed for muscle spasms                                multivitamin, therapeutic Tabs  tablet   Take 1 tablet by mouth daily                                omeprazole 20 MG CR capsule   Commonly known as:  priLOSEC   Take 1 capsule (20 mg) by mouth daily

## 2018-05-22 ENCOUNTER — OFFICE VISIT (OUTPATIENT)
Dept: CARDIOLOGY | Facility: CLINIC | Age: 43
End: 2018-05-22
Attending: INTERNAL MEDICINE
Payer: COMMERCIAL

## 2018-05-22 VITALS
HEART RATE: 86 BPM | BODY MASS INDEX: 25.92 KG/M2 | WEIGHT: 175 LBS | DIASTOLIC BLOOD PRESSURE: 93 MMHG | OXYGEN SATURATION: 100 % | HEIGHT: 69 IN | SYSTOLIC BLOOD PRESSURE: 130 MMHG

## 2018-05-22 DIAGNOSIS — I34.1 MVP (MITRAL VALVE PROLAPSE): ICD-10-CM

## 2018-05-22 PROCEDURE — G0463 HOSPITAL OUTPT CLINIC VISIT: HCPCS | Mod: ZF

## 2018-05-22 PROCEDURE — 99213 OFFICE O/P EST LOW 20 MIN: CPT | Mod: ZP | Performed by: INTERNAL MEDICINE

## 2018-05-22 ASSESSMENT — PAIN SCALES - GENERAL: PAINLEVEL: NO PAIN (0)

## 2018-05-22 NOTE — LETTER
5/22/2018      RE: Frederick Nguyen  [address] [facility] [address]       Dear Colleague,    Thank you for the opportunity to participate in the care of your patient, Frederick Nguyen, at the [facility]. Please see a copy of my visit note below.       SUBJECTIVE:  Frederick Nguyen is a 42 year old male who presents for follow up.  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  Recent strep Viridans bacteremia ,Lumbar Osteomyelitis and possible endocarditis. Remain asymptomatic.  Finished antibiotic course. Afebrile,cultures negative.  No complaints..    Patient Active Problem List    Diagnosis Date Noted     Infective endocarditis 03/03/2018     Priority: Medium     Infection by Streptococcus, viridans group 03/03/2018     Priority: Medium     Bacteremia 03/02/2018     Priority: Medium     Mitral valve prolapse 12/03/2012     Priority: Medium     Mitral valve regurgitation 12/03/2012     Priority: Medium     Lumbago 05/10/2011     Priority: Medium     Overview:   Latent effects of sprain.       Nonallopathic lesion of lumbar region 05/10/2011     Priority: Medium     Nonallopathic lesion of thoracic region 07/24/2009     Priority: Medium     Cervical spondylosis without myelopathy 07/21/2009     Priority: Medium     Cervicalgia 07/08/2008     Priority: Medium     Spasm of muscle 04/23/2008     Priority: Medium     Duane syndrome of left eye 1977     Priority: Medium    .  Current Outpatient Prescriptions   Medication Sig     multivitamin, therapeutic (THERA-VIT) TABS tablet Take 1 tablet by mouth daily     cyclobenzaprine (FLEXERIL) 10 MG tablet Take 1 tablet (10 mg) by mouth 3 times daily as needed for muscle spasms (Patient not taking: Reported on 5/22/2018)     No current facility-administered medications for this visit.      Past Medical History:   Diagnosis Date     Endocarditis, mitral valve, strep mitis  "03/15/2018     Heart murmur      Osteomyelitis, L5S1 03/15/2018    secondary to endocarditis     Past Surgical History:   Procedure Laterality Date     INJECT SACROILIAC JOINT Right 5/9/2018    Procedure: INJECT SACROILIAC JOINT;  Right Sacroiliac Joint Injection;  Surgeon: Thom Williamson MD;  Location:  OR     No Known Allergies  Social History     Social History     Marital status:      Spouse name: N/A     Number of children: N/A     Years of education: N/A     Occupational History     Not on file.     Social History Main Topics     Smoking status: Never Smoker     Smokeless tobacco: Never Used     Alcohol use No     Drug use: No     Sexual activity: Not on file     Other Topics Concern     Not on file     Social History Narrative     No family history on file.       REVIEW OF SYSTEMS:  General: negative, fever, chills, night sweats  Skin: negative, acne, rash and scaling  Eyes: negative, double vision, eye pain and photophobia  Ears/Nose/Throat: negative, nasal congestion and purulent rhinorrhea  Respiratory: No dyspnea on exertion, No cough, No hemoptysis and negative  Cardiovascular: negative, palpitations, tachycardia, irregular heart beat, chest pain, exertional chest pain or pressure, paroxysmal nocturnal dyspnea, dyspnea on exertion and orthopnea         OBJECTIVE:  Blood pressure (!) 130/93, pulse 86, height 1.753 m (5' 9\"), weight 79.4 kg (175 lb), SpO2 100 %.  General Appearance: healthy, alert, active and no distress  Head: Normocephalic. No masses, lesions, tenderness or abnormalities  Eyes: conjuctiva clear, PERRL, EOM intact  Ears: External ears normal. Canals clear. TM's normal.  Nose: Nares normal  Mouth: normal  Neck: Supple, no cervical adenopathy, no thyromegaly  Lungs: clear to auscultation  Cardiac: regular rate and rhythm, normal S1 and S2, no murmur       ASSESSMENT/PLAN:  Myxomatous Mitral valve,MVP/MR possible endocarditis.  Finished Abx course and cultures " negative.  DEEPAK-Severe MR. Flail posterior leaflet-P1P2.  Will need surgery.  Will discuss with Dr. Trinidad for minimally invasive MVR.  Per orders.   Return to Clinic Post Op.        Please do not hesitate to contact me if you have any questions/concerns.     Sincerely,     CARLOS Bush MD

## 2018-05-22 NOTE — MR AVS SNAPSHOT
After Visit Summary   5/22/2018    Frederick Nguyen    MRN: [id number]           Patient Information     Date Of Birth          1975        Visit Information        Provider Department      5/22/2018 3:30 PM CARLOS Bush MD [facility]        Today's Diagnoses     MVP (mitral valve prolapse)          Care Instructions    Thank you for your visit today.  Please call me with any questions or concerns.   Yifan Hunter RN  Cardiology Care Coordinator  [phone], press option 1 then option 3          Follow-ups after your visit        Follow-up notes from your care team     Return if symptoms worsen or fail to improve.      Your next 10 appointments already scheduled     Jun 06, 2018  4:00 PM CDT   [facility] Spine Follow Up with Teri Wilson PT   [facility] ([facility] [place])    [address] [place] [address] [phone]           If you have your physician's order in hand, please bring it with you to your appointment              Who to contact     If you have questions or need follow up information about today's clinic visit or your schedule please contact [facility] directly at [phone].  Normal or non-critical lab and imaging results will be communicated to you by MyChart, letter or phone within 4 business days after the clinic has received the results. If you do not hear from us within 7 days, please contact the clinic through [employer] or phone. If you have a critical or abnormal lab result, we will notify you by phone as soon as possible.  Submit refill requests through [employer] or call your pharmacy and they will forward the refill request to us. Please allow 3 business days for your refill to be completed.          Additional Information About Your Visit        MyChart Information     [employer] gives you secure access to your electronic health record. If you see a primary  "care provider, you can also send messages to your care team and make appointments. If you have questions, please call your primary care clinic.  If you do not have a primary care provider, please call [phone] and they will assist you.        Care EveryWhere ID     This is your Care EveryWhere ID. This could be used by other organizations to access your [facility] medical records  [id number]        Your Vitals Were     Pulse Height Pulse Oximetry BMI (Body Mass Index)          86 1.753 m (5' 9\") 100% 25.84 kg/m2         Blood Pressure from Last 3 Encounters:   05/22/18 (!) 130/93   05/09/18 (!) 140/102   04/30/18 124/87    Weight from Last 3 Encounters:   05/22/18 79.4 kg (175 lb)   05/09/18 78 kg (172 lb)   04/30/18 81.7 kg (180 lb 3.2 oz)              We Performed the Following     Follow-Up with Cardiologist          Today's Medication Changes          These changes are accurate as of 5/22/18  3:32 PM.  If you have any questions, ask your nurse or doctor.               Stop taking these medicines if you haven't already. Please contact your care team if you have questions.     ALEVE PO   Stopped by:  CARLOS Bush MD           amoxicillin 500 MG tablet   Commonly known as:  AMOXIL   Stopped by:  CARLOS Bush MD           omeprazole 20 MG CR capsule   Commonly known as:  priLOSEC   Stopped by:  CARLOS Bush MD                    Primary Care Provider Office Phone # Fax #    Liliana Ginger Murray [phone]       [place] [address]        Equal Access to Services     DAMION QUINTERO : Hadsharron Coelho, elza beal, keya lowry. So [facility] [phone].    ATENCIÓN: Si habla español, tiene a chaves disposición servicios gratuitos de asistencia lingüística. Llame al [phone].    We comply with applicable federal civil rights laws and [place] laws. We do not discriminate on " the basis of race, color, national origin, age, disability, sex, sexual orientation, or gender identity.            Thank you!     Thank you for choosing [facility]  for your care. Our goal is always to provide you with excellent care. Hearing back from our patients is one way we can continue to improve our services. Please take a few minutes to complete the written survey that you may receive in the mail after your visit with us. Thank you!             Your Updated Medication List - Protect others around you: Learn how to safely use, store and throw away your medicines at www.disposemymeds.org.          This list is accurate as of 5/22/18  3:32 PM.  Always use your most recent med list.                   Brand Name Dispense Instructions for use Diagnosis    cyclobenzaprine 10 MG tablet    FLEXERIL    42 tablet    Take 1 tablet (10 mg) by mouth 3 times daily as needed for muscle spasms    Acute low back pain without sciatica, unspecified back pain laterality       multivitamin, therapeutic Tabs tablet      Take 1 tablet by mouth daily

## 2018-05-23 NOTE — PROGRESS NOTES
SUBJECTIVE:  Frederick Nguyen is a 42 year old male who presents for follow up.  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  Recent strep Viridans bacteremia ,Lumbar Osteomyelitis and possible endocarditis. Remain asymptomatic.  Finished antibiotic course. Afebrile,cultures negative.  No complaints..    Patient Active Problem List    Diagnosis Date Noted     Infective endocarditis 03/03/2018     Priority: Medium     Infection by Streptococcus, viridans group 03/03/2018     Priority: Medium     Bacteremia 03/02/2018     Priority: Medium     Mitral valve prolapse 12/03/2012     Priority: Medium     Mitral valve regurgitation 12/03/2012     Priority: Medium     Lumbago 05/10/2011     Priority: Medium     Overview:   Latent effects of sprain.       Nonallopathic lesion of lumbar region 05/10/2011     Priority: Medium     Nonallopathic lesion of thoracic region 07/24/2009     Priority: Medium     Cervical spondylosis without myelopathy 07/21/2009     Priority: Medium     Cervicalgia 07/08/2008     Priority: Medium     Spasm of muscle 04/23/2008     Priority: Medium     Duane syndrome of left eye 1977     Priority: Medium    .  Current Outpatient Prescriptions   Medication Sig     multivitamin, therapeutic (THERA-VIT) TABS tablet Take 1 tablet by mouth daily     cyclobenzaprine (FLEXERIL) 10 MG tablet Take 1 tablet (10 mg) by mouth 3 times daily as needed for muscle spasms (Patient not taking: Reported on 5/22/2018)     No current facility-administered medications for this visit.      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     Heart murmur      Osteomyelitis, L5S1 03/15/2018    secondary to endocarditis     Past Surgical History:   Procedure Laterality Date     INJECT SACROILIAC JOINT Right 5/9/2018    Procedure: INJECT SACROILIAC JOINT;  Right Sacroiliac Joint Injection;  Surgeon: Thom Williamson MD;  Location: [facility] OR     No Known  "Allergies  Social History     Social History     Marital status:      Spouse name: N/A     Number of children: N/A     Years of education: N/A     Occupational History     Not on file.     Social History Main Topics     Smoking status: Never Smoker     Smokeless tobacco: Never Used     Alcohol use No     Drug use: No     Sexual activity: Not on file     Other Topics Concern     Not on file     Social History Narrative     No family history on file.       REVIEW OF SYSTEMS:  General: negative, fever, chills, night sweats  Skin: negative, acne, rash and scaling  Eyes: negative, double vision, eye pain and photophobia  Ears/Nose/Throat: negative, nasal congestion and purulent rhinorrhea  Respiratory: No dyspnea on exertion, No cough, No hemoptysis and negative  Cardiovascular: negative, palpitations, tachycardia, irregular heart beat, chest pain, exertional chest pain or pressure, paroxysmal nocturnal dyspnea, dyspnea on exertion and orthopnea         OBJECTIVE:  Blood pressure (!) 130/93, pulse 86, height 1.753 m (5' 9\"), weight 79.4 kg (175 lb), SpO2 100 %.  General Appearance: healthy, alert, active and no distress  Head: Normocephalic. No masses, lesions, tenderness or abnormalities  Eyes: conjuctiva clear, PERRL, EOM intact  Ears: External ears normal. Canals clear. TM's normal.  Nose: Nares normal  Mouth: normal  Neck: Supple, no cervical adenopathy, no thyromegaly  Lungs: clear to auscultation  Cardiac: regular rate and rhythm, normal S1 and S2, no murmur       ASSESSMENT/PLAN:  Myxomatous Mitral valve,MVP/MR possible endocarditis.  Finished Abx course and cultures negative.  DEEPAK-Severe MR. Flail posterior leaflet-P1P2.  Will need surgery.  Will discuss with Dr. Trinidad for minimally invasive MVR.  Per orders.   Return to Clinic Post Op.  Answers for HPI/ROS submitted by the patient on 5/8/2018   General Symptoms: No  Skin Symptoms: No  HENT Symptoms: No  EYE SYMPTOMS: No  HEART SYMPTOMS: No  LUNG SYMPTOMS: " No  INTESTINAL SYMPTOMS: Yes  URINARY SYMPTOMS: No  REPRODUCTIVE SYMPTOMS: No  SKELETAL SYMPTOMS: Yes  BLOOD SYMPTOMS: No  NERVOUS SYSTEM SYMPTOMS: No  MENTAL HEALTH SYMPTOMS: No  Heart burn or indigestion: No  Nausea: No  Vomiting: No  Abdominal pain: No  Bloating: No  Constipation: No  Diarrhea: No  Blood in stool: No  Black stools: No  Rectal or Anal pain: No  Fecal incontinence: No  Yellowing of skin or eyes: No  Vomit with blood: No  Change in stools: No  Back pain: Yes  Muscle aches: No  Neck pain: No  Swollen joints: No  Joint pain: Yes  Bone pain: No  Muscle cramps: No  Muscle weakness: No  Joint stiffness: Yes  Bone fracture: No

## 2018-05-24 NOTE — TELEPHONE ENCOUNTER
FUTURE VISIT INFORMATION      FUTURE VISIT INFORMATION:    Date: 6/7/18    Time: 11:30 AM    [place] [facility]  REFERRAL INFORMATION:    Referring provider:  Dr. Browning    Referring providers clinic:  [facility] [place]    Reason for visit/diagnosis: Minimally invasive mitral valve repair/replacement    RECORDS STATUS      NOTES STATUS DETAILS   OFFICE NOTE from referring provider (last 1 yr) Internal 5/22/18, 3/17/18   OFFICE NOTE from other specialist N/A    DISCHARGE SUMMARY from hospital Internal 3/2/18   DISCHARGE REPORT from the ER N/A    OPERATIVE REPORT N/A    MEDICATION LIST Internal    IMAGING     CALCIUM SCORE STUDY (REPORT ONLY) N/A    CAROTID ULTRASOUND (REPORT ONLY) N/A    CORONARY ANGIOGRAM (DISC & REPORT) N/A    CT CHEST (DISC & REPORT) Internal 2/28/18   ECHO (DEEPAK OR TTE) (DISC & REPORT) Internal DEEPAK 3/30/18  TTE 3/2/18    PFT RESULTS & VEIN MAPPING (ULTRASOUND) N/A    ANY OTHER CARDIAC IMAGING   (MRI, CT, ANGIOGRAM) (DISC & REPORT) Internal Chest XRay 3/2/18

## 2018-05-30 ASSESSMENT — ENCOUNTER SYMPTOMS
MUSCLE WEAKNESS: 0
BACK PAIN: 1
ARTHRALGIAS: 0
MUSCLE CRAMPS: 0
NECK PAIN: 0
STIFFNESS: 1
JOINT SWELLING: 0
MYALGIAS: 0

## 2018-05-30 NOTE — PROCEDURES
.Sacro-iliac Joint Injection    The patient s identity, the procedure to be performed and the specific site of the procedure was verified in accordance with [facility] Protocol.  Pre-procedure pain score: 4/10  Procedure Note:   Informed consent was obtained and the patient was positioned comfortably in the prone position.  There was no evidence of infection at the sites of needle insertion.  The patient was prepped and draped in a sterile fashion.  Skeletal landmarks were identified with the fluoroscope.  22 gauge spinal needle was then placed within the Sacro-iliac joint in the lower 1/3 of the joint.    The ligaments medial and superior to the joint were not infiltrated with local anesthetic and steroid.     SIDE:   Right  Medication: 40mg of Depo-Medrol  3ml of 0.25% Bupivacaine    The patient was given discharge instructions and verbalizes understanding, including understanding of those signs and symptoms that would require emergency care.   Post-procedure Pain Score 3/10  Counseling: Greater than 50% of this patient visit was spent in counseling the patient regarding the treatment of their pain, coordinating their overall treatment plan and assessing their progress.

## 2018-06-06 ENCOUNTER — THERAPY VISIT (OUTPATIENT)
Dept: PHYSICAL THERAPY | Facility: CLINIC | Age: 43
End: 2018-06-06
Payer: COMMERCIAL

## 2018-06-06 ENCOUNTER — PRE VISIT (OUTPATIENT)
Dept: CARDIOLOGY | Facility: CLINIC | Age: 43
End: 2018-06-06

## 2018-06-06 DIAGNOSIS — M54.50 BILATERAL LOW BACK PAIN WITHOUT SCIATICA, UNSPECIFIED CHRONICITY: Primary | ICD-10-CM

## 2018-06-06 PROCEDURE — 97530 THERAPEUTIC ACTIVITIES: CPT | Mod: GP | Performed by: PHYSICAL THERAPIST

## 2018-06-06 PROCEDURE — 97110 THERAPEUTIC EXERCISES: CPT | Mod: GP | Performed by: PHYSICAL THERAPIST

## 2018-06-06 PROCEDURE — 97164 PT RE-EVAL EST PLAN CARE: CPT | Mod: GP | Performed by: PHYSICAL THERAPIST

## 2018-06-06 NOTE — PROGRESS NOTES
Subjective:  HPI                    Objective:  System    Physical Exam    General     ROS    Assessment/Plan:    PROGRESS  REPORT    Progress reporting period is from 3/21/2018 to 6/6/2018.       SUBJECTIVE  Subjective changes noted by patient:  Patient returns to PT after a SI injection. Pain improved following the injection - current pain 1-2/10. There is no positional causes for his continued pain. Left side pain continues. Pain with bending forward and bending backwards. Has had back pain since August - has had set backs due to the bacterial infection.     Continued symptoms with prolonged sitting (especially if he has to sit all the way back in the chair), getting dressed (bending forward to put shoes on), has not tested his lifting capabilities. Also has pain if laying flat on his back and stretching out.   Current pain level is 2/10  .     Previous pain level was  10/10  .   Changes in function:  None  Adverse reaction to treatment or activity: None    OBJECTIVE  Changes noted in objective findings:  The objective findings below are from DOS 6/6/2018    Restricted lumbar flexion and extension - pain at end range flexion, extension, no change with repeated movements into flexion and extension. Hypomobile with PA pressure to lumbar segments.     Negative SLR for neural tension, + for hamstring tightness.     No myotome or dermatome findings.     Negative SIJ testing (compressure, distraction, JENN).     + response to traction with abolished pain at end range extension and decreased pain at end range flexion.       ASSESSMENT/PLAN  Updated problem list and treatment plan: Diagnosis 1:  Low back pain  Pain -  hot/cold therapy, manual therapy, STS, splint/taping/bracing/orthotics, self management and education  Decreased ROM/flexibility - manual therapy, therapeutic exercise and home program  Decreased joint mobility - manual therapy, therapeutic exercise and home program  Decreased strength - therapeutic  exercise, therapeutic activities and home program  Impaired muscle performance - neuro re-education and home program  Decreased function - therapeutic activities and home program  STG/LTGs have been met or progress has been made towards goals:  Yes (See Goal flow sheet completed today.)  Assessment of Progress: The patient's condition has potential to improve.  Self Management Plans:  Patient has been instructed in a home treatment program.  Patient  has been instructed in self management of symptoms.  I have re-evaluated this patient and find that the nature, scope, duration and intensity of the therapy is appropriate for the medical condition of the patient.  Frederick continues to require the following intervention to meet STG and LTG's:  PT    Recommendations:  This patient would benefit from continued therapy.     Frequency:  1 X week, once daily  Duration:  for 6 weeks        Please refer to the daily flowsheet for treatment today, total treatment time and time spent performing 1:1 timed codes.

## 2018-06-06 NOTE — MR AVS SNAPSHOT
After Visit Summary   6/6/2018    Frederick Nguyen    MRN: [id number]           Patient Information     Date Of Birth          1975        Visit Information        Provider Department      6/6/2018 4:00 PM Teri Wilson, PT [facility]        Today's Diagnoses     Bilateral low back pain without sciatica, unspecified chronicity    -  1       Follow-ups after your visit        Your next 10 appointments already scheduled     Jun 07, 2018 11:30 AM CDT   (Arrive by 11:15 AM)   New Patient Visit with Marin Trinidad MD   [facility] ([facility])    [address]  [address]  [facility] [id number] [phone]            Jun 12, 2018 12:50 PM CDT   [facility] Spine with Jalen Prakash PT   [facility] ([facility])    [address] [place] [id number] [phone]            Jun 18, 2018 12:40 PM CDT   CRYSTAL Spine with Jalen Prakash PT   [facility] ([facility])    [address] [place] [id number] [phone]            Jun 19, 2018  9:40 AM CDT   (Arrive by 9:25 AM)   Return Visit with Liliana Murray MD   [facility] ([facility])    [address] [place] [address]  4th Floor  [facility] [id number] [phone]              Who to contact     If you have questions or need follow up information about today's clinic visit or your schedule please contact [facility] directly at [phone].  Normal or non-critical lab and imaging results will be communicated to you by MyChart, letter or phone within 4 business days after the clinic has received the results. If you do not hear from us within 7 days, please contact the clinic through MyChart or phone. If you have a critical or abnormal lab result, we will notify you by phone as soon as  possible.  Submit refill requests through [employer] or call your pharmacy and they will forward the refill request to us. Please allow 3 business days for your refill to be completed.          Additional Information About Your Visit        [employer] Information     [employer] gives you secure access to your electronic health record. If you see a primary care provider, you can also send messages to your care team and make appointments. If you have questions, please call your primary care clinic.  If you do not have a primary care provider, please call [phone] and they will assist you.        Care EveryWhere ID     This is your Care EveryWhere ID. This could be used by other organizations to access your Frederick medical records  [id number]         Blood Pressure from Last 3 Encounters:   05/22/18 (!) 130/93   05/09/18 (!) 140/102   04/30/18 124/87    Weight from Last 3 Encounters:   05/22/18 79.4 kg (175 lb)   05/09/18 78 kg (172 lb)   04/30/18 81.7 kg (180 lb 3.2 oz)              We Performed the Following     HC PT RE-EVAL     [facility] PROGRESS NOTES REPORT     THERAPEUTIC ACTIVITIES     THERAPEUTIC EXERCISES        Primary Care Provider Office Phone # Fax #    Liliana Ginger Murray [phone]       [place] [address]        Equal Access to Services     HILTON QUINTERO : Hadii aad ku hadasho Soomaali, waaxda luqadaha, qaybta kaalmada adeegyada, keya pires hayblairen jessika dixon. So [facility] [phone].    ATENCIÓN: Si habla español, tiene a chaves disposición servicios gratuitos de asistencia lingüística. Llame al [phone].    We comply with applicable federal civil rights laws and [place] laws. We do not discriminate on the basis of race, color, national origin, age, disability, sex, sexual orientation, or gender identity.            Thank you!     Thank you for choosing [facility]  for your care. Our goal is always to provide you with  excellent care. Hearing back from our patients is one way we can continue to improve our services. Please take a few minutes to complete the written survey that you may receive in the mail after your visit with us. Thank you!             Your Updated Medication List - Protect others around you: Learn how to safely use, store and throw away your medicines at www.disposemymeds.org.          This list is accurate as of 6/6/18  6:12 PM.  Always use your most recent med list.                   Brand Name Dispense Instructions for use Diagnosis    cyclobenzaprine 10 MG tablet    FLEXERIL    42 tablet    Take 1 tablet (10 mg) by mouth 3 times daily as needed for muscle spasms    Acute low back pain without sciatica, unspecified back pain laterality       multivitamin, therapeutic Tabs tablet      Take 1 tablet by mouth daily

## 2018-06-06 NOTE — TELEPHONE ENCOUNTER
ASKED BY REFERRING PHYSICIAN: Dr LAURITA Bush    CHIEF COMPLAINT: Pre Visit Planning - Done (New consult for minimally invasive MVR/R)      HPI: Frederick is a 42 year old male who presents with known myxomatous mitral valve, mitral valve prolapse and mild to moderate mitral regurgitation.  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  The patient was treated with Ceftriazone 2000 mg IV daily beginning 03/02/18 and completing the course 04/16/18.  Last blood cultures drawn 04/24/18 showed no growth. Patient has no other medical history.       PROCEDURES/IMAGING:    ECHOCARDIOGRAM: 03/02/18  Interpretation Summary  Global and regional left ventricular function is normal with an EF of 60-65%.  Global right ventricular function is normal. The right ventricle is normal size.  Significant prolapse of the posterior mitral valve leaflet is present.  It is difficult to quantify MR severity due to tachycardia, but based on ERO (0.4 cm2) moderate to severe anteriorly directed eccentric mitral insufficiency is present.   Pulmonary artery systolic pressure is normal.  The inferior vena cava was normal in size with preserved respiratory variability.  Estimated mean right atrial pressure is 3 mmHg.  No pericardial effusion is present.  No vegetation seen. DEEPAK is recommended to quantify MR severity.  Previous study not available for comparison.      Left Ventricle  Global and regional left ventricular function is normal with an EF of 60-65%. Left ventricular wall thickness is normal. Left ventricular size is normal. Left ventricular diastolic function is normal.     Right Ventricle  The right ventricle is normal size. Global right ventricular function is normal.     Atria  The right atria appears normal. Moderate left atrial enlargement is present.      Mitral Valve  Mitral valve prolapse is present. Mitral leaflet thickness is  increased . The cause of the mitral insufficiency appears to be prolapse. Moderate to severe mitral insufficiency is present.     Aortic Valve  Aortic valve is normal in structure and function. The aortic valve is tricuspid.     Tricuspid Valve  The right ventricular systolic pressure is approximated at 14.0 mmHg plus the right atrial pressure. Pulmonary artery systolic pressure is normal.     Pulmonic Valve  The pulmonic valve is normal.     Vessels  The inferior vena cava was normal in size with preserved respiratory variability. Ascending aorta 3 cm. Estimated mean right atrial pressure is 3 mmHg.     Pericardium  No pericardial effusion is present.    MMode/2D Measurements & Calculations  IVSd: 0.94 cm  LVIDd: 5.2 cm  LVIDs: 3.2 cm  LVPWd: 0.94 cm  FS: 38.2 %  LV mass(C)d: 177.6 grams  LV mass(C)dI: 93.0 grams/m2  Ao root diam: 2.9 cm  LA dimension: 4.3 cm  asc Aorta Diam: 3.0 cm  LA/Ao: 1.5  LVOT diam: 2.0 cm  LVOT area: 3.1 cm2  LA Volume (BP): 78.4 ml  LA Volume Index (BP): 41.0 ml/m2  RWT: 0.36    Doppler Measurements & Calculations  MV E max lauren: 153.0 cm/sec  Ao V2 max: 124.0 cm/sec  Ao max P.0 mmHg  Ao V2 mean: 86.0 cm/sec  Ao mean PG: 3.0 mmHg  Ao V2 VTI: 17.5 cm  EDITH(I,D): 3.3 cm2  EDITH(V,D): 3.1 cm2  LV V1 max P.1 mmHg  LV V1 max: 123.0 cm/sec  LV V1 VTI: 18.5 cm  SV(LVOT): 58.1 ml  SI(LVOT): 30.5 ml/m2  PA acc time: 0.07 sec  TR max lauren: 187.0 cm/sec  TR max P.0 mmHg  EDITH Index (cm2/m2): 1.7  E/E' av.7  Lateral E/e': 9.3  Medial E/e': 14.0    TRANSESOPHAGEAL ECHO: 18  Interpretation Summary  No evidence of endocarditis.  The Ejection Fraction is estimated at 65-70%.  Global right ventricular function is normal.  Prolapse and flail of posterior mitral leaflet (P1/P2). Piece of chordae is seen attached to the flail segment.   Severe anteriorly directed mitral insufficiency is present. (Federico class II etiology).There is systolic flow reversal in most of the pulmonary veins. No  pericardial effusion is present.       Left Ventricle  The Ejection Fraction is estimated at 65-70%. No regional wall motion abnormalities are seen.     Right Ventricle  The right ventricle is normal size. Global right ventricular function is normal.     Atria  The right atria appears normal. Moderate left atrial enlargement is present. No TIMI clot.     Mitral Valve  A flail posterior mitral leaflet is present. Mitral valve prolapse is present. Severe mitral insufficiency is present. The cause of the mitral insufficiency appears to be a flail leaflet. Regurgitation originates from cusps P1 and P2.      Aortic Valve  Aortic valve is normal in structure and function.     Tricuspid Valve  Mild tricuspid valve prolapse.     Pulmonic Valve  Trace pulmonic insufficiency is present.     Vessels  Catheter in superior vena cava. The LUPV Doppler shows systolic reversal . The RUPV Doppler shows systolic reversal . The RLPV Doppler shows systolic reversal . The left lower pulmonary vein cannot be assessed.      Pericardium  No pericardial effusion is present.     Miscellaneous  Unable to obtain transgastric views     ASSESSMENT/PLAN:   Frederick is a 42 year old male who presents with       Risks and benefits of surgery were discussed with patient (and all present) including risk of death, stroke, bleeding, cardiac ischemia, wound infection, renal failure, arrhythmias and possible pacemaker implantation. Patient verbalized understanding and accepts these risks.     Valve choices were discussed with the patient, mechanical and bioprosthetic. Risks and benefits of both explained.       Approximately 60 minutes spent with this case including review of the clinical history and data; discussion with the patient and his family and coordination of the care    Thank you for including me in the care of this kind patient. Do not hesitate to contact me with any questions.    Dr. Marin Trinidad     Cardiothoracic Surgeon  [facility]  [facility]  Division of Cardiothoracic Surgery    CC  Patient Care Team:  Liliana Murray MD as PCP - General (Student in organized health care education/training program)  Gomez Lopez MD as Referring Physician (Internal Medicine)

## 2018-06-07 ENCOUNTER — OFFICE VISIT (OUTPATIENT)
Dept: CARDIOLOGY | Facility: CLINIC | Age: 43
End: 2018-06-07
Attending: INTERNAL MEDICINE
Payer: COMMERCIAL

## 2018-06-07 ENCOUNTER — TELEPHONE (OUTPATIENT)
Dept: CARDIOLOGY | Facility: CLINIC | Age: 43
End: 2018-06-07

## 2018-06-07 ENCOUNTER — PRE VISIT (OUTPATIENT)
Dept: CARDIOLOGY | Facility: CLINIC | Age: 43
End: 2018-06-07

## 2018-06-07 VITALS
HEART RATE: 91 BPM | SYSTOLIC BLOOD PRESSURE: 138 MMHG | WEIGHT: 175.4 LBS | OXYGEN SATURATION: 95 % | HEIGHT: 69 IN | DIASTOLIC BLOOD PRESSURE: 88 MMHG | BODY MASS INDEX: 25.98 KG/M2

## 2018-06-07 DIAGNOSIS — I34.1 MVP (MITRAL VALVE PROLAPSE): Primary | ICD-10-CM

## 2018-06-07 PROCEDURE — G0463 HOSPITAL OUTPT CLINIC VISIT: HCPCS | Mod: ZF

## 2018-06-07 PROCEDURE — 40000809 ZZH STATISTIC NO DOCUMENTATION TO SUPPORT CHARGE

## 2018-06-07 ASSESSMENT — PAIN SCALES - GENERAL: PAINLEVEL: NO PAIN (0)

## 2018-06-07 NOTE — TELEPHONE ENCOUNTER
Replied to patient's [employer] message and called and left a voicemail message on his cell phone at ~ 1:00 pm asking him to call and reschedule his appointment.      By 5:30 pm no message was returned in [employer] and patient did not answer his cell phone, tried spouse cell and she did not answer, did not leave message.

## 2018-06-07 NOTE — NURSING NOTE
Chief Complaint   Patient presents with     New Patient     Records in Epic, Images in PACs     Vitals were taken and medications were reconciled.     Mary Beth Bustillo RMA  11:17 AM

## 2018-06-07 NOTE — MR AVS SNAPSHOT
After Visit Summary   6/7/2018    Frederick Nguyen    MRN: [id number]           Patient Information     Date Of Birth          1975        Visit Information        Provider Department      6/7/2018 11:30 AM Marin Trinidad MD [facility]        Today's Diagnoses     MVP (mitral valve prolapse)    -  1       Follow-ups after your visit        Your next 10 appointments already scheduled     Jun 12, 2018 12:50 PM CDT   CRYSTAL Spine with Jalen Prakash PT   [facility] ([facility])    [address] [place] [address]   [phone]            Jun 18, 2018 12:40 PM CDT   CRYSTAL Spine with Jalen Prakash PT   [facility] [facility] ([facility])    [address] [place] [address] [phone]            Jun 19, 2018  9:40 AM CDT   (Arrive by 9:25 AM)   Return Visit with Liliana Murray MD   [facility] ([facility])    [address] [address] [phone]              Who to contact     If you have questions or need follow up information about today's clinic visit or your schedule please contact [facility] directly at [phone].  Normal or non-critical lab and imaging results will be communicated to you by MyChart, letter or phone within 4 business days after the clinic has received the results. If you do not hear from us within 7 days, please contact the clinic through [employer] or phone. If you have a critical or abnormal lab result, we will notify you by phone as soon as possible.  Submit refill requests through [employer] or call your pharmacy and they will forward the refill request to us. Please allow 3 business days for your refill to be completed.          Additional Information About Your Visit        [employer] Information     [employer] gives you secure access to your  "electronic health record. If you see a primary care provider, you can also send messages to your care team and make appointments. If you have questions, please call your primary care clinic.  If you do not have a primary care provider, please call [phone] and they will assist you.        Care EveryWhere ID     This is your Care EveryWhere ID. This could be used by other organizations to access your [facility] medical records  [id number]        Your Vitals Were     Pulse Height Pulse Oximetry BMI (Body Mass Index)          91 1.753 m (5' 9\") 95% 25.9 kg/m2         Blood Pressure from Last 3 Encounters:   06/07/18 138/88   05/22/18 (!) 130/93   05/09/18 (!) 140/102    Weight from Last 3 Encounters:   06/07/18 79.6 kg (175 lb 6.4 oz)   05/22/18 79.4 kg (175 lb)   05/09/18 78 kg (172 lb)              Today, you had the following     No orders found for display       Primary Care Provider Office Phone # Fax #    Liliana Ginger Murray [phone]       [address] [address]        Equal Access to Services     HILTON QUINTERO AH: Hadii kathia Coelho, wajulianada lian, qaybta kaalmada adekimberlyda, keya dixon. So [facility] [phone].    ATENCIÓN: Si habla español, tiene a chaves disposición servicios gratuitos de asistencia lingüística. Llame al [phone].    We comply with applicable federal civil rights laws and [place] laws. We do not discriminate on the basis of race, color, national origin, age, disability, sex, sexual orientation, or gender identity.            Thank you!     Thank you for choosing [facility]  for your care. Our goal is always to provide you with excellent care. Hearing back from our patients is one way we can continue to improve our services. Please take a few minutes to complete the written survey that you may receive in the mail after your visit with us. Thank you!             Your Updated Medication List " - Protect others around you: Learn how to safely use, store and throw away your medicines at www.disposemymeds.org.          This list is accurate as of 6/7/18  1:57 PM.  Always use your most recent med list.                   Brand Name Dispense Instructions for use Diagnosis    cyclobenzaprine 10 MG tablet    FLEXERIL    42 tablet    Take 1 tablet (10 mg) by mouth 3 times daily as needed for muscle spasms    Acute low back pain without sciatica, unspecified back pain laterality       multivitamin, therapeutic Tabs tablet      Take 1 tablet by mouth daily        NAPROXEN PO      Take 770 mg by mouth as needed for moderate pain

## 2018-06-07 NOTE — LETTER
6/7/2018      RE: Frederick Nguyen  [address] [facility] [address]       Dear Colleague,    Thank you for the opportunity to participate in the care of your patient, Frederick Nguyen, at the [facility]. Please see a copy of my visit note below.    Patient left clinic without being seen.  He was upset at having to wait too long to be seen.      Please do not hesitate to contact me if you have any questions/concerns.     Sincerely,     Marin Trinidad MD

## 2018-06-12 ENCOUNTER — THERAPY VISIT (OUTPATIENT)
Dept: PHYSICAL THERAPY | Facility: CLINIC | Age: 43
End: 2018-06-12
Payer: COMMERCIAL

## 2018-06-12 DIAGNOSIS — M54.50 BILATERAL LOW BACK PAIN WITHOUT SCIATICA, UNSPECIFIED CHRONICITY: ICD-10-CM

## 2018-06-12 PROCEDURE — 97530 THERAPEUTIC ACTIVITIES: CPT | Mod: GP | Performed by: PHYSICAL THERAPIST

## 2018-06-12 PROCEDURE — 97012 MECHANICAL TRACTION THERAPY: CPT | Mod: GP | Performed by: PHYSICAL THERAPIST

## 2018-06-12 PROCEDURE — 97110 THERAPEUTIC EXERCISES: CPT | Mod: GP | Performed by: PHYSICAL THERAPIST

## 2018-06-18 ENCOUNTER — TELEPHONE (OUTPATIENT)
Dept: CARDIOLOGY | Facility: CLINIC | Age: 43
End: 2018-06-18

## 2018-06-18 ENCOUNTER — THERAPY VISIT (OUTPATIENT)
Dept: PHYSICAL THERAPY | Facility: CLINIC | Age: 43
End: 2018-06-18
Payer: COMMERCIAL

## 2018-06-18 DIAGNOSIS — M54.50 BILATERAL LOW BACK PAIN WITHOUT SCIATICA, UNSPECIFIED CHRONICITY: ICD-10-CM

## 2018-06-18 PROCEDURE — 97530 THERAPEUTIC ACTIVITIES: CPT | Mod: GP | Performed by: PHYSICAL THERAPIST

## 2018-06-18 PROCEDURE — 97110 THERAPEUTIC EXERCISES: CPT | Mod: GP | Performed by: PHYSICAL THERAPIST

## 2018-06-18 PROCEDURE — 97012 MECHANICAL TRACTION THERAPY: CPT | Mod: GP | Performed by: PHYSICAL THERAPIST

## 2018-06-18 NOTE — TELEPHONE ENCOUNTER
Call patient and left a voicemail message asking him to call and discuss the appointment with Dr Trinidad that the patient could not wait as he needed to return to work.  Left message and am waiting for return call.

## 2018-06-19 ENCOUNTER — OFFICE VISIT (OUTPATIENT)
Dept: INTERNAL MEDICINE | Facility: CLINIC | Age: 43
End: 2018-06-19
Payer: COMMERCIAL

## 2018-06-19 VITALS
DIASTOLIC BLOOD PRESSURE: 85 MMHG | WEIGHT: 177.6 LBS | HEART RATE: 77 BPM | SYSTOLIC BLOOD PRESSURE: 137 MMHG | BODY MASS INDEX: 26.23 KG/M2

## 2018-06-19 DIAGNOSIS — G89.29 CHRONIC LEFT-SIDED LOW BACK PAIN WITHOUT SCIATICA: Primary | ICD-10-CM

## 2018-06-19 DIAGNOSIS — M54.50 CHRONIC LEFT-SIDED LOW BACK PAIN WITHOUT SCIATICA: Primary | ICD-10-CM

## 2018-06-19 RX ORDER — CYCLOBENZAPRINE HCL 10 MG
10 TABLET ORAL 3 TIMES DAILY PRN
Qty: 180 TABLET | Refills: 0 | Status: SHIPPED | OUTPATIENT
Start: 2018-06-19 | End: 2018-08-18

## 2018-06-19 ASSESSMENT — PAIN SCALES - GENERAL: PAINLEVEL: NO PAIN (0)

## 2018-06-19 NOTE — PROGRESS NOTES
PRIMARY CARE CENTER       SUBJECTIVE:  Frederick Nguyen is a 42 year old male with a PMHx of chronic lower back pain, S oralis bacteremia and endocarditis, who comes in for followup of his lower back pain.     He states that he is no longer having fevers/chills or cough like previously (prior to antibiotic treatment of bacteremia). Per discharge summary, there was some concern for osteomyelitis/discitis in L5-S1 region. He completed treatment with 6 weeks of IV ceftriaxone. He has followed up with IM since, with Dr. Nguyen. At that time he was having R lower back pain and was given a prescription for flexeril and hydrocodone. That was greater than 8 weeks ago.      Now, he reports that he is having back spasms in his lower back bilaterally. This is helped with PT, but PT has not completely resolved it. He also notes lower back pain on the L next to the spine. This is worse with movement, primary flexion of the spine and also with laying down on hard surfaces. Previously he had some R lower back pain on previous visit and was seen by neurosurgery who injected his SI joint. This helped the pain and he is not having significant R lower    Originally the spasm was on the R side and had SI injection 5 weeks ago and that pain went away. Now there is still stiffness. Now he has pain on his lower L spine. And there is inflammation on his L spine. Has pain w/bending forward in his back in the spot next to the spine on the L. He has not noted any shooting pains down the leg or leg tingling or bowel/bladder symptoms or saddle anesthesia.     Medications and allergies reviewed by me today.     ROS:   Comprehensive ROS completed and are negative unless otherwise noted in HPI.     OBJECTIVE:    /85  Pulse 77  Wt 80.6 kg (177 lb 9.6 oz)  BMI 26.23 kg/m2   Wt Readings from Last 1 Encounters:   06/19/18 80.6 kg (177 lb 9.6 oz)     Gen: pt in NAD, well groomed, pleasant  Spine: no tenderness with  palpation of the spine, and no abnormality on palpation. No tenderness of paraspinal muscles bilaterally or SI joint. Has pain with forward flexion and ROM of forward flexion limited to 90 degrees. Pain with laying on exam table but no pain in back with leg raise test. There is subjective muscle tightness noted with abduction of both legs.   Resp: unlabored breathing on room air  Skin: no visible rashes  HEENT: PERRL and EOMI     ASSESSMENT/PLAN:    Frederick was seen today for back pain.    Diagnoses and all orders for this visit:    Chronic left-sided low back pain without sciatica  The differential includes osteomyelitis/discitis and with this seen on prior imaging, and although the patient completed abx treatment and is no longer febrile or with chills, we would like to get MRI with and without contrast to rule out spine infection and will also obtain a CRP. Given that the pain is likely muscular in origin, we will plan to continue PT and also prescribe flexeril. Given that he has chronic degenerative changes in his spine seen on previous imaging, we will refer to orthopedics for evaluation.     Orders:  -     MRI Lumbar spine w/o & w contrast; Future  -     cyclobenzaprine (FLEXERIL) 10 MG tablet; Take 1 tablet (10 mg) by mouth 3 times daily as needed for muscle spasms  -     ORTHOPEDICS ADULT REFERRAL  -     CRP inflammation; Future       Pt should return to clinic for f/u with me as needed if symptoms do not improve.   Liliana Murray MD  Jun 19, 2018    Plan of care discussed with Dr. Morocho.     Teaching Physician Note:    While the patient was in clinic, I reviewed the patient's medical history and results, the resident's findings on physical examination, and the patient's diagnosis and treatment plan with the resident.  I agree with the information documented with the following exceptions: none.    Joselin Morocho M.D.  Internal Medicine  Primary Care Center   pager [phone]

## 2018-06-19 NOTE — NURSING NOTE
Chief Complaint   Patient presents with     Back Pain     Muscle spasms in back.       Marisabel Stephens LPN at 9:31 AM on June 19, 2018

## 2018-06-19 NOTE — PATIENT INSTRUCTIONS
MRI [phone] [facility] [place]  [phone] [facility]  [phone] [facility]    Orthopedics [phone]

## 2018-06-19 NOTE — MR AVS SNAPSHOT
After Visit Summary   2018    Frederick Nguyen    MRN: [id number]           Patient Information     Date Of Birth          1975        Visit Information        Provider Department      2018 9:40 AM Liliana Murray MD [facility]        Today's Diagnoses     Chronic left-sided low back pain without sciatica    -  1      Care Instructions    MRI [phone] [facility] [place]  [phone] [facility]  [phone] [facility]    Orthopedics [phone]          Follow-ups after your visit        Additional Services     ORTHOPEDICS ADULT REFERRAL       Your provider has referred you to: UMP: [facility] [phone]   [email or web address]  GICH: [facility] [phone] [email or web address]    Please be aware that coverage of these services is subject to the terms and limitations of your health insurance plan.  Call member services at your health plan with any benefit or coverage questions.      Please bring the following to your appointment:    >>   Any x-rays, CTs or MRIs which have been performed.  Contact the facility where they were done to arrange for  prior to your scheduled appointment.    >>   List of current medications   >>   This referral request   >>   Any documents/labs given to you for this referral                  Your next 10 appointments already scheduled     2018 12:00 PM CDT   [facility] with Jalen Prakash PT   [facility] [place] ([address] [id number] [phone]              Future tests that were ordered for you today     Open Future Orders        Priority Expected Expires Ordered    MRI Lumbar spine w/o & w contrast Routine  2019            Who to contact     Please call  your clinic at [phone] to:    Ask questions about your health    Make or cancel appointments    Discuss your medicines    Learn about your test results    Speak to your doctor            Additional Information About Your Visit        Pass[employer] Information     My COI gives you secure access to your electronic health record. If you see a primary care provider, you can also send messages to your care team and make appointments. If you have questions, please call your primary care clinic.  If you do not have a primary care provider, please call [phone] and they will assist you.      My COI is an electronic gateway that provides easy, online access to your medical records. With My COI, you can request a clinic appointment, read your test results, renew a prescription or communicate with your care team.     To access your existing account, please contact your [facility] or call [phone] for assistance.        Care EveryWhere ID     This is your Care EveryWhere ID. This could be used by other organizations to access your [place] medical records  [id number]        Your Vitals Were     Pulse BMI (Body Mass Index)                77 26.23 kg/m2           Blood Pressure from Last 3 Encounters:   06/19/18 137/85   06/07/18 138/88   05/22/18 (!) 130/93    Weight from Last 3 Encounters:   06/19/18 80.6 kg (177 lb 9.6 oz)   06/07/18 79.6 kg (175 lb 6.4 oz)   05/22/18 79.4 kg (175 lb)              We Performed the Following     ORTHOPEDICS ADULT REFERRAL          Today's Medication Changes          These changes are accurate as of 6/19/18 10:09 AM.  If you have any questions, ask your nurse or doctor.               These medicines have changed or have updated prescriptions.        Dose/Directions    * cyclobenzaprine 10 MG tablet   Commonly known as:  FLEXERIL   This may have changed:  Another medication with the same name was added. Make sure you understand how and when to take each.    Used for:  Acute low back pain without sciatica, unspecified back pain laterality   Changed by:  Liliana Murray MD        Dose:  10 mg   Take 1 tablet (10 mg) by mouth 3 times daily as needed for muscle spasms   Quantity:  42 tablet   Refills:  0       * cyclobenzaprine 10 MG tablet   Commonly known as:  FLEXERIL   This may have changed:  You were already taking a medication with the same name, and this prescription was added. Make sure you understand how and when to take each.   Used for:  Chronic left-sided low back pain without sciatica   Changed by:  Liliana Murray MD        Dose:  10 mg   Take 1 tablet (10 mg) by mouth 3 times daily as needed for muscle spasms   Quantity:  180 tablet   Refills:  0       * Notice:  This list has 2 medication(s) that are the same as other medications prescribed for you. Read the directions carefully, and ask your doctor or other care provider to review them with you.         Where to get your medicines      These medications were sent to [facility] [id number] [place] [address] [address] [place] [address] [address] AT [facility] [address] [facility]  [address] [facility] [address]     Phone:  [phone]     cyclobenzaprine 10 MG tablet                Primary Care Provider Office Phone # Fax #    Liliana Murray [phone]       [address] [address]        Equal Access to Services     HILTON QUINTERO AH: Hadsharron londonoo Sokalyn, waaxda luqadaha, qaybta kaalmada adeegyada, keya melgar . So [facility] [phone].    ATENCIÓN: Si habla español, tiene a chaves disposición servicios gratuitos de asistencia lingüística. Llame al [phone].    We comply with applicable federal civil rights laws and [place] laws. We do not discriminate on the basis of race, color, national origin, age, disability, sex, sexual orientation, or gender identity.            Thank you!     Thank you for  [facility]  for your care. Our goal is always to provide you with excellent care. Hearing back from our patients is one way we can continue to improve our services. Please take a few minutes to complete the written survey that you may receive in the mail after your visit with us. Thank you!             Your Updated Medication List - Protect others around you: Learn how to safely use, store and throw away your medicines at www.disposemymeds.org.          This list is accurate as of 6/19/18 10:09 AM.  Always use your most recent med list.                   Brand Name Dispense Instructions for use Diagnosis    * cyclobenzaprine 10 MG tablet    FLEXERIL    42 tablet    Take 1 tablet (10 mg) by mouth 3 times daily as needed for muscle spasms    Acute low back pain without sciatica, unspecified back pain laterality       * cyclobenzaprine 10 MG tablet    FLEXERIL    180 tablet    Take 1 tablet (10 mg) by mouth 3 times daily as needed for muscle spasms    Chronic left-sided low back pain without sciatica       multivitamin, therapeutic Tabs tablet      Take 1 tablet by mouth daily        NAPROXEN PO      Take 770 mg by mouth as needed for moderate pain        * Notice:  This list has 2 medication(s) that are the same as other medications prescribed for you. Read the directions carefully, and ask your doctor or other care provider to review them with you.

## 2018-06-20 NOTE — TELEPHONE ENCOUNTER
Patient sent Only [place] message regarding rescheduling his clinic appointment with Dr Trinidad.  He states he has multiple appointments set up for his back and will let us know when he is ready to make an appointment.  Gave patient contact information to call when he is ready.

## 2018-06-23 NOTE — NURSING NOTE
Chief Complaint   Patient presents with     Follow Up For     F/U MVP     Vitals were taken and medications were reconciled.    Dahlia Kelly,KAELYN  3:21 PM         Patient/Caregiver provided printed discharge information.

## 2018-06-28 ENCOUNTER — HOSPITAL ENCOUNTER (OUTPATIENT)
Dept: MRI IMAGING | Facility: CLINIC | Age: 43
Discharge: HOME | End: 2018-06-28
Attending: INTERNAL MEDICINE | Admitting: INTERNAL MEDICINE
Payer: COMMERCIAL

## 2018-06-28 DIAGNOSIS — M54.50 CHRONIC LEFT-SIDED LOW BACK PAIN WITHOUT SCIATICA: ICD-10-CM

## 2018-06-28 DIAGNOSIS — G89.29 CHRONIC LEFT-SIDED LOW BACK PAIN WITHOUT SCIATICA: ICD-10-CM

## 2018-06-28 PROCEDURE — A9585 GADOBUTROL INJECTION: HCPCS | Performed by: INTERNAL MEDICINE

## 2018-06-28 PROCEDURE — 25000128 H RX IP 250 OP 636: Performed by: INTERNAL MEDICINE

## 2018-06-28 PROCEDURE — 72158 MRI LUMBAR SPINE W/O & W/DYE: CPT

## 2018-06-28 RX ORDER — GADOBUTROL 604.72 MG/ML
10 INJECTION INTRAVENOUS ONCE
Status: COMPLETED | OUTPATIENT
Start: 2018-06-28 | End: 2018-06-28

## 2018-06-28 RX ADMIN — GADOBUTROL 8 ML: 604.72 INJECTION INTRAVENOUS at 08:35

## 2018-06-28 NOTE — TELEPHONE ENCOUNTER
FUTURE VISIT INFORMATION      FUTURE VISIT INFORMATION:    Date: 7/03/18    Time: 7:00    Location:   REFERRAL INFORMATION:    Referring provider:  Bailee Olivera    Referring providers clinic:  [facility]    Reason for visit/diagnosis: Chronic left-sided low back pain without sciatica        RECORDS STATUS      ALL RECORDS AND IMAGING INTERNAL

## 2018-06-29 NOTE — PROGRESS NOTES
This is a recent snapshot of the patient's [facility] medical record.  For current drug dose and complete information and questions, call [phone] or In [facility] pool, fv home infusion ([id number])  CSN Number:  [id number]

## 2018-07-03 ENCOUNTER — PRE VISIT (OUTPATIENT)
Dept: ORTHOPEDICS | Facility: CLINIC | Age: 43
End: 2018-07-03

## 2018-07-03 ENCOUNTER — OFFICE VISIT (OUTPATIENT)
Dept: ORTHOPEDICS | Facility: CLINIC | Age: 43
End: 2018-07-03
Payer: COMMERCIAL

## 2018-07-03 VITALS — RESPIRATION RATE: 16 BRPM | WEIGHT: 177 LBS | HEIGHT: 69 IN | BODY MASS INDEX: 26.22 KG/M2

## 2018-07-03 DIAGNOSIS — M51.369 LUMBAR DEGENERATIVE DISC DISEASE: ICD-10-CM

## 2018-07-03 DIAGNOSIS — M53.3 SACROILIAC JOINT DYSFUNCTION: Primary | ICD-10-CM

## 2018-07-03 NOTE — PROGRESS NOTES
"Sports Medicine Clinic Visit    PCP: Liliana Murray Patrick is a 42 year old male who is seen  in consultation at the request of Dr. Morocho presenting with low back pain. The pt reports stretching his body out last August when he woke up in the morning when he felt a pop and immediate spasm. He recently had a SI joint injection 2 months that alleviated the right sided pain.     Injury: August 2017    Location of Pain: left low back  Duration of Pain: 11 month(s)  Rating of Pain: 6/10 at worst  Pain is better with: SI joint injection  Pain is worse with: Bending forward  Additional Features: None  Treatment so far consists of: PT, SI joint injection   Prior History of related problems: None    Resp 16  Ht 5' 9\" (1.753 m)  Wt 177 lb (80.3 kg)  BMI 26.14 kg/m2         PMH:  Heart murmur  Bacteremia    Active problem list:  Patient Active Problem List   Diagnosis     Cervical spondylosis without myelopathy     Cervicalgia     Duane syndrome of left eye     Lumbago     Mitral valve prolapse     Mitral valve regurgitation     Nonallopathic lesion of lumbar region     Spasm of muscle     Nonallopathic lesion of thoracic region     Bacteremia     Infective endocarditis     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    SH:  Social History     Social History     Marital status:      Spouse name: N/A     Number of children: N/A     Years of education: N/A     Occupational History     Not on file.     Social History Main Topics     Smoking status: Never Smoker     Smokeless tobacco: Never Used     Alcohol use No     Drug use: No     Sexual activity: Not on file     Other Topics Concern     Not on file     Social History Narrative       MEDS:  See EMR, reviewed  ALL:  See EMR, reviewed    REVIEW OF SYSTEMS:  CONSTITUTIONAL:NEGATIVE for fever, chills, change in weight  INTEGUMENTARY/SKIN: NEGATIVE for worrisome rashes, moles or lesions  EYES: NEGATIVE " for vision changes or irritation  ENT/MOUTH: NEGATIVE for ear, mouth and throat problems  RESP:NEGATIVE for significant cough or SOB  BREAST: NEGATIVE for masses, tenderness or discharge  CV: NEGATIVE for chest pain, palpitations or peripheral edema  GI: NEGATIVE for nausea, abdominal pain, heartburn, or change in bowel habits  :NEGATIVE for frequency, dysuria, or hematuria  :NEGATIVE for frequency, dysuria, or hematuria  NEURO: NEGATIVE for weakness, dizziness or paresthesias  ENDOCRINE: NEGATIVE for temperature intolerance, skin/hair changes  HEME/ALLERGY/IMMUNE: NEGATIVE for bleeding problems  PSYCHIATRIC: NEGATIVE for changes in mood or affect      SUBJECTIVE:  This 42-year-old male 2 months ago had a right-sided image-guided sacroiliac joint injection that significantly improved the discomfort he was feeling in his low back and right-sided buttock with long periods of sitting and with making the motions to move to stand.  It significantly decreased the intensity of the pain that he was dealing with.  Prior to that, he had been dealing with the onset of low back discomfort that first occurred 10 months ago in 08/2017.  This occurred with an abrupt event where he was lying in bed, arched his back, reached his hands above his head, did a long stretch and felt a sudden pop in his centralized low back that made it difficult for him to  an upright position with subsequent spasm in his back and persistent pain.  It made it difficult to sit and stand without severe discomfort and the pain seemed centralized to the low back on the left side, but was not associated with buttock pain at the time.  He sought care of [facility] and had an MRI of his lumbar spine that suggested problem with the lowest lumbar disc.  However, in the midst of this, he had issues with subsequent diagnosis of bacteremia and the concern at the time was that he may have a diskitis with some subtle suggestions of this on the MRI.  At  "that time he was being treated with antibiotics for a possible infective endocarditis.  The patient since tells me that he has completed his workup with interventional cardiology that ruled out infective endocarditis.  It was felt that the bacteremia may have come from saurav in his mouth.  He also subsequently saw the neurosurgeon this past April who felt it unlikely that diskitis was a true diagnosis in his case.  It was felt that sacroiliac joint problems may be more the reason for his back pain.  He subsequently had a repeat MRI that suggested no signs of diskitis, but did show a consistent degenerative disk at L5-S1 with a centralized disk extrusion with some bilateral facet arthrosis and a moderate amount of bilateral neural foraminal stenosis at L5-S1.  The patient has had no radicular discomfort in either lower extremity.  No numbness or tingling in the legs.  No radiating pain down the legs.  The MRI also suggested some interspinous edema and enhancement from the levels of L2-S1 that could be consistent with impingement syndrome of the transverse processes or \"Baastrup's disease.\"  However, the patient denies a history of trouble with back extension.  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  Other than the acute discomfort that happened when he stretched his back, where he felt a sudden \"pop\" in his centralized low back, there were no issues prior to that with lifting, reaching, falling and no previous history discomfort in his back at that time.  He suspects that the SI joint may have started bothering him because he was dealing with such severe back pain that made it difficult for him to stand erect and to walk.      Regardless, symptoms have improved over the last 8 months.  The SI joint injection 2 months ago was helpful and " his back pain is lessened.  He has no radicular pain today.  He initially did some physical therapy, but it was stopped in the midst of his workup for bacteremia.      OBJECTIVE:  He can forward flex the lumbar spine to touch his shins today.  Extension is full.  One-legged extension does reproduce some discomfort bilaterally in his low back, but he tolerates 2-legged extension without significant discomfort.  Straight leg raise is negative bilaterally.  Lower extremity strength to flexion/extension at hip, knee, ankle including toe strength and foot evertor strength are 5 out of 5 symmetrically bilaterally.  A JENN test reproduces some low back discomfort, but it does not localize to buttock discomfort.  He is nontender directly over the sacroiliac joints bilaterally.  Spring testing of the lumbar spine is mildly tender at all levels symmetrically.      ASSESSMENT:     1.  Lumbar spine degenerative disk and joint disease.   2.  History of sacroiliac joint dysfunction.      PLAN:  It seems unlikely by his physical exam and clinical history that the Carmitaup's findings on MRI are the main source of his pain.  It also seems unlikely from his neurosurgery referral and subsequent findings from Cardiology, that ongoing diskitis is the source of his pain.  The recent MRI looks favorable.  Regardless, he was treated with antibiotics.  Therefore, he will undergo protocol that could help both lumbar degenerative disk and SI joint dysfunction.  I will have him see a manual physical therapist that can focus on his SI joint and core stabilization.  If he graduates from this program with improved back fitness, he could transfer to outpatient core strengthening program with lora bell strengthening program with a good instructor and some options for this were given.  I would not recommend further injections or surgery at this time.  He will follow up with me in 6-8 weeks.

## 2018-07-03 NOTE — LETTER
"  7/3/2018      RE: Frederick Nguyen  [address] [facility] [address]       Sports Medicine Clinic Visit    PCP: Liliana Murray    Frederick Nguyen is a 42 year old male who is seen  in consultation at the request of Dr. Morocho presenting with low back pain. The pt reports stretching his body out last August when he woke up in the morning when he felt a pop and immediate spasm. He recently had a SI joint injection 2 months that alleviated the right sided pain.     Injury: August 2017    Location of Pain: left low back  Duration of Pain: 11 month(s)  Rating of Pain: 6/10 at worst  Pain is better with: SI joint injection  Pain is worse with: Bending forward  Additional Features: None  Treatment so far consists of: PT, SI joint injection   Prior History of related problems: None    Resp 16  Ht 5' 9\" (1.753 m)  Wt 177 lb (80.3 kg)  BMI 26.14 kg/m2         PMH:  Heart murmur  Bacteremia    Active problem list:  Patient Active Problem List   Diagnosis     Cervical spondylosis without myelopathy     Cervicalgia     Duane syndrome of left eye     Lumbago     Mitral valve prolapse     Mitral valve regurgitation     Nonallopathic lesion of lumbar region     Spasm of muscle     Nonallopathic lesion of thoracic region     Bacteremia     Infective endocarditis     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    SH:  Social History     Social History     Marital status:      Spouse name: N/A     Number of children: N/A     Years of education: N/A     Occupational History     Not on file.     Social History Main Topics     Smoking status: Never Smoker     Smokeless tobacco: Never Used     Alcohol use No     Drug use: No     Sexual activity: Not on file     Other Topics Concern     Not on file     Social History Narrative       MEDS:  See EMR, reviewed  ALL:  See EMR, reviewed    REVIEW OF SYSTEMS:  CONSTITUTIONAL:NEGATIVE for fever, chills, change in " weight  INTEGUMENTARY/SKIN: NEGATIVE for worrisome rashes, moles or lesions  EYES: NEGATIVE for vision changes or irritation  ENT/MOUTH: NEGATIVE for ear, mouth and throat problems  RESP:NEGATIVE for significant cough or SOB  BREAST: NEGATIVE for masses, tenderness or discharge  CV: NEGATIVE for chest pain, palpitations or peripheral edema  GI: NEGATIVE for nausea, abdominal pain, heartburn, or change in bowel habits  :NEGATIVE for frequency, dysuria, or hematuria  :NEGATIVE for frequency, dysuria, or hematuria  NEURO: NEGATIVE for weakness, dizziness or paresthesias  ENDOCRINE: NEGATIVE for temperature intolerance, skin/hair changes  HEME/ALLERGY/IMMUNE: NEGATIVE for bleeding problems  PSYCHIATRIC: NEGATIVE for changes in mood or affect      SUBJECTIVE:  This 42-year-old male 2 months ago had a right-sided image-guided sacroiliac joint injection that significantly improved the discomfort he was feeling in his low back and right-sided buttock with long periods of sitting and with making the motions to move to stand.  It significantly decreased the intensity of the pain that he was dealing with.  Prior to that, he had been dealing with the onset of low back discomfort that first occurred 10 months ago in 08/2017.  This occurred with an abrupt event where he was lying in bed, arched his back, reached his hands above his head, did a long stretch and felt a sudden pop in his centralized low back that made it difficult for him to  an upright position with subsequent spasm in his back and persistent pain.  It made it difficult to sit and stand without severe discomfort and the pain seemed centralized to the low back on the left side, but was not associated with buttock pain at the time.  He sought care of [facility] and had an MRI of his lumbar spine that suggested problem with the lowest lumbar disc.  However, in the midst of this, he had issues with subsequent diagnosis of bacteremia and the concern at  "the time was that he may have a diskitis with some subtle suggestions of this on the MRI.  At that time he was being treated with antibiotics for a possible infective endocarditis.  The patient since tells me that he has completed his workup with interventional cardiology that ruled out infective endocarditis.  It was felt that the bacteremia may have come from saurav in his mouth.  He also subsequently saw the neurosurgeon this past April who felt it unlikely that diskitis was a true diagnosis in his case.  It was felt that sacroiliac joint problems may be more the reason for his back pain.  He subsequently had a repeat MRI that suggested no signs of diskitis, but did show a consistent degenerative disk at L5-S1 with a centralized disk extrusion with some bilateral facet arthrosis and a moderate amount of bilateral neural foraminal stenosis at L5-S1.  The patient has had no radicular discomfort in either lower extremity.  No numbness or tingling in the legs.  No radiating pain down the legs.  The MRI also suggested some interspinous edema and enhancement from the levels of L2-S1 that could be consistent with impingement syndrome of the transverse processes or \"Baastrup's disease.\"  However, the patient denies a history of trouble with back extension.  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  Other than the acute discomfort that happened when he stretched his back, where he felt a sudden \"pop\" in his centralized low back, there were no issues prior to that with lifting, reaching, falling and no previous history discomfort in his back at that time.  He suspects that the SI joint may have started bothering him because he was dealing with such severe back pain that made it difficult for him to stand erect and to walk.      Regardless, " symptoms have improved over the last 8 months.  The SI joint injection 2 months ago was helpful and his back pain is lessened.  He has no radicular pain today.  He initially did some physical therapy, but it was stopped in the midst of his workup for bacteremia.      OBJECTIVE:  He can forward flex the lumbar spine to touch his shins today.  Extension is full.  One-legged extension does reproduce some discomfort bilaterally in his low back, but he tolerates 2-legged extension without significant discomfort.  Straight leg raise is negative bilaterally.  Lower extremity strength to flexion/extension at hip, knee, ankle including toe strength and foot evertor strength are 5 out of 5 symmetrically bilaterally.  A JENN test reproduces some low back discomfort, but it does not localize to buttock discomfort.  He is nontender directly over the sacroiliac joints bilaterally.  Spring testing of the lumbar spine is mildly tender at all levels symmetrically.      ASSESSMENT:     1.  Lumbar spine degenerative disk and joint disease.   2.  History of sacroiliac joint dysfunction.      PLAN:  It seems unlikely by his physical exam and clinical history that the Meirsamaraup's findings on MRI are the main source of his pain.  It also seems unlikely from his neurosurgery referral and subsequent findings from Cardiology, that ongoing diskitis is the source of his pain.  The recent MRI looks favorable.  Regardless, he was treated with antibiotics.  Therefore, he will undergo protocol that could help both lumbar degenerative disk and SI joint dysfunction.  I will have him see a manual physical therapist that can focus on his SI joint and core stabilization.  If he graduates from this program with improved back fitness, he could transfer to outpatient core strengthening program with lora bell strengthening program with a good instructor and some options for this were given.  I would not recommend further injections or surgery at this  time.  He will follow up with me in 6-8 weeks.                         Dmitriy Bustillo MD

## 2018-07-03 NOTE — Clinical Note
Thank you for allowing me to see your patient in Sports Medicine Clinic.  Please see the attached copy of our visit.  Sincerely,  Dmitriy Bustillo MD

## 2018-07-03 NOTE — MR AVS SNAPSHOT
After Visit Summary   7/3/2018    Frederick Nguyen    MRN: [id number]           Patient Information     Date Of Birth          1975        Visit Information        Provider Department      7/3/2018 7:00 AM Dmitriy Bustillo MD [facility]        Today's Diagnoses     Sacroiliac joint dysfunction    -  1    Lumbar degenerative disc disease           Follow-ups after your visit        Additional Services     PHYSICAL THERAPY REFERRAL (Internal)       Physical Therapy Referral    SI joint injection helped with pain.     Core strengthening and manual therapy.                  Your next 10 appointments already scheduled     Aug 14, 2018  7:00 AM CDT   (Arrive by 6:45 AM)   Return Visit with Dmitriy Bustillo MD   [facility] ([facility])    [address] [facility]  [address] [facility] [address] [phone]              Who to contact     Please call your clinic at [phone] to:    Ask questions about your health    Make or cancel appointments    Discuss your medicines    Learn about your test results    Speak to your doctor            Additional Information About Your Visit        [employer] Information     [employer] gives you secure access to your electronic health record. If you see a primary care provider, you can also send messages to your care team and make appointments. If you have questions, please call your primary care clinic.  If you do not have a primary care provider, please call [phone] and they will assist you.      [employer] is an electronic gateway that provides easy, online access to your medical records. With [employer], you can request a clinic appointment, read your test results, renew a prescription or communicate with your care team.     To access your existing account, please contact your [facility] or call [phone] for assistance.        Care EveryWhere ID     This is your Care  "EveryWhere ID. This could be used by other organizations to access your [place] medical records  [id number]        Your Vitals Were     Respirations Height BMI (Body Mass Index)             16 5' 9\" (1.753 m) 26.14 kg/m2          Blood Pressure from Last 3 Encounters:   06/19/18 137/85   06/07/18 138/88   05/22/18 (!) 130/93    Weight from Last 3 Encounters:   07/03/18 177 lb (80.3 kg)   06/19/18 177 lb 9.6 oz (80.6 kg)   06/07/18 175 lb 6.4 oz (79.6 kg)              We Performed the Following     PHYSICAL THERAPY REFERRAL (Internal)        Primary Care Provider Office Phone # Fax #    Liliana Ginger Murray [phone]       [address] [address]        Equal Access to Services     HILTON QUINTERO : Hadii kathia londonoo Sokalyn, waaxda luqadaha, qaybta kaalmada adedorayada, keya melgar . So [facility] [phone].    ATENCIÓN: Si habla español, tiene a chaves disposición servicios gratuitos de asistencia lingüística. Llame al [phone].    We comply with applicable federal civil rights laws and [place] laws. We do not discriminate on the basis of race, color, national origin, age, disability, sex, sexual orientation, or gender identity.            Thank you!     Thank you for choosing [facility]  for your care. Our goal is always to provide you with excellent care. Hearing back from our patients is one way we can continue to improve our services. Please take a few minutes to complete the written survey that you may receive in the mail after your visit with us. Thank you!             Your Updated Medication List - Protect others around you: Learn how to safely use, store and throw away your medicines at www.disposemymeds.org.          This list is accurate as of 7/3/18 11:59 PM.  Always use your most recent med list.                   Brand Name Dispense Instructions for use Diagnosis    * cyclobenzaprine 10 MG tablet    FLEXERIL "    42 tablet    Take 1 tablet (10 mg) by mouth 3 times daily as needed for muscle spasms    Acute low back pain without sciatica, unspecified back pain laterality       * cyclobenzaprine 10 MG tablet    FLEXERIL    180 tablet    Take 1 tablet (10 mg) by mouth 3 times daily as needed for muscle spasms    Chronic left-sided low back pain without sciatica       multivitamin, therapeutic Tabs tablet      Take 1 tablet by mouth daily        NAPROXEN PO      Take 770 mg by mouth as needed for moderate pain        * Notice:  This list has 2 medication(s) that are the same as other medications prescribed for you. Read the directions carefully, and ask your doctor or other care provider to review them with you.

## 2018-07-13 ENCOUNTER — THERAPY VISIT (OUTPATIENT)
Dept: PHYSICAL THERAPY | Facility: CLINIC | Age: 43
End: 2018-07-13
Attending: FAMILY MEDICINE
Payer: COMMERCIAL

## 2018-07-13 DIAGNOSIS — M54.50 BILATERAL LOW BACK PAIN WITHOUT SCIATICA, UNSPECIFIED CHRONICITY: ICD-10-CM

## 2018-07-13 DIAGNOSIS — M53.3 SI (SACROILIAC) JOINT DYSFUNCTION: Primary | ICD-10-CM

## 2018-07-13 PROCEDURE — 97110 THERAPEUTIC EXERCISES: CPT | Mod: GP | Performed by: PHYSICAL THERAPIST

## 2018-07-13 PROCEDURE — 97530 THERAPEUTIC ACTIVITIES: CPT | Mod: GP | Performed by: PHYSICAL THERAPIST

## 2018-07-13 PROCEDURE — 97112 NEUROMUSCULAR REEDUCATION: CPT | Mod: GP | Performed by: PHYSICAL THERAPIST

## 2018-07-13 NOTE — MR AVS SNAPSHOT
After Visit Summary   7/13/2018    Frederick Nguyen    MRN: [id number]           Patient Information     Date Of Birth          1975        Visit Information        Provider Department      7/13/2018 3:20 PM Kinga Greer PT [facility]        Today's Diagnoses     SI (sacroiliac) joint dysfunction    -  1    Bilateral low back pain without sciatica, unspecified chronicity           Follow-ups after your visit        Your next 10 appointments already scheduled     Aug 10, 2018  2:40 PM CDT   CRYSTAL Extremity with Kinga Greer PT   [facility] ([facility])    [address] [place] [address]   [phone]            Aug 14, 2018  7:00 AM CDT   (Arrive by 6:45 AM)   Return Visit with Dmitriy Bustillo MD   [facility] ([facility])    [address] [facility]  [address] [facility] [address]   [phone]            Aug 17, 2018  2:40 PM CDT   CRYSTAL Extremity with Kinga Greer PT   [facility] ([facility])    [address] [place] [address]   [phone]            Aug 28, 2018  7:30 AM CDT   CRYSTAL Extremity with Kinga Greer PT   [facility] ([facility])    [address] [place] [address]   [phone]            Sep 07, 2018  2:40 PM CDT   CRYSTAL Extremity with Kinga Greer PT   [facility] ([facility])    [address] [place] [address]   [phone]              Who to contact     If you have questions or need follow up information about today's clinic visit or your schedule please contact [facility] directly at [phone].  Normal or non-critical lab and imaging results will be communicated to you by MyChart, letter or  phone within 4 business days after the clinic has received the results. If you do not hear from us within 7 days, please contact the clinic through [employer] or phone. If you have a critical or abnormal lab result, we will notify you by phone as soon as possible.  Submit refill requests through [employer] or call your pharmacy and they will forward the refill request to us. Please allow 3 business days for your refill to be completed.          Additional Information About Your Visit        [employer] Information     [employer] gives you secure access to your electronic health record. If you see a primary care provider, you can also send messages to your care team and make appointments. If you have questions, please call your primary care clinic.  If you do not have a primary care provider, please call [phone] and they will assist you.        Care EveryWhere ID     This is your Care EveryWhere ID. This could be used by other organizations to access your [place] medical records  [id number]         Blood Pressure from Last 3 Encounters:   06/19/18 137/85   06/07/18 138/88   05/22/18 (!) 130/93    Weight from Last 3 Encounters:   07/03/18 80.3 kg (177 lb)   06/19/18 80.6 kg (177 lb 9.6 oz)   06/07/18 79.6 kg (175 lb 6.4 oz)              We Performed the Following     CRYSTAL PROGRESS NOTES REPORT     NEUROMUSCULAR RE-EDUCATION     THERAPEUTIC ACTIVITIES     THERAPEUTIC EXERCISES        Primary Care Provider Office Phone # Fax #    Liliana Ginger Murray [phone]       [place] [address]        Equal Access to Services     HILTON QUINTERO : Hadii aad ku hadasho Soomaali, waaxda luqadaha, qaybta kaalmada adeegyada, keya dixon. So [facility] [phone].    ATENCIÓN: Si habla español, tiene a chaves disposición servicios gratuitos de asistencia lingüística. Llame al [phone].    We comply with applicable federal civil rights laws and [place] laws. We  do not discriminate on the basis of race, color, national origin, age, disability, sex, sexual orientation, or gender identity.            Thank you!     Thank you for choosing [facility]  for your care. Our goal is always to provide you with excellent care. Hearing back from our patients is one way we can continue to improve our services. Please take a few minutes to complete the written survey that you may receive in the mail after your visit with us. Thank you!             Your Updated Medication List - Protect others around you: Learn how to safely use, store and throw away your medicines at www.disposemymeds.org.          This list is accurate as of 7/13/18 11:59 PM.  Always use your most recent med list.                   Brand Name Dispense Instructions for use Diagnosis    * cyclobenzaprine 10 MG tablet    FLEXERIL    42 tablet    Take 1 tablet (10 mg) by mouth 3 times daily as needed for muscle spasms    Acute low back pain without sciatica, unspecified back pain laterality       * cyclobenzaprine 10 MG tablet    FLEXERIL    180 tablet    Take 1 tablet (10 mg) by mouth 3 times daily as needed for muscle spasms    Chronic left-sided low back pain without sciatica       multivitamin, therapeutic Tabs tablet      Take 1 tablet by mouth daily        NAPROXEN PO      Take 770 mg by mouth as needed for moderate pain        * Notice:  This list has 2 medication(s) that are the same as other medications prescribed for you. Read the directions carefully, and ask your doctor or other care provider to review them with you.

## 2018-07-19 PROBLEM — M53.3 SI (SACROILIAC) JOINT DYSFUNCTION: Status: ACTIVE | Noted: 2018-07-19

## 2018-07-20 NOTE — PROGRESS NOTES
[facility]  Physical Exam  General   ROS    PROGRESS  REPORT    Progress reporting period is from 6/6/2018 to 7/13/2018.       SUBJECTIVE  Subjective: Pt reports improvement in SI joint following injection. Prior to injection, was having pain with all movements. Pt reports improved pain, but limited movement/ flexibility on alysia R side. Bending to put on shoes triggers pain. He also continues to get muscle spasms that are debilitating and these come at unknown times     Changes in function:  Minimal change in function   Adverse reaction to treatment or activity: None    OBJECTIVE    Objective: Trial of SI belt with some improvement in symptoms when bending to put on shoes. A great amount of time spent educating patient on movement but not pushing into pain range. Modified stretching and stabilization program.      ASSESSMENT/PLAN  Updated problem list and treatment plan: Diagnosis 1:  SI Joint dysfunction   Pain -  hot/cold therapy, manual therapy, self management, education and home program  Decreased ROM/flexibility - manual therapy, therapeutic exercise and home program  Impaired muscle performance - neuro re-education and home program  Decreased function - therapeutic activities and home program  STG/LTGs have been met or progress has been made towards goals:  Goals updated to represent pt's current functional deficits  Assessment of Progress: The patient's condition has potential to improve.  Self Management Plans:  Patient has been instructed in a home treatment program.  I have re-evaluated this patient and find that the nature, scope, duration and intensity of the therapy is appropriate for the medical condition of the patient.  Frederick continues to require the following intervention to meet STG and LTG's:  PT    Recommendations:  This patient would benefit from continued therapy.     Frequency:  1 X/2 week, once daily  Duration:  for 8 weeks        Please refer to the daily flowsheet for treatment today,  total treatment time and time spent performing 1:1 timed codes.

## 2018-09-07 ENCOUNTER — THERAPY VISIT (OUTPATIENT)
Dept: PHYSICAL THERAPY | Facility: CLINIC | Age: 43
End: 2018-09-07
Payer: COMMERCIAL

## 2018-09-07 DIAGNOSIS — M53.3 SI (SACROILIAC) JOINT DYSFUNCTION: ICD-10-CM

## 2018-09-07 DIAGNOSIS — M54.50 BILATERAL LOW BACK PAIN WITHOUT SCIATICA, UNSPECIFIED CHRONICITY: ICD-10-CM

## 2018-09-07 PROCEDURE — 97112 NEUROMUSCULAR REEDUCATION: CPT | Mod: GP | Performed by: PHYSICAL THERAPIST

## 2018-09-07 PROCEDURE — 97110 THERAPEUTIC EXERCISES: CPT | Mod: GP | Performed by: PHYSICAL THERAPIST

## 2018-09-18 ENCOUNTER — OFFICE VISIT (OUTPATIENT)
Dept: INTERNAL MEDICINE | Facility: CLINIC | Age: 43
End: 2018-09-18
Payer: COMMERCIAL

## 2018-09-18 VITALS
WEIGHT: 184.4 LBS | BODY MASS INDEX: 27.23 KG/M2 | RESPIRATION RATE: 16 BRPM | SYSTOLIC BLOOD PRESSURE: 145 MMHG | DIASTOLIC BLOOD PRESSURE: 92 MMHG | HEART RATE: 79 BPM

## 2018-09-18 DIAGNOSIS — I10 BENIGN ESSENTIAL HYPERTENSION: Primary | ICD-10-CM

## 2018-09-18 DIAGNOSIS — L72.3 SEBACEOUS CYST: ICD-10-CM

## 2018-09-18 DIAGNOSIS — M54.50 ACUTE LOW BACK PAIN WITHOUT SCIATICA, UNSPECIFIED BACK PAIN LATERALITY: ICD-10-CM

## 2018-09-18 RX ORDER — CYCLOBENZAPRINE HCL 10 MG
10 TABLET ORAL 3 TIMES DAILY PRN
Qty: 42 TABLET | Refills: 0 | Status: SHIPPED | OUTPATIENT
Start: 2018-09-18 | End: 2018-11-13

## 2018-09-18 ASSESSMENT — PAIN SCALES - GENERAL: PAINLEVEL: NO PAIN (1)

## 2018-09-18 NOTE — NURSING NOTE
Chief Complaint   Patient presents with     Back Pain     pt is here to follow up on back pain       Radha Crystal CMA at 7:21 AM on 9/18/2018

## 2018-09-18 NOTE — MR AVS SNAPSHOT
After Visit Summary   9/18/2018    Frederick Nguyen    MRN: [id number]           Patient Information     Date Of Birth          1975        Visit Information        Provider Department      9/18/2018 7:45 AM Liliana Murray MD [facility]        Today's Diagnoses     Benign essential hypertension    -  1    Acute low back pain without sciatica, unspecified back pain laterality        Sebaceous cyst          Care Instructions    Primary Care Center Phone Number [phone]  Primary Care Center Medication Refill Request Information:  * Please contact your pharmacy regarding ANY request for medication refills.  ** [facility] Prescription Fax = [phone]  * Please allow 3 business days for routine medication refills.  * Please allow 5 business days for controlled substance medication refills.     Primary Care Center Test Result notification information:  *You will be notified with in 7-10 days of your appointment day regarding the results of your test.  If you are on MyChart you will be notified as soon as the provider has reviewed the results and signed off on them.      [place]ermat[place] [phone] [place] [place]     U Ortho [phone] [place] [place] [place]                  [facility]         Internal Medicine Resident                   Continuity Clinic    Who We Are    Resident Continuity Clinic is a part of the [facility].  Resident physicians see patients independently and establish a relationship with them over the course of their three-year residency program.  As with the Primary Care Clinic, our Resident Continuity Clinic models a group practice.  If your doctor is not available, you will be able to see another resident physician.  At the end of a resident s training, patients will be transitioned to a new resident physician for ongoing care.     We treat patients with a wide array of medical needs from routine physicals, to acute  illnesses, to diabetes and blood pressure management, to complex medical illness.  What is a Resident Physician?    Resident physicians hold medical degrees and are doctors. They are training to become specialists in Internal Medicine. They work under the supervision of board-certified faculty physicians.  Expectations for Your Care    We strive to provide accessible, quality care at all times.    In order to provide this care, it is best to see your primary care resident doctor consistently rather switching between providers.  In the event you do see another physician, you should schedule a follow-up visit with your usual primary care doctor.    If you are transitioning your care from another clinic, it is helpful to have your records available for your doctor to review.    We do not prescribe controlled substances, such as ADD medications or narcotic pain medications, on your first visit.  We will review your health records and concerns prior to devising a treatment plan with you in order to provide the best care.      Clinic Services     Extended clinic hours; patient  to help navigate your visit;  parking; laboratory and imaging services with evening and weekend hours    Multiple medical and surgical specialties in one building    Complementary services, including Nutrition, Integrative Medicine, Pharmacy consultations, Mental and Behavioral Health, Sports Medicine and Physical Therapy    Thank You    We would like to thank you for choosing the [facility] Resident Continuity Clinic for your primary care. You are making a priceless contribution to the training of the next generation of health care practitioners.     Contact us at [phone] for appointments or questions.    Resident Clinic Hours are Tuesdays and Thursdays, 7:30am-5:00pm    Residents  Radha Hope MD   (Female )   Teri Cormier MD   (Female)   Iam Rick MD  (Male)   Kobi Martínez MD   (Male)   Adi Talamantes MD   (Female)   Anton Lyn MD  (Male)    Ravi Ramos MD  (Male)   Gume Muhammad MD  (Male)   Kobi Ziegler MD (Male)   Arash Heard MD  (Male)   Liliana Murray MD (Female)    Iveth Brown MD (Female)   Marcela Chávez MD  (Male)   Micaela Villagomez MD(Female)   Pauly Nelson MD  (Female)    Supervising Physicians   MD Lin Figueredo, MD Rich Raygoza, MD Gomez Lopez, MD Maryam Higuera, MD Jasmyn Dolan, MD Kim Jones, MD Nolvia Nguyen MD        We will try the BP monitor and check twice daily and write down BPs.  We will give you one prescription for flexiril. Please follow up with sports medicine regarding outpatient PT.             Follow-ups after your visit        Additional Services     DERMATOLOGY REFERRAL       Your provider has referred you to: [facility]: Dermatology Clinic [facility] [phone]   [email or web address]    Please be aware that coverage of these services is subject to the terms and limitations of your health insurance plan.  Call member services at your health plan with any benefit or coverage questions.      Please bring the following with you to your appointment:    (1) Any X-Rays, CTs or MRIs which have been performed.  Contact the facility where they were done to arrange for  prior to your scheduled appointment.    (2) List of current medications  (3) This referral request   (4) Any documents/labs given to you for this referral                  Follow-up notes from your care team     Return in about 1 month (around 10/18/2018).      Who to contact     Please call your clinic at [phone] to:    Ask questions about your health    Make or cancel appointments    Discuss your medicines    Learn about your test results    Speak to your doctor            Additional Information About Your Visit        MyChart Information      [employer] gives you secure access to your electronic health record. If you see a primary care provider, you can also send messages to your care team and make appointments. If you have questions, please call your primary care clinic.  If you do not have a primary care provider, please call [phone] and they will assist you.      [employer] is an electronic gateway that provides easy, online access to your medical records. With [employer], you can request a clinic appointment, read your test results, renew a prescription or communicate with your care team.     To access your existing account, please contact your [facility] or call [phone] for assistance.        Care EveryWhere ID     This is your Care EveryWhere ID. This could be used by other organizations to access your [facility] medical records  [id number]        Your Vitals Were     Pulse Respirations BMI (Body Mass Index)             79 16 27.23 kg/m2          Blood Pressure from Last 3 Encounters:   09/18/18 (!) 145/92   06/19/18 137/85   06/07/18 138/88    Weight from Last 3 Encounters:   09/18/18 83.6 kg (184 lb 6.4 oz)   07/03/18 80.3 kg (177 lb)   06/19/18 80.6 kg (177 lb 9.6 oz)              We Performed the Following     DERMATOLOGY REFERRAL          Today's Medication Changes          These changes are accurate as of 9/18/18  7:49 AM.  If you have any questions, ask your nurse or doctor.               Start taking these medicines.        Dose/Directions    Blood Pressure Monitor/Wrist Malu   Used for:  Benign essential hypertension   Started by:  Liliana Murray MD        Dose:  1 each   1 each 2 times daily   Quantity:  1 each   Refills:  0         Stop taking these medicines if you haven't already. Please contact your care team if you have questions.     NAPROXEN PO   Stopped by:  Liliana Murray MD                Where to get your medicines      These medications were sent to [facility]  [facility] - [place], [place] - [address]  [address], [facility] [address]    Hours:  TRANSPLANT PHONE NUMBER [phone] Phone:  [phone]     Blood Pressure Monitor/Wrist Malu    cyclobenzaprine 10 MG tablet                Primary Care Provider Office Phone # Fax #    Liliana Ginger Murray [phone]       [address]  [facility] [address]        Equal Access to Services     HILTON QUINTERO : Hadii aad ku hadasho Soomaali, waaxda luqadaha, qaybta kaalmada adeegyada, waxay idiin hayaan jessika dixon. So [facility] [phone].    ATENCIÓN: Si habla español, tiene a chaves disposición servicios gratuitos de asistencia lingüística. [facility] [phone].    We comply with applicable federal civil rights laws and [place] laws. We do not discriminate on the basis of race, color, national origin, age, disability, sex, sexual orientation, or gender identity.            Thank you!     Thank you for choosing [facility]  for your care. Our goal is always to provide you with excellent care. Hearing back from our patients is one way we can continue to improve our services. Please take a few minutes to complete the written survey that you may receive in the mail after your visit with us. Thank you!             Your Updated Medication List - Protect others around you: Learn how to safely use, store and throw away your medicines at www.disposemymeds.org.          This list is accurate as of 9/18/18  7:49 AM.  Always use your most recent med list.                   Brand Name Dispense Instructions for use Diagnosis    Blood Pressure Monitor/Wrist Malu     1 each    1 each 2 times daily    Benign essential hypertension       cyclobenzaprine 10 MG tablet    FLEXERIL    42 tablet    Take 1 tablet (10 mg) by mouth 3 times daily as needed for muscle spasms    Acute low back pain without sciatica, unspecified back pain laterality        multivitamin, therapeutic Tabs tablet      Take 1 tablet by mouth daily

## 2018-09-18 NOTE — PATIENT INSTRUCTIONS
Primary Care Center Phone Number [phone]  Primary Care Center Medication Refill Request Information:  * Please contact your pharmacy regarding ANY request for medication refills.  ** [facility] Prescription Fax = [phone]  * Please allow 3 business days for routine medication refills.  * Please allow 5 business days for controlled substance medication refills.     Primary Care Center Test Result notification information:  *You will be notified with in 7-10 days of your appointment day regarding the results of your test.  If you are on MyChart you will be notified as soon as the provider has reviewed the results and signed off on them.      Dermatology [phone] ([facility] [place], 3rd [facility] N)     U Ortho [phone] ([facility] [place], 4th Floor N)                  [facility]         Internal Medicine Resident                   Continuity Clinic    Who We Are    Resident Continuity Clinic is a part of the [facility].  Resident physicians see patients independently and establish a relationship with them over the course of their three-year residency program.  As with the Primary Care Clinic, our Resident Continuity Clinic models a group practice.  If your doctor is not available, you will be able to see another resident physician.  At the end of a resident s training, patients will be transitioned to a new resident physician for ongoing care.     We treat patients with a wide array of medical needs from routine physicals, to acute illnesses, to diabetes and blood pressure management, to complex medical illness.  What is a Resident Physician?    Resident physicians hold medical degrees and are doctors. They are training to become specialists in Internal Medicine. They work under the supervision of board-certified faculty physicians.  Expectations for Your Care    We strive to provide accessible, quality care at all times.    In order to provide this care, it is best to see your primary care resident doctor  consistently rather switching between providers.  In the event you do see another physician, you should schedule a follow-up visit with your usual primary care doctor.    If you are transitioning your care from another clinic, it is helpful to have your records available for your doctor to review.    We do not prescribe controlled substances, such as ADD medications or narcotic pain medications, on your first visit.  We will review your health records and concerns prior to devising a treatment plan with you in order to provide the best care.      Clinic Services     Extended clinic hours; patient  to help navigate your visit;  parking; laboratory and imaging services with evening and weekend hours    Multiple medical and surgical specialties in one building    Complementary services, including Nutrition, Integrative Medicine, Pharmacy consultations, Mental and Behavioral Health, Sports Medicine and Physical Therapy    Thank You    We would like to thank you for choosing the [facility] Internal [facility] Resident Continuity Clinic for your primary care. You are making a priceless contribution to the training of the next generation of health care practitioners.     Contact us at [phone] for appointments or questions.    Resident Clinic Hours are Tuesdays and Thursdays, 7:30am-5:00pm    Residents  Radha Hope MD   (Female )   Teri Cormier MD   (Female)   Iam Rick MD  (Male)   Kobi Martínez MD  (Male)   Adi Talamantes MD   (Female)   Anton Lyn MD  (Male)    Ravi Ramos MD  (Male)   Gume Muhammad MD  (Male)   Kobi Ziegler MD (Male)   Arash Heard MD  (Male)   Liliana Murray MD (Female)    Iveth Brown MD (Female)   Marcela Chávez MD  (Male)   Micaela Villagomez MD(Female)   Pauly Nelson MD  (Female)    Supervising Physicians   MD Lin Figueredo MD Briar Duffy, MD James Langland, MD Mary Logeais, MD Tanya  MD Kamlesh Dolan MD Heather Thompson Buum, MD Kathleen Watson, MD        We will try the BP monitor and check twice daily and write down BPs.  We will give you one prescription for flexiril. Please follow up with sports medicine regarding outpatient PT.

## 2018-09-18 NOTE — PROGRESS NOTES
"                     PRIMARY CARE CENTER       SUBJECTIVE:  Frederick Nguyen is a 43 year old male with a PMHx of chronic lower back pain, SI joint dysfunction, S oralis bacteremia and endocarditis s/p abx treatment, who comes in for f/u of back pain deemed to be due to degenerative joint disease and SI dysfunction.    Less pain, overall flexibility and range of motion is improving. Now he is having fewer muscle spasms; however, now they are stronger. They do go away faster than in the past (usually resolve within 24 hours). But this causes him to go home. Usually he is just sitting there and they will start. This does not happen after or during PT exercises. He does not believe there are any triggers. He says he does not ever lift anything heavy. These will occur 4-5 times per month. He is not having any fevers or chills, no saddle anesthesia or bowel or bladder dysfunction.     He has a bump under his chin that he says is a \"blind pimple\". He has had this before that went away in 2-3 weeks. He has had this current bump for 20 days.     Of note, pt treated for discitis in past year. Has completed abx and had f/u MRI without signs of discitis. Has been afebrile since completing abx and back pain has improved since PT.     Medications and allergies reviewed by me today.     ROS:   14 pt ROS completed and negative unless otherwise listed in HPI.     OBJECTIVE:  BP (!) 145/92  Pulse 79  Resp 16  Wt 83.6 kg (184 lb 6.4 oz)  BMI 27.23 kg/m2   Wt Readings from Last 1 Encounters:   09/18/18 83.6 kg (184 lb 6.4 oz)     General: AAOx3, NAD  HEENT: NC/AT, PERRL, area with cyst on R side (submandibular) without surrounding erythema and non-painful, is pea-sized  Resp: breathing comfortably on room air  Extremities: no signs of inflammation in joints  Skin: No obvious rashes   Neuro: no focal deficits  Psych: Appropriate mood  Back: palpation of spine reveals no step-offs and is non-tender. Palpation of paraspinal " "muscles non-tender. Pt is able to flex forward and rotated laterally without eliciting pain.     ASSESSMENT/PLAN:    Frederick Nguyen is a 43 year old male with a PMHx of chronic lower back pain, SI joint dysfunction, S oralis bacteremia and endocarditis s/p abx treatment, who comes in for f/u of back pain deemed to be due to degenerative joint disease and SI dysfunction.    Frederikc was seen today for back pain.    Diagnoses and all orders for this visit:    #Benign essential hypertension  Pt's BP has been above systolic 130s on all visits. In the past, he has always had back pain during our visits. Today he has no active pain, and BP is 145 systolic. He endorses he has had decreased exercise. I discussed starting a BP med. Instead, pt would like to check BPs at home and will write them down and will f/u in 1-2 months.  -     Blood Pressure Monitoring (BLOOD PRESSURE MONITOR/WRIST) LORETO; 1 each 2 times daily    #Acute low back pain without sciatica, unspecified back pain laterality  Pt has no fevers/chills, saddle anesthesia, bowel or bladder dysfunction. Exam is benign. Likely still degenerative joint disease and SI joint dysfunction. Pt will f/u with sports-med for outpatient PT (has already completed 8 weeks of this and says this has improved flexibility and pain).  I discussed flexeril for acute attacks until then and have provided him with one prescription of this.  -     cyclobenzaprine (FLEXERIL) 10 MG tablet; Take 1 tablet (10 mg) by mouth 3 times daily as needed for muscle spasms    #Sebaceous cyst on face  Pt notes \"deep pimple\" on underside on chin on beard that he has had a cyst removed before. Has had this for 20 days. No surrounding redness or pain currently. Will refer to dermatology. If dissapears prior to appt, pt can cancel derm appt, otherwise, can see derm to see if they feel it is a cyst that can be excised.   -     DERMATOLOGY REFERRAL       Pt should return to clinic for f/u with me in " 1-2 months for BP check.  Liliana Murray MD  Sep 18, 2018    Plan of care discussed with Dr. Morocho.     Liliana Murray MD PhD   Internal Medicine, PGY 2  p     Teaching Physician Note:    While the patient was in clinic, I reviewed the patient's medical history and results, the resident's findings on physical examination, and the patient's diagnosis and treatment plan with the resident.  I agree with the information documented with the following exceptions: none.    Joselin Morocho M.D.  Internal Medicine  Primary Care Center   pager [phone]

## 2018-10-18 ENCOUNTER — OFFICE VISIT (OUTPATIENT)
Dept: FAMILY MEDICINE | Facility: CLINIC | Age: 43
End: 2018-10-18
Payer: COMMERCIAL

## 2018-10-18 VITALS
HEART RATE: 85 BPM | BODY MASS INDEX: 27.32 KG/M2 | TEMPERATURE: 99.1 F | OXYGEN SATURATION: 96 % | SYSTOLIC BLOOD PRESSURE: 136 MMHG | WEIGHT: 185 LBS | DIASTOLIC BLOOD PRESSURE: 6 MMHG

## 2018-10-18 DIAGNOSIS — J01.00 ACUTE NON-RECURRENT MAXILLARY SINUSITIS: Primary | ICD-10-CM

## 2018-10-18 DIAGNOSIS — I34.1 MITRAL VALVE PROLAPSE: ICD-10-CM

## 2018-10-18 PROCEDURE — 99213 OFFICE O/P EST LOW 20 MIN: CPT | Performed by: FAMILY MEDICINE

## 2018-10-18 RX ORDER — FLUTICASONE PROPIONATE 50 MCG
SPRAY, SUSPENSION (ML) NASAL
Qty: 1 BOTTLE | Refills: 1 | Status: SHIPPED | OUTPATIENT
Start: 2018-10-18 | End: 2018-11-13

## 2018-10-18 RX ORDER — AMOXICILLIN 875 MG
875 TABLET ORAL 2 TIMES DAILY
Qty: 20 TABLET | Refills: 0 | Status: SHIPPED | OUTPATIENT
Start: 2018-10-18 | End: 2018-11-01

## 2018-10-18 ASSESSMENT — PAIN SCALES - GENERAL: PAINLEVEL: NO PAIN (0)

## 2018-10-18 NOTE — PROGRESS NOTES
SUBJECTIVE:                                                    SUBJECTIVE: Frederick Nguyen is a 43 year old male patient complaining of sinuses pressure, pain, fullness in ear. Post nasal drainage, feeling dizzy at sometimes for 10 days or more.     OBJECTIVE: The patient appears healthy, alert and no distress.   EARS: External ears normal. Canals clear. TM's normal.  NOSE/SINUS: Nares normal. Septum midline. Mucosa normal. No drainage or sinus tenderness.  Sinus palpation: Maxillary sinus tender to palpation   THROAT: normal   NECK:Neck supple. No adenopathy. Thyroid symmetric, normal size,   CHEST: Clear to auscultation  Heart, normal sinuses and rhythm, Soft systolic heart murmur.  ASSESSMENT: Acute Sinusitis  (J01.00) Acute non-recurrent maxillary sinusitis  (primary encounter diagnosis)    Plan: amoxicillin (AMOXIL) 875 MG tablet, fluticasone        (FLONASE) 50 MCG/ACT spray     (I34.1) Mitral valve prolapse

## 2018-10-18 NOTE — MR AVS SNAPSHOT
After Visit Summary   10/18/2018    Frederick Nguyen    MRN: [id number]           Patient Information     Date Of Birth          1975        Visit Information        Provider Department      10/18/2018 11:40 AM Frances Gonzalez MD [facility]        Today's Diagnoses     Acute non-recurrent maxillary sinusitis    -  1      Care Instructions      Sinus Headache    The sinuses are air-filled spaces within the bones of the face. They connect to the inside of the nose. Sinusitis is an inflammation of the tissue lining the sinus cavity. Sinus inflammation can occur during a cold or hay fever (allergies to pollens and other particles in the air) and cause symptoms of sinus congestion and fullness and perhaps a low-grade fever. An infection is usually present when there is also facial pain or headache and green or yellow drainage from the nose or into the back of the throat (postnasal drip). Antibiotics are often prescribed to treat this condition.  Sinus headache may cause pain in different places, depending on which sinuses are infected. There may be pain in the temples, forehead, top of the head, behind or around the eye, across the cheekbone, or into the upper teeth.  You may find that changing your position, sitting upright or lying down, will bring some relief.  Home care  The following guidelines will help you care for yourself at home:    Drink plenty of water, hot tea, and other liquids to stay well hydrated. This thins the mucus and helps your sinuses drain.    Apply heat to the painful areas of the face. Use a towel soaked in hot water. Or  the shower with the hot spray on your face. This is a good way to inhale warm water vapor and get heat on your face at the same time. Cover your mouth and nose with your hands so you can still breathe as you do this.    Use a cool mist vaporizer at night. Suck on peppermint, menthol, or eucalyptus hard candies during the  day.    An expectorant containing guaifenesin helps thin the mucus. It also helps your sinuses drain.    You may use over-the-counter decongestants unless a similar medicine was prescribed. Nasal sprays or drops work the fastest. Use one that contains phenylephrine or oxymetazoline. First blow your nose gently to remove mucus. Then apply the spray or drops. Don't use decongestant nasal sprays or drops more often than the label says or for more than 3 days. This can make symptoms worse. Nasal sprays or drops prescribed by your doctor typically do not have these limits. Check with your doctor or pharmacist. You may also use oral tablets containing pseudoephedrine. Side effects from oral decongestants tend to be worse than with nasal sprays or drops, and may keep you from using them. Many sinus remedies combine ingredients, which may increase side effects. Also, if you are taking a combination medicine with another medicine, be sure you are not taking a double dose of anything by mistake. Read the labels or ask the pharmacist for help. Talk with your doctor before using decongestants if you have high blood pressure, heart disease, glaucoma, or prostate trouble.    Antihistamines may help if allergies are causing your sinusitis. You can get chlorpheniramine and diphenhydramine over the counter, but these can cause drowsiness. Don't use these if you have glaucoma or if you are a man with trouble urinating due to an enlarged prostate. Over-the-counter antihistamines containing loratidine and cetirizine cause less drowsiness and may be a better choice for daytime use.    When allergies cause your sinusitis, a saline nasal rinse may give relief. A saline nasal rinse reduces swelling and clears excess mucus. This allows sinuses to drain. Prepackaged kits are available at most drugstores. These contain premixed salt packets and an irrigation device. If antibiotics have been prescribed to treat an acute sinus infection, talk  with your doctor before using a nasal rinse to be sure it is safe for you.    You may use over-the-counter medicine to control pain and fever, unless another pain medicine was prescribed. Talk with your doctor before using acetaminophen or ibuprofen if you have chronic liver or kidney disease. Also talk with your doctor if you have ever had a stomach ulcer. Aspirin should never be used in anyone under 18 years of age who has a fever. It may cause a life-threatening condition called Reye syndrome.    If antibiotics were given, finish all of them, even if you are feeling better after a few days.  Follow-up care  Follow up with your healthcare provider, or as advised if your symptoms aren't better in 1 week.  Call 911  Call 911 if any of these occur:    Unusual drowsiness or confusion    Swelling of the forehead or eyelids    Vision problems including blurred or double vision    Seizure  When to seek medical advice  Call your healthcare provider right away if any of these occur:    Facial pain or headache becomes more severe    Stiff neck    Fever of 100.4  F (38  C) or higher, or as directed by your healthcare provider    Bleeding from the nose or throat  Date Last Reviewed: 10/1/2016    3303-8580 [employer]. [address] [place] [address]. All rights reserved. This information is not intended as a substitute for professional medical care. Always follow your healthcare professional's instructions.                Follow-ups after your visit        Who to contact     If you have questions or need follow up information about today's clinic visit or your schedule please contact [facility] directly at [phone].  Normal or non-critical lab and imaging results will be communicated to you by MyChart, letter or phone within 4 business days after the clinic has received the results. If you do not hear from us within 7 days, please contact the clinic through MyChart or phone.  If you have a critical or abnormal lab result, we will notify you by phone as soon as possible.  Submit refill requests through [employer] or call your pharmacy and they will forward the refill request to us. Please allow 3 business days for your refill to be completed.          Additional Information About Your Visit        Blowtorchhart Information     [employer] gives you secure access to your electronic health record. If you see a primary care provider, you can also send messages to your care team and make appointments. If you have questions, please call your primary care clinic.  If you do not have a primary care provider, please call [phone] and they will assist you.        Care EveryWhere ID     This is your Care EveryWhere ID. This could be used by other organizations to access your [facility] medical records  [id number]        Your Vitals Were     Pulse Temperature Pulse Oximetry BMI (Body Mass Index)          85 99.1  F (37.3  C) (Oral) 96% 27.32 kg/m2         Blood Pressure from Last 3 Encounters:   10/18/18 (!) 136/6   09/18/18 (!) 145/92   06/19/18 137/85    Weight from Last 3 Encounters:   10/18/18 185 lb (83.9 kg)   09/18/18 184 lb 6.4 oz (83.6 kg)   07/03/18 177 lb (80.3 kg)              Today, you had the following     No orders found for display         Today's Medication Changes          These changes are accurate as of 10/18/18 12:03 PM.  If you have any questions, ask your nurse or doctor.               Start taking these medicines.        Dose/Directions    amoxicillin 875 MG tablet   Commonly known as:  AMOXIL   Used for:  Acute non-recurrent maxillary sinusitis   Started by:  Frances Gonzalez MD        Dose:  875 mg   Take 1 tablet (875 mg) by mouth 2 times daily for 10 days   Quantity:  20 tablet   Refills:  0       fluticasone 50 MCG/ACT spray   Commonly known as:  FLONASE   Used for:  Acute non-recurrent maxillary sinusitis   Started by:  Frances Gonzalez MD        1-2 spray per nostril at bedtime/  prn   Quantity:  1 Bottle   Refills:  1            Where to get your medicines      These medications were sent to [facility] - [place], [place] - [address]  [address], [facility] [address]     Phone:  [phone]     amoxicillin 875 MG tablet    fluticasone 50 MCG/ACT spray                Primary Care Provider Office Phone # Fax #    Liliana Ginger Murray [phone]       [address] [address]        Equal Access to Services     HILTON QUINTERO : Hadii aad ku hadasho Soomaali, waaxda luqadaha, qaybta kaalmada adeegyada, waxlewis pires hayjuice dixon. So [facility] [phone].    ATENCIÓN: Si habla español, tiene a chaves disposición servicios gratuitos de asistencia lingüística. New al [phone].    We comply with applicable federal civil rights laws and [place] laws. We do not discriminate on the basis of race, color, national origin, age, disability, sex, sexual orientation, or gender identity.            Thank you!     Thank you for choosing [facility]  for your care. Our goal is always to provide you with excellent care. Hearing back from our patients is one way we can continue to improve our services. Please take a few minutes to complete the written survey that you may receive in the mail after your visit with us. Thank you!             Your Updated Medication List - Protect others around you: Learn how to safely use, store and throw away your medicines at www.disposemymeds.org.          This list is accurate as of 10/18/18 12:03 PM.  Always use your most recent med list.                   Brand Name Dispense Instructions for use Diagnosis    amoxicillin 875 MG tablet    AMOXIL    20 tablet    Take 1 tablet (875 mg) by mouth 2 times daily for 10 days    Acute non-recurrent maxillary sinusitis       Blood Pressure Monitor/Wrist Malu     1 each    1 each 2 times daily     Benign essential hypertension       cyclobenzaprine 10 MG tablet    FLEXERIL    42 tablet    Take 1 tablet (10 mg) by mouth 3 times daily as needed for muscle spasms    Acute low back pain without sciatica, unspecified back pain laterality       fluticasone 50 MCG/ACT spray    FLONASE    1 Bottle    1-2 spray per nostril at bedtime/ prn    Acute non-recurrent maxillary sinusitis       multivitamin, therapeutic Tabs tablet      Take 1 tablet by mouth daily

## 2018-10-18 NOTE — PATIENT INSTRUCTIONS
Sinus Headache    The sinuses are air-filled spaces within the bones of the face. They connect to the inside of the nose. Sinusitis is an inflammation of the tissue lining the sinus cavity. Sinus inflammation can occur during a cold or hay fever (allergies to pollens and other particles in the air) and cause symptoms of sinus congestion and fullness and perhaps a low-grade fever. An infection is usually present when there is also facial pain or headache and green or yellow drainage from the nose or into the back of the throat (postnasal drip). Antibiotics are often prescribed to treat this condition.  Sinus headache may cause pain in different places, depending on which sinuses are infected. There may be pain in the temples, forehead, top of the head, behind or around the eye, across the cheekbone, or into the upper teeth.  You may find that changing your position, sitting upright or lying down, will bring some relief.  Home care  The following guidelines will help you care for yourself at home:    Drink plenty of water, hot tea, and other liquids to stay well hydrated. This thins the mucus and helps your sinuses drain.    Apply heat to the painful areas of the face. Use a towel soaked in hot water. Or  the shower with the hot spray on your face. This is a good way to inhale warm water vapor and get heat on your face at the same time. Cover your mouth and nose with your hands so you can still breathe as you do this.    Use a cool mist vaporizer at night. Suck on peppermint, menthol, or eucalyptus hard candies during the day.    An expectorant containing guaifenesin helps thin the mucus. It also helps your sinuses drain.    You may use over-the-counter decongestants unless a similar medicine was prescribed. Nasal sprays or drops work the fastest. Use one that contains phenylephrine or oxymetazoline. First blow your nose gently to remove mucus. Then apply the spray or drops. Don't use decongestant nasal  sprays or drops more often than the label says or for more than 3 days. This can make symptoms worse. Nasal sprays or drops prescribed by your doctor typically do not have these limits. Check with your doctor or pharmacist. You may also use oral tablets containing pseudoephedrine. Side effects from oral decongestants tend to be worse than with nasal sprays or drops, and may keep you from using them. Many sinus remedies combine ingredients, which may increase side effects. Also, if you are taking a combination medicine with another medicine, be sure you are not taking a double dose of anything by mistake. Read the labels or ask the pharmacist for help. Talk with your doctor before using decongestants if you have high blood pressure, heart disease, glaucoma, or prostate trouble.    Antihistamines may help if allergies are causing your sinusitis. You can get chlorpheniramine and diphenhydramine over the counter, but these can cause drowsiness. Don't use these if you have glaucoma or if you are a man with trouble urinating due to an enlarged prostate. Over-the-counter antihistamines containing loratidine and cetirizine cause less drowsiness and may be a better choice for daytime use.    When allergies cause your sinusitis, a saline nasal rinse may give relief. A saline nasal rinse reduces swelling and clears excess mucus. This allows sinuses to drain. Prepackaged kits are available at most drugstores. These contain premixed salt packets and an irrigation device. If antibiotics have been prescribed to treat an acute sinus infection, talk with your doctor before using a nasal rinse to be sure it is safe for you.    You may use over-the-counter medicine to control pain and fever, unless another pain medicine was prescribed. Talk with your doctor before using acetaminophen or ibuprofen if you have chronic liver or kidney disease. Also talk with your doctor if you have ever had a stomach ulcer. Aspirin should never be used  in anyone under 18 years of age who has a fever. It may cause a life-threatening condition called Reye syndrome.    If antibiotics were given, finish all of them, even if you are feeling better after a few days.  Follow-up care  Follow up with your healthcare provider, or as advised if your symptoms aren't better in 1 week.  Call 911  Call 911 if any of these occur:    Unusual drowsiness or confusion    Swelling of the forehead or eyelids    Vision problems including blurred or double vision    Seizure  When to seek medical advice  Call your healthcare provider right away if any of these occur:    Facial pain or headache becomes more severe    Stiff neck    Fever of 100.4  F (38  C) or higher, or as directed by your healthcare provider    Bleeding from the nose or throat  Date Last Reviewed: 10/1/2016    1452-4185 [employer]. [address] [place] [address], [place] [address]. All rights reserved. This information is not intended as a substitute for professional medical care. Always follow your healthcare professional's instructions.

## 2018-10-29 ENCOUNTER — TELEPHONE (OUTPATIENT)
Dept: FAMILY MEDICINE | Facility: CLINIC | Age: 43
End: 2018-10-29

## 2018-10-29 ENCOUNTER — MYC MEDICAL ADVICE (OUTPATIENT)
Dept: FAMILY MEDICINE | Facility: CLINIC | Age: 43
End: 2018-10-29

## 2018-10-29 NOTE — TELEPHONE ENCOUNTER
Sent my chart message to clarify symptoms.    Christa Horne RN  [facility]

## 2018-10-29 NOTE — TELEPHONE ENCOUNTER
[facility]/01 for the following:    Completed antibiotics however mucus laden cough  persists along with nasal congestion.    Routing to review symptoms prior to appointment. Patient was previously seen on 10/18 by Dr Gonzalez with onset of symptoms 10/06.

## 2018-10-30 NOTE — TELEPHONE ENCOUNTER
Routing to PCP to review and advise.    Please see My Chart message.    Does have an appointment tomorrow 11/1      Christa Horne RN  [facility]

## 2018-11-01 ENCOUNTER — OFFICE VISIT (OUTPATIENT)
Dept: FAMILY MEDICINE | Facility: CLINIC | Age: 43
End: 2018-11-01
Payer: COMMERCIAL

## 2018-11-01 VITALS
DIASTOLIC BLOOD PRESSURE: 85 MMHG | OXYGEN SATURATION: 95 % | BODY MASS INDEX: 27.47 KG/M2 | SYSTOLIC BLOOD PRESSURE: 128 MMHG | TEMPERATURE: 97.9 F | WEIGHT: 186 LBS | HEART RATE: 70 BPM

## 2018-11-01 DIAGNOSIS — I34.1 MITRAL VALVE PROLAPSE: ICD-10-CM

## 2018-11-01 DIAGNOSIS — J01.00 ACUTE NON-RECURRENT MAXILLARY SINUSITIS: ICD-10-CM

## 2018-11-01 DIAGNOSIS — R05.9 COUGH: Primary | ICD-10-CM

## 2018-11-01 PROCEDURE — 99213 OFFICE O/P EST LOW 20 MIN: CPT | Performed by: FAMILY MEDICINE

## 2018-11-01 RX ORDER — AMOXICILLIN 875 MG
875 TABLET ORAL 2 TIMES DAILY
Qty: 20 TABLET | Refills: 0 | Status: SHIPPED | OUTPATIENT
Start: 2018-11-01 | End: 2018-11-13

## 2018-11-01 RX ORDER — CODEINE PHOSPHATE AND GUAIFENESIN 10; 100 MG/5ML; MG/5ML
1-2 SOLUTION ORAL EVERY 6 HOURS PRN
Qty: 120 ML | Refills: 0 | Status: SHIPPED | OUTPATIENT
Start: 2018-11-01 | End: 2018-11-13

## 2018-11-01 ASSESSMENT — PAIN SCALES - GENERAL: PAINLEVEL: NO PAIN (0)

## 2018-11-01 NOTE — TELEPHONE ENCOUNTER
Patient seen this am in clinic by Dr. Ramos. Closing TE.    Christa Horne RN  [facility]

## 2018-11-01 NOTE — PATIENT INSTRUCTIONS
Use cough med mainly at night    Stay well hydrated    Hold prescription for the antibiotic.  Fill if symptoms worsen/ don't resolve    Follow up as needed based on symptoms

## 2018-11-01 NOTE — PROGRESS NOTES
SUBJECTIVE:   Frederick Nguyen is a 43 year old male who presents to clinic today for the following health issues:      Patient is here today to follow up on a sinus infection, still waking at night with a cough and through out the day. His mucous is lighter green but still thick.     overall better    Cough severity better but not gone     Still some bad coughing spells    Some phlegm from chest but maybe just draining down from head    Only missed 2 days of work, before the 1st visit    Problem list and histories reviewed & adjusted, as indicated.  Additional history: as documented         Reviewed and updated as needed this visit by clinical staff  Tobacco  Allergies  Meds  Med Hx  Surg Hx  Fam Hx  Soc Hx      Reviewed and updated as needed this visit by Provider          history of mvp    Had back injury, much improved    Was hospitalized for sepsis last feb/ march    A bit of soreness left upper chest wall when taking a deep breath    Not short of breath    Throat gets croaky when talking a lot    Trying to drink lots of fluids    Takes amox prior to dental visits      Physical Exam   Constitutional: He is oriented to person, place, and time and well-developed, well-nourished, and in no distress.   HENT:   Head: Normocephalic and atraumatic.   Right Ear: Tympanic membrane, external ear and ear canal normal.   Left Ear: Tympanic membrane, external ear and ear canal normal.   Nose: Nose normal.   Mouth/Throat: Oropharynx is clear and moist.   No sinus or submandib tenderness     Eyes: Conjunctivae are normal.   Cardiovascular: Normal rate and regular rhythm.    Murmur heard.  Pulmonary/Chest: Effort normal and breath sounds normal. No respiratory distress. He exhibits no tenderness.   Abdominal: Soft. There is no tenderness.   Lymphadenopathy:     He has no cervical adenopathy.   Neurological: He is alert and oriented to person, place, and time.     ASSESSMENT / PLAN:  (R05) Cough  (primary encounter  diagnosis)  Comment: no wheezing.  Cough mainly a problem at night.  Use prescription cough med mainly at night.  Warned of sedation.  Stay well hydrated.   Plan: guaiFENesin-codeine (ROBITUSSIN AC) 100-10         MG/5ML SOLN solution             (J01.00) Acute non-recurrent maxillary sinusitis  Comment: patient is better overall.  Gave prescription to hold.  Fill if symptoms worsen/ don't improve in next few days.  Plan: amoxicillin (AMOXIL) 875 MG tablet             (I34.1) Mitral valve prolapse  Comment: patient has history of this and history of what sounds like sepsis  Plan: continue antibiotics prior to dental visits      I reviewed the patient's medications, allergies, medical history, family history, and social history.    Jerome Ramos MD

## 2018-11-13 ENCOUNTER — OFFICE VISIT (OUTPATIENT)
Dept: FAMILY MEDICINE | Facility: CLINIC | Age: 43
End: 2018-11-13
Payer: COMMERCIAL

## 2018-11-13 VITALS
BODY MASS INDEX: 27.76 KG/M2 | WEIGHT: 188 LBS | TEMPERATURE: 97.9 F | HEART RATE: 80 BPM | SYSTOLIC BLOOD PRESSURE: 125 MMHG | DIASTOLIC BLOOD PRESSURE: 81 MMHG

## 2018-11-13 DIAGNOSIS — M20.21 HALLUX RIGIDUS OF BOTH FEET: Primary | ICD-10-CM

## 2018-11-13 DIAGNOSIS — M20.22 HALLUX RIGIDUS OF BOTH FEET: Primary | ICD-10-CM

## 2018-11-13 PROCEDURE — 99212 OFFICE O/P EST SF 10 MIN: CPT | Performed by: FAMILY MEDICINE

## 2018-11-13 NOTE — MR AVS SNAPSHOT
After Visit Summary   11/13/2018    Frederick Nguyen    MRN: [id number]           Patient Information     Date Of Birth          1975        Visit Information        Provider Department      11/13/2018 7:40 AM Jerome Ramos MD [facility]        Today's Diagnoses     Hallux rigidus of both feet    -  1      Care Instructions    Work on stretching toes/ feet    Flexing and extending    Keep appointment with podiatry    Comfortable footwear    Call with problems/ questions              Follow-ups after your visit        Your next 10 appointments already scheduled     Dec 10, 2018  7:00 AM CST   (Arrive by 6:45 AM)   Sunday Podiatry New with Manoj Zepeda DPM   [facility] ([facility])    [address]  5th Floor  [facility] [address] [phone]              Who to contact     If you have questions or need follow up information about today's clinic visit or your schedule please contact [facility] directly at [phone].  Normal or non-critical lab and imaging results will be communicated to you by [employer]is[employer]hart, letter or phone within 4 business days after the clinic has received the results. If you do not hear from us within 7 days, please contact the clinic through [employer] or phone. If you have a critical or abnormal lab result, we will notify you by phone as soon as possible.  Submit refill requests through [employer] or call your pharmacy and they will forward the refill request to us. Please allow 3 business days for your refill to be completed.          Additional Information About Your Visit        Decisivhart Information     [employer] gives you secure access to your electronic health record. If you see a primary care provider, you can also send messages to your care team and make appointments. If you have questions, please call your primary care clinic.  If you do not have a primary care  provider, please call [phone] and they will assist you.        Care EveryWhere ID     This is your Care EveryWhere ID. This could be used by other organizations to access your [facility] medical records  [id number]        Your Vitals Were     Pulse Temperature BMI (Body Mass Index)             80 97.9  F (36.6  C) (Oral) 27.76 kg/m2          Blood Pressure from Last 3 Encounters:   11/13/18 125/81   11/01/18 128/85   10/18/18 (!) 136/6    Weight from Last 3 Encounters:   11/13/18 188 lb (85.3 kg)   11/01/18 186 lb (84.4 kg)   10/18/18 185 lb (83.9 kg)              Today, you had the following     No orders found for display       Primary Care Provider Office Phone # Fax #    Liliana Ginger Murray [phone]       [place] [address]        Equal Access to Services     HILTON QUINTERO : Hadii kathia ku hadasho Sokalyn, waaxda luqadaha, qaybta kaalmada adeegyada, keya melgar . So [facility] [phone].    ATENCIÓN: Si habla español, tiene a chaves disposición servicios gratuitos de asistencia lingüística. Llame al [phone].    We comply with applicable federal civil rights laws and [place] laws. We do not discriminate on the basis of race, color, national origin, age, disability, sex, sexual orientation, or gender identity.            Thank you!     Thank you for choosing [facility]  for your care. Our goal is always to provide you with excellent care. Hearing back from our patients is one way we can continue to improve our services. Please take a few minutes to complete the written survey that you may receive in the mail after your visit with us. Thank you!             Your Updated Medication List - Protect others around you: Learn how to safely use, store and throw away your medicines at www.disposemymeds.org.          This list is accurate as of 11/13/18  8:05 AM.  Always use your most recent med list.                    Brand Name Dispense Instructions for use Diagnosis    multivitamin, therapeutic Tabs tablet      Take 1 tablet by mouth daily

## 2018-11-13 NOTE — PROGRESS NOTES
SUBJECTIVE:   Frederick Nguyen is a 43 year old male who presents to clinic today for the following health issues:       Bilateral great toe pain for the past 4 months    none    Problem list and histories reviewed & adjusted, as indicated.  Additional history: as documented         Reviewed and updated as needed this visit by clinical staff  Allergies  Meds       Reviewed and updated as needed this visit by Provider          gradually getting worse    No trauma    Changing shoes    Going to thicker and stiffer soles      Has appointment with podiatry     Full physical not done     Mentation and affect are fine    No tremor of speech or extremity    On exam patient has some limited passive and active range of motion of great toe bilat, especially  On right.     A bit of subj pain on plantar flexion of toe, especially against resistance    No swelling/ redness / tenderness of mtp joint at all    Remainder of foot okay    ASSESSMENT / PLAN:  (M20.21,  M20.22) Hallux rigidus of both feet  (primary encounter diagnosis)  Comment: patient does have podiatry appointment at [facility] coming up in Dec.  This is good timing.   Plan: advised him to work on range of motion of 1st mtp and great toe.  Demonstrate some stretches to do on regular basis.  Comfortable footwear.  Need not be rigid shoes.      I reviewed the patient's medications, allergies, medical history, family history, and social history.    Jerome Ramos MD

## 2018-11-13 NOTE — PATIENT INSTRUCTIONS
Work on stretching toes/ feet    Flexing and extending    Keep appointment with podiatry    Comfortable footwear    Call with problems/ questions

## 2018-12-05 NOTE — TELEPHONE ENCOUNTER
FUTURE VISIT INFORMATION      FUTURE VISIT INFORMATION:    Date: 12/10/18    Time:     [place] [facility] [place] INFORMATION:    Referring provider:  Jerome Ramos    Referring providers clinic:      Reason for visit/diagnosis  Podiatry  Pain and stiffness in both big toes. Pain is amplified   when walking. Hallux Rigidus?    RECORDS REQUESTED FROM:       Clinic name Comments Records Status Imaging Status     internal

## 2018-12-10 ENCOUNTER — ANCILLARY PROCEDURE (OUTPATIENT)
Dept: GENERAL RADIOLOGY | Facility: CLINIC | Age: 43
End: 2018-12-10
Attending: PODIATRIST
Payer: COMMERCIAL

## 2018-12-10 ENCOUNTER — OFFICE VISIT (OUTPATIENT)
Dept: ORTHOPEDICS | Facility: CLINIC | Age: 43
End: 2018-12-10
Payer: COMMERCIAL

## 2018-12-10 ENCOUNTER — PRE VISIT (OUTPATIENT)
Dept: ORTHOPEDICS | Facility: CLINIC | Age: 43
End: 2018-12-10

## 2018-12-10 DIAGNOSIS — M25.571 PAIN IN JOINT INVOLVING RIGHT ANKLE AND FOOT: ICD-10-CM

## 2018-12-10 DIAGNOSIS — M20.5X1 HALLUX LIMITUS OF RIGHT FOOT: ICD-10-CM

## 2018-12-10 DIAGNOSIS — M20.5X1 HALLUX LIMITUS OF RIGHT FOOT: Primary | ICD-10-CM

## 2018-12-10 NOTE — PROGRESS NOTES
Subjective:   Frederick Nguyen is a 43 year old male who is here for right great toe pain when ambulating started this summer.     Background:   Date of injury: None   Duration of symptoms: 4 months  Mechanism of Injury: Insidious Onset.  Intensity: 2/10 at rest, 6/10 with activity   Aggravating factors: walking  Relieving Factors: rest  Prior Evaluation: PCP. Was told to start doing ROM on the joint.    PAST MEDICAL, SOCIAL, SURGICAL AND FAMILY HISTORY: He  has a past medical history of Endocarditis, mitral valve, strep mitis (03/15/2018), Heart murmur, and Osteomyelitis, L5S1 (03/15/2018).  He  has a past surgical history that includes Inject Sacroiliac Joint (Right, 5/9/2018).  His family history is not on file.  He reports that  has never smoked. he has never used smokeless tobacco. He reports that he does not drink alcohol or use drugs.    ALLERGIES: He has No Known Allergies.    CURRENT MEDICATIONS: He has a current medication list which includes the following prescription(s): multivitamin, therapeutic.     REVIEW OF SYSTEMS: 9 point review of systems is negative except as noted above.     Exam:   There were no vitals taken for this visit.           CONSTITUTIONAL: healthy, alert and no distress  HEAD: Normocephalic. No masses, lesions, tenderness or abnormalities  SKIN: no suspicious lesions or rashes  GAIT: normal  NEUROLOGIC: Non-focal  PSYCHIATRIC: affect normal/bright and mentation appears normal.    MUSCULOSKELETAL: Pes planus BL with flexor stabilizing hammertoes. Right hallux is jamming in dorsiflexion, with some discomfort noted with this motion. Decreased ROM to the right 1st MTPJ. Osteophyte formation noted to the joint.       Assessment/Plan:   Right hallux limitus causing pain.    - Pt seen and evaluated.  - Xrays taken and discussed with him.  - I discussed treatment options with him. Would recommend starting with custom orthotics with a Blanco's extension. He agrees to this. Can escalate  to injectio therapy. Discussed future exostectomy if no relief.   - Rx for Voltaren gel written.  - See again in 2 months.    X-RAY INTERPRETATION:   X-Ray of the Right Foot: 3-view, ap, lateral, oblique   ordered and interpreted in the office today was positive for dorsal flag sign at the 1st MTPJ. Decreased joint space to the 1st MTPJ.

## 2018-12-10 NOTE — LETTER
12/10/2018      RE: Frederick Nguyen  [address] [facility] [address]        Subjective:   Frederick Nguyen is a 43 year old male who is here for right great toe pain when ambulating started this summer.     Background:   Date of injury: None   Duration of symptoms: 4 months  Mechanism of Injury: Insidious Onset.  Intensity: 2/10 at rest, 6/10 with activity   Aggravating factors: walking  Relieving Factors: rest  Prior Evaluation: PCP. Was told to start doing ROM on the joint.    PAST MEDICAL, SOCIAL, SURGICAL AND FAMILY HISTORY: He  has a past medical history of Endocarditis, mitral valve, strep mitis (03/15/2018), Heart murmur, and Osteomyelitis, L5S1 (03/15/2018).  He  has a past surgical history that includes Inject Sacroiliac Joint (Right, 5/9/2018).  His family history is not on file.  He reports that  has never smoked. he has never used smokeless tobacco. He reports that he does not drink alcohol or use drugs.    ALLERGIES: He has No Known Allergies.    CURRENT MEDICATIONS: He has a current medication list which includes the following prescription(s): multivitamin, therapeutic.     REVIEW OF SYSTEMS: 9 point review of systems is negative except as noted above.     Exam:   There were no vitals taken for this visit.           CONSTITUTIONAL: healthy, alert and no distress  HEAD: Normocephalic. No masses, lesions, tenderness or abnormalities  SKIN: no suspicious lesions or rashes  GAIT: normal  NEUROLOGIC: Non-focal  PSYCHIATRIC: affect normal/bright and mentation appears normal.    MUSCULOSKELETAL: Pes planus BL with flexor stabilizing hammertoes. Right hallux is jamming in dorsiflexion, with some discomfort noted with this motion. Decreased ROM to the right 1st MTPJ. Osteophyte formation noted to the joint.       Assessment/Plan:   Right hallux limitus causing pain.    - Pt seen and evaluated.  - Xrays taken and discussed with him.  - I discussed treatment options with him. Would recommend  starting with custom orthotics with a Blanco's extension. He agrees to this. Can escalate to injectio therapy. Discussed future exostectomy if no relief.   - Rx for Voltaren gel written.  - See again in 2 months.    X-RAY INTERPRETATION:   X-Ray of the Right Foot: 3-view, ap, lateral, oblique   ordered and interpreted in the office today was positive for dorsal flag sign at the 1st MTPJ. Decreased joint space to the 1st MTPJ.     Manoj Zepeda DPM

## 2019-01-17 ENCOUNTER — THERAPY VISIT (OUTPATIENT)
Dept: CHIROPRACTIC MEDICINE | Facility: CLINIC | Age: 44
End: 2019-01-17
Payer: COMMERCIAL

## 2019-01-17 DIAGNOSIS — M99.03 SEGMENTAL DYSFUNCTION OF LUMBAR REGION: ICD-10-CM

## 2019-01-17 DIAGNOSIS — M62.838 SPASM OF MUSCLE: ICD-10-CM

## 2019-01-17 DIAGNOSIS — M99.02 THORACIC SEGMENT DYSFUNCTION: ICD-10-CM

## 2019-01-17 DIAGNOSIS — M99.05 SEGMENTAL DYSFUNCTION OF PELVIC REGION: Primary | ICD-10-CM

## 2019-01-17 DIAGNOSIS — M54.50 LOW BACK ACHE: ICD-10-CM

## 2019-01-17 PROCEDURE — 98941 CHIROPRACT MANJ 3-4 REGIONS: CPT | Mod: AT | Performed by: CHIROPRACTOR

## 2019-01-17 PROCEDURE — 97110 THERAPEUTIC EXERCISES: CPT | Performed by: CHIROPRACTOR

## 2019-01-17 PROCEDURE — 99203 OFFICE O/P NEW LOW 30 MIN: CPT | Mod: 25 | Performed by: CHIROPRACTOR

## 2019-01-17 NOTE — MR AVS SNAPSHOT
After Visit Summary   3/13/2018    Frederick Nguyen    MRN: [id number]           Patient Information     Date Of Birth          1975        Visit Information        Provider Department      3/13/2018 1:30 PM Nolvia Nguyen MD [facility]        Today's Diagnoses     Acute bacterial endocarditis    -  1    Acute low back pain without sciatica, unspecified back pain laterality          Care Instructions    Primary Care Center: [phone]     Primary Care Center Medication Refill Request Information:  * Please contact your pharmacy regarding ANY request for medication refills.  ** [facility] Prescription Fax = [phone]  * Please allow 3 business days for routine medication refills.  * Please allow 5 business days for controlled substance medication refills.     Primary Care Center Test Result notification information:  *You will be notified with in 7-10 days of your appointment day regarding the results of your test.  If you are on MyChart you will be notified as soon as the provider has reviewed the results and signed off on them.    Patient must get into the infectious disease clinic within the next week!  Post hospitalization follow-up for endocarditis and osteomyelitis of the L5-S1 vertebrae      Labs for home health nurse to draw weekly:  CBC, CMP and CRP           Follow-ups after your visit        Additional Services     INFECTIOUS DISEASE REFERRAL       Your provider has referred you to: [facility]: Adult Specialty [facility] Infusion [facility] [phone]   [email or web address] or Dr. Salamanca  Please be aware that coverage of these services is subject to the terms and limitations of your health insurance plan.  Call member services at your health plan with any benefit or coverage questions.      Please bring the following with you to your appointment:    (1) Any X-Rays, CTs or MRIs which  Identified Patient with two Patient identifiers (Name and ). Two Patient Identifiers confirmed. Reviewed record in preparation for visit and have obtained necessary documentation. Chief Complaint   Patient presents with    Back Pain       Visit Vitals  /89 (BP 1 Location: Right arm, BP Patient Position: Sitting)   Pulse 96   Temp 97.9 °F (36.6 °C) (Oral)   Resp 18   Ht 5' 4\" (1.626 m)   Wt 201 lb (91.2 kg)   SpO2 99%   BMI 34.50 kg/m²       1. Have you been to the ER, urgent care clinic since your last visit? Hospitalized since your last visit? No    2. Have you seen or consulted any other health care providers outside of the [address] [place] [address] since your last visit? Include any pap smears or colon screening.  No have been performed.  Contact the facility where they were done to arrange for  prior to your scheduled appointment.    (2) List of current medications   (3) This referral request   (4) Any documents/labs given to you for this referral                  Follow-up notes from your care team     Return in about 3 weeks (around 4/3/2018).      Your next 10 appointments already scheduled     Mar 17, 2018  8:30 AM CDT   (Arrive by 8:15 AM)   New Patient Visit with CARLOS Bush MD   [facility])    [address] [place] [address]  [address] [place] [id number] [phone]              Who to contact     Please call your clinic at [phone] to:    Ask questions about your health    Make or cancel appointments    Discuss your medicines    Learn about your test results    Speak to your doctor            Additional Information About Your Visit        [employer] Information     [employer] gives you secure access to your electronic health record. If you see a primary care provider, you can also send messages to your care team and make appointments. If you have questions, please call your primary care clinic.  If you do not have a primary care provider, please call [phone] and they will assist you.      [employer] is an electronic gateway that provides easy, online access to your medical records. With [employer], you can request a clinic appointment, read your test results, renew a prescription or communicate with your care team.     To access your existing account, please contact your [facility] or call [phone] for assistance.        Care EveryWhere ID     This is your Care EveryWhere ID. This could be used by other organizations to access your [facility] medical records  [id number]        Your Vitals Were     Pulse Temperature Respirations Pulse Oximetry BMI (Body Mass Index)       82 98.4  F (36.9  C) (Oral) 20 98% 24.22 kg/m2        Blood  Pressure from Last 3 Encounters:   03/13/18 129/84   03/07/18 114/69   02/27/18 125/84    Weight from Last 3 Encounters:   03/13/18 74.4 kg (164 lb)   03/06/18 74 kg (163 lb 2.3 oz)   02/27/18 74.4 kg (164 lb)              We Performed the Following     INFECTIOUS DISEASE REFERRAL          Today's Medication Changes          These changes are accurate as of 3/13/18  2:04 PM.  If you have any questions, ask your nurse or doctor.               Start taking these medicines.        Dose/Directions    cyclobenzaprine 10 MG tablet   Commonly known as:  FLEXERIL   Used for:  Acute low back pain without sciatica, unspecified back pain laterality   Started by:  Nolvia Nguyen MD        Dose:  10 mg   Take 1 tablet (10 mg) by mouth 3 times daily as needed for muscle spasms   Quantity:  42 tablet   Refills:  0       HYDROcodone-acetaminophen 5-325 MG per tablet   Commonly known as:  NORCO   Used for:  Acute low back pain without sciatica, unspecified back pain laterality   Started by:  Nolvia Nguyen MD        Dose:  1 tablet   Take 1 tablet by mouth every 4 hours as needed for pain maximum 3 tablet(s) per day   Quantity:  12 tablet   Refills:  0            Where to get your medicines      These medications were sent to [facility] [id number]    Hours:  TRANSPLANT PHONE NUMBER [phone] Phone:  [phone]     cyclobenzaprine 10 MG tablet         Some of these will need a paper prescription and others can be bought over the counter.  Ask your nurse if you have questions.     Bring a paper prescription for each of these medications     HYDROcodone-acetaminophen 5-325 MG per tablet                Primary Care Provider Office Phone # Fax #    Liliana Murray [phone]       [address] [address]        Equal Access to Services     HILTON QUINTERO AH: Farooq  kathia Coelho, waaxda luqadaha, qaybta kaalmada melquiades, keya shreein hayaanena schroederdora georgiaej laJarretjuice zack. So [facility] [phone].    ATENCIÓN: Si dionnala shelia, tiene a chaves disposición servicios gratuitos de asistencia lingüística. New al [phone].    We comply with applicable federal civil rights laws and [place] laws. We do not discriminate on the basis of race, color, national origin, age, disability, sex, sexual orientation, or gender identity.            Thank you!     Thank you for choosing [facility]  for your care. Our goal is always to provide you with excellent care. Hearing back from our patients is one way we can continue to improve our services. Please take a few minutes to complete the written survey that you may receive in the mail after your visit with us. Thank you!             Your Updated Medication List - Protect others around you: Learn how to safely use, store and throw away your medicines at www.disposemymeds.org.          This list is accurate as of 3/13/18  2:04 PM.  Always use your most recent med list.                   Brand Name Dispense Instructions for use Diagnosis    albuterol (2.5 MG/3ML) 0.083% neb solution     120 vial    Take 1 vial (2.5 mg) by nebulization every 6 hours as needed for shortness of breath / dyspnea or wheezing    Cough       cefTRIAXone 1 GM vial    ROCEPHIN    260 mL    Inject 2 g (2,000 mg) into the vein daily    Gram-positive bacteremia, Osteomyelitis of spine (H)       cyclobenzaprine 10 MG tablet    FLEXERIL    42 tablet    Take 1 tablet (10 mg) by mouth 3 times daily as needed for muscle spasms    Acute low back pain without sciatica, unspecified back pain laterality       guaiFENesin-codeine 100-10 MG/5ML Soln solution    ROBITUSSIN AC     Take 1-2 tsp. by mouth every 4 hours as needed for cough        HYDROcodone-acetaminophen 5-325 MG per tablet    NORCO    12 tablet    Take 1 tablet by mouth every 4 hours as needed for pain  maximum 3 tablet(s) per day    Acute low back pain without sciatica, unspecified back pain laterality       montelukast 10 MG tablet    SINGULAIR    90 tablet    Take 1 tablet (10 mg) by mouth At Bedtime    Cough       multivitamin, therapeutic Tabs tablet      Take 1 tablet by mouth daily        order for DME     1 Units    Equipment being ordered: Nebulizer    Cough       TIGER BALM EX      As needed for back pain

## 2019-01-17 NOTE — PROGRESS NOTES
Initial Chiropractic Clinic Visit    PCP: Lilinaa Murray    Frederick Nguyen is a 43 year old male who is seen  as a self referral presenting with low back stiffness . Patient reports that the onset was about 6 months ago. When asked, patient denies:, falling, slipping, bending and reaching or sleeping awkwardly. Frederick reports taht he is having some low level stiffness in his low back.  He rates his symptoms at a 2 out of 10 and describes them as a stiff discomfort.  He has injured his back in the  in 2017. He got relief with PT and a R SI joint injection.     Injury: none    Location of Pain: bilateral lower lumbar at the following level(s) L4  and L5   Duration of Pain: 6 month(s)  Rating of Pain at worst: 2/10  Rating of Pain Currently: 2/10  Symptoms are better with: Nothing  Symptoms are worse with:   Additional Features:      Health History  as reported by the patient:    How does the patient rate their own health:   Excellent    Current or past medical history:   No red flags identified    Medical allergies  None    Past Traumas/Surgeries  None    Family History  The family history is not on file.    Medications:  None    Occupation:      Primary job tasks:   Computer work and Prolonged sitting    Barriers as home/work:   none    Additional health Issues:                 Frederick was asked to complete the Oswestry Low Back Disability Index and Deni Start Back screening tool, today in the office.  Disability score: 8%. Keel Start Total Score:1 Sub Score: 1     Review of Systems  Musculoskeletal: as above  Remainder of review of systems is negative including constitutional, CV, pulmonary, GI, Skin and Neurologic except as noted in HPI or medical history.    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      GENERAL APPEARANCE: healthy, alert and no distress   GAIT: NORMAL  SKIN: no suspicious lesions or rashes  NEURO: Normal strength and tone, mentation intact and speech normal  PSYCH:  mentation appears normal and affect normal/bright    Low back exam:    Inspection:  \"     no visible deformity in the low back       normal skin\",    ROM:       full flexion       full extension    Tender:       paraspinal muscles    Non Tender:       remainder of lumbar spine    Strength:       hip flexion 5/5 Normal       knee extension 5/5 Normal       ankle dorsiflexion 5/5 Normal       ankle plantarflexion 5/5 Normal       dorsiflexion of the great toe 5/5 Normal    Reflexes:       patellar (L3, L4) 2 bilaterally    Sensation:      grossly intact throughout lower extremities    Special tests:  SLR - Right negative and Left negative, Fabere - Right negative and Left negative, Yeoman's - Right negative and Left negative, Saulo - Right negative and Left negative and Ely's - Right negative and Left negative    Segmental spinal dysfunction/restrictions found at:  T10, T11, T12L4 , L5  and PSIS Right     The following soft tissue hypotonicities were observed:Piriformis: right, referred pain: no    Trigger points were found in:Piriformis    Muscle spasm found in:Lumbar erector spine and Piriformis      Radiology:      Assessment:    1. Segmental dysfunction of pelvic region    2. Low back ache    3. Segmental dysfunction of lumbar region    4. Spasm of muscle    5. Thoracic segment dysfunction        RX ordered/plan of care  Anticipated outcomes  Possible risks and side effects    After discussing the risk and benefits of care, patient consented to treatment    Prognosis: Good      Patient's " condition:  Patient had restrictions pre-manipulation    Treatment effectiveness:  Post manipulation there is better intersegmental movement and Patient claims to feel looser post manipulation      Plan:    Procedures:  Evaluation and Management:  08853 Moderate level exam 30 min    CMT:  93273 Chiropractic manipulative treatment 3-4 regions performed   Thoracic: Diversified, T10, T11, T12, Prone  Lumbar: Diversified, L4, L5, Side posture  Pelvis: Drop Table, PSIS Right , Prone    Modalities:  56472: Heat:   For 5 min to Lumbar erector spine and Piriformis  74356: MSTM:  To Lumbar erector spine and Piriformis  for 5 min    Therapeutic procedures:  05147: Therapeutic Exercises  Direct one-on-one treatment to develop flexibilty, range of motion, strength and endurance.   The following were demonstrated and practiced:   Stretches - Reviewed stretches today.  Crossed leg piriformis stretch seated  Opposite knee to opposite shoulder supine  Crossed leg piriformis stretch supine      Total time: 10    Response to Treatment  Reduction in symptoms as reported by patient      Treatment plan and goals:  Goals:  SITTING: the patient specific goal is to attain pre-injury status of  6 hours comfortably    Frequency of care  Duration of care is estimated to be 6 weeks, from the initial treatment.  It is estimated that the patient will need a total of 6 visits to resolve this episode.  For the initial therapeutic trial of care, the frequency is recommended at 1 X week, once daily.  A reevaluation would be clinically appropriate in 6 visits, to determine progress and further course of care.    In-Office Treatment  Evaluation  Spinal Chiropractic Manipulative Therapy:    Modalities: Heat and MSTM  Therapeutic exercises:  Stretches         Recommendations:    Instructions:  stretch as instructed at visit    Follow-up:    Return to care in one week.       Discussed the assessment with the patient.      Disclaimer: This note consists  of symbols derived from keyboarding, dictation and/or voice recognition software. As a result, there may be errors in the script that have gone undetected. Please consider this when interpreting information found in this chart.

## 2019-02-11 ENCOUNTER — OFFICE VISIT (OUTPATIENT)
Dept: ORTHOPEDICS | Facility: CLINIC | Age: 44
End: 2019-02-11
Payer: COMMERCIAL

## 2019-02-11 VITALS — HEIGHT: 69 IN | RESPIRATION RATE: 16 BRPM | WEIGHT: 189 LBS | BODY MASS INDEX: 27.99 KG/M2

## 2019-02-11 DIAGNOSIS — M20.5X1 HALLUX LIMITUS OF RIGHT FOOT: ICD-10-CM

## 2019-02-11 ASSESSMENT — MIFFLIN-ST. JEOR: SCORE: 1752.68

## 2019-02-11 NOTE — PROGRESS NOTES
" Subjective:   Frederick Nguyen is a 43 year old male who is here following up on hallux limitus of right foot. He notes swelling in toe.    Date of injury: NA  Date last seen: 12/10/2018  Following Therapeutic Plan: Yes voltaren gel and orthotics  Pain: Worsening  Function: Worsening  Relates that he has been using the orthotics, but has not had any relief. Has a 4-5/10 pain at baseline and can spike to a 9.     PAST MEDICAL, SOCIAL, SURGICAL AND FAMILY HISTORY: He  has a past medical history of Endocarditis, mitral valve, strep mitis (03/15/2018), Heart murmur, and Osteomyelitis, L5S1 (03/15/2018).  He  has a past surgical history that includes Inject Sacroiliac Joint (Right, 5/9/2018).  His family history is not on file.  He reports that  has never smoked. he has never used smokeless tobacco. He reports that he does not drink alcohol or use drugs.    ALLERGIES: He has No Known Allergies.    CURRENT MEDICATIONS: He has a current medication list which includes the following prescription(s): diclofenac and multivitamin, therapeutic.     REVIEW OF SYSTEMS: 9 point review of systems is negative except as noted above.     Exam:   Resp 16   Ht 1.753 m (5' 9\")   Wt 85.7 kg (189 lb)   BMI 27.91 kg/m             CONSTITUTIONAL: healthy, alert and no distress  HEAD: Normocephalic. No masses, lesions, tenderness or abnormalities  SKIN: no suspicious lesions or rashes  GAIT: normal  NEUROLOGIC: Non-focal  PSYCHIATRIC: affect normal/bright and mentation appears normal.    MUSCULOSKELETAL: Pes planus BL with flexor stabilizing hammertoes. Right hallux is jamming in dorsiflexion, with some discomfort noted with this motion. Decreased ROM to the right 1st MTPJ. Osteophyte formation noted to the joint.       Assessment/Plan:   Right hallux limitus causing pain - has failed conservative treatment.     - Pt seen and evaluated.   - I spoke to him about his XRs again. Unfortunately, he has failed conservative treatment. At " this juncture, I would recommend that he speak to Dr. Crowell about an exostectomy or other joint procedure. He agrees to this and referral was written.   - See again PRN.

## 2019-02-11 NOTE — LETTER
"  2/11/2019      RE: Frederick Nguyen  [address] [facility] [address]        Subjective:   Frederick Nguyen is a 43 year old male who is here following up on hallux limitus of right foot. He notes swelling in toe.    Date of injury: NA  Date last seen: 12/10/2018  Following Therapeutic Plan: Yes voltaren gel and orthotics  Pain: Worsening  Function: Worsening  Relates that he has been using the orthotics, but has not had any relief. Has a 4-5/10 pain at baseline and can spike to a 9.     PAST MEDICAL, SOCIAL, SURGICAL AND FAMILY HISTORY: He  has a past medical history of Endocarditis, mitral valve, strep mitis (03/15/2018), Heart murmur, and Osteomyelitis, L5S1 (03/15/2018).  He  has a past surgical history that includes Inject Sacroiliac Joint (Right, 5/9/2018).  His family history is not on file.  He reports that  has never smoked. he has never used smokeless tobacco. He reports that he does not drink alcohol or use drugs.    ALLERGIES: He has No Known Allergies.    CURRENT MEDICATIONS: He has a current medication list which includes the following prescription(s): diclofenac and multivitamin, therapeutic.     REVIEW OF SYSTEMS: 9 point review of systems is negative except as noted above.     Exam:   Resp 16   Ht 1.753 m (5' 9\")   Wt 85.7 kg (189 lb)   BMI 27.91 kg/m              CONSTITUTIONAL: healthy, alert and no distress  HEAD: Normocephalic. No masses, lesions, tenderness or abnormalities  SKIN: no suspicious lesions or rashes  GAIT: normal  NEUROLOGIC: Non-focal  PSYCHIATRIC: affect normal/bright and mentation appears normal.    MUSCULOSKELETAL: Pes planus BL with flexor stabilizing hammertoes. Right hallux is jamming in dorsiflexion, with some discomfort noted with this motion. Decreased ROM to the right 1st MTPJ. Osteophyte formation noted to the joint.       Assessment/Plan:   Right hallux limitus causing pain - has failed conservative treatment.     - Pt seen and evaluated.   - I spoke " to him about his XRs again. Unfortunately, he has failed conservative treatment. At this juncture, I would recommend that he speak to Dr. Crowell about an exostectomy or other joint procedure. He agrees to this and referral was written.   - See again PRN.     Manoj Zepeda DPM

## 2019-02-21 DIAGNOSIS — M20.5X9 HL (HALLUX LIMITUS): Primary | ICD-10-CM

## 2019-02-25 ASSESSMENT — ENCOUNTER SYMPTOMS
MYALGIAS: 0
ARTHRALGIAS: 0
NECK PAIN: 0
MUSCLE WEAKNESS: 0
STIFFNESS: 1
BACK PAIN: 1
MUSCLE CRAMPS: 0
JOINT SWELLING: 0

## 2019-03-01 NOTE — TELEPHONE ENCOUNTER
RECORDS RECEIVED FROM: Hallux limitus of right foot    DATE RECEIVED: 03/01/19    NOTES STATUS DETAILS   OFFICE NOTE from referring provider Internal Dr. Zepeda 2/11/19, 12/10/18   OFFICE NOTE from other specialist Internal Dr. Ramos 11/13/18   DISCHARGE SUMMARY from hospital N/A    DISCHARGE REPORT from the ER N/A    OPERATIVE REPORT N/A    MEDICATION LIST Internal    IMPLANT RECORD/STICKER N/A    LABS     CBC/DIFF Internal 4/24/18   CULTURES N/A    INJECTIONS DONE IN RADIOLOGY N/A    MRI N/A    CT SCAN N/A    XRAYS (IMAGES & REPORTS) Internal 12/10/18   TUMOR     PATHOLOGY  Slides & report N/A

## 2019-03-05 ENCOUNTER — PRE VISIT (OUTPATIENT)
Dept: ORTHOPEDICS | Facility: CLINIC | Age: 44
End: 2019-03-05

## 2019-03-05 ENCOUNTER — DOCUMENTATION ONLY (OUTPATIENT)
Dept: ORTHOPEDICS | Facility: CLINIC | Age: 44
End: 2019-03-05

## 2019-03-05 ENCOUNTER — OFFICE VISIT (OUTPATIENT)
Dept: ORTHOPEDICS | Facility: CLINIC | Age: 44
End: 2019-03-05
Attending: PODIATRIST
Payer: COMMERCIAL

## 2019-03-05 ENCOUNTER — ANCILLARY PROCEDURE (OUTPATIENT)
Dept: GENERAL RADIOLOGY | Facility: CLINIC | Age: 44
End: 2019-03-05
Attending: ORTHOPAEDIC SURGERY
Payer: COMMERCIAL

## 2019-03-05 VITALS — BODY MASS INDEX: 26.2 KG/M2 | HEIGHT: 70 IN | WEIGHT: 183 LBS

## 2019-03-05 DIAGNOSIS — M25.571 PAIN IN JOINT, ANKLE AND FOOT, RIGHT: Primary | ICD-10-CM

## 2019-03-05 DIAGNOSIS — M20.5X9 HL (HALLUX LIMITUS): ICD-10-CM

## 2019-03-05 ASSESSMENT — MIFFLIN-ST. JEOR: SCORE: 1733.39

## 2019-03-05 NOTE — PROGRESS NOTES
Patient is scheduled for surgery with Dr. Crowell    Spoke or left message with: Patient in exam room     Date of Surgery: 4/10/19    Location: [facility]    Post ops: 2 weeks & 6 weeks    Pre-op with surgeon (if applicable): Complete    H&P: Patient will call to schedule with PCP    Additional imaging/appointments: N/A    Surgery packet: Received in clinic     Additional comments: N/A

## 2019-03-05 NOTE — PROGRESS NOTES
CHIEF COMPLAINT:  Right great toe pain.      HISTORY OF PRESENT ILLNESS:  Mr. Nguyen is a 43-year-old male who presents today for evaluation of his right foot.  The patient has been evaluated in the past by Dr. Zepeda who has diagnosed him with hallux rigidus of the right foot.  The patient has already tried the use of a steel plate as well as some topical anti-inflammatory medication without any significant success.  Reports to have now pain and discomfort that he would be interested in undergoing a surgical procedure to improve his right foot.        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      The patient also expressed a pretty good understanding of his pathology and the fact that he has arthritis and the presence of bone spurs.  He is very much interested in undergoing a procedure in order to remove the bone spurs.      PAST MEDICAL HISTORY:  Mitral valve regurgitation and prolapse, infective endocarditis.      PAST SURGICAL HISTORY:  Reviewed today.        DRUG ALLERGIES:  None.      CURRENT MEDICATIONS:  Voltaren topical.      PHYSICAL EXAMINATION:  On today's visit, he presents as a pleasant male in no apparent distress with a height of 5 feet 9 inches and a weight of 183 pounds.  Denies to have any constitutional symptoms.      On today's visit, he presents with full range of motion of the right ankle, hindfoot and midfoot joints.  CMS intact.  Skin intact.  Presents with a first MTP joint motion of no more than probably a combined 30 degrees.  This is painful to extreme dorsiflexion.  There are palpable painful osteophytes.      RADIOGRAPHIC EVALUATION:  Three views of the right foot were obtained today which were significant for showing a relatively well-preserved joint space with a fair amount of osteophyte formation along the base of the proximal phalanx both medial and laterally.  He also presents with a dorsal osteophyte across the first metatarsal head.      ASSESSMENT:   Right first MTP joint hallux rigidus.      PLAN:  Discussed with the patient that at this point the options are to proceed with observation versus a corticosteroid injection versus surgical approach.  The patient is interested in a surgical approach to his foot, and out of all the options, he is interested in undergoing a right first MTP joint cheilectomy.  The patient has a very clear understanding that we may still have some residual pain given the fact that we may still leave some osteoarthritis behind us after the procedure.      I discussed with him the most likely postoperative course and complications from undergoing a right foot cheilectomy.  Complications include but are not limited to infection, bleeding, nerve damage, residual pain and stiffness.      All questions were answered.  The patient will schedule surgery at the best of his convenience.  In the meantime, he has no activity restrictions.      TT 30 minutes, CT 20 minutes.

## 2019-03-05 NOTE — NURSING NOTE
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  Teaching Topic: Right cheilectomy     Person(s) involved in teaching: Patient     Patient lives in [facility] with someone. He has a history of mitral valve prolapse, cervical spondylosis.     Motivation Level:  Asks Questions: Yes  Eager to Learn: Yes  Cooperative: Yes  Receptive (willing/able to accept information): Yes  Any cultural factors/B[employer]ist beliefs that may influence understanding or compliance? No     Patient demonstrates understanding of the following:  Reason for the appointment, diagnosis and treatment plan: Yes  Knowledge of proper use of medications and conditions for which they are ordered (with special attention to potential side effects or drug interactions): Yes  Which situations necessitate calling provider and whom to contact: Yes     Teaching Concerns Addressed: Patient understands he will need a preoperative H&P within 30 days of the date of surgery.      Proper use and care of assistive devices. (medical equip, care aids, etc.): Yes  Nutritional needs and diet plan: NA  Pain management techniques: Yes  Wound Care: Yes  How and/when to access community resources: Yes     Instructional Materials Used/Given: Preoperative teaching packet, surgical soap.

## 2019-03-05 NOTE — NURSING NOTE
Reason For Visit:   Chief Complaint   Patient presents with     Consult     Right great toe pain and swelling       There were no vitals taken for this visit.    Pain Assessment  Patient Currently in Pain: Yes  0-10 Pain Scale: 2  Primary Pain Location: Toe (Comment which one)  Pain Descriptors: Constant, Aching  Alleviating Factors: Rest  Aggravating Factors: Walking    Marilee Brock, ATC

## 2019-03-05 NOTE — LETTER
3/5/2019     RE: Frederick Nguyen  [address] [facility] [address]     Dear Colleague,    Thank you for referring your patient, Frederick Nguyen, to the [facility]. Please see a copy of my visit note below.    CHIEF COMPLAINT:  Right great toe pain.      HISTORY OF PRESENT ILLNESS:  Mr. Nguyen is a 43-year-old male who presents today for evaluation of his right foot.  The patient has been evaluated in the past by Dr. Zepeda who has diagnosed him with hallux rigidus of the right foot.  The patient has already tried the use of a steel plate as well as some topical anti-inflammatory medication without any significant success.  Reports to have now pain and discomfort that he would be interested in undergoing a surgical procedure to improve his right foot.        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      The patient also expressed a pretty good understanding of his pathology and the fact that he has arthritis and the presence of bone spurs.  He is very much interested in undergoing a procedure in order to remove the bone spurs.      PAST MEDICAL HISTORY:  Mitral valve regurgitation and prolapse, infective endocarditis.      PAST SURGICAL HISTORY:  Reviewed today.        DRUG ALLERGIES:  None.      CURRENT MEDICATIONS:  Voltaren topical.      PHYSICAL EXAMINATION:  On today's visit, he presents as a pleasant male in no apparent distress with a height of 5 feet 9 inches and a weight of 183 pounds.  Denies to have any constitutional symptoms.      On today's visit, he presents with full range of motion of the right ankle, hindfoot and midfoot joints.  CMS intact.  Skin intact.  Presents with a first MTP joint motion of no more than probably a combined 30 degrees.  This is painful to extreme dorsiflexion.  There are palpable painful osteophytes.      RADIOGRAPHIC EVALUATION:  Three views of the right foot were  obtained today which were significant for showing a relatively well-preserved joint space with a fair amount of osteophyte formation along the base of the proximal phalanx both medial and laterally.  He also presents with a dorsal osteophyte across the first metatarsal head.      ASSESSMENT:  Right first MTP joint hallux rigidus.      PLAN:  Discussed with the patient that at this point the options are to proceed with observation versus a corticosteroid injection versus surgical approach.  The patient is interested in a surgical approach to his foot, and out of all the options, he is interested in undergoing a right first MTP joint cheilectomy.  The patient has a very clear understanding that we may still have some residual pain given the fact that we may still leave some osteoarthritis behind us after the procedure.      I discussed with him the most likely postoperative course and complications from undergoing a right foot cheilectomy.  Complications include but are not limited to infection, bleeding, nerve damage, residual pain and stiffness.      All questions were answered.  The patient will schedule surgery at the best of his convenience.  In the meantime, he has no activity restrictions.      TT 30 minutes, CT 20 minutes.     Again, thank you for allowing me to participate in the care of your patient.      Sincerely,  Sawyer Crowell MD

## 2019-03-05 NOTE — NURSING NOTE
"Reason For Visit:   Chief Complaint   Patient presents with     Consult     Right great toe pain and swelling       Ht 1.765 m (5' 9.5\")   Wt 83 kg (183 lb)   BMI 26.64 kg/m      Pain Assessment  Patient Currently in Pain: Yes  0-10 Pain Scale: 2  Primary Pain Location: Toe (Comment which one)  Pain Descriptors: Constant, Aching  Alleviating Factors: Rest  Aggravating Factors: Walking    Marilee Brock ATC    "

## 2019-03-06 ASSESSMENT — ENCOUNTER SYMPTOMS
MYALGIAS: 0
ARTHRALGIAS: 0
JOINT SWELLING: 0
MUSCLE CRAMPS: 0
MUSCLE WEAKNESS: 0
NECK PAIN: 0
BACK PAIN: 1

## 2019-03-19 NOTE — PROGRESS NOTES
[facility]   Susan ROSARIOAnette Sylvesterchavo, BENNY CNP  03/20/2019     Chief Complaint:   Pre-Op Exam      History of Present Illness:   Frederick Nguyen is 43 year old male here at the request of Dr. Crowell for cardiovascular, pulmonary, and perioperative risk assessment prior to surgery.  The intended surgical procedure is Right Cheilectomy on April 10th.  A copy of this note will be sent to the surgeon.     Reason for surgery:  Patient last saw Dr. Crowell 03/05 for evaluation of his right foot--patient was diagnosed with hallux rigidus of the right foot by Dr. Zepeda in podiatry. He has tried use of steel plate as well as topical anti-inflammatory medication without relief. He continues to have pain and discomfort to the foot, and would like to undergo surgical procedure to improve right foot. Currently, his pain limits his ability to walk and play golf, which he enjoys. Of note, patient also has bone spurs which limit daily activity, and he would like to remove these as well.    Cardiovascular Risk:  This patient does not have chest pain with ambulation or walking up a flight of stairs.  There is not any chest pain with exercise.  He does have a history of mitral valve prolapse/mitral regurgitation, which worsened after bacteremia, treated with 6  weeks IV Ceftriaxone. Patient is following Cardiology, and is planning on seeing them after his surgery. There are plans to have possible valve repair surgery in the future. He denies history of endocarditis or infection to his mitral valve--he had a transesophageal echocardiogram done to confirm this. There is not a history of stroke.    Pulmonary Risk:  The patient does not have any history of lung problems.    Perioperative Complications:  The patient does not have a history of bleeding or clotting problems in the past, specifically has no history of DVT, PE, or bleeding disorder.  He is not currently anticoagulated.  He has not had complications from past  "surgeries, but patient reports post-operative nausea and vomiting every time he goes under anesthesia.  The patient does not have a family history of any anesthesia or surgical complications.    Review of Systems:   Pertinent items are noted in HPI and below, remainder of complete ROS is negative.    Answers for HPI/ROS submitted by the patient on 3/6/2019   General Symptoms: No  Skin Symptoms: No  HENT Symptoms: No  EYE SYMPTOMS: No  HEART SYMPTOMS: No  LUNG SYMPTOMS: No  INTESTINAL SYMPTOMS: No  URINARY SYMPTOMS: No  REPRODUCTIVE SYMPTOMS: No  SKELETAL SYMPTOMS: Yes  BLOOD SYMPTOMS: No  NERVOUS SYSTEM SYMPTOMS: No  MENTAL HEALTH SYMPTOMS: No  Back pain: Yes  Muscle aches: No  Neck pain: No  Swollen joints: No  Joint pain: No  Bone pain: No  Muscle cramps: No  Muscle weakness: No  Bone fracture: No    Active Medications:      diclofenac (VOLTAREN) 1 % topical gel, Place onto the skin 4 times daily, Disp: 100 g, Rfl: 3     multivitamin, therapeutic (THERA-VIT) TABS tablet, Take 1 tablet by mouth daily, Disp: , Rfl:       Allergies:   Patient has no known allergies.      Past Medical History:  Endocarditis, mitral valve, strep mitis  Heart murmur  Osteomyelitis, L5 S1   Cervical spondylosis without myelopathy   Lumbago   Duane syndrome of left eye   Spasm of muscle   Bacteremia   Nonallopathic lesion of lumbar region   SI joint dysfunction   Hallux limitus of right foot      Past Surgical History:  Inject SI joint    Family History:    History reviewed. No pertinent family history.       Social History:   The patient was alone.  Smoking Status: Never   Smokeless Tobacco: Never   Alcohol Use: No      Physical Exam:   /87   Pulse 77   Ht 1.765 m (5' 9.49\")   Wt 84.5 kg (186 lb 4.8 oz)   SpO2 99%   BMI 27.13 kg/m       Constitutional: Alert, oriented, pleasant, no acute distress  Head: Normocephalic, atraumatic  Eyes: Extra-ocular movements intact, pupils equally round and reactive bilaterally, no scleral " icterus  Ears: tympanic membranes pearly gray with positive light reflex  ENT: Oropharynx clear, moist mucus membranes, good dentition  Neck: Supple, no lymphadenopathy, no thyromegaly  Cardiovascular: Regular rate and rhythm, 5/6 holosystolic murmur, no rubs or gallops, peripheral pulses full/symmetric  Respiratory: Good air movement bilaterally, lungs clear, no wheezes/rales/rhonchi  GI: Abdomen soft, bowel sounds present, nondistended, nontender, no organomegaly or masses, no rebound/guarding  Musculoskeletal: No edema, normal muscle tone, normal gait  Neurologic: Alert and oriented, cranial nerves grossly intact, strength 5/5 throughout, reflexes 2+ bilaterally  Skin: No rashes/lesions  Psychiatric: normal mentation, affect and mood     Future Labs:  - Basic metabolic panel  - Hemoglobin    EKG:  EKG was indicated based on risk assessment.  no acute changes     Assessment and Plan:  Preop general physical exam  - Basic metabolic panel  - Hemoglobin    Post-operative nausea and vomiting  Recommend anti-emetics in PACU/recovery phase.    Mitral valve prolapse  - Echocardiogram Complete  - CARDIOLOGY EVAL ADULT REFERRAL    1. Pre-operative assessment for Right Cheilectomy   The patient is at LOW risk for cardiovascular complications and at LOW risk for pulmonary complications of this LOW risk surgery.    --Approval given to proceed with proposed procedure, without further diagnostic evaluation  --Patient is to take all other scheduled medications on the day of surgery   --Patient will have echocardiogram for further evaluation of murmur. He needs to follow up with cardiology, and advised he schedule this soon.  --I did consult with his cardiologist Dr. Bush and ID Dr. Brito, who recommends a single dose of IV Cefazolin 2 g prior to the procedure given his hx of septicemia with endocarditis.     The patient has been told to not take any aspirin or NSAIDs for 10 days prior to surgery. The patient has been  instructed as to what to do with medications prior to surgery.    Follow-up: Return to clinic as needed.         Scribe Disclosure:   I, Sherita Lora, am serving as a scribe to document services personally performed by BENNY Qureshi CNP at this visit, based upon the provider's statements to me. All documentation has been reviewed by the aforementioned provider prior to being entered into the official medical record.     Portions of this medical record were completed by a scribe. UPON MY REVIEW AND AUTHENTICATION BY ELECTRONIC SIGNATURE, this confirms (a) I performed the applicable clinical services, and (b) the record is accurate.      BENNY Qureshi CNP

## 2019-03-20 ENCOUNTER — OFFICE VISIT (OUTPATIENT)
Dept: INTERNAL MEDICINE | Facility: CLINIC | Age: 44
End: 2019-03-20
Payer: COMMERCIAL

## 2019-03-20 ENCOUNTER — MYC MEDICAL ADVICE (OUTPATIENT)
Dept: INTERNAL MEDICINE | Facility: CLINIC | Age: 44
End: 2019-03-20

## 2019-03-20 VITALS
WEIGHT: 186.3 LBS | HEIGHT: 69 IN | BODY MASS INDEX: 27.59 KG/M2 | DIASTOLIC BLOOD PRESSURE: 87 MMHG | HEART RATE: 77 BPM | OXYGEN SATURATION: 99 % | SYSTOLIC BLOOD PRESSURE: 138 MMHG

## 2019-03-20 DIAGNOSIS — M54.50 CHRONIC LEFT-SIDED LOW BACK PAIN WITHOUT SCIATICA: ICD-10-CM

## 2019-03-20 DIAGNOSIS — I34.1 MITRAL VALVE PROLAPSE: ICD-10-CM

## 2019-03-20 DIAGNOSIS — G89.29 CHRONIC LEFT-SIDED LOW BACK PAIN WITHOUT SCIATICA: ICD-10-CM

## 2019-03-20 DIAGNOSIS — Z98.890 POST-OPERATIVE NAUSEA AND VOMITING: ICD-10-CM

## 2019-03-20 DIAGNOSIS — R11.2 POST-OPERATIVE NAUSEA AND VOMITING: ICD-10-CM

## 2019-03-20 DIAGNOSIS — Z01.818 PREOP GENERAL PHYSICAL EXAM: Primary | ICD-10-CM

## 2019-03-20 DIAGNOSIS — Z01.818 PREOP GENERAL PHYSICAL EXAM: ICD-10-CM

## 2019-03-20 LAB
ANION GAP SERPL CALCULATED.3IONS-SCNC: 4 MMOL/L (ref 3–14)
BUN SERPL-MCNC: 15 MG/DL (ref 7–30)
CALCIUM SERPL-MCNC: 9 MG/DL (ref 8.5–10.1)
CHLORIDE SERPL-SCNC: 107 MMOL/L (ref 94–109)
CO2 SERPL-SCNC: 30 MMOL/L (ref 20–32)
CREAT SERPL-MCNC: 0.97 MG/DL (ref 0.66–1.25)
CRP SERPL-MCNC: <2.9 MG/L (ref 0–8)
GFR SERPL CREATININE-BSD FRML MDRD: >90 ML/MIN/{1.73_M2}
GLUCOSE SERPL-MCNC: 93 MG/DL (ref 70–99)
HGB BLD-MCNC: 15.4 G/DL (ref 13.3–17.7)
POTASSIUM SERPL-SCNC: 4.1 MMOL/L (ref 3.4–5.3)
SODIUM SERPL-SCNC: 141 MMOL/L (ref 133–144)

## 2019-03-20 ASSESSMENT — MIFFLIN-ST. JEOR: SCORE: 1748.17

## 2019-03-20 ASSESSMENT — PAIN SCALES - GENERAL: PAINLEVEL: MODERATE PAIN (5)

## 2019-03-20 NOTE — LETTER
3/20/2019      RE: Frederick Nguyen  [address] [place] [address]       [facility] [place]   BENNY Qureshi CNP  03/20/2019     Chief Complaint:   Pre-Op Exam      History of Present Illness:   Frederick Nguyen is 43 year old male here at the request of Dr. Crowell for cardiovascular, pulmonary, and perioperative risk assessment prior to surgery.  The intended surgical procedure is Right Cheilectomy on April 10th.  A copy of this note will be sent to the surgeon.     Reason for surgery:  Patient last saw Dr. Crowell 03/05 for evaluation of his right foot--patient was diagnosed with hallux rigidus of the right foot by Dr. Zepeda in podiatry. He has tried use of steel plate as well as topical anti-inflammatory medication without relief. He continues to have pain and discomfort to the foot, and would like to undergo surgical procedure to improve right foot. Currently, his pain limits his ability to walk and play golf, which he enjoys. Of note, patient also has bone spurs which limit daily activity, and he would like to remove these as well.    Cardiovascular Risk:  This patient does not have chest pain with ambulation or walking up a flight of stairs.  There is not any chest pain with exercise.  He does have a history of mitral valve prolapse/mitral regurgitation, which worsened after bacteremia, treated with 6  weeks IV Ceftriaxone. Patient is following Cardiology, and is planning on seeing them after his surgery. There are plans to have possible valve repair surgery in the future. He denies history of endocarditis or infection to his mitral valve--he had a transesophageal echocardiogram done to confirm this. There is not a history of stroke.    Pulmonary Risk:  The patient does not have any history of lung problems.    Perioperative Complications:  The patient does not have a history of bleeding or clotting problems in the past, specifically has no history of DVT, PE, or bleeding  "disorder.  He is not currently anticoagulated.  He has not had complications from past surgeries, but patient reports post-operative nausea and vomiting every time he goes under anesthesia.  The patient does not have a family history of any anesthesia or surgical complications.    Review of Systems:   Pertinent items are noted in HPI and below, remainder of complete ROS is negative.    Answers for HPI/ROS submitted by the patient on 3/6/2019   General Symptoms: No  Skin Symptoms: No  HENT Symptoms: No  EYE SYMPTOMS: No  HEART SYMPTOMS: No  LUNG SYMPTOMS: No  INTESTINAL SYMPTOMS: No  URINARY SYMPTOMS: No  REPRODUCTIVE SYMPTOMS: No  SKELETAL SYMPTOMS: Yes  BLOOD SYMPTOMS: No  NERVOUS SYSTEM SYMPTOMS: No  MENTAL HEALTH SYMPTOMS: No  Back pain: Yes  Muscle aches: No  Neck pain: No  Swollen joints: No  Joint pain: No  Bone pain: No  Muscle cramps: No  Muscle weakness: No  Bone fracture: No    Active Medications:      diclofenac (VOLTAREN) 1 % topical gel, Place onto the skin 4 times daily, Disp: 100 g, Rfl: 3     multivitamin, therapeutic (THERA-VIT) TABS tablet, Take 1 tablet by mouth daily, Disp: , Rfl:       Allergies:   Patient has no known allergies.      Past Medical History:  Endocarditis, mitral valve, strep mitis  Heart murmur  Osteomyelitis, L5 S1   Cervical spondylosis without myelopathy   Lumbago   Duane syndrome of left eye   Spasm of muscle   Bacteremia   Nonallopathic lesion of lumbar region   SI joint dysfunction   Hallux limitus of right foot      Past Surgical History:  Inject SI joint    Family History:    History reviewed. No pertinent family history.       Social History:   The patient was alone.  Smoking Status: Never   Smokeless Tobacco: Never   Alcohol Use: No      Physical Exam:   /87   Pulse 77   Ht 1.765 m (5' 9.49\")   Wt 84.5 kg (186 lb 4.8 oz)   SpO2 99%   BMI 27.13 kg/m        Constitutional: Alert, oriented, pleasant, no acute distress  Head: Normocephalic, atraumatic  Eyes: " Extra-ocular movements intact, pupils equally round and reactive bilaterally, no scleral icterus  Ears: tympanic membranes pearly gray with positive light reflex  ENT: Oropharynx clear, moist mucus membranes, good dentition  Neck: Supple, no lymphadenopathy, no thyromegaly  Cardiovascular: Regular rate and rhythm, 5/6 holosystolic murmur, no rubs or gallops, peripheral pulses full/symmetric  Respiratory: Good air movement bilaterally, lungs clear, no wheezes/rales/rhonchi  GI: Abdomen soft, bowel sounds present, nondistended, nontender, no organomegaly or masses, no rebound/guarding  Musculoskeletal: No edema, normal muscle tone, normal gait  Neurologic: Alert and oriented, cranial nerves grossly intact, strength 5/5 throughout, reflexes 2+ bilaterally  Skin: No rashes/lesions  Psychiatric: normal mentation, affect and mood     Future Labs:  - Basic metabolic panel  - Hemoglobin    EKG:  EKG was indicated based on risk assessment.  no acute changes     Assessment and Plan:  Preop general physical exam  - Basic metabolic panel  - Hemoglobin    Post-operative nausea and vomiting  Recommend anti-emetics in PACU/recovery phase.    Mitral valve prolapse  - Echocardiogram Complete  - CARDIOLOGY EVAL ADULT REFERRAL    1. Pre-operative assessment for Right Cheilectomy   The patient is at LOW risk for cardiovascular complications and at LOW risk for pulmonary complications of this LOW risk surgery.    --Approval given to proceed with proposed procedure, without further diagnostic evaluation  --Patient is to take all other scheduled medications on the day of surgery   --Patient will have echocardiogram for further evaluation of murmur. He needs to follow up with cardiology, and advised he schedule this soon.  --I did consult with his cardiologist Dr. Bush and ID Dr. Brito, who recommends a single dose of IV Cefazolin 2 g prior to the procedure given his hx of septicemia with endocarditis.     The patient has been  told to not take any aspirin or NSAIDs for 10 days prior to surgery. The patient has been instructed as to what to do with medications prior to surgery.    Follow-up: Return to clinic as needed.         Scribe Disclosure:   I, Sherita Lora, am serving as a scribe to document services personally performed by BENNY Qureshi CNP at this visit, based upon the provider's statements to me. All documentation has been reviewed by the aforementioned provider prior to being entered into the official medical record.     Portions of this medical record were completed by a scribe. UPON MY REVIEW AND AUTHENTICATION BY ELECTRONIC SIGNATURE, this confirms (a) I performed the applicable clinical services, and (b) the record is accurate.      BENNY Qureshi CNP      Rooming Note      Pre-Operative Information  Surgery: Right Cheilectomy  Date of Surgery: 4/10/2019  Surgeon: Sawyer Crowell MD  Location:  OR    BENNY Qureshi CNP

## 2019-03-20 NOTE — PATIENT INSTRUCTIONS
[facility] Phone Number: [phone]   [facility] Medication Refill Request Information:  * Please contact your pharmacy regarding ANY request for medication refills.  ** [facility] Prescription Fax = [phone]  * Please allow 3 business days for routine medication refills.  * Please allow 5 business days for controlled substance medication refills.     [facility] Test Result notification information:  *You will be notified with in 7-10 days of your appointment day regarding the results of your test.  If you are on MyChart you will be notified as soon as the provider has reviewed the results and signed off on them.

## 2019-03-20 NOTE — PROGRESS NOTES
Rooming Note      Pre-Operative Information  Surgery: Right Cheilectomy  Date of Surgery: 4/10/2019  Surgeon: Sawyer Crowell MD  Location:  OR

## 2019-03-20 NOTE — NURSING NOTE
Chief Complaint   Patient presents with     Pre-Op Exam     Pt is here for a pre-op exam (right cheilectomy)       Jasmyn Tyson, Rachel EMT at 7:50 AM on 3/20/2019

## 2019-04-04 PROBLEM — M53.3 SI (SACROILIAC) JOINT DYSFUNCTION: Status: RESOLVED | Noted: 2018-07-19 | Resolved: 2019-04-04

## 2019-04-09 ENCOUNTER — ANESTHESIA EVENT (OUTPATIENT)
Dept: SURGERY | Facility: AMBULATORY SURGERY CENTER | Age: 44
End: 2019-04-09

## 2019-04-10 ENCOUNTER — ANESTHESIA (OUTPATIENT)
Dept: SURGERY | Facility: AMBULATORY SURGERY CENTER | Age: 44
End: 2019-04-10

## 2019-04-10 ENCOUNTER — HOSPITAL ENCOUNTER (OUTPATIENT)
Facility: AMBULATORY SURGERY CENTER | Age: 44
End: 2019-04-10
Attending: ORTHOPAEDIC SURGERY
Payer: COMMERCIAL

## 2019-04-10 VITALS
HEART RATE: 103 BPM | RESPIRATION RATE: 14 BRPM | SYSTOLIC BLOOD PRESSURE: 116 MMHG | DIASTOLIC BLOOD PRESSURE: 88 MMHG | TEMPERATURE: 97.9 F | OXYGEN SATURATION: 98 %

## 2019-04-10 DIAGNOSIS — M79.671 RIGHT FOOT PAIN: Primary | ICD-10-CM

## 2019-04-10 RX ORDER — ACETAMINOPHEN 325 MG/1
975 TABLET ORAL ONCE
Status: COMPLETED | OUTPATIENT
Start: 2019-04-10 | End: 2019-04-10

## 2019-04-10 RX ORDER — LIDOCAINE HYDROCHLORIDE 20 MG/ML
INJECTION, SOLUTION INFILTRATION; PERINEURAL PRN
Status: DISCONTINUED | OUTPATIENT
Start: 2019-04-10 | End: 2019-04-10

## 2019-04-10 RX ORDER — SODIUM CHLORIDE, SODIUM LACTATE, POTASSIUM CHLORIDE, CALCIUM CHLORIDE 600; 310; 30; 20 MG/100ML; MG/100ML; MG/100ML; MG/100ML
INJECTION, SOLUTION INTRAVENOUS CONTINUOUS
Status: DISCONTINUED | OUTPATIENT
Start: 2019-04-10 | End: 2019-04-11 | Stop reason: HOSPADM

## 2019-04-10 RX ORDER — CEFAZOLIN SODIUM 1 G/50ML
1 SOLUTION INTRAVENOUS SEE ADMIN INSTRUCTIONS
Status: DISCONTINUED | OUTPATIENT
Start: 2019-04-10 | End: 2019-04-10 | Stop reason: HOSPADM

## 2019-04-10 RX ORDER — NALOXONE HYDROCHLORIDE 0.4 MG/ML
.1-.4 INJECTION, SOLUTION INTRAMUSCULAR; INTRAVENOUS; SUBCUTANEOUS
Status: DISCONTINUED | OUTPATIENT
Start: 2019-04-10 | End: 2019-04-11 | Stop reason: HOSPADM

## 2019-04-10 RX ORDER — SODIUM CHLORIDE, SODIUM LACTATE, POTASSIUM CHLORIDE, CALCIUM CHLORIDE 600; 310; 30; 20 MG/100ML; MG/100ML; MG/100ML; MG/100ML
INJECTION, SOLUTION INTRAVENOUS CONTINUOUS
Status: DISCONTINUED | OUTPATIENT
Start: 2019-04-10 | End: 2019-04-10 | Stop reason: HOSPADM

## 2019-04-10 RX ORDER — FENTANYL CITRATE 50 UG/ML
25-50 INJECTION, SOLUTION INTRAMUSCULAR; INTRAVENOUS
Status: DISCONTINUED | OUTPATIENT
Start: 2019-04-10 | End: 2019-04-11 | Stop reason: HOSPADM

## 2019-04-10 RX ORDER — GABAPENTIN 300 MG/1
300 CAPSULE ORAL ONCE
Status: COMPLETED | OUTPATIENT
Start: 2019-04-10 | End: 2019-04-10

## 2019-04-10 RX ORDER — MEPERIDINE HYDROCHLORIDE 25 MG/ML
12.5 INJECTION INTRAMUSCULAR; INTRAVENOUS; SUBCUTANEOUS
Status: DISCONTINUED | OUTPATIENT
Start: 2019-04-10 | End: 2019-04-11 | Stop reason: HOSPADM

## 2019-04-10 RX ORDER — HYDROXYZINE HYDROCHLORIDE 25 MG/1
25 TABLET, FILM COATED ORAL
Status: DISCONTINUED | OUTPATIENT
Start: 2019-04-10 | End: 2019-04-11 | Stop reason: HOSPADM

## 2019-04-10 RX ORDER — SCOLOPAMINE TRANSDERMAL SYSTEM 1 MG/1
1 PATCH, EXTENDED RELEASE TRANSDERMAL
Status: DISCONTINUED | OUTPATIENT
Start: 2019-04-10 | End: 2019-04-11 | Stop reason: HOSPADM

## 2019-04-10 RX ORDER — NALOXONE HYDROCHLORIDE 0.4 MG/ML
.1-.4 INJECTION, SOLUTION INTRAMUSCULAR; INTRAVENOUS; SUBCUTANEOUS
Status: DISCONTINUED | OUTPATIENT
Start: 2019-04-10 | End: 2019-04-10 | Stop reason: HOSPADM

## 2019-04-10 RX ORDER — HYDROCODONE BITARTRATE AND ACETAMINOPHEN 5; 325 MG/1; MG/1
1-2 TABLET ORAL EVERY 4 HOURS PRN
Qty: 20 TABLET | Refills: 0 | Status: SHIPPED | OUTPATIENT
Start: 2019-04-10 | End: 2019-05-07

## 2019-04-10 RX ORDER — ONDANSETRON 2 MG/ML
4 INJECTION INTRAMUSCULAR; INTRAVENOUS EVERY 30 MIN PRN
Status: DISCONTINUED | OUTPATIENT
Start: 2019-04-10 | End: 2019-04-11 | Stop reason: HOSPADM

## 2019-04-10 RX ORDER — FENTANYL CITRATE 50 UG/ML
25-50 INJECTION, SOLUTION INTRAMUSCULAR; INTRAVENOUS
Status: DISCONTINUED | OUTPATIENT
Start: 2019-04-10 | End: 2019-04-10 | Stop reason: HOSPADM

## 2019-04-10 RX ORDER — ONDANSETRON 4 MG/1
4 TABLET, ORALLY DISINTEGRATING ORAL EVERY 30 MIN PRN
Status: DISCONTINUED | OUTPATIENT
Start: 2019-04-10 | End: 2019-04-11 | Stop reason: HOSPADM

## 2019-04-10 RX ORDER — ONDANSETRON 2 MG/ML
INJECTION INTRAMUSCULAR; INTRAVENOUS PRN
Status: DISCONTINUED | OUTPATIENT
Start: 2019-04-10 | End: 2019-04-10

## 2019-04-10 RX ORDER — HYDROXYZINE HYDROCHLORIDE 25 MG/1
25 TABLET, FILM COATED ORAL EVERY 8 HOURS PRN
Qty: 30 TABLET | Refills: 0 | Status: SHIPPED | OUTPATIENT
Start: 2019-04-10 | End: 2019-05-07

## 2019-04-10 RX ORDER — HYDROCODONE BITARTRATE AND ACETAMINOPHEN 5; 325 MG/1; MG/1
2 TABLET ORAL
Status: DISCONTINUED | OUTPATIENT
Start: 2019-04-10 | End: 2019-04-11 | Stop reason: HOSPADM

## 2019-04-10 RX ORDER — OXYCODONE HYDROCHLORIDE 5 MG/1
5 TABLET ORAL EVERY 4 HOURS PRN
Status: DISCONTINUED | OUTPATIENT
Start: 2019-04-10 | End: 2019-04-11 | Stop reason: HOSPADM

## 2019-04-10 RX ORDER — PROPOFOL 10 MG/ML
INJECTION, EMULSION INTRAVENOUS CONTINUOUS PRN
Status: DISCONTINUED | OUTPATIENT
Start: 2019-04-10 | End: 2019-04-10

## 2019-04-10 RX ORDER — DEXAMETHASONE SODIUM PHOSPHATE 4 MG/ML
INJECTION, SOLUTION INTRA-ARTICULAR; INTRALESIONAL; INTRAMUSCULAR; INTRAVENOUS; SOFT TISSUE PRN
Status: DISCONTINUED | OUTPATIENT
Start: 2019-04-10 | End: 2019-04-10

## 2019-04-10 RX ORDER — BUPIVACAINE HYDROCHLORIDE 2.5 MG/ML
INJECTION, SOLUTION EPIDURAL; INFILTRATION; INTRACAUDAL PRN
Status: DISCONTINUED | OUTPATIENT
Start: 2019-04-10 | End: 2019-04-10

## 2019-04-10 RX ORDER — CEFAZOLIN SODIUM 2 G/50ML
2 SOLUTION INTRAVENOUS
Status: COMPLETED | OUTPATIENT
Start: 2019-04-10 | End: 2019-04-10

## 2019-04-10 RX ORDER — CYCLOBENZAPRINE HCL 10 MG
10 TABLET ORAL 3 TIMES DAILY PRN
COMMUNITY
End: 2019-05-07

## 2019-04-10 RX ORDER — PROPOFOL 10 MG/ML
INJECTION, EMULSION INTRAVENOUS PRN
Status: DISCONTINUED | OUTPATIENT
Start: 2019-04-10 | End: 2019-04-10

## 2019-04-10 RX ORDER — FLUMAZENIL 0.1 MG/ML
0.2 INJECTION, SOLUTION INTRAVENOUS
Status: DISCONTINUED | OUTPATIENT
Start: 2019-04-10 | End: 2019-04-10 | Stop reason: HOSPADM

## 2019-04-10 RX ADMIN — LIDOCAINE HYDROCHLORIDE 100 MG: 20 INJECTION, SOLUTION INFILTRATION; PERINEURAL at 08:48

## 2019-04-10 RX ADMIN — CEFAZOLIN SODIUM 2 G: 2 SOLUTION INTRAVENOUS at 08:52

## 2019-04-10 RX ADMIN — FENTANYL CITRATE 25 MCG: 50 INJECTION, SOLUTION INTRAMUSCULAR; INTRAVENOUS at 08:55

## 2019-04-10 RX ADMIN — SODIUM CHLORIDE, SODIUM LACTATE, POTASSIUM CHLORIDE, CALCIUM CHLORIDE: 600; 310; 30; 20 INJECTION, SOLUTION INTRAVENOUS at 08:41

## 2019-04-10 RX ADMIN — GABAPENTIN 300 MG: 300 CAPSULE ORAL at 07:50

## 2019-04-10 RX ADMIN — ONDANSETRON 4 MG: 2 INJECTION INTRAMUSCULAR; INTRAVENOUS at 08:48

## 2019-04-10 RX ADMIN — DEXAMETHASONE SODIUM PHOSPHATE 4 MG: 4 INJECTION, SOLUTION INTRA-ARTICULAR; INTRALESIONAL; INTRAMUSCULAR; INTRAVENOUS; SOFT TISSUE at 08:48

## 2019-04-10 RX ADMIN — FENTANYL CITRATE 50 MCG: 50 INJECTION, SOLUTION INTRAMUSCULAR; INTRAVENOUS at 08:21

## 2019-04-10 RX ADMIN — BUPIVACAINE HYDROCHLORIDE 40 ML: 2.5 INJECTION, SOLUTION EPIDURAL; INFILTRATION; INTRACAUDAL at 08:25

## 2019-04-10 RX ADMIN — SCOLOPAMINE TRANSDERMAL SYSTEM 1 PATCH: 1 PATCH, EXTENDED RELEASE TRANSDERMAL at 08:34

## 2019-04-10 RX ADMIN — PROPOFOL 50 MG: 10 INJECTION, EMULSION INTRAVENOUS at 08:55

## 2019-04-10 RX ADMIN — PROPOFOL 200 MCG/KG/MIN: 10 INJECTION, EMULSION INTRAVENOUS at 08:48

## 2019-04-10 RX ADMIN — FENTANYL CITRATE 25 MCG: 50 INJECTION, SOLUTION INTRAMUSCULAR; INTRAVENOUS at 08:53

## 2019-04-10 RX ADMIN — PROPOFOL 200 MG: 10 INJECTION, EMULSION INTRAVENOUS at 08:48

## 2019-04-10 RX ADMIN — ACETAMINOPHEN 975 MG: 325 TABLET ORAL at 07:50

## 2019-04-10 NOTE — ANESTHESIA PREPROCEDURE EVALUATION
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   Procedure(s):  Right Cheilectomy     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    patient states not a valve infection     Heart murmur      Osteomyelitis, L5S1 03/15/2018    secondary to endocarditis      Past Surgical History:   Procedure Laterality Date     INJECT SACROILIAC JOINT Right 2018    Procedure: INJECT SACROILIAC JOINT;  Right Sacroiliac Joint Injection;  Surgeon: Thom Williamson MD;  Location: [facility] OR                   PHYSICAL EXAM:   Mental Status/Neuro:    Airway: Facies: Feasible  Mallampati: I  Mouth/Opening: Full  TM distance: > 6 cm  Neck ROM: Full   Respiratory: Auscultation: CTAB     Resp. Rate: Normal     Resp. Effort: Normal      CV: Rhythm: Regular  Rate: Age appropriate  Heart: Normal Sounds   Comments:      Dental: Normal                  Lab Results   Component Value Date    WBC 8.4 2018    HGB 15.4 2019    HCT 47.2 2018     2018    CRP <2.9 2019    SED 25 (H) 2018     2019    POTASSIUM 4.1 2019    CHLORIDE 107 2019    CO2 30 2019    BUN 15 2019    CR 0.97 2019    GLC 93 2019    JEANNINE 9.0 2019    ALBUMIN 4.1 2018    PROTTOTAL 7.8 2018    ALT 43 2018    AST 32 2018    ALKPHOS 56 2018    BILITOTAL 0.4 2018    TSH 2.53 2018       Preop Vitals  BP Readings from Last 3 Encounters:   19 138/87   18 125/81   18 128/85    Pulse Readings from Last 3 Encounters:   19 77   18 80   18 70      Resp Readings from Last 3 Encounters:   19 16   18 16   18 16    SpO2 Readings from Last 3 Encounters:   19 99%  "  11/01/18 95%   10/18/18 96%      Temp Readings from Last 1 Encounters:   11/13/18 36.6  C (97.9  F) (Oral)    Ht Readings from Last 1 Encounters:   03/20/19 1.765 m (5' 9.49\")      Wt Readings from Last 1 Encounters:   03/20/19 84.5 kg (186 lb 4.8 oz)    Estimated body mass index is 27.13 kg/m  as calculated from the following:    Height as of 3/20/19: 1.765 m (5' 9.49\").    Weight as of 3/20/19: 84.5 kg (186 lb 4.8 oz).     LDA:  Peripheral IV 03/05/18 Right;Anterior Upper forearm (Active)   Number of days: 400       PICC Single Lumen 03/07/18 Left Brachial vein medial (Active)   Number of days: 398            Assessment:   ASA SCORE: 2    NPO Status: > 6 hours since completed [employer]   Documentation: H&P complete; Preop Testing complete; Consents complete   Proceeding: Proceed without further delay  Tobacco Use:  NO Active use of Tobacco/UNKNOWN Tobacco use status     Plan:   Anes. Type:  Regional; MAC     RA Details:  Pre Induction; SS     RA-Location/Type: Nerve Block (Popliteal and adductor canal)   Pre-Induction: Midazolam IV; Opioid IV; Acetaminophen PO   Induction:  IV (Standard)   Airway: Native Airway   Access/Monitoring: PIV   Maintenance: Propofol; IV   Emergence: Procedure Site   Logistics: Same Day Surgery     Postop Pain/Sedation Strategy:  Standard-Options: Opioids PRN; Exparel; Block SS     PONV Management:  Adult Risk Factors:, Non-Smoker, Postop Opioids  Prevention: Ondansetron; Dexamethasone; Scop. Patch     CONSENT: Direct conversation   Plan and risks discussed with: Patient   Blood Products: Consent Deferred (Minimal Blood Loss)                         Connie Arroyo MD  "

## 2019-04-10 NOTE — BRIEF OP NOTE
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 Sawyer Crowell MD   Assistants(s): Zohra Rivera PA-C   Estimated blood loss: 20 ml's    Specimens: None   Findings: None       Plan:  Same Day surgery discharge to home once criteria met.  CAM boot to remain on right  lower extremity and WBAT.  Norco and atarax for pain.  No dressing change on own.  Leave dressing on until 2 weeks follow up appointment.  F/U in clinic in 2 weeks    I was asked by Dr. Crowell to assist with surgery. I positioned and prepped the patient. I retracted soft tissue.   I suctioned fluids when needed. I provided traction for dissection. I helped to ligate blood vessels. I helped Dr. Crowell identify and protect important structures. The procedure was medically necessary for an assistant because Dr. Crowell needed the operative exposure and assistance that I provided. This allowed him to safely and efficiently operate. It was also important that I help ligate blood vessels to maintain hemostasis and reduce the bleeding risk. I helped with the closure of the operative incisions as well as helping with the boot/cast/splint.  The assistance that I provided reduced operative time which meant less general anesthetic for the patient. No qualified residents were available to assist.    Zohra Rivera PA-C

## 2019-04-10 NOTE — ANESTHESIA POSTPROCEDURE EVALUATION
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       Anesthesia Type:  Regional, MAC    Reportable Event: NO     PAIN: Uncomplicated   Sign Out status: Comfortable, Well controlled pain     PONV: No PONV   Sign Out status:  No Nausea or Vomiting     Neuro/Psych: Uneventful perioperative course   Sign Out Status: Preoperative baseline; Age appropriate mentation     Airway/Resp.: Uneventful perioperative course   Sign Out Status: Non labored breathing, age appropriate RR; Resp. Status within EXPECTED Parameters     CV: Uneventful perioperative course   Sign Out status: Appropriate BP and perfusion indices; Appropriate HR/Rhythm     Disposition:   Sign Out in:  PACU  Disposition:  Phase II; Home  Recovery Course: Uneventful  Follow-Up: Not required           Last Anesthesia Record Vitals:  CRNA VITALS  4/10/2019 0847 - 4/10/2019 0947      4/10/2019             Pulse:  74    SpO2:  98 %    Resp Rate (observed):  12          Last PACU Vitals:  Vitals Value Taken Time   /83 4/10/2019  9:30 AM   Temp     Pulse 86 4/10/2019  9:29 AM   Resp 11 4/10/2019  9:30 AM   SpO2 97 % 4/10/2019  9:30 AM   Temp src     NIBP     Pulse     SpO2     Resp     Temp     Ht Rate     Temp 2     Vitals shown include unvalidated device data.      Electronically Signed By: Connie Arroyo MD, April 10, 2019, 12:50 PM

## 2019-04-10 NOTE — ANESTHESIA PROCEDURE NOTES
Peripheral Nerve Block Procedure Note    Staff:     Anesthesiologist:  Connie Arroyo MD    Resident/CRNA:  Ravi Marcano MD    Block performed by resident/CRNA in the presence of a teaching physician    Location: Pre-op  Procedure Start/Stop TImes:     patient identified, IV checked, site marked, risks and benefits discussed, informed consent, monitors and equipment checked, pre-op evaluation, at physician/surgeon's request and post-op pain management      Correct Patient: Yes      Correct Position: Yes      Correct Site: Yes      Correct Procedure: Yes      Correct Laterality:  Yes    Site Marked:  Yes  Procedure details:     Procedure:  Popliteal    ASA:  1    Diagnosis:  Post-op analgesia    Laterality:  Right    Position:  Supine    Sterile Prep: chloraprep, patient draped, mask and sterile gloves      Needle gauge:  21    Needle length (mm):  110    Ultrasound: Yes      Ultrasound used to identify targeted nerve, plexus, or vascular structure and placed a needle adjacent to it      Permanent Image entered into patiient's record      Abnormal pain on injection: No      Blood Aspirated: No      Paresthesias:  No    Bleeding at site: No      Bolus via:  Needle    Infusion Method:  Single Shot    Complications:  None

## 2019-04-10 NOTE — ANESTHESIA PROCEDURE NOTES
Peripheral Nerve Block Procedure Note    Staff:     Anesthesiologist:  Connie Arroyo MD    Resident/CRNA:  Ravi Marcano MD    Block performed by resident/CRNA in the presence of a teaching physician    Location: Pre-op  Procedure Start/Stop TImes:     patient identified, IV checked, site marked, risks and benefits discussed, informed consent, monitors and equipment checked, pre-op evaluation, at physician/surgeon's request and post-op pain management      Correct Patient: Yes      Correct Position: Yes      Correct Site: Yes      Correct Procedure: Yes      Correct Laterality:  Yes    Site Marked:  Yes  Procedure details:     Procedure:  Adductor canal    ASA:  1    Diagnosis:  Post-op analgesia    Laterality:  Right    Position:  Supine    Sterile Prep: chloraprep, patient draped, mask and sterile gloves      Needle:  Short bevel    Needle gauge:  21    Needle length (mm):  110    Ultrasound: Yes      Ultrasound used to identify targeted nerve, plexus, or vascular structure and placed a needle adjacent to it      Permanent Image entered into patiient's record      Abnormal pain on injection: No      Blood Aspirated: No      Paresthesias:  No    Bleeding at site: No      Bolus via:  Needle    Infusion Method:  Single Shot    Complications:  None

## 2019-04-10 NOTE — OR NURSING
Patient received right side femoral and sciatic nerve block with Exparel.  Fentanyl 50mcg and Versed 1mg given. Tolerated procedure well.

## 2019-04-10 NOTE — ANESTHESIA CARE TRANSFER NOTE
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    Anesthesia Type:   No value filed.     Note:  Airway :Room Air  Patient transferred to:PACU  Comments: Report to RN    121/67, 87, 16, 98%Handoff Report: Identifed the Patient, Identified the Reponsible Provider, Reviewed the pertinent medical history, Discussed the surgical course, Reviewed Intra-OP anesthesia mangement and issues during anesthesia, Set expectations for post-procedure period and Allowed opportunity for questions and acknowledgement of understanding      Vitals: (Last set prior to Anesthesia Care Transfer)    CRNA VITALS  4/10/2019 0847 - 4/10/2019 0920      4/10/2019             Pulse:  74    SpO2:  98 %    Resp Rate (observed):  12                Electronically Signed By: BENNY Chapin CRNA  April 10, 2019  9:20 AM

## 2019-04-10 NOTE — DISCHARGE INSTRUCTIONS
"[facility]  Home Care Following Anesthesia  For 24 hours after surgery:  1. Get plenty of rest.  A responsible adult must stay with you for at least 24 hours after you leave the surgery center.  2. Do not drive or use heavy equipment.  If you have weakness or tingling, don't drive or use heavy equipment until this feeling goes away.   3. Do not drink alcohol.   4. Avoid strenuous or risky activities.  Ask for help when climbing stairs.  5. You may feel lightheaded.  IF so, sit for a few minutes before standing.  Have someone help you get up.   6. If you have nausea (feel sick to your stomach): Drink only clear liquids such as apple juice, ginger ale, broth or 7-Up.  Rest may also help.  Be sure to drink enough fluids.  Move to a regular diet as you feel able.   7. You may have a slight fever.  Call the doctor if your fever is over 100 F (37.7 C) (taken under the tongue) or lasts longer than 24 hours.  8. You may have a dry mouth, a sore throat, muscle aches or trouble sleeping. These should go away after 24 hours.  9. Do not make important or legal decisions.        Today you received an Exparel block to numb the nerves near your surgery site.  This is a block using local anesthetic or \"numbing\" medication injected around the nerves to anesthetize or \"numb\" the area supplied by those nerves.  This block is injected into the muscle layer near your surgical site.  This medication may numb the location where you had surgery up to 72 hours.  If your surgical site is an arm or leg you should be careful with your affected limb, since it is possible to injure your limb without being aware of it due to the numbing.  Until full feeling returns, you should guard against bumping or hitting your limb, and avoid extreme hot or cold temperatures on the skin.  As the block wears off, the feeling will return as a tingling or prickly sensation near your surgical site.  You will experince more " discomfort from your incision as the feeling returns.  You may want to take a pain pill (a narcotic or Tylenol if this was prescribed by your surgeon) when you start to experience mild pain before the pain beomes more severe.  If your pain medications do not control your pain, you should notify your surgeon.    Tips for taking pain medications  To get the best pain relief possible, remember these points:    Take pain medications as directed, before pain becomes severe.    Pain medication can upset your stomach: taking it with food may help.    Constipation is a common side effect of pain medication. Drink plenty of  fluids.    Eat foods high in fiber. Take a stool softener if recommended by your doctor or pharmacist.    Do not drink alcohol, drive or operate machinery while taking pain medications.    Ask about other ways to control pain, such as with heat, ice or relaxation.    Tylenol/Acetaminophen Consumption  To help encourage the safe use of acetaminophen, the makers of TYLENOL  have lowered the maximum daily dose for single-ingredient Extra Strength TYLENOL  (acetaminophen) products sold in the [place] from 8 pills per day (4,000 mg) to 6 pills per day (3,000 mg). The dosing interval has also changed from 2 pills every 4-6 hours to 2 pills every 6 hours.    If you feel your pain relief is insufficient, you may take Tylenol/Acetaminophen in addition to your narcotic pain medication.     Be careful not to exceed 3,000 mg of Tylenol/Acetaminophen in a 24 hour period from all sources.    If you are taking extra strength Tylenol/acetaminophen (500 mg), the maximum dose is 6 tablets in 24 hours.    If you are taking regular strength acetaminophen (325 mg), the maximum dose is 9 tablets in 24 hours.    Call a doctor for any of the followin. Signs of infection (fever, growing tenderness at the surgery site, a large amount of drainage or bleeding, severe pain, foul-smelling drainage, redness, swelling).  2. It has  been over 8 to 10 hours since surgery and you are still not able to urinate (pass water).  3. Headache for over 24 hours.  4. Numbness, tingling or weakness the day after surgery (if you had spinal anesthesia).  Your doctor is:       Dr. Sawyer Crowell, Orthopaedics: [phone]               Or dial [phone] and ask for the resident on call for:  Orthopaedics  For emergency care, call the:  [facility] Emergency Department: [phone] (TTY for hearing impaired: [phone])Scopolamine Patch- (Absorbed through the skin)    This medicine prevents nausea and vomiting caused by motion sickness or anesthesia.  The medicine is in a patch worn behind the ear.      Do NOT use the Scopolamine Patch if you have glaucoma or are allergic to scopolamine.    How to Use This Medicine:    The patch is applied behind the ear.    Keep the patch dry to prevent it from falling off.  Limit contact with water (no bathing or swimming).      If the patch is loose or falls off throw it away.  You do not need to apply a new patch.    After you take off the patch or if it falls off, wash your hands and the area behind your ear with soap and water.      You can remove the patch tomorrow, or leave on for up to 3 days.    Only one patch should be used at any time.    How to Dispose of This Medicine:    Fold the used patch in half with the sticky sides together. Throw any used patch away so that children or pets cannot get to it. You will also need to throw away old patches after the expiration date has passed.    Keep all medicine away from children and never share your medicine with anyone.    Warnings While Using This Medicine:    This medicine can make you sleepy.  Avoid taking sleeping pills and other medicines that can make you sleepy while the patch is on.    Do not drink alcohol while the patch is on.    This medicine can cause temporary blurring and other vision problems if it comes in contact with the eyes.  This is not serious  unless accompanied by eye pain and redness.     This medicine may cause problems with urination. If you have problems with urinating, remove the patch.  If you are unable to urinate, call your doctor.      This medicine may make you dizzy or drowsy. Avoid driving, using machines, or doing anything else that could be dangerous if the patch is on.    This medicine may make you sweat less and cause your body to get too hot. Be careful in hot weather or if you are exercising.    Make sure any doctor or dentist who treats you knows that you have the patch on. This medicine may affect the results of certain medical tests.    Skin burns have been reported at the patch site in several patients wearing an aluminized transdermal system during a magnetic resonance imaging scan (MRI).  Since this patch contains aluminum, it is recommended to remove the patch if you are having an MRI.    Possible Side Effects While Using This Medicine:    Dry mouth    Drowsiness    Temporary blurring of vision and widening of the pupils    Call your doctor right away if you notice any of these side effects:    Allergic reaction: Itching or hives, swelling in your face or hands, swelling or tingling in your mouth or throat, chest tightness, trouble breathing.    Blurred vision that does not go away after the patch is removed    Confusion or memory loss    Fast,slow, or uneven heartbeat    Lightheadedness, dizziness, drowsiness, or fainting    Seeing, hearing, or feeling things that are not there    Restlessness    Severe eye pain    Trouble urinating    If you notice other side effects that you think are caused by this medicine, call your doctor immediately.

## 2019-04-11 NOTE — OP NOTE
Procedure Date: 04/10/2019      PREOPERATIVE DIAGNOSIS:  Right first MTP joint hallux rigidus.      POSTOPERATIVE DIAGNOSIS:  Right first MTP joint hallux rigidus.      PROCEDURES:     1.  Right first MTP joint cheilectomy.   2.  Right first MTP joint proximal phalanx debridement.      SURGEON:  aSwyer Crowell MD      ASSISTANT:  Zohra Rivera PA-C       COMPLICATIONS:  None.      DRAINS:  None.      ESTIMATED BLOOD LOSS:  Less than 20 mL.      ANESTHESIA:  General endotracheal.      INDICATIONS:  Please refer to clinic notes for further details regarding indications for Mr. Nguyen's case.       PROCEDURE NOTE:  On 04/10/2019, the patient was taken to surgery.  Preoperative antibiotics were administered to the patient prior to arrival to the OR.      After successful induction of general endotracheal anesthesia, he was placed supine on the operating table.  The right lower extremity was prepped and draped in a sterile fashion.  After exsanguination by gravity, the tourniquet cuff was inflated to 300 mmHg on the proximal third of the right thigh.      The pause for the cause was performed according to our institution's policy, which confirmed laterality of the procedure.       Following this, we proceeded with an incision along the dorsal medial aspect of the first MTP joint.  Subcutaneous tissues were dissected.  We proceeded with identification of the first MTP joint and confirmed the presence of arthritic changes along the dorsal third of the first metatarsal head as well as a large amount of osteophyte formation across the dorsal aspect of the first metatarsal head as well as the base of the proximal phalanx.      With a rongeur, we proceeded with debridement of the proximal phalanx through all osteophytes, medial, dorsal and laterally.  With an oscillating saw, we proceeded with resection of the distal third of the first metatarsal head as well as some osteophytes along the most lateral portion.  By  the time the debridement was performed, the patient did not present with any arthritic changes through the first MTP joint.      Copious irrigation was applied.  We proceeded with deflation of the tourniquet cuff.  The wound was closed in layers, including the pedicles of the capsule.  Satisfactory hemostasis was accomplished prior to the closure.  Sterile dressings were applied.  The patient was placed in a short CAM Walker and transferred in stable condition to PACU.      PLAN:  The patient will remain weightbearing as tolerated with the use of the CAM Walker.  The CAM Walker will be utilized at all times except for hygiene for the first 2 weeks from surgery.  At that time, sutures will be removed if indicated.  He will proceed with the use of the CAM Walker for comfort purposes and physical therapy without restrictions with an emphasis on range-of-motion exercises for the first MTP joint.      The patient will be discouraged from pounding activities between weeks 2-6.      The patient will be reevaluated at 6 weeks from surgery, and at that time no x-rays will be obtained.         SATINDER TRAN MD             D: 2019   T: 2019   MT: tana      Name:     ALEKSANDER NEWTON   MRN:      [id number]        Account:        [id number]   :      1975           Procedure Date: 04/10/2019      Document: [id number]

## 2019-04-16 DIAGNOSIS — M79.673 FOOT PAIN: Primary | ICD-10-CM

## 2019-04-16 RX ORDER — TRAMADOL HYDROCHLORIDE 50 MG/1
50 TABLET ORAL EVERY 6 HOURS PRN
Qty: 30 TABLET | Refills: 0
Start: 2019-04-16 | End: 2019-05-07

## 2019-04-17 DIAGNOSIS — M79.671 RIGHT FOOT PAIN: Primary | ICD-10-CM

## 2019-04-17 DIAGNOSIS — Z98.890 S/P FOOT SURGERY, RIGHT: ICD-10-CM

## 2019-04-18 ASSESSMENT — ENCOUNTER SYMPTOMS
DECREASED APPETITE: 0
CHILLS: 0
WEIGHT GAIN: 0
FEVER: 0
INCREASED ENERGY: 0
POLYPHAGIA: 0
NIGHT SWEATS: 0
FATIGUE: 0
HALLUCINATIONS: 0
WEIGHT LOSS: 0
POLYDIPSIA: 0
ALTERED TEMPERATURE REGULATION: 0

## 2019-04-24 ENCOUNTER — OFFICE VISIT (OUTPATIENT)
Dept: ORTHOPEDICS | Facility: CLINIC | Age: 44
End: 2019-04-24
Payer: COMMERCIAL

## 2019-04-24 DIAGNOSIS — Z98.890 S/P FOOT SURGERY, RIGHT: Primary | ICD-10-CM

## 2019-04-24 NOTE — PROGRESS NOTES
Reason for visit:    Frederick Nguyen came in to the clinic for a two week post op check.    His surgery was done 4/10/19 by Dr Crowell.  He had Right 1st MTP joint cheilectomy.     Assessment:    Frederick came into the clinic in CAM walker WBAT    The Surgical wounds were exposed and found to be well-healed; so the sutures were removed.    Plan:     He was placed in CAM walker, but told he can progress into a regular shoe.  He was told to remain WBAT.    PT orders were given at today's visit and he can start that as soon as today if he would like.     He has an appointment to see Dr. Crowell at that time Dr. Crowell will determine further restrictions.    He has our phone number and will call with questions or problems.      Marilee Brock ATC    Answers for HPI/ROS submitted by the patient on 4/18/2019   General Symptoms: Yes  Skin Symptoms: No  HENT Symptoms: No  EYE SYMPTOMS: No  HEART SYMPTOMS: No  LUNG SYMPTOMS: No  INTESTINAL SYMPTOMS: No  URINARY SYMPTOMS: No  REPRODUCTIVE SYMPTOMS: No  SKELETAL SYMPTOMS: No  BLOOD SYMPTOMS: No  NERVOUS SYSTEM SYMPTOMS: No  MENTAL HEALTH SYMPTOMS: No  Fever: No  Loss of appetite: No  Weight loss: No  Weight gain: No  Fatigue: No  Night sweats: No  Chills: No  Increased stress: No  Excessive hunger: No  Excessive thirst: No  Feeling hot or cold when others believe the temperature is normal: No  Loss of height: No  Post-operative complications: No  Surgical site pain: Yes  Hallucinations: No  Change in or Loss of Energy: No  Hyperactivity: No  Confusion: No

## 2019-04-29 ENCOUNTER — THERAPY VISIT (OUTPATIENT)
Dept: PHYSICAL THERAPY | Facility: CLINIC | Age: 44
End: 2019-04-29
Payer: COMMERCIAL

## 2019-04-29 DIAGNOSIS — M79.671 RIGHT FOOT PAIN: ICD-10-CM

## 2019-04-29 DIAGNOSIS — R60.0 LOCALIZED EDEMA: ICD-10-CM

## 2019-04-29 DIAGNOSIS — Z98.890 S/P FOOT SURGERY, RIGHT: ICD-10-CM

## 2019-04-29 DIAGNOSIS — Z47.89 AFTERCARE FOLLOWING SURGERY OF THE MUSCULOSKELETAL SYSTEM: ICD-10-CM

## 2019-04-29 PROCEDURE — 97140 MANUAL THERAPY 1/> REGIONS: CPT | Mod: GP | Performed by: PHYSICAL THERAPIST

## 2019-04-29 PROCEDURE — 97161 PT EVAL LOW COMPLEX 20 MIN: CPT | Mod: GP | Performed by: PHYSICAL THERAPIST

## 2019-04-29 PROCEDURE — 97110 THERAPEUTIC EXERCISES: CPT | Mod: GP | Performed by: PHYSICAL THERAPIST

## 2019-04-29 ASSESSMENT — ACTIVITIES OF DAILY LIVING (ADL)
TOTAL_ADL_ITEM_SCORE:: 48
HIGHEST_POTENTIAL_ADL_SCORE:: 68

## 2019-04-29 NOTE — PROGRESS NOTES
3Physical Therapy Initial Evaluation: Subjective History  Date of Surgery: 4/10/19.    Surgical Procedure/Limb: s/p R foot surgery, 1st MTP cheilectomy  Surgeon Name: Dr. Crowell  Average Daily Pain Levels: 3/10 (Location: R great toe; Quality: Sharp and Aching/Throbbing)  Other Symptoms: Stiffness/swelling  Symptom Mgmt Strategies: Rest, ice, elevation, pain meds  Prior orthopaedic history/procedures: Refer to EMR  Prior non-operative management: insoles  Next MD Appt Date: Next month    Functional limitations following procedure: Difficulty with walking, transfers, ADLs, navigating stairs  Previous level of function: Unrestricted    Patient Employment:   Typical Physical Activities: Elliptical, jog, cycling, disc golf, swim      Post-Operative Physical Therapy Examination    Physical Mobility Status  Gait: Antalgic pattern in CAM boot    Anthropometric Measures   Right Left Difference   Joint ROM      DF 5 deg 12 deg 7 deg   PF 45 deg 45 deg 0 deg   Inversion 30 deg 30 deg 0 deg   Eversion 15 deg 15 deg 0 deg   Great Toe Ext 10 deg     Great Toe Flex 25 deg     Circumferential Measures      Edema Mild effusion present on dorsum of R great toe         Status of Incision: Clean & healing    Assessment/Plan:    The patient is a 43 year old male with chief complaint of R foot pain.    The patient has the following significant findings with corresponding treatment plan.  Diagnosis 1: s/p R foot surgery, 1st MTP cheilectomy    Pain -  hot/cold therapy, electric stimulation, manual therapy, splint/taping/bracing/orthotics and self management  Decreased ROM/flexibility - manual therapy, therapeutic exercise and home program  Decreased joint mobility - manual therapy and therapeutic exercise  Decreased strength - therapeutic exercise, therapeutic activities and home program  Impaired balance - neuro re-education and therapeutic activities  Decreased proprioception - neuro re-education and therapeutic  activities  Edema - vasopneumatics, electric stimulation and cold therapy  Impaired gait - gait training  Impaired muscle performance - neuro re-education  Decreased function - therapeutic activities    Therapy Evaluation Codes:   1) History comprised of:   Personal factors that impact the plan of care:      Please refer to EMR.    Comorbidity factors that impact the plan of care are:      Please refer to EMR.     Medications impacting care: None.  2) Examination of Body Systems comprised of:   Body structures and functions that impact the plan of care:      Knee.   Activity limitations that impact the plan of care are:      Stairs, Standing and Walking.   Clinical presentation characteristics are:    Stable/Uncomplicated.  3) Presentation comprised of:   Presentation scored as Low complexity with uncomplicated characteristics..  4) Decision-Making    Low complexity using standardized patient assessment instrument and/or measureable assessment of functional outcome.  Cumulative Therapy Evaluation is: Low complexity.    Previous and current functional limitations:  (See Goal Flow Sheet for this information)    Short term and Long term goals: (See Goal Flow Sheet for this information)     Communication ability:  Patient appears to be able to clearly communicate and understand verbal and written communication and follow directions correctly.  Treatment Explanation - The following has been discussed with the patient: RX ordered/plan of care, anticipated outcomes, and possible risks and side effects.  This patient would benefit from PT intervention to resume normal activities.   Rehab potential is good.    Frequency:  1 X week, once daily  Duration:  for 3 weeks, tapering to 2x/month for 1 month  Discharge Plan: Achieve all LTGs, be independent in home treatment program, and reach maximal therapeutic benefit.    Please refer to the daily flowsheet for treatment today, total treatment time and time spent performing 1:1  timed codes.

## 2019-05-01 ASSESSMENT — ENCOUNTER SYMPTOMS
HOARSE VOICE: 0
SINUS PAIN: 0
SORE THROAT: 0
NECK MASS: 0
TROUBLE SWALLOWING: 0
SINUS CONGESTION: 1
TASTE DISTURBANCE: 0
SMELL DISTURBANCE: 0

## 2019-05-07 ENCOUNTER — OFFICE VISIT (OUTPATIENT)
Dept: INTERNAL MEDICINE | Facility: CLINIC | Age: 44
End: 2019-05-07
Payer: COMMERCIAL

## 2019-05-07 ENCOUNTER — THERAPY VISIT (OUTPATIENT)
Dept: PHYSICAL THERAPY | Facility: CLINIC | Age: 44
End: 2019-05-07
Payer: COMMERCIAL

## 2019-05-07 VITALS
BODY MASS INDEX: 27.07 KG/M2 | WEIGHT: 185.9 LBS | SYSTOLIC BLOOD PRESSURE: 134 MMHG | DIASTOLIC BLOOD PRESSURE: 90 MMHG | HEART RATE: 75 BPM

## 2019-05-07 DIAGNOSIS — R60.0 LOCALIZED EDEMA: ICD-10-CM

## 2019-05-07 DIAGNOSIS — J32.9 BACTERIAL SINUSITIS: Primary | ICD-10-CM

## 2019-05-07 DIAGNOSIS — Z47.89 AFTERCARE FOLLOWING SURGERY OF THE MUSCULOSKELETAL SYSTEM: ICD-10-CM

## 2019-05-07 DIAGNOSIS — B96.89 BACTERIAL SINUSITIS: Primary | ICD-10-CM

## 2019-05-07 DIAGNOSIS — R03.0 BORDERLINE HIGH BLOOD PRESSURE: ICD-10-CM

## 2019-05-07 DIAGNOSIS — M79.671 RIGHT FOOT PAIN: ICD-10-CM

## 2019-05-07 PROCEDURE — 97140 MANUAL THERAPY 1/> REGIONS: CPT | Mod: GP | Performed by: PHYSICAL THERAPIST

## 2019-05-07 PROCEDURE — 97112 NEUROMUSCULAR REEDUCATION: CPT | Mod: GP | Performed by: PHYSICAL THERAPIST

## 2019-05-07 PROCEDURE — 97110 THERAPEUTIC EXERCISES: CPT | Mod: GP | Performed by: PHYSICAL THERAPIST

## 2019-05-07 ASSESSMENT — PAIN SCALES - GENERAL: PAINLEVEL: MODERATE PAIN (4)

## 2019-05-07 NOTE — PROGRESS NOTES
PRIMARY CARE CENTER       SUBJECTIVE:  Not a new patient in the Primary Care Center    Frederick Nguyen is a 43 year old male with a PMHx of Streptococcus bacteremia, mitral valve prolapse and chronic back pain who comes in to reestablish care to discuss sinus congestion.  Overall is been feeling well, although for the last 2 or more weeks he has had persistent sinus congestion, sore throat and cough.  Symptoms started as a runny nose and sore throat, progressed to sinus pain and pressure.  He is coughing up yellow-green sputum several times per day.  He denies shortness of breath, wheezing, chills or fever.  He denies ear pain, denies watery eyes or redness.  He denies dizziness or loss of consciousness.  He presented today because his symptoms were not improving.  He does have a history of bacterial sinusitis, which was treated with antibiotics last year.  He has no other concerns today, he recently had surgery on his foot and had to take a break from physical therapy.  He is planning to go back when able.  No new back pain, his appetite is stable and no weight loss.  Normal stools and no pain with urination.     Medications and allergies reviewed by me today.     ROS:   Constitutional, neuro, ENT, endocrine, pulmonary, cardiac, gastrointestinal, genitourinary, musculoskeletal, integument and psychiatric systems are negative, except as otherwise noted.  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    patient states not a valve infection     Heart murmur      Osteomyelitis, L5S1 03/15/2018    secondary to endocarditis     Past Surgical History:   Procedure Laterality Date     CHEILECTOMY Right 4/10/2019    Procedure: Right Cheilectomy;  Surgeon: Sawyer Crowell MD;  Location: [facility] OR     INJECT SACROILIAC JOINT Right 5/9/2018    Procedure: INJECT SACROILIAC JOINT;  Right Sacroiliac Joint Injection;  Surgeon: Thom Williamson MD;  Location:  OR     No family history on file.--negative  Social History     Socioeconomic History     Marital status:      Spouse name: Not on file     Number of children: Not on file     Years of education: Not on file     Highest education level: Not on file   Occupational History     Comment: desk work   Social Needs     Financial resource strain: Not on file     Food insecurity:     Worry: Not on file     Inability: Not on file     Transportation needs:     Medical: Not on file     Non-medical: Not on file   Tobacco Use     Smoking status: Never Smoker     Smokeless tobacco: Never Used   Substance and Sexual Activity     Alcohol use: No     Drug use: No     Sexual activity: Yes     Partners: Female   Lifestyle     Physical activity:     Days per week: Not on file     Minutes per session: Not on file     Stress: Not on file   Relationships     Social connections:     Talks on phone: Not on file     Gets together: Not on file     Attends Congregation service: Not on file     Active member of club or organization: Not on file     Attends meetings of clubs or organizations: Not on file     Relationship status: Not on file     Intimate partner violence:     Fear of current or ex partner: Not on file     Emotionally abused: Not on file     Physically abused: Not on file     Forced sexual activity: Not on file   Other Topics Concern     Parent/sibling w/ CABG, MI or angioplasty before 65F 55M? No    Social History Narrative     Not on file       OBJECTIVE:    /90   Pulse 75   Wt 84.3 kg (185 lb 14.4 oz)   BMI 27.07 kg/m     Wt Readings from Last 1 Encounters:   05/07/19 84.3 kg (185 lb 14.4 oz)       GENERAL APPEARANCE: healthy, alert and no distress     EYES: EOMI,  PERRL. No scleral icterus or erytema.      HENT: ear canals and TM's normal, Nasal turbinates erythematous bilaterally. Minimal erythema of the posterior oropharynx. No exudate, no lymphadenopathy.      NECK: no adenopathy, no asymmetry, masses, or scars      RESP: lungs clear to auscultation - no rales, rhonchi or wheezes     CV: regular rates and rhythm, normal S1 S2, 2/6 holosystolic murmur hear at the cardiac apex. RP 2+ bilaterally.      ABDOMEN:  soft, nontender, no HSM or masses and bowel sounds normal     MS: extremities normal- no gross deformities noted     SKIN: no suspicious lesions or rashes     NEURO: Normal strength and tone, mentation intact and speech normal     PSYCH: mentation appears normal. and affect normal/bright     LYMPHATICS: No cervical adenopathy     ASSESSMENT/PLAN:    Frederick was seen today for establish care, physical and sinus problem.    Diagnoses and all orders for this visit:    Bacterial sinusitis  Patient with symptoms for more than 2 weeks without apparent resolution.  Notably has a history coccal bacteremia.  Given symptom duration we will plan to treat with a week's course of oral antibiotics.  Discussed red flag symptoms such as difficulty swallowing, redness of breath or new fevers.  If symptoms do not improve with antibiotics he should return for reevaluation.   -     amoxicillin-clavulanate (AUGMENTIN) 875-125 MG tablet; Take 1 tablet by mouth 2 times daily    Borderline high blood pressure  Has been documented previously although he does check his blood pressure regularly at home.  No higher than ideal I would not treat at this time, he should check his blood pressure 2-3 times a month at  home we can review numbers at his next appointment.     Mitral Valve Prolapse   Patient has an active order for cardiac echocardiogram, which is not scheduled although he is planning to do so.  He will then make an appointment with his cardiologist.  His last echo in 2018 did show significant prolapse of the mitral leaflet, he had a DEEPAK as well she did show severe mitrail insufficiency.  No overt signs or symptoms of heart failure at this time. Plan to have him follow-up with cardiology before he sees me next.  No surgical plans at this time.     Pt should return to clinic for f/u with me in 6 months.     Anton Lyn MD  May 7, 2019    Pt was seen and plan of care discussed with Dr. Morocho.     Teaching Physician Note:  I was present during the visit and the patient was seen and examined by me.   I discussed the case with the resident and agree with the note as documented by the resident with the following exceptions:  None.    Joselin Morocho M.D.  Internal Medicine   pager [phone]

## 2019-05-07 NOTE — NURSING NOTE
Chief Complaint   Patient presents with     Establish Care     Physical     Sinus Problem     Presents with sinus issues for past 2 weeks.        Marisabel Stephens LPN at 8:32 AM on May 7, 2019

## 2019-05-13 ENCOUNTER — OFFICE VISIT (OUTPATIENT)
Dept: FAMILY MEDICINE | Facility: CLINIC | Age: 44
End: 2019-05-13
Payer: COMMERCIAL

## 2019-05-13 VITALS
DIASTOLIC BLOOD PRESSURE: 79 MMHG | WEIGHT: 184 LBS | OXYGEN SATURATION: 97 % | BODY MASS INDEX: 26.79 KG/M2 | TEMPERATURE: 98.1 F | SYSTOLIC BLOOD PRESSURE: 117 MMHG | HEART RATE: 69 BPM

## 2019-05-13 DIAGNOSIS — I34.0 MITRAL VALVE INSUFFICIENCY, UNSPECIFIED ETIOLOGY: ICD-10-CM

## 2019-05-13 DIAGNOSIS — J06.9 ACUTE URI: Primary | ICD-10-CM

## 2019-05-13 PROCEDURE — 99213 OFFICE O/P EST LOW 20 MIN: CPT | Performed by: FAMILY MEDICINE

## 2019-05-13 NOTE — PROGRESS NOTES
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 No pertinent family history.    Social History     Tobacco Use     Smoking status: Never Smoker     Smokeless tobacco: Never Used   Substance Use Topics     Alcohol use: No     Current Outpatient Medications   Medication Sig Dispense Refill     amoxicillin-clavulanate (AUGMENTIN) 875-125 MG tablet Take 1 tablet by mouth 2 times daily 14 tablet 0     diclofenac (VOLTAREN) 1 % topical gel Place onto the skin 4 times daily (Patient taking differently: Place onto the skin 4 times daily as needed ) 100 g 3     multivitamin, therapeutic (THERA-VIT) TABS tablet Take 1 tablet by mouth daily       O: /79 (BP Location: Left arm, Patient Position: Sitting, Cuff Size: Adult Large)   Pulse 69   Temp 98.1  F (36.7  C) (Oral)   Wt 83.5 kg (184 lb)   SpO2 97%   BMI 26.79 kg/m       Head: Normocephalic, atraumatic.  Eyes: Conjunctiva clear, non icteric. PERRLA.  Ears: External ears and TMs normal BL.  Nose: Septum midline, nasal mucosa pink and moist. No discharge.  Mouth / Throat: Normal dentition.  No oral lesions. Pharynx non erythematous, tonsils  without hypertrophy.  Neck: Supple, no enlarged LN, trachea midline.    Chest wall normal to inspection and palpation. Good excursion bilaterally. Lungs clear to auscultation. Good air movement bilaterally without rales, wheezes, or rhonchi.   Regular rate and  rhythm. S1 and S2 normal, loud  Murmurs over left lateral mitral area louderin mitral position with loud S2, clicks, gallops or rubs. No edema or JVD.    A; sinusitis resolving   Acute URI     Mitral insuffiency, severe     P: follow up with cardiology

## 2019-05-15 ENCOUNTER — THERAPY VISIT (OUTPATIENT)
Dept: PHYSICAL THERAPY | Facility: CLINIC | Age: 44
End: 2019-05-15
Payer: COMMERCIAL

## 2019-05-15 DIAGNOSIS — M79.671 RIGHT FOOT PAIN: ICD-10-CM

## 2019-05-15 DIAGNOSIS — R60.0 LOCALIZED EDEMA: ICD-10-CM

## 2019-05-15 DIAGNOSIS — Z47.89 AFTERCARE FOLLOWING SURGERY OF THE MUSCULOSKELETAL SYSTEM: ICD-10-CM

## 2019-05-15 PROCEDURE — 97110 THERAPEUTIC EXERCISES: CPT | Mod: GP | Performed by: PHYSICAL THERAPIST

## 2019-05-15 PROCEDURE — 97140 MANUAL THERAPY 1/> REGIONS: CPT | Mod: GP | Performed by: PHYSICAL THERAPIST

## 2019-05-15 PROCEDURE — 97112 NEUROMUSCULAR REEDUCATION: CPT | Mod: GP | Performed by: PHYSICAL THERAPIST

## 2019-05-20 ENCOUNTER — OFFICE VISIT (OUTPATIENT)
Dept: DERMATOLOGY | Facility: CLINIC | Age: 44
End: 2019-05-20
Payer: COMMERCIAL

## 2019-05-20 VITALS — SYSTOLIC BLOOD PRESSURE: 128 MMHG | DIASTOLIC BLOOD PRESSURE: 88 MMHG

## 2019-05-20 DIAGNOSIS — L81.4 LENTIGINES: ICD-10-CM

## 2019-05-20 DIAGNOSIS — D22.9 MULTIPLE NEVI: ICD-10-CM

## 2019-05-20 DIAGNOSIS — L82.1 SEBORRHEIC KERATOSES: ICD-10-CM

## 2019-05-20 DIAGNOSIS — D48.5 NEOPLASM OF UNCERTAIN BEHAVIOR OF SKIN: ICD-10-CM

## 2019-05-20 DIAGNOSIS — D18.01 CHERRY ANGIOMA: Primary | ICD-10-CM

## 2019-05-20 PROCEDURE — 11102 TANGNTL BX SKIN SINGLE LES: CPT | Performed by: PHYSICIAN ASSISTANT

## 2019-05-20 PROCEDURE — 99214 OFFICE O/P EST MOD 30 MIN: CPT | Mod: 25 | Performed by: PHYSICIAN ASSISTANT

## 2019-05-20 PROCEDURE — 88305 TISSUE EXAM BY PATHOLOGIST: CPT | Mod: TC | Performed by: PHYSICIAN ASSISTANT

## 2019-05-20 PROCEDURE — 11103 TANGNTL BX SKIN EA SEP/ADDL: CPT | Performed by: PHYSICIAN ASSISTANT

## 2019-05-20 NOTE — PATIENT INSTRUCTIONS
Proper skin care from [facility]:    -Eliminate harsh soaps as they strip the natural oils from the skin, often resulting in dry itchy skin ( i.e. Dial, Zest, Tanya Spring)  -Use mild soaps such as Cetaphil or Dove Sensitive Skin in the shower. You do not need to use soap on arms, legs, and trunk every time you shower unless visibly soiled.   -Avoid hot or cold showers.  -After showering, lightly dry off and apply moisturizing within 2-3 minutes. This will help trap moisture in the skin.   -Aggressive use of a moisturizer at least 1-2 times a day to the entire body (including -Vanicream, Cetaphil, Aquaphor or Cerave) and moisturize hands after every washing.  -We recommend using moisturizers that come in a tub that needs to be scooped out, not a pump. This has more of an oil base. It will hold moisture in your skin much better than a water base moisturizer. The above recommended are non-pore clogging.       Wear a sunscreen with at least SPF 30 on your face, ears, neck and V of the chest daily. Wear sunscreen on other areas of the body if those areas are exposed to the sun throughout the day. Sunscreens can contain physical and/or chemical blockers. Physical blockers are less likely to clog pores, these include zinc oxide and titanium dioxide. Reapply every two hour and after swimming. Sunscreen examples include Neutrogena, CeraVe, Blue Lizard, Elta MD and many others.    UV radiation  UVA radiation remains constant throughout the day and throughout the year. It is a longer wavelength than UVB and therefore penetrates deeper into the skin leading to immediate and delayed tanning, photoaging, and skin cancer. 70-80% of UVA and UVB radiation occurs between the hours of 10am-2pm.  UVB radiation  UVB radiation causes the most harmful effects and is more significant during the summer months. However, snow and ice can reflect UVB radiation leading to skin damage during the winter months as well. UVB radiation is  responsible for tanning, burning, inflammation, delayed erythema (pinkness), pigmentation (brown spots), and skin cancer.                  Wound Care Instructions     FOR SUPERFICIAL WOUNDS     [facility] [phone]    [facility] [phone]          AFTER 24 HOURS YOU SHOULD REMOVE THE BANDAGE AND BEGIN DAILY DRESSING CHANGES AS FOLLOWS:     1) Remove Dressing.     2) Clean and dry the area with tap water using a Q-tip or sterile gauze pad.     3) Apply Vaseline, Aquaphor, Polysporin ointment or Bacitracin ointment over entire wound.  Do NOT use Neosporin ointment.     4) Cover the wound with a band-aid, or a sterile non-stick gauze pad and micropore paper tape      REPEAT THESE INSTRUCTIONS AT LEAST ONCE A DAY UNTIL THE WOUND HAS COMPLETELY HEALED.    It is an old wives tale that a wound heals better when it is exposed to air and allowed to dry out. The wound will heal faster with a better cosmetic result if it is kept moist with ointment and covered with a bandage.    **Do not let the wound dry out.**      Supplies Needed:      *Cotton tipped applicators (Q-tips)    *Polysporin Ointment or Bacitracin Ointment (NOT NEOSPORIN)    *Band-aids or non-stick gauze pads and micropore paper tape.      PATIENT INFORMATION:    During the healing process you will notice a number of changes. All wounds develop a small halo of redness surrounding the wound.  This means healing is occurring. Severe itching with extensive redness usually indicates sensitivity to the ointment or bandage tape used to dress the wound.  You should call our office if this develops.      Swelling  and/or discoloration around your surgical site is common, particularly when performed around the eye.    All wounds normally drain.  The larger the wound the more drainage there will be.  After 7-10 days, you will notice the wound beginning to shrink and new skin will begin to grow.  The wound is healed when you can see skin  has formed over the entire area.  A healed wound has a healthy, shiny look to the surface and is red to dark pink in color to normalize.  Wounds may take approximately 4-6 weeks to heal.  Larger wounds may take 6-8 weeks.  After the wound is healed you may discontinue dressing changes.    You may experience a sensation of tightness as your wound heals. This is normal and will gradually subside.    Your healed wound may be sensitive to temperature changes. This sensitivity improves with time, but if you re having a lot of discomfort, try to avoid temperature extremes.    Patients frequently experience itching after their wound appears to have healed because of the continue healing under the skin.  Plain Vaseline will help relieve the itching.        POSSIBLE COMPLICATIONS    BLEEDIN. Leave the bandage in place.  2. Use tightly rolled up gauze or a cloth to apply direct pressure over the bandage for 30  minutes.  3. Reapply pressure for an additional 30 minutes if necessary  4. Use additional gauze and tape to maintain pressure once the bleeding has stopped.

## 2019-05-20 NOTE — PROGRESS NOTES
HPI:  Frederick Nguyen is a 43 year old male patient here today for spot on right groin.  Patient states this has been present for a short time.  Patient reports the following symptoms: none .  Patient reports the following previous treatments: none.  Patient reports the following modifying factors: none.  Associated symptoms: none.  Patient has no other skin complaints today.  Remainder of the HPI, Meds, PMH, Allergies, FH, and SH was reviewed in chart.    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    Social History     Socioeconomic History     Marital status:      Spouse name: Not on file     Number of children: Not on file     Years of education: Not on file     Highest education level: Not on file   Occupational History     Comment: desk work   Social Needs     Financial resource strain: Not on file     Food insecurity:     Worry: Not on file     Inability: Not on file     Transportation needs:     Medical: Not on file     Non-medical: Not on file   Tobacco Use     Smoking status: Never Smoker     Smokeless tobacco: Never Used   Substance and Sexual Activity     Alcohol use: No     Drug use: No     Sexual activity: Yes     Partners: Female   Lifestyle     Physical activity:     Days per week: Not on file     Minutes per session: Not on file     Stress: Not on file   Relationships     Social  connections:     Talks on phone: Not on file     Gets together: Not on file     Attends [employer] service: Not on file     Active member of club or organization: Not on file     Attends meetings of clubs or organizations: Not on file     Relationship status: Not on file     Intimate partner violence:     Fear of current or ex partner: Not on file     Emotionally abused: Not on file     Physically abused: Not on file     Forced sexual activity: Not on file   Other Topics Concern     Parent/sibling w/ CABG, MI or angioplasty before 65F 55M? No   Social History Narrative     Not on file       Outpatient Encounter Medications as of 5/20/2019   Medication Sig Dispense Refill     diclofenac (VOLTAREN) 1 % topical gel Place onto the skin 4 times daily (Patient taking differently: Place onto the skin 4 times daily as needed ) 100 g 3     multivitamin, therapeutic (THERA-VIT) TABS tablet Take 1 tablet by mouth daily       [DISCONTINUED] amoxicillin-clavulanate (AUGMENTIN) 875-125 MG tablet Take 1 tablet by mouth 2 times daily (Patient not taking: Reported on 5/20/2019) 14 tablet 0     No facility-administered encounter medications on file as of 5/20/2019.        Review Of Systems:  Skin: As above  Eyes: negative  Ears/Nose/Throat: negative  Respiratory: No shortness of breath, dyspnea on exertion, cough, or hemoptysis  Cardiovascular: negative  Gastrointestinal: negative  Genitourinary: negative  Musculoskeletal: negative  Neurologic: negative  Psychiatric: negative  Hematologic/Lymphatic/Immunologic: negative  Endocrine: negative      Objective:     /88   Eyes: Conjunctivae/lids: Normal   ENT: Lips:  Normal  MSK: Normal  Cardiovascular: Peripheral edema none  Pulm: Breathing Normal  Neuro/Psych: Orientation: Normal; Mood/Affect: Normal, NAD, WDWN  Pt accompanied by: self  Following areas examined: Scalp, face, eyelids, lips, neck, chest, abdomen, back, buttocks, and R&L upper and lower extremities. Pt wearing  sock-defers exam of feet  Mason skin type:i   Findings:  Red smooth well-defined macules on trunk and extremities.  Brown, stuck-on scaly appearing papules on trunk and extremities.  Well circumscribed macules with symmetric color distribution on trunk and extremities.  Tan WD smooth macules on face, neck, trunk, and extremities.  Irregular pigmented macule on left flank 0.6cm  Medium brown patch with central medium brown/dark brown papule on right crural fold     Assessment and Plan:     1) Cherry angiomas, Seborrheic keratoses, Benign nevi, Lentigines     I discussed the specifics of tumor, prognosis, and genetics of benign lesions.  I explained that treatment of these lesions would be purely cosmetic and not medically neccessary.  I discussed with patient different removal options including excision, cryotherapy, cautery and /or laser.  Lesion may recur and/or may not completely resolve. May need additional treatment.     2) Neoplasm of uncertain behavior on left flank 0.6cm  Rule out atypical nevus  TANGENTIAL BIOPSY:  After consent, anesthesia with LEC and prep, tangential biopsy performed.  No complications and routine wound care.  May grow back and will get a scar. Based on lesion type may need to completely remove lesion. Patient will be notified in 7-10 days of results. Wound care directions given.    3) Neoplasm of uncertain behavior right crural fold 1.3cm  Rule out atypical nevus  TANGENTIAL BIOPSY:  After consent, anesthesia with LEC and prep, tangential biopsy performed.  No complications and routine wound care.  May grow back and will get a scar. Based on lesion type may need to completely remove lesion. Patient will be notified in 7-10 days of results. Wound care directions given.    Signs and Symptoms of non-melanoma skin cancer and ABCDEs of melanoma reviewed with patient. Patient encouraged to perform monthly self skin exams and educated on how to perform them. UV precautions reviewed with  patient. Patient was asked about new or changing moles/lesions on body.   Wear a sunscreen with at least SPF 30 on your face, ears, neck and V of the chest daily. Wear sunscreen on other areas of the body if those areas are exposed to the sun throughout the day. Sunscreens can contain physical and/or chemical blockers. Physical blockers are less likely to clog pores, these include zinc oxide and titanium dioxide. Reapply every two hour and after swimming. Sunscreen examples include Neutrogena, CeraVe, Blue Lizard, Elta MD and many others.    Proper skin care from [facility]:    -Eliminate harsh soaps as they strip the natural oils from the skin, often resulting in dry itchy skin ( i.e. Dial, Zest, Tanya Spring)  -Use mild soaps such as Cetaphil or Dove Sensitive Skin in the shower. You do not need to use soap on arms, legs, and trunk every time you shower unless visibly soiled.   -Avoid hot or cold showers.  -After showering, lightly dry off and apply moisturizing within 2-3 minutes. This will help trap moisture in the skin.   -Aggressive use of a moisturizer at least 1-2 times a day to the entire body (including -Vanicream, Cetaphil, Aquaphor or Cerave) and moisturize hands after every washing.  -We recommend using moisturizers that come in a tub that needs to be scooped out, not a pump. This has more of an oil base. It will hold moisture in your skin much better than a water base moisturizer. The above recommended are non-pore clogging.         Follow up in yearly FBE

## 2019-05-20 NOTE — LETTER
5/20/2019         RE: Frederick Nguyen  [address] [facility] [address]        Dear Colleague,    Thank you for referring your patient, Fredreick Nguyen, to the [facility]. Please see a copy of my visit note below.    HPI:  Frederick Nguyen is a 43 year old male patient here today for spot on right groin.  Patient states this has been present for a short time.  Patient reports the following symptoms: none .  Patient reports the following previous treatments: none.  Patient reports the following modifying factors: none.  Associated symptoms: none.  Patient has no other skin complaints today.  Remainder of the HPI, Meds, PMH, Allergies, FH, and SH was reviewed in chart.    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    Social History     Socioeconomic History     Marital status:      Spouse name: Not on file     Number of children: Not on file     Years of education: Not on file     Highest education level: Not on file   Occupational History     Comment: desk work   Social Needs     Financial resource strain: Not on file     Food insecurity:     Worry: Not on file     Inability: Not on file     Transportation needs:     Medical: Not on file     Non-medical: Not on file   Tobacco Use     Smoking status: Never Smoker     Smokeless tobacco: Never Used   Substance  and Sexual Activity     Alcohol use: No     Drug use: No     Sexual activity: Yes     Partners: Female   Lifestyle     Physical activity:     Days per week: Not on file     Minutes per session: Not on file     Stress: Not on file   Relationships     Social connections:     Talks on phone: Not on file     Gets together: Not on file     Attends Yazdanism service: Not on file     Active member of club or organization: Not on file     Attends meetings of clubs or organizations: Not on file     Relationship status: Not on file     Intimate partner violence:     Fear of current or ex partner: Not on file     Emotionally abused: Not on file     Physically abused: Not on file     Forced sexual activity: Not on file   Other Topics Concern     Parent/sibling w/ CABG, MI or angioplasty before 65F 55M? No   Social History Narrative     Not on file       Outpatient Encounter Medications as of 5/20/2019   Medication Sig Dispense Refill     diclofenac (VOLTAREN) 1 % topical gel Place onto the skin 4 times daily (Patient taking differently: Place onto the skin 4 times daily as needed ) 100 g 3     multivitamin, therapeutic (THERA-VIT) TABS tablet Take 1 tablet by mouth daily       [DISCONTINUED] amoxicillin-clavulanate (AUGMENTIN) 875-125 MG tablet Take 1 tablet by mouth 2 times daily (Patient not taking: Reported on 5/20/2019) 14 tablet 0     No facility-administered encounter medications on file as of 5/20/2019.        Review Of Systems:  Skin: As above  Eyes: negative  Ears/Nose/Throat: negative  Respiratory: No shortness of breath, dyspnea on exertion, cough, or hemoptysis  Cardiovascular: negative  Gastrointestinal: negative  Genitourinary: negative  Musculoskeletal: negative  Neurologic: negative  Psychiatric: negative  Hematologic/Lymphatic/Immunologic: negative  Endocrine: negative      Objective:     /88   Eyes: Conjunctivae/lids: Normal   ENT: Lips:  Normal  MSK: Normal  Cardiovascular: Peripheral edema none  Pulm:  Breathing Normal  Neuro/Psych: Orientation: Normal; Mood/Affect: Normal, NAD, WDWN  Pt accompanied by: self  Following areas examined: Scalp, face, eyelids, lips, neck, chest, abdomen, back, buttocks, and R&L upper and lower extremities. Pt wearing sock-defers exam of feet  Mason skin type:i   Findings:  Red smooth well-defined macules on trunk and extremities.  Brown, stuck-on scaly appearing papules on trunk and extremities.  Well circumscribed macules with symmetric color distribution on trunk and extremities.  Tan WD smooth macules on face, neck, trunk, and extremities.  Irregular pigmented macule on left flank 0.6cm  Medium brown patch with central medium brown/dark brown papule on right crural fold     Assessment and Plan:     1) Cherry angiomas, Seborrheic keratoses, Benign nevi, Lentigines     I discussed the specifics of tumor, prognosis, and genetics of benign lesions.  I explained that treatment of these lesions would be purely cosmetic and not medically neccessary.  I discussed with patient different removal options including excision, cryotherapy, cautery and /or laser.  Lesion may recur and/or may not completely resolve. May need additional treatment.     2) Neoplasm of uncertain behavior on left flank 0.6cm  Rule out atypical nevus  TANGENTIAL BIOPSY:  After consent, anesthesia with LEC and prep, tangential biopsy performed.  No complications and routine wound care.  May grow back and will get a scar. Based on lesion type may need to completely remove lesion. Patient will be notified in 7-10 days of results. Wound care directions given.    3) Neoplasm of uncertain behavior right crural fold 1.3cm  Rule out atypical nevus  TANGENTIAL BIOPSY:  After consent, anesthesia with LEC and prep, tangential biopsy performed.  No complications and routine wound care.  May grow back and will get a scar. Based on lesion type may need to completely remove lesion. Patient will be notified in 7-10 days of  results. Wound care directions given.    Signs and Symptoms of non-melanoma skin cancer and ABCDEs of melanoma reviewed with patient. Patient encouraged to perform monthly self skin exams and educated on how to perform them. UV precautions reviewed with patient. Patient was asked about new or changing moles/lesions on body.   Wear a sunscreen with at least SPF 30 on your face, ears, neck and V of the chest daily. Wear sunscreen on other areas of the body if those areas are exposed to the sun throughout the day. Sunscreens can contain physical and/or chemical blockers. Physical blockers are less likely to clog pores, these include zinc oxide and titanium dioxide. Reapply every two hour and after swimming. Sunscreen examples include Neutrogena, CeraVe, Blue Lizard, Elta MD and many others.    Proper skin care from [facility]:    -Eliminate harsh soaps as they strip the natural oils from the skin, often resulting in dry itchy skin ( i.e. Dial, Zest, Maltese Spring)  -Use mild soaps such as Cetaphil or Dove Sensitive Skin in the shower. You do not need to use soap on arms, legs, and trunk every time you shower unless visibly soiled.   -Avoid hot or cold showers.  -After showering, lightly dry off and apply moisturizing within 2-3 minutes. This will help trap moisture in the skin.   -Aggressive use of a moisturizer at least 1-2 times a day to the entire body (including -Vanicream, Cetaphil, Aquaphor or Cerave) and moisturize hands after every washing.  -We recommend using moisturizers that come in a tub that needs to be scooped out, not a pump. This has more of an oil base. It will hold moisture in your skin much better than a water base moisturizer. The above recommended are non-pore clogging.         Follow up in yearly FBE      Again, thank you for allowing me to participate in the care of your patient.        Sincerely,        Ginny Blackwell PA-C

## 2019-05-21 ENCOUNTER — OFFICE VISIT (OUTPATIENT)
Dept: ORTHOPEDICS | Facility: CLINIC | Age: 44
End: 2019-05-21
Payer: COMMERCIAL

## 2019-05-21 ENCOUNTER — THERAPY VISIT (OUTPATIENT)
Dept: PHYSICAL THERAPY | Facility: CLINIC | Age: 44
End: 2019-05-21
Payer: COMMERCIAL

## 2019-05-21 DIAGNOSIS — M79.671 RIGHT FOOT PAIN: ICD-10-CM

## 2019-05-21 DIAGNOSIS — M25.571 PAIN IN JOINT, ANKLE AND FOOT, RIGHT: Primary | ICD-10-CM

## 2019-05-21 DIAGNOSIS — R60.0 LOCALIZED EDEMA: ICD-10-CM

## 2019-05-21 DIAGNOSIS — Z47.89 AFTERCARE FOLLOWING SURGERY OF THE MUSCULOSKELETAL SYSTEM: ICD-10-CM

## 2019-05-21 PROCEDURE — 97140 MANUAL THERAPY 1/> REGIONS: CPT | Mod: GP | Performed by: PHYSICAL THERAPIST

## 2019-05-21 PROCEDURE — 97110 THERAPEUTIC EXERCISES: CPT | Mod: GP | Performed by: PHYSICAL THERAPIST

## 2019-05-21 PROCEDURE — 97112 NEUROMUSCULAR REEDUCATION: CPT | Mod: GP | Performed by: PHYSICAL THERAPIST

## 2019-05-21 NOTE — LETTER
5/21/2019       RE: Frederick Nguyen  [address] [place] [facility] [address]     Dear Colleague,    Thank you for referring your patient, Frederick Nguyen, to [facility]. Please see a copy of my visit note below.    CHIEF COMPLAINT:  Status post right foot cheilectomy performed on 04/10/2019.      HISTORY OF PRESENT ILLNESS:  Mr. Nguyen presents today for further followup.  Denies to have any problems or complaints.  Reports to be doing quite well.  Reports to have no pain like the one he had prior to surgery.      PHYSICAL EXAMINATION:  On today's visit, he presents with a combined plantar and dorsiflexion of approximately 30-40 degrees of the first MTP joint.  Alignment is excellent.  There is a well-healed surgical incision.  There is some swelling.      ASSESSMENT:  Status post right first MTP joint cheilectomy.      PLAN:  I discussed with the patient he is making progress according to the plan.  He is going to proceed with activity as tolerated.  He has no restrictions.  He will follow up on a p.r.n. basis.  I encouraged the patient to visit with us in 2-3 months from now if he is not happy with the function of his foot.     Again, thank you for allowing me to participate in the care of your patient.      Sincerely,    Sawyer Crowell MD

## 2019-05-21 NOTE — PROGRESS NOTES
CHIEF COMPLAINT:  Status post right foot cheilectomy performed on 04/10/2019.      HISTORY OF PRESENT ILLNESS:  Mr. Nguyen presents today for further followup.  Denies to have any problems or complaints.  Reports to be doing quite well.  Reports to have no pain like the one he had prior to surgery.      PHYSICAL EXAMINATION:  On today's visit, he presents with a combined plantar and dorsiflexion of approximately 30-40 degrees of the first MTP joint.  Alignment is excellent.  There is a well-healed surgical incision.  There is some swelling.      ASSESSMENT:  Status post right first MTP joint cheilectomy.      PLAN:  I discussed with the patient he is making progress according to the plan.  He is going to proceed with activity as tolerated.  He has no restrictions.  He will follow up on a p.r.n. basis.  I encouraged the patient to visit with us in 2-3 months from now if he is not happy with the function of his foot.

## 2019-05-21 NOTE — NURSING NOTE
Reason For Visit:   Chief Complaint   Patient presents with     RECHECK     Right Cheilectomy DOS: 4/10/19       There were no vitals taken for this visit.    Pain Assessment  Patient Currently in Pain: Denies(Patient reports some stiffness and soreness by end of day if he had prolonged walking throughout the day.)    Marilee Brock, ATC

## 2019-05-24 LAB — COPATH REPORT: NORMAL

## 2019-06-03 ENCOUNTER — OFFICE VISIT (OUTPATIENT)
Dept: FAMILY MEDICINE | Facility: CLINIC | Age: 44
End: 2019-06-03
Payer: COMMERCIAL

## 2019-06-03 VITALS
OXYGEN SATURATION: 97 % | SYSTOLIC BLOOD PRESSURE: 140 MMHG | BODY MASS INDEX: 26.79 KG/M2 | DIASTOLIC BLOOD PRESSURE: 89 MMHG | HEART RATE: 82 BPM | WEIGHT: 184 LBS | TEMPERATURE: 99.3 F

## 2019-06-03 DIAGNOSIS — J01.01 ACUTE RECURRENT MAXILLARY SINUSITIS: Primary | ICD-10-CM

## 2019-06-03 PROCEDURE — 99213 OFFICE O/P EST LOW 20 MIN: CPT | Performed by: FAMILY MEDICINE

## 2019-06-03 RX ORDER — PREDNISONE 20 MG/1
TABLET ORAL
Qty: 20 TABLET | Refills: 0 | Status: ON HOLD | OUTPATIENT
Start: 2019-06-03 | End: 2019-10-25

## 2019-06-03 RX ORDER — DOXYCYCLINE HYCLATE 100 MG
100 TABLET ORAL 2 TIMES DAILY
Qty: 20 TABLET | Refills: 0 | Status: ON HOLD | OUTPATIENT
Start: 2019-06-03 | End: 2019-09-09

## 2019-06-03 NOTE — PROGRESS NOTES
Subjective     Frederick Nguyen is a 43 year old male who presents to clinic today for the following health issues:    HPI   Patient is here for follow up on URI that has not gotten any better and gotten worse again.    Temp in high 99's  Yellow drainage     Was on augmentin       O; /89 (BP Location: Right arm, Patient Position: Chair, Cuff Size: Adult Regular)   Pulse 82   Temp 99.3  F (37.4  C) (Oral)   Wt 83.5 kg (184 lb)   SpO2 97%   BMI 26.79 kg/m      Head: Normocephalic, atraumatic.  Eyes: Conjunctiva clear, non icteric. PERRLA.  Ears: External ears and TMs normal BL.  Nose: Septum midline, nasal mucosa pink and moist. No discharge.  Mouth / Throat: Normal dentition.  No oral lesions. Pharynx non erythematous, tonsils without hypertrophy.  Neck: Supple, no enlarged LN, trachea midline.    Chest wall normal to inspection and palpation. Good excursion bilaterally. Lungs clear to auscultation. Good air movement bilaterally without rales, wheezes, or rhonchi.   Regular rate and  rhythm. S1 and S2 normal, no murmurs, clicks, gallops or rubs. No edema or JVD.      Sinusitis     P: prednisone and doxycycline

## 2019-06-12 ENCOUNTER — THERAPY VISIT (OUTPATIENT)
Dept: PHYSICAL THERAPY | Facility: CLINIC | Age: 44
End: 2019-06-12
Payer: COMMERCIAL

## 2019-06-12 DIAGNOSIS — Z47.89 AFTERCARE FOLLOWING SURGERY OF THE MUSCULOSKELETAL SYSTEM: ICD-10-CM

## 2019-06-12 DIAGNOSIS — M79.671 RIGHT FOOT PAIN: ICD-10-CM

## 2019-06-12 DIAGNOSIS — R60.0 LOCALIZED EDEMA: ICD-10-CM

## 2019-06-12 PROCEDURE — 97112 NEUROMUSCULAR REEDUCATION: CPT | Mod: GP | Performed by: PHYSICAL THERAPIST

## 2019-06-12 PROCEDURE — 97110 THERAPEUTIC EXERCISES: CPT | Mod: GP | Performed by: PHYSICAL THERAPIST

## 2019-06-12 PROCEDURE — 97140 MANUAL THERAPY 1/> REGIONS: CPT | Mod: GP | Performed by: PHYSICAL THERAPIST

## 2019-06-12 ASSESSMENT — ACTIVITIES OF DAILY LIVING (ADL)
HIGHEST_POTENTIAL_ADL_SCORE:: 84
TOTAL_ADL_ITEM_SCORE:: 67
ACTIVITIES_OF_DAILY_LIVING_MEASURE_SCORE(%):: 79.76

## 2019-06-15 ENCOUNTER — OFFICE VISIT (OUTPATIENT)
Dept: CARDIOLOGY | Facility: CLINIC | Age: 44
End: 2019-06-15
Attending: INTERNAL MEDICINE
Payer: COMMERCIAL

## 2019-06-15 ENCOUNTER — MYC MEDICAL ADVICE (OUTPATIENT)
Dept: FAMILY MEDICINE | Facility: CLINIC | Age: 44
End: 2019-06-15

## 2019-06-15 VITALS
HEIGHT: 71 IN | BODY MASS INDEX: 26.12 KG/M2 | WEIGHT: 186.6 LBS | DIASTOLIC BLOOD PRESSURE: 90 MMHG | HEART RATE: 68 BPM | OXYGEN SATURATION: 96 % | SYSTOLIC BLOOD PRESSURE: 137 MMHG

## 2019-06-15 DIAGNOSIS — J01.01 ACUTE RECURRENT MAXILLARY SINUSITIS: Primary | ICD-10-CM

## 2019-06-15 DIAGNOSIS — I34.1 MVP (MITRAL VALVE PROLAPSE): Primary | ICD-10-CM

## 2019-06-15 PROCEDURE — 99214 OFFICE O/P EST MOD 30 MIN: CPT | Mod: ZP | Performed by: INTERNAL MEDICINE

## 2019-06-15 PROCEDURE — G0463 HOSPITAL OUTPT CLINIC VISIT: HCPCS | Mod: ZF

## 2019-06-15 ASSESSMENT — MIFFLIN-ST. JEOR: SCORE: 1765.6

## 2019-06-15 ASSESSMENT — PAIN SCALES - GENERAL: PAINLEVEL: NO PAIN (0)

## 2019-06-15 NOTE — NURSING NOTE
Chief Complaint   Patient presents with     Follow Up     follow up - per patient     Vitals were taken and medications were reconciled.     Pattie Bustillo CMA    8:52 AM

## 2019-06-15 NOTE — PATIENT INSTRUCTIONS
Patient Instructions:  It was a pleasure to see you in the cardiology clinic today.      If you have any questions, you can reach my nurse, Mao Rodriguez, at [phone].  Press Option #1 for the [facility], and then press Option #4 for nursing.    We are encouraging the use of [employer] to communicate with your HealthCare Provider    Medication Changes:None    Studies Ordered:None    The results from today include:None      Sincerely,    SAM Browning MD     If you have an urgent need after hours (8:00 am to 4:30 pm) please call [phone] and ask for the cardiology fellow on call.

## 2019-06-15 NOTE — LETTER
6/15/2019      RE: Frederick Nguyen  [address] [facility] [address]       Dear Colleague,    Thank you for the opportunity to participate in the care of your patient, Frederick Nguyen, at the [facility]. Please see a copy of my visit note below.       SUBJECTIVE:  Frederick Nguyen is a 43 year old male who presents for follow-up.  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  Previously patient had mild to moderate mitral regurgitation.  Had strep viridans bacteremia and lumbar osteomyelitis.  At this point probably had mitral valve endocarditis and developed severe mitral regurgitation mitral valve prolapse and flail posterior segment, P1 and P2.  Patient was referred for surgery but no surgery was done here for follow-up.  No new symptoms.    Patient Active Problem List    Diagnosis Date Noted     Right foot pain 04/29/2019     Priority: Medium     Aftercare following surgery of the musculoskeletal system 04/29/2019     Priority: Medium     Hallux limitus of right foot 02/11/2019     Priority: Medium     Infective endocarditis 03/03/2018     Priority: Medium     Infection by Streptococcus, viridans group 03/03/2018     Priority: Medium     Bacteremia 03/02/2018     Priority: Medium     Mitral valve prolapse 12/03/2012     Priority: Medium     Mitral valve regurgitation 12/03/2012     Priority: Medium     Nonallopathic lesion of lumbar region 05/10/2011     Priority: Medium     Nonallopathic lesion of thoracic region 07/24/2009     Priority: Medium     Cervical spondylosis without myelopathy 07/21/2009     Priority: Medium     Cervicalgia 07/08/2008     Priority: Medium     Spasm of muscle 04/23/2008     Priority: Medium     Duane syndrome of left eye 1977     Priority: Medium    .  Current Outpatient Medications   Medication Sig     diclofenac (VOLTAREN) 1 % topical gel Place onto the skin 4 times  daily (Patient taking differently: Place onto the skin 4 times daily as needed )     multivitamin, therapeutic (THERA-VIT) TABS tablet Take 1 tablet by mouth daily     predniSONE (DELTASONE) 20 MG tablet Take 3 tabs by mouth daily x 3 days, then 2 tabs daily x 3 days, then 1 tab daily x 3 days, then 1/2 tab daily x 3 days. (Patient not taking: Reported on 6/15/2019)     UNABLE TO FIND MEDICATION NAME: day time Nyquil and Dayquil     No current facility-administered medications for this visit.      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    patient states not a valve infection     Heart murmur      Osteomyelitis, L5S1 03/15/2018    secondary to endocarditis     Past Surgical History:   Procedure Laterality Date     APPENDECTOMY  1987     CHEILECTOMY Right 4/10/2019    Procedure: Right Cheilectomy;  Surgeon: Sawyer Crowell MD;  Location: [facility] OR     INJECT SACROILIAC JOINT Right 5/9/2018    Procedure: INJECT SACROILIAC JOINT;  Right Sacroiliac Joint Injection;  Surgeon: Thom Williamson MD;  Location:  OR     ORTHOPEDIC SURGERY  4/10/19    Bone Spur Removal     No Known Allergies  Social History     Socioeconomic History     Marital status:      Spouse name: Not on file     Number of children: Not on file     Years of education: Not on file     Highest education level: Not on file   Occupational History     Comment: desk work   Social Needs     Financial resource strain: Not on file     Food insecurity:     Worry: Not on file     Inability: Not on file     Transportation needs:     Medical: Not on file     Non-medical: Not on file   Tobacco Use     Smoking status: Never Smoker     Smokeless tobacco: Never Used   Substance and Sexual Activity     Alcohol use: Not Currently     Comment: Foot surgery + illness     Drug use: No     Sexual activity: Yes     Partners: Female     Birth control/protection: Pill   Lifestyle     Physical activity:     Days  "per week: Not on file     Minutes per session: Not on file     Stress: Not on file   Relationships     Social connections:     Talks on phone: Not on file     Gets together: Not on file     Attends [employer]ism service: Not on file     Active member of club or organization: Not on file     Attends meetings of clubs or organizations: Not on file     Relationship status: Not on file     Intimate partner violence:     Fear of current or ex partner: Not on file     Emotionally abused: Not on file     Physically abused: Not on file     Forced sexual activity: Not on file   Other Topics Concern     Parent/sibling w/ CABG, MI or angioplasty before 65F 55M? No   Social History Narrative     Not on file     No family history on file.       REVIEW OF SYSTEMS:  General: negative, fever, chills, night sweats  Skin: negative, acne, rash and scaling  Eyes: negative, double vision, eye pain and photophobia  Ears/Nose/Throat: negative, nasal congestion and purulent rhinorrhea  Respiratory: No dyspnea on exertion, No cough, No hemoptysis and negative  Cardiovascular: negative, palpitations, tachycardia, irregular heart beat, chest pain, exertional chest pain or pressure, paroxysmal nocturnal dyspnea, dyspnea on exertion and orthopnea         OBJECTIVE:  Blood pressure 137/90, pulse 68, height 1.791 m (5' 10.5\"), weight 84.6 kg (186 lb 9.6 oz), SpO2 96 %.  General Appearance: healthy, alert, active and no distress  Head: Normocephalic. No masses, lesions, tenderness or abnormalities  Eyes: conjuctiva clear, PERRL, EOM intact  Ears: External ears normal. Canals clear. TM's normal.  Nose: Nares normal  Mouth: normal  Neck: Supple, no cervical adenopathy, no thyromegaly  Lungs: clear to auscultation  Cardiac: regular rate and rhythm, normal S1 and S2, MR.       ASSESSMENT/PLAN:  Patient here for follow-up.  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  Severe mitral regurgitation.  Recent strep viridans bacteremia and lumbar " osteomyelitis.  During this.  His mitral regurgitation worsened.  DEEPAK showed flail P1 P2 segments.  Severe mitral regurgitation.  No evidence for endocarditis.  Patient was referred for surgery but somehow was unable to see the surgeon.  Will refer patient back for surgery for mitral valve repair as soon as possible.  Per orders.   Return to Clinic PRN.    Please do not hesitate to contact me if you have any questions/concerns.     Sincerely,     CARLOS Bush MD

## 2019-06-15 NOTE — PROGRESS NOTES
SUBJECTIVE:  Frederick Nguyen is a 43 year old male who presents for follow-up.  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  Previously patient had mild to moderate mitral regurgitation.  Had strep viridans bacteremia and lumbar osteomyelitis.  At this point probably had mitral valve endocarditis and developed severe mitral regurgitation mitral valve prolapse and flail posterior segment, P1 and P2.  Patient was referred for surgery but no surgery was done here for follow-up.  No new symptoms.    Patient Active Problem List    Diagnosis Date Noted     Right foot pain 04/29/2019     Priority: Medium     Aftercare following surgery of the musculoskeletal system 04/29/2019     Priority: Medium     Hallux limitus of right foot 02/11/2019     Priority: Medium     Infective endocarditis 03/03/2018     Priority: Medium     Infection by Streptococcus, viridans group 03/03/2018     Priority: Medium     Bacteremia 03/02/2018     Priority: Medium     Mitral valve prolapse 12/03/2012     Priority: Medium     Mitral valve regurgitation 12/03/2012     Priority: Medium     Nonallopathic lesion of lumbar region 05/10/2011     Priority: Medium     Nonallopathic lesion of thoracic region 07/24/2009     Priority: Medium     Cervical spondylosis without myelopathy 07/21/2009     Priority: Medium     Cervicalgia 07/08/2008     Priority: Medium     Spasm of muscle 04/23/2008     Priority: Medium     Duane syndrome of left eye 1977     Priority: Medium    .  Current Outpatient Medications   Medication Sig     diclofenac (VOLTAREN) 1 % topical gel Place onto the skin 4 times daily (Patient taking differently: Place onto the skin 4 times daily as needed )     multivitamin, therapeutic (THERA-VIT) TABS tablet Take 1 tablet by mouth daily     predniSONE (DELTASONE) 20 MG tablet Take 3 tabs by mouth daily x 3 days, then 2 tabs daily x 3 days, then 1 tab daily x 3 days, then 1/2 tab daily x 3 days.  (Patient not taking: Reported on 6/15/2019)     UNABLE TO FIND MEDICATION NAME: day time Nyquil and Dayquil     No current facility-administered medications for this visit.      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    patient states not a valve infection     Heart murmur      Osteomyelitis, L5S1 03/15/2018    secondary to endocarditis     Past Surgical History:   Procedure Laterality Date     APPENDECTOMY  1987     CHEILECTOMY Right 4/10/2019    Procedure: Right Cheilectomy;  Surgeon: Sawyer Crowell MD;  Location:  OR     INJECT SACROILIAC JOINT Right 5/9/2018    Procedure: INJECT SACROILIAC JOINT;  Right Sacroiliac Joint Injection;  Surgeon: Thom Williamson MD;  Location:  OR     ORTHOPEDIC SURGERY  4/10/19    Bone Spur Removal     No Known Allergies  Social History     Socioeconomic History     Marital status:      Spouse name: Not on file     Number of children: Not on file     Years of education: Not on file     Highest education level: Not on file   Occupational History     Comment: desk work   Social Needs     Financial resource strain: Not on file     Food insecurity:     Worry: Not on file     Inability: Not on file     Transportation needs:     Medical: Not on file     Non-medical: Not on file   Tobacco Use     Smoking status: Never Smoker     Smokeless tobacco: Never Used   Substance and Sexual Activity     Alcohol use: Not Currently     Comment: Foot surgery + illness     Drug use: No     Sexual activity: Yes     Partners: Female     Birth control/protection: Pill   Lifestyle     Physical activity:     Days per week: Not on file     Minutes per session: Not on file     Stress: Not on file   Relationships     Social connections:     Talks on phone: Not on file     Gets together: Not on file     Attends Mu-ism service: Not on file     Active member of club or organization: Not on file     Attends meetings of clubs or  "organizations: Not on file     Relationship status: Not on file     Intimate partner violence:     Fear of current or ex partner: Not on file     Emotionally abused: Not on file     Physically abused: Not on file     Forced sexual activity: Not on file   Other Topics Concern     Parent/sibling w/ CABG, MI or angioplasty before 65F 55M? No   Social History Narrative     Not on file     No family history on file.       REVIEW OF SYSTEMS:  General: negative, fever, chills, night sweats  Skin: negative, acne, rash and scaling  Eyes: negative, double vision, eye pain and photophobia  Ears/Nose/Throat: negative, nasal congestion and purulent rhinorrhea  Respiratory: No dyspnea on exertion, No cough, No hemoptysis and negative  Cardiovascular: negative, palpitations, tachycardia, irregular heart beat, chest pain, exertional chest pain or pressure, paroxysmal nocturnal dyspnea, dyspnea on exertion and orthopnea         OBJECTIVE:  Blood pressure 137/90, pulse 68, height 1.791 m (5' 10.5\"), weight 84.6 kg (186 lb 9.6 oz), SpO2 96 %.  General Appearance: healthy, alert, active and no distress  Head: Normocephalic. No masses, lesions, tenderness or abnormalities  Eyes: conjuctiva clear, PERRL, EOM intact  Ears: External ears normal. Canals clear. TM's normal.  Nose: Nares normal  Mouth: normal  Neck: Supple, no cervical adenopathy, no thyromegaly  Lungs: clear to auscultation  Cardiac: regular rate and rhythm, normal S1 and S2, MR.       ASSESSMENT/PLAN:  Patient here for follow-up.  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  Severe mitral regurgitation.  Recent strep viridans bacteremia and lumbar osteomyelitis.  During this.  His mitral regurgitation worsened.  DEEPAK showed flail P1 P2 segments.  Severe mitral regurgitation.  No evidence for endocarditis.  Patient was referred for surgery but somehow was unable to see the surgeon.  Will refer patient back for surgery for mitral valve repair as soon as possible.  Per " orders.   Return to Clinic PRN.

## 2019-06-17 ENCOUNTER — CARE COORDINATION (OUTPATIENT)
Dept: CARDIOLOGY | Facility: CLINIC | Age: 44
End: 2019-06-17

## 2019-06-17 NOTE — TELEPHONE ENCOUNTER
Routing to PCP to review and advise.    Please see My Chart message.      Christa Horne RN  [facility]

## 2019-06-17 NOTE — TELEPHONE ENCOUNTER
I have referred you to ENT  However, your symptoms, could be allergies related, I would suggest, try Loratadine or Zyrtec, daily  thanks

## 2019-06-17 NOTE — PROGRESS NOTES
Called patient and left a voicemail message asking patient to call back to schedule an appointment with one of the surgeon's for MVR.  Waiting return call.     Patient was referred to surgery in June 2018, but refused to make an appointment to see a surgeon as he had too many other problems and back surgery he was dealing with.  Patient stated that he would call when he was ready to schedule.  Patient never called back.

## 2019-06-17 NOTE — TELEPHONE ENCOUNTER
Provider's response and referral contact information routed to patient via [employer] ([id number]).    Kerri Soriano RN  [facility]

## 2019-06-18 NOTE — PROGRESS NOTES
Called patient again today and was able to speak with him and he scheduled an appointment with Dr Masters on 07/01/19

## 2019-06-29 NOTE — TELEPHONE ENCOUNTER
FUTURE VISIT INFORMATION      FUTURE VISIT INFORMATION:    Date: 8/19/19     Time: 10:30AM    [place] [facility] [place]  REFERRAL INFORMATION:    Referring provider:  Dr. Darnell Gallardo    Referring providers clinic:  [facility]    Reason for visit/diagnosis:  DX: Acute recurrent maxillary sinusitis and referred by Dr. Lisa Daniels, per pt. All records in , per pt.    RECORDS REQUESTED FROM:       Clinic name Comments Records Status Imaging Status   [facility] 6/3/19 notes with Dr. Jamil [facility] 5/7/19 notes with Dr. Anton Lyn [facility] 10/18/18 notes with Dr. Frances Gonzalez [facility] 11/1/18 notes with Dr. Priya Ramos [facility]                  6/29/19 12:59PM: No images done for head or neck area internally or externally. -SK

## 2019-07-01 ENCOUNTER — PRE VISIT (OUTPATIENT)
Dept: CARDIOLOGY | Facility: CLINIC | Age: 44
End: 2019-07-01

## 2019-07-01 ENCOUNTER — OFFICE VISIT (OUTPATIENT)
Dept: CARDIOLOGY | Facility: CLINIC | Age: 44
End: 2019-07-01
Attending: SURGERY
Payer: COMMERCIAL

## 2019-07-01 VITALS
DIASTOLIC BLOOD PRESSURE: 95 MMHG | HEART RATE: 90 BPM | SYSTOLIC BLOOD PRESSURE: 138 MMHG | HEIGHT: 70 IN | BODY MASS INDEX: 27.27 KG/M2 | WEIGHT: 190.5 LBS | OXYGEN SATURATION: 96 %

## 2019-07-01 DIAGNOSIS — I34.0 MITRAL REGURGITATION, MYXOMATOUS: Primary | ICD-10-CM

## 2019-07-01 PROCEDURE — G0463 HOSPITAL OUTPT CLINIC VISIT: HCPCS | Mod: ZF

## 2019-07-01 ASSESSMENT — ENCOUNTER SYMPTOMS
SMELL DISTURBANCE: 0
MUSCLE WEAKNESS: 0
SORE THROAT: 1
HOARSE VOICE: 0
MYALGIAS: 0
SINUS CONGESTION: 1
ARTHRALGIAS: 0
BACK PAIN: 1
NECK MASS: 0
TASTE DISTURBANCE: 0
JOINT SWELLING: 0
TROUBLE SWALLOWING: 0
NECK PAIN: 0
MUSCLE CRAMPS: 0
SINUS PAIN: 1
STIFFNESS: 0

## 2019-07-01 ASSESSMENT — PAIN SCALES - GENERAL: PAINLEVEL: NO PAIN (0)

## 2019-07-01 ASSESSMENT — MIFFLIN-ST. JEOR: SCORE: 1775.35

## 2019-07-01 NOTE — NURSING NOTE
Chief Complaint   Patient presents with     Follow Up     new consult for MVR     Vitals were taken and medications were reconciled.     Iveth Melo MA    4:27 PM

## 2019-07-01 NOTE — LETTER
7/1/2019      RE: Frederick Nguyen  [address] [facility] [address]       Dear Colleague,    Thank you for the opportunity to participate in the care of your patient, Frederick Nguyen, at the [facility]. Please see a copy of my visit note below.    Service Date: 07/01/2019      REASON FOR CONSULTATION:  I was requested to see Mr. Nguyen for evaluation of options for mitral valve surgery.      HISTORY OF PRESENT ILLNESS:  The patient is a 43-year-old gentleman who is known to have a myxomatous mitral valve with mitral valve prolapse and severe mitral regurgitation.  The patient had a previous Strep viridans bacteremia that he has recovered from.  The patient denies any symptoms related to the heart, including shortness of breath, fatigue, tiredness, fever, chills, syncope, palpitation, loss of consciousness.  His effort tolerance is greater than 3-4 blocks, and the patient is very active.      PAST MEDICAL HISTORY:  Mitral valve prolapse, infective endocarditis and cervical spondylosis.        OUTPATIENT MEDICATIONS:  Prednisone, multivitamins.      SOCIAL HISTORY:  Denies current alcohol, drug or tobacco use.      PAST SURGICAL HISTORY:  Appendectomy, joint surgery.      REVIEW OF SYSTEMS:  A 10 point review of systems is normal other than as mentioned in history and physical.      PHYSICAL EXAMINATION:   VITAL SIGNS:  He is afebrile.  Blood pressure 130/74, heart rate 68.   GENERAL:  He is awake, alert and oriented x3.  He appears comfortable at rest.    CARDIOVASCULAR:  S1 and S2 normal.  No S3.  A 2/6 systolic murmur in the apex part.   RESPIRATORY:  Bilateral breath sounds.  No rales, rhonchi or crepitations.   ABDOMEN:  Bowel sounds present and nontender.   LOWER EXTREMITIES:  Warm and well perfused.  No pedal edema.   NEUROLOGICAL:  No focal deficits.     EYES:  Pupils equal, round and reactive to light.   DERMATOLOGICAL:  Within  normal limits.   ENT:  Within normal limits.         LABORATORY:  Pending.  Echocardiogram from last year showed severe mitral valve regurgitation with mitral valve prolapse secondary to P2 prolapse.  Normal LV function, normal LV size, normal RV function, normal RV size.  No pulmonary hypertension.      ASSESSMENT AND PLAN:  A 43-year-old gentleman with severe mitral regurgitation with no symptoms evident of any pulmonary hypertension with normal sinus rhythm.  Clearly, at this time the patient is asymptomatic.  The patient could wait for surgery; however, we need to repeat an echocardiogram, as his last echocardiogram was done over a year ago.  Once we do a repeat echo, we will make a decision on options for surgical intervention.       If there are any questions regarding his care, please contact me.         BRADLEY BURNETT MD             D: 2019   T: 2019   MT: tana      Name:     ALEKSANDER NEWTON   MRN:      [id number]        Account:      [id number]   :      1975           Service Date: 2019      Document: [id number]       cc: CARLOS Bush MD       [facility] Surgery Billing

## 2019-07-02 ENCOUNTER — CARE COORDINATION (OUTPATIENT)
Dept: CARDIOLOGY | Facility: CLINIC | Age: 44
End: 2019-07-02

## 2019-07-02 NOTE — PROGRESS NOTES
Called patient and left voicemail message offering assistance with scheduling the echocardiogram ordered yesterday as he didn't schedule it on his way out of clinic.   Waiting return call.

## 2019-07-02 NOTE — NURSING NOTE
Patient seen today for consultation for severe mitral valve regurgitation.     Surgery procedure explained to patient and all questions and concerns were answered and addressed.     Valve choices were discussed with the patient, mechanical and bioprosthetic. Risks and benefits of both explained.     Patient states he has no symptoms, he denies shortness of breath at rest and with exertion, chest pain and palpitations.     Will need updated echocardiogram and Instructed patient to schedule this on his way out of clinic. Patient's previous echos were in March of 2018.    Patient and  verbalized understanding of all instructions and will call with any questions or concerns.     Will review echo with Dr Masters and call patients with recommendations.

## 2019-07-08 NOTE — PROGRESS NOTES
Patient called and scheduled his echo for 07/15, will watch for results and discuss with Dr Daniel his recommendations.

## 2019-07-15 ENCOUNTER — ANCILLARY PROCEDURE (OUTPATIENT)
Dept: CARDIOLOGY | Facility: CLINIC | Age: 44
End: 2019-07-15
Attending: SURGERY
Payer: COMMERCIAL

## 2019-07-15 DIAGNOSIS — I34.0 MITRAL VALVE REGURGITATION: ICD-10-CM

## 2019-07-15 RX ADMIN — Medication 3 ML: at 08:30

## 2019-07-23 ENCOUNTER — OFFICE VISIT (OUTPATIENT)
Dept: OPHTHALMOLOGY | Facility: CLINIC | Age: 44
End: 2019-07-23
Payer: COMMERCIAL

## 2019-07-23 DIAGNOSIS — H43.393 VITREOUS FLOATER, BILATERAL: ICD-10-CM

## 2019-07-23 DIAGNOSIS — H50.812 DUANE SYNDROME OF LEFT EYE: ICD-10-CM

## 2019-07-23 DIAGNOSIS — H52.13 MYOPIA OF BOTH EYES: Primary | ICD-10-CM

## 2019-07-23 ASSESSMENT — EXTERNAL EXAM - RIGHT EYE: OD_EXAM: NORMAL

## 2019-07-23 ASSESSMENT — REFRACTION_MANIFEST
OS_CYLINDER: +1.25
OD_AXIS: 054
OD_CYLINDER: +0.50
OS_SPHERE: -2.50
OS_AXIS: 153
OD_SPHERE: -2.25

## 2019-07-23 ASSESSMENT — REFRACTION_WEARINGRX
SPECS_TYPE: SVL
OS_AXIS: 152
OD_AXIS: 060
OD_SPHERE: -2.25
OS_CYLINDER: +1.25
OS_SPHERE: -2.75
OD_CYLINDER: +0.50

## 2019-07-23 ASSESSMENT — CONF VISUAL FIELD
METHOD: COUNTING FINGERS
OS_NORMAL: 1
OD_NORMAL: 1

## 2019-07-23 ASSESSMENT — TONOMETRY
OS_IOP_MMHG: 21
OD_IOP_MMHG: 21
IOP_METHOD: TONOPEN

## 2019-07-23 ASSESSMENT — SLIT LAMP EXAM - LIDS
COMMENTS: NORMAL
COMMENTS: NORMAL

## 2019-07-23 ASSESSMENT — VISUAL ACUITY
OS_CC: 20/20
METHOD: SNELLEN - LINEAR
OD_CC: 20/20
CORRECTION_TYPE: GLASSES

## 2019-07-23 ASSESSMENT — CUP TO DISC RATIO
OS_RATIO: 0.3
OD_RATIO: 0.3

## 2019-07-23 ASSESSMENT — EXTERNAL EXAM - LEFT EYE: OS_EXAM: NORMAL

## 2019-07-23 NOTE — PROGRESS NOTES
History  HPI     Annual Eye Exam     In right eye.  This started years ago.  Occurring intermittently.  It is worse throughout the day.  Associated symptoms include floaters.  Negative for eye pain and flashes.  Treatments tried include no treatments.  Pain was noted as 0/10.              Comments     Floaters in the right eye for years, come and go. Noticeable from low light to bright light and last for 20 minutes and then go away. No flashes of lights.  Patient states vision has been stable since last eye exam, both eyes. Denies eye pain or irritation. Does not take eye drops.    Jazmyn Onofre COT 7:02 AM July 23, 2019             Last edited by Jazmyn Onofre on 7/23/2019  7:02 AM. (History)          Assessment/Plan  (H52.13) Myopia of both eyes  (primary encounter diagnosis)  Comment: Myopia both eyes   Plan: REFRACTION         Educated patient on condition and clinical findings. Dispensed spectacle prescription for full time wear. Educated patient on possibility of adaptation period, if symptoms do not improve return to clinic for further testing.    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 Monitor as needed.    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    Return to clinic in 1 year for comprehensive eye exam.    Complete documentation of historical and exam elements from today's encounter can  be found in the full encounter summary report (not reduplicated in this progress  note). I personally obtained the chief complaint(s) and history of present illness. I  confirmed and edited as necessary the review of systems, past medical/surgical  history, family history, social history, and examination findings as documented by  others; and I examined the patient myself. I personally reviewed the relevant tests,  images, and reports as documented  above. I formulated and edited as necessary the  assessment and plan and discussed the findings and management plan with the  patient and family.    Sridhar Carlton OD, FAAO

## 2019-07-23 NOTE — NURSING NOTE
Chief Complaints and History of Present Illnesses   Patient presents with     Annual Eye Exam     Chief Complaint(s) and History of Present Illness(es)     Annual Eye Exam     Laterality: right eye    Onset: years ago    Frequency: intermittently    Timing: throughout the day    Associated symptoms: floaters.  Negative for eye pain and flashes    Treatments tried: no treatments    Pain scale: 0/10              Comments     Floaters in the right eye for years, come and go. Noticeable from low light to bright light and last for 20 minutes and then go away. No flashes of lights.  Patient states vision has been stable since last eye exam, both eyes. Denies eye pain or irritation. Does not take eye drops.    Jazmyn Onofre COT 7:02 AM July 23, 2019

## 2019-07-25 ENCOUNTER — CARE COORDINATION (OUTPATIENT)
Dept: CARDIOLOGY | Facility: CLINIC | Age: 44
End: 2019-07-25

## 2019-07-25 NOTE — PROGRESS NOTES
Called patient to let him know he is to follow up with Dr Downs in 3 months with repeat echo to discuss minimally invasive MRV.  Patient agreed to plan and patient was scheduled to see Dr Downs on 09/19 with echo on 09/16.

## 2019-07-29 DIAGNOSIS — I34.0 MITRAL VALVE REGURGITATION: Primary | ICD-10-CM

## 2019-07-29 NOTE — PROGRESS NOTES
Service Date: 07/01/2019      REASON FOR CONSULTATION:  I was requested to see Mr. Nguyen for evaluation of options for mitral valve surgery.      HISTORY OF PRESENT ILLNESS:  The patient is a 43-year-old gentleman who is known to have a myxomatous mitral valve with mitral valve prolapse and severe mitral regurgitation.  The patient had a previous Strep viridans bacteremia that he has recovered from.  The patient denies any symptoms related to the heart, including shortness of breath, fatigue, tiredness, fever, chills, syncope, palpitation, loss of consciousness.  His effort tolerance is greater than 3-4 blocks, and the patient is very active.      PAST MEDICAL HISTORY:  Mitral valve prolapse, infective endocarditis and cervical spondylosis.        OUTPATIENT MEDICATIONS:  Prednisone, multivitamins.      SOCIAL HISTORY:  Denies current alcohol, drug or tobacco use.      PAST SURGICAL HISTORY:  Appendectomy, joint surgery.      REVIEW OF SYSTEMS:  A 10 point review of systems is normal other than as mentioned in history and physical.      PHYSICAL EXAMINATION:   VITAL SIGNS:  He is afebrile.  Blood pressure 130/74, heart rate 68.   GENERAL:  He is awake, alert and oriented x3.  He appears comfortable at rest.    CARDIOVASCULAR:  S1 and S2 normal.  No S3.  A 2/6 systolic murmur in the apex part.   RESPIRATORY:  Bilateral breath sounds.  No rales, rhonchi or crepitations.   ABDOMEN:  Bowel sounds present and nontender.   LOWER EXTREMITIES:  Warm and well perfused.  No pedal edema.   NEUROLOGICAL:  No focal deficits.     EYES:  Pupils equal, round and reactive to light.   DERMATOLOGICAL:  Within normal limits.   ENT:  Within normal limits.         LABORATORY:  Pending.  Echocardiogram from last year showed severe mitral valve regurgitation with mitral valve prolapse secondary to P2 prolapse.  Normal LV function, normal LV size, normal RV function, normal RV size.  No pulmonary hypertension.      ASSESSMENT AND PLAN:   A 43-year-old gentleman with severe mitral regurgitation with no symptoms evident of any pulmonary hypertension with normal sinus rhythm.  Clearly, at this time the patient is asymptomatic.  The patient could wait for surgery; however, we need to repeat an echocardiogram, as his last echocardiogram was done over a year ago.  Once we do a repeat echo, we will make a decision on options for surgical intervention.       If there are any questions regarding his care, please contact me.         BRADLEY BURNETT MD             D: 2019   T: 2019   MT: tana      Name:     ALEKSANDER NEWTON   MRN:      [id number]        Account:      [id number]   :      1975           Service Date: 2019      Document: [id number]       cc: CARLOS Bush MD       [facility]

## 2019-08-01 PROBLEM — Z47.89 AFTERCARE FOLLOWING SURGERY OF THE MUSCULOSKELETAL SYSTEM: Status: RESOLVED | Noted: 2019-04-29 | Resolved: 2019-08-01

## 2019-08-01 PROBLEM — M79.671 RIGHT FOOT PAIN: Status: RESOLVED | Noted: 2019-04-29 | Resolved: 2019-08-01

## 2019-08-01 NOTE — PROGRESS NOTES
Discharge Note    Progress reporting period is from initial eval to Jun 12, 2019.     Frederick failed to return for next follow up visit and current status is unknown.  Please see information below for last relevant information on current status.  Patient seen for Rxs Used: 5 visits.    SUBJECTIVE  Subjective changes noted by patient:  Subjective: Able to walk ~10min now before some discomfort in great toe. Toe is feeling much better. More motion. Not rolling foot anymore towards outside of foot. .  Current pain level is Current Pain level: 0/10.     Previous pain level was  Initial Pain level: 3/10.   Changes in function:  Yes (See Goal flowsheet attached for changes in current functional level)  Adverse reaction to treatment or activity: None    OBJECTIVE  Changes noted in objective findings: Objective: Combined toe flex/ext 60deg. Progressing from DL to eccentric SL to ecc/conc SL heelraises. Progressed to squatting/lunging. .    ASSESSMENT/PLAN  Diagnosis: s/p R foot surgery, 1st MTP cheilectomy   DIAGP:  Diagnoses of Right foot pain, Aftercare following surgery of the musculoskeletal system, and Localized edema were pertinent to this visit.  Updated problem list and treatment plan:     Decreased ROM/flexibility - HEP  Decreased function - HEP  Decreased strength - HEP    STG/LTGs have been met or progress has been made towards goals:  Yes, please see goal flowsheet for most current information.    Assessment of Progress: current status is unknown.  Last current status: Assessment of progress: Pt is progressing as expected.  Self Management Plans:  HEP    I have re-evaluated this patient and find that the nature, scope, duration and intensity of the therapy is appropriate for the medical condition of the patient.  Frederick continues to require the following intervention to meet STG and LTG's:  HEP.    Recommendations:  Discharge with current home program.  Patient to follow up with MD as needed. Episode to be  closed at this time and patient formally discharged from therapy.    Jalen Prakash, PT, DPT, OCS      Please refer to the daily flowsheet for treatment today, total treatment time and time spent performing 1:1 timed codes.

## 2019-08-07 ENCOUNTER — TELEPHONE (OUTPATIENT)
Dept: OTOLARYNGOLOGY | Facility: CLINIC | Age: 44
End: 2019-08-07

## 2019-08-07 NOTE — TELEPHONE ENCOUNTER
LVM for patient informing him he could be seen sooner than his scheduled appointment. Left number for scheduling and instructed patient to ask for appt with Dr. Holliday.    Veronica Gaytan, EMT

## 2019-08-13 DIAGNOSIS — Z29.89 NEED FOR SBE (SUBACUTE BACTERIAL ENDOCARDITIS) PROPHYLAXIS: ICD-10-CM

## 2019-08-15 ENCOUNTER — MYC MEDICAL ADVICE (OUTPATIENT)
Dept: FAMILY MEDICINE | Facility: CLINIC | Age: 44
End: 2019-08-15

## 2019-08-15 DIAGNOSIS — I34.0 MITRAL VALVE INSUFFICIENCY, UNSPECIFIED ETIOLOGY: ICD-10-CM

## 2019-08-15 DIAGNOSIS — I33.0 ACUTE BACTERIAL ENDOCARDITIS: ICD-10-CM

## 2019-08-15 DIAGNOSIS — I34.1 MITRAL VALVE PROLAPSE: Primary | ICD-10-CM

## 2019-08-15 RX ORDER — AMOXICILLIN 500 MG/1
CAPSULE ORAL
Qty: 4 CAPSULE | Refills: 0 | Status: SHIPPED | OUTPATIENT
Start: 2019-08-15 | End: 2019-11-04

## 2019-08-15 NOTE — TELEPHONE ENCOUNTER
Valve prolapse is not indication for antibiotic use prior to dental work.  He may have other indications for this such as his history of having diabetes in the past, or his mitral valve regurgitation.  I would inform the patient of this but I have called in a prescription for amoxicillin 500 mg 4 caps 1 hour before procedure.  He can discuss this at a later time.

## 2019-08-16 RX ORDER — AMOXICILLIN 500 MG/1
CAPSULE ORAL
Qty: 4 CAPSULE | Refills: 3 | OUTPATIENT
Start: 2019-08-16

## 2019-08-19 ENCOUNTER — PRE VISIT (OUTPATIENT)
Dept: OTOLARYNGOLOGY | Facility: CLINIC | Age: 44
End: 2019-08-19

## 2019-08-19 ENCOUNTER — APPOINTMENT (OUTPATIENT)
Dept: OTOLARYNGOLOGY | Facility: CLINIC | Age: 44
End: 2019-08-19
Attending: FAMILY MEDICINE
Payer: COMMERCIAL

## 2019-08-27 ENCOUNTER — TELEPHONE (OUTPATIENT)
Dept: CARDIOLOGY | Facility: CLINIC | Age: 44
End: 2019-08-27

## 2019-08-27 DIAGNOSIS — R93.1 ABNORMAL FINDINGS ON DIAGNOSTIC IMAGING OF HEART/CORONARY CIRCULATION: Primary | ICD-10-CM

## 2019-08-27 RX ORDER — LIDOCAINE 40 MG/G
CREAM TOPICAL
Status: CANCELLED | OUTPATIENT
Start: 2019-08-27

## 2019-08-27 RX ORDER — POTASSIUM CHLORIDE 1500 MG/1
40 TABLET, EXTENDED RELEASE ORAL
Status: CANCELLED | OUTPATIENT
Start: 2019-08-27

## 2019-08-27 RX ORDER — SODIUM CHLORIDE 9 MG/ML
INJECTION, SOLUTION INTRAVENOUS CONTINUOUS
Status: CANCELLED | OUTPATIENT
Start: 2019-08-27

## 2019-08-27 RX ORDER — POTASSIUM CHLORIDE 1500 MG/1
20 TABLET, EXTENDED RELEASE ORAL
Status: CANCELLED | OUTPATIENT
Start: 2019-08-27

## 2019-08-27 NOTE — TELEPHONE ENCOUNTER
This writer called patient to let him know that I sent him a dental letter via email.  Also informed him that after talking to Dr Downs her will need a heart cath.  Angiogram scheduled and instructions sent via email.

## 2019-09-09 ENCOUNTER — HOSPITAL ENCOUNTER (OUTPATIENT)
Facility: CLINIC | Age: 44
Discharge: HOME | End: 2019-09-09
Attending: INTERNAL MEDICINE | Admitting: INTERNAL MEDICINE
Payer: COMMERCIAL

## 2019-09-09 ENCOUNTER — APPOINTMENT (OUTPATIENT)
Dept: MEDSURG UNIT | Facility: CLINIC | Age: 44
End: 2019-09-09
Attending: INTERNAL MEDICINE
Payer: COMMERCIAL

## 2019-09-09 ENCOUNTER — APPOINTMENT (OUTPATIENT)
Dept: LAB | Facility: CLINIC | Age: 44
End: 2019-09-09
Attending: INTERNAL MEDICINE
Payer: COMMERCIAL

## 2019-09-09 VITALS
DIASTOLIC BLOOD PRESSURE: 89 MMHG | TEMPERATURE: 98.4 F | SYSTOLIC BLOOD PRESSURE: 140 MMHG | HEART RATE: 92 BPM | RESPIRATION RATE: 20 BRPM | OXYGEN SATURATION: 99 %

## 2019-09-09 DIAGNOSIS — R93.1 ABNORMAL FINDINGS ON DIAGNOSTIC IMAGING OF HEART/CORONARY CIRCULATION: ICD-10-CM

## 2019-09-09 DIAGNOSIS — Z98.890 S/P CORONARY ANGIOGRAM: Primary | ICD-10-CM

## 2019-09-09 LAB
ANION GAP SERPL CALCULATED.3IONS-SCNC: 6 MMOL/L (ref 3–14)
BUN SERPL-MCNC: 18 MG/DL (ref 7–30)
CALCIUM SERPL-MCNC: 8.6 MG/DL (ref 8.5–10.1)
CHLORIDE SERPL-SCNC: 112 MMOL/L (ref 94–109)
CO2 SERPL-SCNC: 24 MMOL/L (ref 20–32)
CREAT SERPL-MCNC: 0.99 MG/DL (ref 0.66–1.25)
ERYTHROCYTE [DISTWIDTH] IN BLOOD BY AUTOMATED COUNT: 12.5 % (ref 10–15)
GFR SERPL CREATININE-BSD FRML MDRD: >90 ML/MIN/{1.73_M2}
GLUCOSE SERPL-MCNC: 91 MG/DL (ref 70–99)
HCT VFR BLD AUTO: 45.3 % (ref 40–53)
HGB BLD-MCNC: 15.1 G/DL (ref 13.3–17.7)
INTERPRETATION ECG - MUSE: NORMAL
MCH RBC QN AUTO: 29.6 PG (ref 26.5–33)
MCHC RBC AUTO-ENTMCNC: 33.3 G/DL (ref 31.5–36.5)
MCV RBC AUTO: 89 FL (ref 78–100)
PLATELET # BLD AUTO: 174 10E9/L (ref 150–450)
POTASSIUM SERPL-SCNC: 4 MMOL/L (ref 3.4–5.3)
RBC # BLD AUTO: 5.1 10E12/L (ref 4.4–5.9)
SODIUM SERPL-SCNC: 142 MMOL/L (ref 133–144)
WBC # BLD AUTO: 6 10E9/L (ref 4–11)

## 2019-09-09 PROCEDURE — 25000128 H RX IP 250 OP 636: Performed by: INTERNAL MEDICINE

## 2019-09-09 PROCEDURE — 85027 COMPLETE CBC AUTOMATED: CPT | Performed by: SURGERY

## 2019-09-09 PROCEDURE — 25000132 ZZH RX MED GY IP 250 OP 250 PS 637: Performed by: SURGERY

## 2019-09-09 PROCEDURE — 40000172 ZZH STATISTIC PROCEDURE PREP ONLY

## 2019-09-09 PROCEDURE — 80048 BASIC METABOLIC PNL TOTAL CA: CPT | Performed by: SURGERY

## 2019-09-09 PROCEDURE — 93456 R HRT CORONARY ARTERY ANGIO: CPT | Performed by: INTERNAL MEDICINE

## 2019-09-09 PROCEDURE — 27210762 ZZH DEVICE SUTURELESS SECUREMENT EA CR2: Performed by: INTERNAL MEDICINE

## 2019-09-09 PROCEDURE — 27210794 ZZH OR GENERAL SUPPLY STERILE: Performed by: INTERNAL MEDICINE

## 2019-09-09 PROCEDURE — 93456 R HRT CORONARY ARTERY ANGIO: CPT | Mod: 26 | Performed by: INTERNAL MEDICINE

## 2019-09-09 PROCEDURE — 25000125 ZZHC RX 250: Performed by: INTERNAL MEDICINE

## 2019-09-09 PROCEDURE — C1894 INTRO/SHEATH, NON-LASER: HCPCS | Performed by: INTERNAL MEDICINE

## 2019-09-09 PROCEDURE — 93010 ELECTROCARDIOGRAM REPORT: CPT | Mod: 59 | Performed by: INTERNAL MEDICINE

## 2019-09-09 PROCEDURE — 99214 OFFICE O/P EST MOD 30 MIN: CPT | Performed by: NURSE PRACTITIONER

## 2019-09-09 PROCEDURE — 36415 COLL VENOUS BLD VENIPUNCTURE: CPT | Performed by: SURGERY

## 2019-09-09 PROCEDURE — C1769 GUIDE WIRE: HCPCS | Performed by: INTERNAL MEDICINE

## 2019-09-09 PROCEDURE — 40000065 ZZH STATISTIC EKG NON-CHARGEABLE

## 2019-09-09 RX ORDER — POTASSIUM CHLORIDE 750 MG/1
40 TABLET, EXTENDED RELEASE ORAL
Status: DISCONTINUED | OUTPATIENT
Start: 2019-09-09 | End: 2019-09-09 | Stop reason: HOSPADM

## 2019-09-09 RX ORDER — NICARDIPINE HYDROCHLORIDE 2.5 MG/ML
INJECTION INTRAVENOUS
Status: DISCONTINUED | OUTPATIENT
Start: 2019-09-09 | End: 2019-09-09 | Stop reason: HOSPADM

## 2019-09-09 RX ORDER — DOPAMINE HYDROCHLORIDE 160 MG/100ML
2-20 INJECTION, SOLUTION INTRAVENOUS CONTINUOUS PRN
Status: DISCONTINUED | OUTPATIENT
Start: 2019-09-09 | End: 2019-09-09 | Stop reason: HOSPADM

## 2019-09-09 RX ORDER — NITROGLYCERIN 5 MG/ML
VIAL (ML) INTRAVENOUS
Status: DISCONTINUED | OUTPATIENT
Start: 2019-09-09 | End: 2019-09-09 | Stop reason: HOSPADM

## 2019-09-09 RX ORDER — HYDROCODONE BITARTRATE AND ACETAMINOPHEN 5; 325 MG/1; MG/1
1-2 TABLET ORAL EVERY 4 HOURS PRN
Status: DISCONTINUED | OUTPATIENT
Start: 2019-09-09 | End: 2019-09-09 | Stop reason: HOSPADM

## 2019-09-09 RX ORDER — HEPARIN SODIUM 1000 [USP'U]/ML
INJECTION, SOLUTION INTRAVENOUS; SUBCUTANEOUS
Status: DISCONTINUED | OUTPATIENT
Start: 2019-09-09 | End: 2019-09-09 | Stop reason: HOSPADM

## 2019-09-09 RX ORDER — LIDOCAINE 40 MG/G
CREAM TOPICAL
Status: DISCONTINUED | OUTPATIENT
Start: 2019-09-09 | End: 2019-09-09 | Stop reason: HOSPADM

## 2019-09-09 RX ORDER — NITROGLYCERIN 20 MG/100ML
.07-2 INJECTION INTRAVENOUS CONTINUOUS PRN
Status: DISCONTINUED | OUTPATIENT
Start: 2019-09-09 | End: 2019-09-09 | Stop reason: HOSPADM

## 2019-09-09 RX ORDER — PHENYLEPHRINE HCL IN 0.9% NACL 50MG/250ML
.5-6 PLASTIC BAG, INJECTION (ML) INTRAVENOUS CONTINUOUS PRN
Status: DISCONTINUED | OUTPATIENT
Start: 2019-09-09 | End: 2019-09-09 | Stop reason: HOSPADM

## 2019-09-09 RX ORDER — ACETAMINOPHEN 325 MG/1
650 TABLET ORAL EVERY 4 HOURS PRN
Status: DISCONTINUED | OUTPATIENT
Start: 2019-09-09 | End: 2019-09-09 | Stop reason: HOSPADM

## 2019-09-09 RX ORDER — DOBUTAMINE HYDROCHLORIDE 200 MG/100ML
2-20 INJECTION INTRAVENOUS CONTINUOUS PRN
Status: DISCONTINUED | OUTPATIENT
Start: 2019-09-09 | End: 2019-09-09 | Stop reason: HOSPADM

## 2019-09-09 RX ORDER — EPTIFIBATIDE 2 MG/ML
2 INJECTION, SOLUTION INTRAVENOUS CONTINUOUS PRN
Status: DISCONTINUED | OUTPATIENT
Start: 2019-09-09 | End: 2019-09-09 | Stop reason: HOSPADM

## 2019-09-09 RX ORDER — NOREPINEPHRINE BITARTRATE 0.06 MG/ML
.03-.4 INJECTION, SOLUTION INTRAVENOUS CONTINUOUS PRN
Status: DISCONTINUED | OUTPATIENT
Start: 2019-09-09 | End: 2019-09-09 | Stop reason: HOSPADM

## 2019-09-09 RX ORDER — EPTIFIBATIDE 2 MG/ML
180 INJECTION, SOLUTION INTRAVENOUS EVERY 10 MIN PRN
Status: DISCONTINUED | OUTPATIENT
Start: 2019-09-09 | End: 2019-09-09 | Stop reason: HOSPADM

## 2019-09-09 RX ORDER — ARGATROBAN 1 MG/ML
350 INJECTION, SOLUTION INTRAVENOUS
Status: DISCONTINUED | OUTPATIENT
Start: 2019-09-09 | End: 2019-09-09 | Stop reason: HOSPADM

## 2019-09-09 RX ORDER — ARGATROBAN 1 MG/ML
150 INJECTION, SOLUTION INTRAVENOUS
Status: DISCONTINUED | OUTPATIENT
Start: 2019-09-09 | End: 2019-09-09 | Stop reason: HOSPADM

## 2019-09-09 RX ORDER — POTASSIUM CHLORIDE 750 MG/1
20 TABLET, EXTENDED RELEASE ORAL
Status: DISCONTINUED | OUTPATIENT
Start: 2019-09-09 | End: 2019-09-09 | Stop reason: HOSPADM

## 2019-09-09 RX ORDER — METOPROLOL TARTRATE 25 MG/1
25 TABLET, FILM COATED ORAL 2 TIMES DAILY
Status: DISCONTINUED | OUTPATIENT
Start: 2019-09-09 | End: 2019-09-09 | Stop reason: HOSPADM

## 2019-09-09 RX ORDER — SODIUM CHLORIDE 9 MG/ML
INJECTION, SOLUTION INTRAVENOUS CONTINUOUS
Status: DISCONTINUED | OUTPATIENT
Start: 2019-09-09 | End: 2019-09-09 | Stop reason: HOSPADM

## 2019-09-09 RX ORDER — NALOXONE HYDROCHLORIDE 0.4 MG/ML
.1-.4 INJECTION, SOLUTION INTRAMUSCULAR; INTRAVENOUS; SUBCUTANEOUS
Status: DISCONTINUED | OUTPATIENT
Start: 2019-09-09 | End: 2019-09-09 | Stop reason: HOSPADM

## 2019-09-09 RX ORDER — IOPAMIDOL 755 MG/ML
INJECTION, SOLUTION INTRAVASCULAR
Status: DISCONTINUED | OUTPATIENT
Start: 2019-09-09 | End: 2019-09-09 | Stop reason: HOSPADM

## 2019-09-09 RX ADMIN — ASPIRIN 325 MG: 325 TABLET, DELAYED RELEASE ORAL at 08:58

## 2019-09-09 NOTE — DISCHARGE INSTRUCTIONS
[facility]     CORONARY ANGIOGRAM DISCHARGE INSTRUCTIONS    AFTER YOU GO HOME:    DO NOT drive or operate machinery at home or at work for at least 24 hours.    If you were given sedation: we recommend that an adult stay with you for 24 hours.    DO relax and take it easy for 24 hours.    DO drink plenty of fluids.    DO resume your regular diet, unless otherwise instructed by your Primary Physician.    If you have been given any mediations that were to help you relax or sedate you during your procedure.    DO NOT drink alcoholic beverages the day of your procedure.    DO NOT do any strenuous exercise or lifting (>10 lbs) for 10 days following your procedure.    DO NOT take a tub bath, get in a hot tub, or pool for at least 7 days following your procedure.    Do Not scrub the procedure site vigorously.    DO NOT make any important or legal decisions for 24 hours following your procedure.    CALL YOUR PRIMARY PHYSICIAN IF:    You start bleeding from the procedure site. If you do start to bleed from that site, lie down flat and hold pressure on the site. Your physician will tell you if you need to return to the hospital. It is common to have a small bruise or lump at the site.    You have numbness, coolness or change in color of the arm or leg of the puncture site.    You experience pain or redness at the puncture site.    You develop hives or a rash or unexplained itching.    You develop a temperature of 101 degrees F or greater.

## 2019-09-09 NOTE — H&P
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  The patient had a previous Strep viridans bacteremia that he has recovered from. Denies chest pain/pressure, dyspnea at rest or on exertion, fatigue, fever, chills, syncope, palpitations, or pedal edema. He presents today for right and left heart cath as preop evaluation for mitral valve repair. He confirms that he an aspirin 325 mg this morning at home.          Active problem list:     Patient Active Problem List    Diagnosis Date Noted     Abnormal findings on diagnostic imaging of heart/coronary circulation 08/27/2019     Priority: Medium     Added automatically from request for surgery [id number]       Hallux limitus of right foot 02/11/2019     Priority: Medium     Infective endocarditis 03/03/2018     Priority: Medium     Infection by Streptococcus, viridans group 03/03/2018     Priority: Medium     Bacteremia 03/02/2018     Priority: Medium     Mitral valve prolapse 12/03/2012     Priority: Medium     Mitral valve regurgitation 12/03/2012     Priority: Medium     Nonallopathic lesion of lumbar region 05/10/2011     Priority: Medium     Nonallopathic lesion of thoracic region 07/24/2009     Priority: Medium     Cervical spondylosis without myelopathy 07/21/2009     Priority: Medium     Cervicalgia 07/08/2008     Priority: Medium     Spasm of muscle 04/23/2008     Priority: Medium     Duane syndrome of left eye 1975     Priority: Medium            Medications (include herbals and vitamins):      Current Facility-Administered Medications   Medication      lidocaine (LMX4) cream     lidocaine 1 % 0.1-1 mL     Patient is NOT allergic to contrast dye OR was given pre-procedure oral steroids for treatment     potassium chloride ER (K-DUR/KLOR-CON M) CR tablet 20 mEq     potassium chloride ER (K-DUR/KLOR-CON M) CR tablet 40 mEq     sodium chloride (PF) 0.9% PF flush 3 mL     sodium chloride (PF) 0.9% PF flush 3 mL     sodium chloride 0.9% infusion     Facility-Administered Medications Ordered in Other Encounters   Medication     sodium chloride (PF) 0.9% PF flush 10 mL         Frederick Nguyen   Home Medication Instructions DEUCE:[id number]    Printed on:09/09/19 0915   Medication Information                      amoxicillin (AMOXIL) 500 MG capsule  Take 4 caps 1 hour prior to dental procedure.             diclofenac (VOLTAREN) 1 % topical gel  Place onto the skin 4 times daily             multivitamin, therapeutic (THERA-VIT) TABS tablet  Take 1 tablet by mouth daily             predniSONE (DELTASONE) 20 MG tablet  Take 3 tabs by mouth daily x 3 days, then 2 tabs daily x 3 days, then 1 tab daily x 3 days, then 1/2 tab daily x 3 days.             UNABLE TO FIND  MEDICATION NAME: day time Nyquil and Dayquil                      Allergies:    No Known Allergies          Social History:     Social History     Tobacco Use     Smoking status: Never Smoker     Smokeless tobacco: Never Used   Substance Use Topics     Alcohol use: Not Currently     Comment: Foot surgery + illness            Physical Exam:   All vitals have been reviewed  Patient Vitals for the past 8 hrs:   BP Temp Temp src Heart Rate Resp SpO2   09/09/19 0830 (!) 139/100 98.2  F (36.8  C) Oral 72 20 100 %     No intake/output data recorded.  Airway assessment:   Patient is able to open mouth wide  Patient is able to stick out tongue}      ENT:   Normocephalic, without obvious abnormality, atraumatic, sinuses nontender on palpation, external ears without lesions, oral pharynx with moist mucous membranes,  tonsils without erythema or exudates, gums normal and good dentition.     Neck:   Supple, symmetrical, trachea midline, no adenopathy, thyroid symmetric, not enlarged and no tenderness, skin normal     Lungs:   No increased work of breathing, good air exchange, clear to auscultation bilaterally, no crackles or wheezing     Cardiovascular:   murmurs include systolic murmur III/VI located at apex              Lab / Radiology Results:     Lab Results   Component Value Date    WBC 6.0 09/09/2019    RBC 5.10 09/09/2019    HGB 15.1 09/09/2019    HCT 45.3 09/09/2019    MCV 89 09/09/2019    RDW 12.5 09/09/2019     09/09/2019      Lab Results   Component Value Date     09/09/2019    CO2 24 09/09/2019    BUN 18 09/09/2019             Plan:   Patient's active problems diagnostically and therapeutically optimized for the planned procedure. Patient here for right and left heart cath as preop evaluation for mitral valve repair. Procedure explained in detail to patient including indications, risks, and benefits. He states understanding and wishes to procced.     BENNY Awan CNP  Electrophysiology Consult Service  Pager: [phone]

## 2019-09-09 NOTE — Clinical Note
dry, intact, no bleeding and no hematoma. 7fr sheath RIJ removed, manual pressure applied, hemostasis achieved, bandage placed, 6fr sheath LRA removed, TR band placed with 14 ml of air

## 2019-09-09 NOTE — PROGRESS NOTES
0905 Pt o 2a prepped for cardiac angiogram. PIV placed. Labs and EKG done. Wife and daughter in gold waiting room. Pt took aspirin 325mg this am at home. Pt ready for consent.

## 2019-09-09 NOTE — PRE-PROCEDURE
GENERAL PRE-PROCEDURE:   Procedure:  Right and left heart catheterization with possible percutaneous coronary intervention  Date/Time:  9/9/2019 9:26 AM    Verbal consent obtained?: Yes    Written consent obtained?: Yes    Risks and benefits: Risks, benefits and alternatives were discussed    Consent given by:  Patient  Patient states understanding of procedure being performed: Yes    Patient's understanding of procedure matches consent: Yes    Procedure consent matches procedure scheduled: Yes    Expected level of sedation:  Moderate  Appropriately NPO:  Yes  ASA Class:  Class 2- mild systemic disease, no acute problems, no functional limitations  Mallampati  :  Grade 2- soft palate, base of uvula, tonsillar pillars, and portion of posterior pharyngeal wall visible  Lungs:  Lungs clear with good breath sounds bilaterally  Heart:  Systolic murmur  History & Physical reviewed:  History and physical reviewed and no updates needed  Statement of review:  I have reviewed the lab findings, diagnostic data, medications, and the plan for sedation    BENNY Awan, NP-C

## 2019-09-09 NOTE — PROGRESS NOTES
IV removed.  Discharge instructions given and questions answered.  Pt. Able to verbalize understanding.  Pt. Has a ride home.  Pt. Has all belongings.

## 2019-09-16 ENCOUNTER — ANCILLARY PROCEDURE (OUTPATIENT)
Dept: CARDIOLOGY | Facility: CLINIC | Age: 44
End: 2019-09-16
Attending: SURGERY
Payer: COMMERCIAL

## 2019-09-16 DIAGNOSIS — I34.0 MITRAL VALVE REGURGITATION: ICD-10-CM

## 2019-09-19 ENCOUNTER — OFFICE VISIT (OUTPATIENT)
Dept: CARDIOLOGY | Facility: CLINIC | Age: 44
End: 2019-09-19
Attending: SURGERY
Payer: COMMERCIAL

## 2019-09-19 VITALS
HEART RATE: 80 BPM | OXYGEN SATURATION: 96 % | BODY MASS INDEX: 27.35 KG/M2 | SYSTOLIC BLOOD PRESSURE: 134 MMHG | HEIGHT: 70 IN | DIASTOLIC BLOOD PRESSURE: 91 MMHG | WEIGHT: 191 LBS

## 2019-09-19 DIAGNOSIS — Z01.810 PRE-OPERATIVE CARDIOVASCULAR EXAMINATION: Primary | ICD-10-CM

## 2019-09-19 DIAGNOSIS — I34.1 MVP (MITRAL VALVE PROLAPSE): Primary | ICD-10-CM

## 2019-09-19 PROCEDURE — G0463 HOSPITAL OUTPT CLINIC VISIT: HCPCS | Mod: ZF

## 2019-09-19 ASSESSMENT — PAIN SCALES - GENERAL: PAINLEVEL: NO PAIN (0)

## 2019-09-19 ASSESSMENT — MIFFLIN-ST. JEOR: SCORE: 1772.62

## 2019-09-19 NOTE — NURSING NOTE
Patient seen today for consultation for Minimally invasive MVR    Surgery procedure explained to patient  and all questions and concerns were answered and addressed.     Valve choices were discussed with the patient, mechanical and bioprosthetic. Risks and benefits of both explained.     Risks and benefits of surgery were discussed with patient (and all present) including risk of death, stroke, bleeding, cardiac ischemia, wound infection, renal failure, arrhythmias and possible pacemaker implantation. Patient accepts these risks and is willing to proceed with surgery.     Reviewed pre surgery tests and procedures needed and will call patient to schedule.      Pre surgery preparation folder with instructions for surgery preparation and recovery given to patient and pre op teaching done.Instructed patient on preparation for TAVR CT and DEEPAK    Patient  verbalized understanding of all instructions and will call with any questions or concerns.

## 2019-09-19 NOTE — LETTER
9/19/2019       RE: Frederick Nguyen  [address] [facility] [id number]     Dear Colleague,    Thank you for referring your patient, Frederick Nguyen, to the [facility]. Please see a copy of my visit note below.    CV Surgery    Patient seen, clinic consult note dictated #[id number].    Rigoberto Downs MD    Service Date: 09/19/2019      REFERRING CARDIOLOGIST:  LAURITA Bush MD      REASON FOR CONSULTATION:  Evaluation for severe mitral valve regurgitation.      HISTORY OF PRESENT ILLNESS:  Mr. Nguyen is a very pleasant, 44-year-old gentleman with a history of mitral valve regurgitation and mitral valve prolapse at least dating back to 2012, which was initially picked up on a routine physical examination.  He has been asymptomatic from his mitral valve regurgitation over the years.  He did have an episode of Strep viridans bacteremia about a year ago, which he recovered from.  A DEEPAK at that time demonstrated no evidence of endocarditis, but with severe myxomatous mitral valve with severe regurgitation and posterior leaflet flail.  He still denies any symptoms, but given the severity of his mitral regurgitation, he was referred to me for evaluation for mitral valve repair.  The patient was also seen by my colleague Dr. Andi Masetrs back in July.      PAST MEDICAL/SURGICAL HISTORY:  Mitral valve prolapse, prior bacteremia, questionable endocarditis and cervical spondylitis.  He has had a previous appendectomy.      ALLERGIES:  No known drug allergies.      CURRENT MEDICATIONS:  Reviewed in Epic chart.      FAMILY HISTORY:  Noncontributory.      SOCIAL HISTORY:  He does not smoke, does not drink alcohol or do any IV drugs.      REVIEW OF SYSTEMS:  As per HPI.  All other 10 point reviews of systems are completed and were otherwise negative unless stated above.      PHYSICAL EXAMINATION:   VITAL SIGNS:  Blood pressure is 134/91.  Pulse is  80.  He is 86 kg, 5 feet 10 inches.   GENERAL:  He appears well, in no acute distress.   HEENT:  Within normal limits.   NECK:  Supple.  No lymphadenopathy.   CARDIOVASCULAR:  Regular rate and rhythm.  Normal S1, S2.  Grade 3/6 holosystolic murmur at the apex radiating to the axilla.   LUNGS:  Clear bilaterally.   ABDOMEN:  Soft, nontender and nondistended.   EXTREMITIES:  Negative for edema, cyanosis or clubbing.   NEUROLOGIC:  A&O x3 with no focal deficits.      LABORATORY STUDIES:  His most recent transesophageal echo was from 03/2018 that demonstrated no evidence of endocarditis, preserved LV systolic function with EF of 65%-70%, normal RV function, flail P1-P2 segment with ruptured chordae and severe eccentric anteriorly directed jet, mild tricuspid valve prolapse, normal aortic valve.  His most recent transthoracic echo was from 09/16/2019 that again demonstrated a normal LV systolic size and function, normal RV function, severe mitral valve regurgitation with a flail posterior leaflet.  The tricuspid valve could not be assessed.  His blood chemistry, CBC and BMP panels from 09/09 were reviewed, and all are within normal limits.      IMPRESSION AND PLAN:  Mr. Nguyen is a previously healthy, 44-year-old gentleman with severe symptomatic mitral valve regurgitation.  His last DEEPAK was from a year and a half ago that demonstrated myxomatous mitral valve disease with a flail posterior leaflet.  Based on this, there is a greater than 90% chance that the valve could be successfully repaired, and my recommendation is a mitral valve repair.  Since the last DEEPAK was from a year and a half ago, I would like to get another DEEPAK before we book him for surgery.  I also think he is an excellent minimally invasive mitral valve repair candidate via a right mini thoracotomy approach.  I would like to get a TAVR protocol CT scan to look at his aortic anatomy and iliofemoral arteries.  I explained to the patient the reason for  recommending a mitral valve repair, its risks and benefits and the potential complications associated with the surgery.  I went over that these complications include, but are not strictly limited to, infection, bleeding, stroke, postop MI, postop arrhythmias, pulmonary or renal complications.  I think overall he is an excellent surgical candidate, and I quoted an operative mortality risk of under 1%.  The patient is eager to proceed.  We will await the DEEPAK and a TAVR protocol CT scan and proceed with a right mini thoracotomy, mitral valve repair and possible replacement in the near future.  I did explain to him that in the very unlikely event that the repair is not feasible, we would recommend a mechanical prosthetic valve given his young age.  This would require him to be on lifelong Coumadin, which he understands.      It was a pleasure to meet Mr. Nguyen today, and thank you very much for this referral.         CAT BLAIR MD             D: 2019   T: 2019   MT: tana      Name:     ALEKSANDER NGUYEN   MRN:      [id number]        Account:      [id number]   :      1975           Service Date: 2019      Document: [id number]

## 2019-09-19 NOTE — PROGRESS NOTES
Service Date: 09/19/2019      REFERRING CARDIOLOGIST:  LAURITA Bush MD      REASON FOR CONSULTATION:  Evaluation for severe mitral valve regurgitation.      HISTORY OF PRESENT ILLNESS:  Mr. Nguyen is a very pleasant, 44-year-old gentleman with a history of mitral valve regurgitation and mitral valve prolapse at least dating back to 2012, which was initially picked up on a routine physical examination.  He has been asymptomatic from his mitral valve regurgitation over the years.  He did have an episode of Strep viridans bacteremia about a year ago, which he recovered from.  A DEEPAK at that time demonstrated no evidence of endocarditis, but with severe myxomatous mitral valve with severe regurgitation and posterior leaflet flail.  He still denies any symptoms, but given the severity of his mitral regurgitation, he was referred to me for evaluation for mitral valve repair.  The patient was also seen by my colleague Dr. Andi Masters back in July.      PAST MEDICAL/SURGICAL HISTORY:  Mitral valve prolapse, prior bacteremia, questionable endocarditis and cervical spondylitis.  He has had a previous appendectomy.      ALLERGIES:  No known drug allergies.      CURRENT MEDICATIONS:  Reviewed in Epic chart.      FAMILY HISTORY:  Noncontributory.      SOCIAL HISTORY:  He does not smoke, does not drink alcohol or do any IV drugs.      REVIEW OF SYSTEMS:  As per HPI.  All other 10 point reviews of systems are completed and were otherwise negative unless stated above.      PHYSICAL EXAMINATION:   VITAL SIGNS:  Blood pressure is 134/91.  Pulse is 80.  He is 86 kg, 5 feet 10 inches.   GENERAL:  He appears well, in no acute distress.   HEENT:  Within normal limits.   NECK:  Supple.  No lymphadenopathy.   CARDIOVASCULAR:  Regular rate and rhythm.  Normal S1, S2.  Grade 3/6 holosystolic murmur at the apex radiating to the axilla.   LUNGS:  Clear bilaterally.   ABDOMEN:  Soft, nontender and nondistended.   EXTREMITIES:   Negative for edema, cyanosis or clubbing.   NEUROLOGIC:  A&O x3 with no focal deficits.      LABORATORY STUDIES:  His most recent transesophageal echo was from 03/2018 that demonstrated no evidence of endocarditis, preserved LV systolic function with EF of 65%-70%, normal RV function, flail P1-P2 segment with ruptured chordae and severe eccentric anteriorly directed jet, mild tricuspid valve prolapse, normal aortic valve.  His most recent transthoracic echo was from 09/16/2019 that again demonstrated a normal LV systolic size and function, normal RV function, severe mitral valve regurgitation with a flail posterior leaflet.  The tricuspid valve could not be assessed.  His blood chemistry, CBC and BMP panels from 09/09 were reviewed, and all are within normal limits.      IMPRESSION AND PLAN:  Mr. Nguyen is a previously healthy, 44-year-old gentleman with severe symptomatic mitral valve regurgitation.  His last DEEPAK was from a year and a half ago that demonstrated myxomatous mitral valve disease with a flail posterior leaflet.  Based on this, there is a greater than 90% chance that the valve could be successfully repaired, and my recommendation is a mitral valve repair.  Since the last DEEPAK was from a year and a half ago, I would like to get another DEEPAK before we book him for surgery.  I also think he is an excellent minimally invasive mitral valve repair candidate via a right mini thoracotomy approach.  I would like to get a TAVR protocol CT scan to look at his aortic anatomy and iliofemoral arteries.  I explained to the patient the reason for recommending a mitral valve repair, its risks and benefits and the potential complications associated with the surgery.  I went over that these complications include, but are not strictly limited to, infection, bleeding, stroke, postop MI, postop arrhythmias, pulmonary or renal complications.  I think overall he is an excellent surgical candidate, and I quoted an operative  mortality risk of under 1%.  The patient is eager to proceed.  We will await the DEEPAK and a TAVR protocol CT scan and proceed with a right mini thoracotomy, mitral valve repair and possible replacement in the near future.  I did explain to him that in the very unlikely event that the repair is not feasible, we would recommend a mechanical prosthetic valve given his young age.  This would require him to be on lifelong Coumadin, which he understands.      It was a pleasure to meet Mr. Nguyen today, and thank you very much for this referral.         CAT BLAIR MD             D: 2019   T: 2019   MT: tana      Name:     ALEKSANDER NGUYEN   MRN:      [id number]        Account:      [id number]   :      1975           Service Date: 2019      Document: [id number]

## 2019-09-19 NOTE — LETTER
9/19/2019      RE: Frederick Nguyen  [address] [facility] [id number]       Dear Colleague,    Thank you for the opportunity to participate in the care of your patient, Frederick Nguyen, at the [facility]. Please see a copy of my visit note below.    CV Surgery    Patient seen, clinic consult note dictated #[id number].    Rigoberto Downs MD    Please do not hesitate to contact me if you have any questions/concerns.     Sincerely,     Rigoberto Downs MD

## 2019-09-19 NOTE — NURSING NOTE
Chief Complaint   Patient presents with     New Patient     New Consult For Minimally mitral Valve R/R Pr Dr. Masters      Vitals were taken and medications were reconciled.     Mary Beth Bustillo RMA  7:27 AM

## 2019-09-20 ENCOUNTER — TELEPHONE (OUTPATIENT)
Dept: CARDIOLOGY | Facility: CLINIC | Age: 44
End: 2019-09-20

## 2019-09-20 DIAGNOSIS — Z01.810 PRE-OPERATIVE CARDIOVASCULAR EXAMINATION: Primary | ICD-10-CM

## 2019-09-20 RX ORDER — CEFAZOLIN SODIUM 2 G/100ML
2 INJECTION, SOLUTION INTRAVENOUS
Status: CANCELLED | OUTPATIENT
Start: 2019-09-20

## 2019-09-20 RX ORDER — METOPROLOL TARTRATE 25 MG/1
25 TABLET, FILM COATED ORAL
Status: CANCELLED | OUTPATIENT
Start: 2019-09-20

## 2019-09-20 RX ORDER — CEFAZOLIN SODIUM 1 G/3ML
1 INJECTION, POWDER, FOR SOLUTION INTRAMUSCULAR; INTRAVENOUS SEE ADMIN INSTRUCTIONS
Status: CANCELLED | OUTPATIENT
Start: 2019-09-20

## 2019-09-20 RX ORDER — MUPIROCIN 20 MG/G
OINTMENT TOPICAL 2 TIMES DAILY
Status: CANCELLED | OUTPATIENT
Start: 2019-09-20 | End: 2019-09-21

## 2019-09-20 NOTE — TELEPHONE ENCOUNTER
Pt called back to schedule preop and surgery with Dr. Downs. Talked with pt and scheduled preop imaging for 10/7 and 10/4. Surgery is scheduled for [facility] for 10/25. Will call if anything changes

## 2019-09-20 NOTE — TELEPHONE ENCOUNTER
Per task, pt needs to schedule preop and surgery with Dr. Downs at [facility]. LM asking pt to call back. Will try again later

## 2019-10-07 ENCOUNTER — ANCILLARY PROCEDURE (OUTPATIENT)
Dept: GENERAL RADIOLOGY | Facility: CLINIC | Age: 44
End: 2019-10-07
Attending: SURGERY
Payer: COMMERCIAL

## 2019-10-07 DIAGNOSIS — Z01.810 PRE-OPERATIVE CARDIOVASCULAR EXAMINATION: ICD-10-CM

## 2019-10-07 LAB
ABO + RH BLD: NORMAL
ABO + RH BLD: NORMAL
ALBUMIN SERPL-MCNC: 4 G/DL (ref 3.4–5)
ALBUMIN UR-MCNC: NEGATIVE MG/DL
ALP SERPL-CCNC: 49 U/L (ref 40–150)
ALT SERPL W P-5'-P-CCNC: 17 U/L (ref 0–70)
ANION GAP SERPL CALCULATED.3IONS-SCNC: 4 MMOL/L (ref 3–14)
APPEARANCE UR: CLEAR
AST SERPL W P-5'-P-CCNC: 18 U/L (ref 0–45)
BASOPHILS # BLD AUTO: 0.1 10E9/L (ref 0–0.2)
BASOPHILS NFR BLD AUTO: 1.4 %
BILIRUB DIRECT SERPL-MCNC: 0.1 MG/DL (ref 0–0.2)
BILIRUB SERPL-MCNC: 0.5 MG/DL (ref 0.2–1.3)
BILIRUB UR QL STRIP: NEGATIVE
BLD GP AB SCN SERPL QL: NORMAL
BLOOD BANK CMNT PATIENT-IMP: NORMAL
BUN SERPL-MCNC: 18 MG/DL (ref 7–30)
CALCIUM SERPL-MCNC: 9 MG/DL (ref 8.5–10.1)
CHLORIDE SERPL-SCNC: 108 MMOL/L (ref 94–109)
CO2 SERPL-SCNC: 27 MMOL/L (ref 20–32)
COLOR UR AUTO: YELLOW
CREAT SERPL-MCNC: 1.11 MG/DL (ref 0.66–1.25)
DIFFERENTIAL METHOD BLD: NORMAL
EOSINOPHIL # BLD AUTO: 0.2 10E9/L (ref 0–0.7)
EOSINOPHIL NFR BLD AUTO: 2.6 %
ERYTHROCYTE [DISTWIDTH] IN BLOOD BY AUTOMATED COUNT: 12.4 % (ref 10–15)
GFR SERPL CREATININE-BSD FRML MDRD: 81 ML/MIN/{1.73_M2}
GLUCOSE SERPL-MCNC: 90 MG/DL (ref 70–99)
GLUCOSE UR STRIP-MCNC: NEGATIVE MG/DL
HBA1C MFR BLD: 4.9 % (ref 0–5.6)
HCT VFR BLD AUTO: 46.6 % (ref 40–53)
HGB BLD-MCNC: 15.6 G/DL (ref 13.3–17.7)
HGB UR QL STRIP: NEGATIVE
IMM GRANULOCYTES # BLD: 0 10E9/L (ref 0–0.4)
IMM GRANULOCYTES NFR BLD: 0.6 %
INR PPP: 1.07 (ref 0.86–1.14)
KETONES UR STRIP-MCNC: NEGATIVE MG/DL
LEUKOCYTE ESTERASE UR QL STRIP: NEGATIVE
LYMPHOCYTES # BLD AUTO: 1.9 10E9/L (ref 0.8–5.3)
LYMPHOCYTES NFR BLD AUTO: 29.5 %
MCH RBC QN AUTO: 30 PG (ref 26.5–33)
MCHC RBC AUTO-ENTMCNC: 33.5 G/DL (ref 31.5–36.5)
MCV RBC AUTO: 90 FL (ref 78–100)
MONOCYTES # BLD AUTO: 0.5 10E9/L (ref 0–1.3)
MONOCYTES NFR BLD AUTO: 7.9 %
MUCOUS THREADS #/AREA URNS LPF: PRESENT /LPF
NEUTROPHILS # BLD AUTO: 3.8 10E9/L (ref 1.6–8.3)
NEUTROPHILS NFR BLD AUTO: 58 %
NITRATE UR QL: NEGATIVE
NRBC # BLD AUTO: 0 10*3/UL
NRBC BLD AUTO-RTO: 0 /100
PH UR STRIP: 5 PH (ref 5–7)
PLATELET # BLD AUTO: 175 10E9/L (ref 150–450)
POTASSIUM SERPL-SCNC: 3.8 MMOL/L (ref 3.4–5.3)
PROT SERPL-MCNC: 7.1 G/DL (ref 6.8–8.8)
RBC # BLD AUTO: 5.2 10E12/L (ref 4.4–5.9)
RBC #/AREA URNS AUTO: <1 /HPF (ref 0–2)
SODIUM SERPL-SCNC: 139 MMOL/L (ref 133–144)
SOURCE: ABNORMAL
SP GR UR STRIP: 1.02 (ref 1–1.03)
SPECIMEN EXP DATE BLD: NORMAL
UROBILINOGEN UR STRIP-MCNC: 0 MG/DL (ref 0–2)
WBC # BLD AUTO: 6.5 10E9/L (ref 4–11)
WBC #/AREA URNS AUTO: 1 /HPF (ref 0–5)

## 2019-10-08 LAB — INTERPRETATION ECG - MUSE: NORMAL

## 2019-10-14 ENCOUNTER — HOSPITAL ENCOUNTER (OUTPATIENT)
Dept: CT IMAGING | Facility: CLINIC | Age: 44
Discharge: HOME | End: 2019-10-14
Attending: SURGERY | Admitting: SURGERY
Payer: COMMERCIAL

## 2019-10-14 ENCOUNTER — HOSPITAL ENCOUNTER (OUTPATIENT)
Dept: CARDIOLOGY | Facility: CLINIC | Age: 44
End: 2019-10-14
Attending: SURGERY
Payer: COMMERCIAL

## 2019-10-14 VITALS
OXYGEN SATURATION: 97 % | RESPIRATION RATE: 14 BRPM | DIASTOLIC BLOOD PRESSURE: 87 MMHG | HEART RATE: 84 BPM | SYSTOLIC BLOOD PRESSURE: 130 MMHG

## 2019-10-14 DIAGNOSIS — Z01.810 PRE-OPERATIVE CARDIOVASCULAR EXAMINATION: ICD-10-CM

## 2019-10-14 PROCEDURE — 25800030 ZZH RX IP 258 OP 636: Performed by: SURGERY

## 2019-10-14 PROCEDURE — 93320 DOPPLER ECHO COMPLETE: CPT | Mod: 26 | Performed by: INTERNAL MEDICINE

## 2019-10-14 PROCEDURE — 99153 MOD SED SAME PHYS/QHP EA: CPT

## 2019-10-14 PROCEDURE — 71275 CT ANGIOGRAPHY CHEST: CPT | Mod: 26 | Performed by: INTERNAL MEDICINE

## 2019-10-14 PROCEDURE — 25000125 ZZHC RX 250: Performed by: SURGERY

## 2019-10-14 PROCEDURE — 74174 CTA ABD&PLVS W/CONTRAST: CPT | Mod: 26 | Performed by: INTERNAL MEDICINE

## 2019-10-14 PROCEDURE — 93325 DOPPLER ECHO COLOR FLOW MAPG: CPT

## 2019-10-14 PROCEDURE — 25000128 H RX IP 250 OP 636: Performed by: INTERNAL MEDICINE

## 2019-10-14 PROCEDURE — 93325 DOPPLER ECHO COLOR FLOW MAPG: CPT | Mod: 26 | Performed by: INTERNAL MEDICINE

## 2019-10-14 PROCEDURE — 74174 CTA ABD&PLVS W/CONTRAST: CPT

## 2019-10-14 PROCEDURE — 25000128 H RX IP 250 OP 636: Performed by: SURGERY

## 2019-10-14 PROCEDURE — 99152 MOD SED SAME PHYS/QHP 5/>YRS: CPT

## 2019-10-14 PROCEDURE — 93312 ECHO TRANSESOPHAGEAL: CPT | Mod: 26 | Performed by: INTERNAL MEDICINE

## 2019-10-14 RX ORDER — IOPAMIDOL 755 MG/ML
110 INJECTION, SOLUTION INTRAVASCULAR ONCE
Status: COMPLETED | OUTPATIENT
Start: 2019-10-14 | End: 2019-10-14

## 2019-10-14 RX ORDER — FENTANYL CITRATE 50 UG/ML
50 INJECTION, SOLUTION INTRAMUSCULAR; INTRAVENOUS ONCE
Status: DISCONTINUED | OUTPATIENT
Start: 2019-10-14 | End: 2019-10-15 | Stop reason: HOSPADM

## 2019-10-14 RX ORDER — FLUMAZENIL 0.1 MG/ML
0.2 INJECTION, SOLUTION INTRAVENOUS
Status: DISCONTINUED | OUTPATIENT
Start: 2019-10-14 | End: 2019-10-15 | Stop reason: HOSPADM

## 2019-10-14 RX ORDER — SODIUM CHLORIDE 9 MG/ML
INJECTION, SOLUTION INTRAVENOUS CONTINUOUS PRN
Status: DISCONTINUED | OUTPATIENT
Start: 2019-10-14 | End: 2019-10-15 | Stop reason: HOSPADM

## 2019-10-14 RX ORDER — NALOXONE HYDROCHLORIDE 0.4 MG/ML
.1-.4 INJECTION, SOLUTION INTRAMUSCULAR; INTRAVENOUS; SUBCUTANEOUS
Status: DISCONTINUED | OUTPATIENT
Start: 2019-10-14 | End: 2019-10-15 | Stop reason: HOSPADM

## 2019-10-14 RX ORDER — LIDOCAINE 40 MG/G
CREAM TOPICAL
Status: DISCONTINUED | OUTPATIENT
Start: 2019-10-14 | End: 2019-10-15 | Stop reason: HOSPADM

## 2019-10-14 RX ORDER — LIDOCAINE HYDROCHLORIDE 20 MG/ML
15 SOLUTION OROPHARYNGEAL ONCE
Status: COMPLETED | OUTPATIENT
Start: 2019-10-14 | End: 2019-10-14

## 2019-10-14 RX ORDER — ONDANSETRON 2 MG/ML
4 INJECTION INTRAMUSCULAR; INTRAVENOUS ONCE
Status: COMPLETED | OUTPATIENT
Start: 2019-10-14 | End: 2019-10-14

## 2019-10-14 RX ORDER — FENTANYL CITRATE 50 UG/ML
25 INJECTION, SOLUTION INTRAMUSCULAR; INTRAVENOUS
Status: DISCONTINUED | OUTPATIENT
Start: 2019-10-14 | End: 2019-10-15 | Stop reason: HOSPADM

## 2019-10-14 RX ADMIN — FENTANYL CITRATE 50 MCG: 50 INJECTION, SOLUTION INTRAMUSCULAR; INTRAVENOUS at 13:27

## 2019-10-14 RX ADMIN — MIDAZOLAM 2 MG: 1 INJECTION INTRAMUSCULAR; INTRAVENOUS at 13:23

## 2019-10-14 RX ADMIN — MIDAZOLAM 1 MG: 1 INJECTION INTRAMUSCULAR; INTRAVENOUS at 13:31

## 2019-10-14 RX ADMIN — IOPAMIDOL 110 ML: 755 INJECTION, SOLUTION INTRAVENOUS at 10:18

## 2019-10-14 RX ADMIN — TOPICAL ANESTHETIC 0.5 ML: 200 SPRAY DENTAL; PERIODONTAL at 13:13

## 2019-10-14 RX ADMIN — FENTANYL CITRATE 50 MCG: 50 INJECTION, SOLUTION INTRAMUSCULAR; INTRAVENOUS at 13:32

## 2019-10-14 RX ADMIN — MIDAZOLAM 2 MG: 1 INJECTION INTRAMUSCULAR; INTRAVENOUS at 13:27

## 2019-10-14 RX ADMIN — LIDOCAINE HYDROCHLORIDE 30 ML: 20 SOLUTION ORAL; TOPICAL at 13:12

## 2019-10-14 RX ADMIN — ONDANSETRON 4 MG: 2 INJECTION INTRAMUSCULAR; INTRAVENOUS at 13:02

## 2019-10-14 RX ADMIN — SODIUM CHLORIDE: 9 INJECTION, SOLUTION INTRAVENOUS at 13:05

## 2019-10-14 RX ADMIN — SODIUM CHLORIDE 100 ML: 9 INJECTION, SOLUTION INTRAVENOUS at 13:59

## 2019-10-14 RX ADMIN — MIDAZOLAM 1 MG: 1 INJECTION INTRAMUSCULAR; INTRAVENOUS at 13:35

## 2019-10-14 RX ADMIN — FENTANYL CITRATE 50 MCG: 50 INJECTION, SOLUTION INTRAMUSCULAR; INTRAVENOUS at 13:23

## 2019-10-14 NOTE — PROGRESS NOTES
Pt arrived in ECHO department for scheduled DEEPAK.   Procedure explained, questions answered and consent signed. Discharge instructions discussed with patient and given written copy.   Pt's throat sprayed at 1315, therefore pt will not be able to eat or drink until 2 hours after at 1515. Informed pt of this time and encouraged to start with warm fluids and soft foods.    Pt tolerated procedure well, and was given a total of 150 mcg IV fentanyl and 6 mg IV versed for conscious sedation.   DEEPAK probe 61 used for procedure.  Pt denied chest or throat pain after procedure and was monitored until awake and awake and VSS. Escorted to [facility] waiting room where pt's spouse, Zohra, was waiting to drive pt home.

## 2019-10-24 ENCOUNTER — ANESTHESIA EVENT (OUTPATIENT)
Dept: SURGERY | Facility: CLINIC | Age: 44
DRG: 219 | End: 2019-10-24
Payer: COMMERCIAL

## 2019-10-24 NOTE — ANESTHESIA PREPROCEDURE EVALUATION
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    patient states not a valve infection     Heart murmur      Osteomyelitis, L5S1 03/15/2018    secondary to endocarditis     Past Surgical History:   Procedure Laterality Date     APPENDECTOMY  1987     CHEILECTOMY Right 4/10/2019    Procedure: Right Cheilectomy;  Surgeon: Sawyer Crowell MD;  Location: [facility] OR     CV CORONARY ANGIOGRAM N/A 2019    Procedure: CV CORONARY ANGIOGRAM;  Surgeon: Pernell Bar MD;  Location:  HEART CARDIAC CATH LAB     CV RIGHT HEART CATH N/A 2019    Procedure: CV RIGHT HEART CATH;  Surgeon: Pernell Bar MD;  Location:  HEART CARDIAC CATH LAB     INJECT SACROILIAC JOINT Right 2018    Procedure: INJECT SACROILIAC JOINT;  Right Sacroiliac Joint Injection;  Surgeon: Thom Williamson MD;  Location:  OR     ORTHOPEDIC SURGERY  4/10/19    Bone Spur Removal     Echo 10/14/2019  Interpretation Summary  Normal LV size and wall thickness with LVEF 60-65%.  Right ventricular function, chamber size, wall motion, and thickness are  normal.  Severe mitral valve insuffiency with an eccentric, anteriorly-directed jet. A  flail posterior mitral leaflet is present with prolapse of P1 and P2.  No pericardial effusion is present.  Compared to the previous TTE dated 2019 and DEEPAK 3/30/2018, there has been  no significant change.  _____________________________________________________________________________  __        Procedure  Transesophageal Echocardiogram with color and spectral Doppler performed.  Procedure location Echo Lab. The procedure was  performed in the Echo Lab.  Informed consent for Transesophegeal echo obtained. DEEPAK Probe #61 was used  during the procedure. Patient was sedated using Fentanyl 150 mcg. Patient was  sedated using Versed 6 mg. I determined this patient to be an appropriate  candidate for the planned sedation and procedure and have reassessed the  patient immediately prior to sedation and procedure. Total sedation time: 32  minutes of continuous bedside 1:1 monitoring. The Transducer was inserted  without difficulty . The patient tolerated the procedure well. Complications  None. The patient's rhythm is normal sinus. Adequate quality two-dimensional  was performed and interpreted. Adequate quality color and spectral Doppler  were performed and interpreted.     Left Ventricle  Normal LV size and wall thickness with LVEF 60-65%. No regional wall motion  abnormalities are seen.     Right Ventricle  Right ventricular function, chamber size, wall motion, and thickness are  normal.     Atria  The right atria appears normal. The left atrial appendage is normal. It is  free of spontaneous echo contrast and thrombus. Severe left atrial enlargement  is present. The atrial septum is intact as assessed by color Doppler .        Mitral Valve  Severe mitral valve insuffiency with an eccentric, anteriorly-directed jet. A  flail posterior mitral leaflet is present with prolapse of P1 and P2.     Aortic Valve  Aortic valve is normal in structure and function. The aortic valve is  tricuspid.     Tricuspid Valve  The tricuspid valve is normal. Trace tricuspid insufficiency is present.     Pulmonic Valve  The pulmonic valve is normal.     Vessels  The thoracic aorta is normal. The pulmonary artery and bifurcation cannot be  assessed. Normal pulmonary vein velocity.     Pericardium  No pericardial effusion is present.     EKG - SR, nl axis     Cardiac cath   Conclusion       Right sided filling pressures are mildly elevated.    Normal PA  pressures.    Left sided filling pressures are mildly elevated.    Normal cardiac output level.    Normal coronaries          Plan       Follow bedrest per protocol    Continued medical management and lifestyle modifications for cardiovascular risk factor optimizations.    Follow up with Dr. Masters.   Coronary Findings     Diagnostic   Dominance: Right   Left Main   The vessel is angiographically normal.   Left Anterior Descending   The vessel is angiographically normal.   Left Circumflex   The vessel is angiographically normal.   Right Coronary Artery   The vessel is angiographically normal.       Anesthesia Evaluation     . Pt has had prior anesthetic.     History of anesthetic complications   - PONV        ROS/MED HX    ENT/Pulmonary:  - neg pulmonary ROS    (-) sleep apnea and recent URI   Neurologic:      (-) seizures, CVA and migraines   Cardiovascular: Comment: Strep viridans bacteremia about a year ago with ? endocarditis    (+) ----. : . . . :. 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      (-) CAD and orthopnea/PND   METS/Exercise Tolerance:     Hematologic:         Musculoskeletal: Comment: Cervical spondylitis     Hx of osteomyelitis L5-S1 secondary to endocarditis         GI/Hepatic:  - neg GI/hepatic ROS      (-) GERD   Renal/Genitourinary:  - ROS Renal section negative       Endo:      (-) Type II DM and thyroid disease   Psychiatric:  - neg psychiatric ROS       Infectious Disease:  - neg infectious disease ROS       Malignancy:      - no malignancy   Other:                          Physical Exam  Normal systems: pulmonary and dental    Airway   Mallampati: I  TM distance: >3 FB  Neck ROM: full    Dental     Cardiovascular   Rhythm and rate: regular and normal  (+) murmur       Pulmonary    breath sounds clear to auscultation            Lab Results   Component Value Date    WBC 6.5 10/07/2019    HGB 15.6 10/07/2019    HCT 46.6 10/07/2019      "10/07/2019    CRP <2.9 03/20/2019    SED 25 (H) 02/28/2018     10/07/2019    POTASSIUM 3.8 10/07/2019    CHLORIDE 108 10/07/2019    CO2 27 10/07/2019    BUN 18 10/07/2019    CR 1.11 10/07/2019    GLC 90 10/07/2019    JEANNINE 9.0 10/07/2019    ALBUMIN 4.0 10/07/2019    PROTTOTAL 7.1 10/07/2019    ALT 17 10/07/2019    AST 18 10/07/2019    ALKPHOS 49 10/07/2019    BILITOTAL 0.5 10/07/2019    INR 1.07 10/07/2019    TSH 2.53 04/24/2018       Preop Vitals  BP Readings from Last 3 Encounters:   10/14/19 130/87   09/19/19 (!) 134/91   09/09/19 (!) 140/89    Pulse Readings from Last 3 Encounters:   10/14/19 84   09/19/19 80   09/09/19 92      Resp Readings from Last 3 Encounters:   10/14/19 14   09/09/19 20   04/10/19 14    SpO2 Readings from Last 3 Encounters:   10/14/19 97%   09/19/19 96%   09/09/19 99%      Temp Readings from Last 1 Encounters:   09/09/19 36.9  C (98.4  F) (Oral)    Ht Readings from Last 1 Encounters:   09/19/19 1.778 m (5' 10\")      Wt Readings from Last 1 Encounters:   09/19/19 86.6 kg (191 lb)    Estimated body mass index is 27.41 kg/m  as calculated from the following:    Height as of 9/19/19: 1.778 m (5' 10\").    Weight as of 9/19/19: 86.6 kg (191 lb).       Anesthesia Plan      History & Physical Review  History and physical reviewed and following examination; no interval change.    ASA Status:  3 .    NPO Status:  > 8 hours    Plan for General and ETT with Propofol induction. Maintenance will be Balanced.    PONV prophylaxis:  Ondansetron (or other 5HT-3)  Additional equipment: Arterial Line, Central Line, PA Catheter and DEEPAK      Postoperative Care  Postoperative pain management:  IV analgesics.      Consents  Anesthetic plan, risks, benefits and alternatives discussed with:  Patient..                 Asad Bates MD  "

## 2019-10-25 ENCOUNTER — APPOINTMENT (OUTPATIENT)
Dept: CARDIOLOGY | Facility: CLINIC | Age: 44
DRG: 219 | End: 2019-10-25
Attending: SURGERY
Payer: COMMERCIAL

## 2019-10-25 ENCOUNTER — APPOINTMENT (OUTPATIENT)
Dept: GENERAL RADIOLOGY | Facility: CLINIC | Age: 44
DRG: 219 | End: 2019-10-25
Attending: PHYSICIAN ASSISTANT
Payer: COMMERCIAL

## 2019-10-25 ENCOUNTER — ANESTHESIA (OUTPATIENT)
Dept: SURGERY | Facility: CLINIC | Age: 44
DRG: 219 | End: 2019-10-25
Payer: COMMERCIAL

## 2019-10-25 ENCOUNTER — HOSPITAL ENCOUNTER (INPATIENT)
Facility: CLINIC | Age: 44
LOS: 3 days | Discharge: HOME | DRG: 219 | End: 2019-10-28
Attending: SURGERY | Admitting: SURGERY
Payer: COMMERCIAL

## 2019-10-25 DIAGNOSIS — Z98.890 S/P MVR (MITRAL VALVE REPAIR): Primary | ICD-10-CM

## 2019-10-25 LAB
ABO + RH BLD: NORMAL
ABO + RH BLD: NORMAL
ALBUMIN SERPL-MCNC: 3.5 G/DL (ref 3.4–5)
ALP SERPL-CCNC: 35 U/L (ref 40–150)
ALT SERPL W P-5'-P-CCNC: 16 U/L (ref 0–70)
ANION GAP SERPL CALCULATED.3IONS-SCNC: 6 MMOL/L (ref 3–14)
ANION GAP SERPL CALCULATED.3IONS-SCNC: 7 MMOL/L (ref 3–14)
APTT PPP: 30 SEC (ref 22–37)
APTT PPP: 33 SEC (ref 22–37)
AST SERPL W P-5'-P-CCNC: 45 U/L (ref 0–45)
BASE DEFICIT BLDA-SCNC: 1 MMOL/L
BASE DEFICIT BLDV-SCNC: 1.1 MMOL/L
BILIRUB SERPL-MCNC: 0.7 MG/DL (ref 0.2–1.3)
BLD GP AB SCN SERPL QL: NORMAL
BLD PROD TYP BPU: NORMAL
BLD UNIT ID BPU: 0
BLD UNIT ID BPU: 0
BLOOD BANK CMNT PATIENT-IMP: NORMAL
BLOOD PRODUCT CODE: NORMAL
BLOOD PRODUCT CODE: NORMAL
BPU ID: NORMAL
BPU ID: NORMAL
BUN SERPL-MCNC: 12 MG/DL (ref 7–30)
BUN SERPL-MCNC: 13 MG/DL (ref 7–30)
CA-I BLD-MCNC: 4.5 MG/DL (ref 4.4–5.2)
CA-I BLD-SCNC: 4.5 MG/DL (ref 4.4–5.2)
CA-I BLD-SCNC: 4.8 MG/DL (ref 4.4–5.2)
CALCIUM SERPL-MCNC: 7.6 MG/DL (ref 8.5–10.1)
CALCIUM SERPL-MCNC: 7.7 MG/DL (ref 8.5–10.1)
CHLORIDE SERPL-SCNC: 111 MMOL/L (ref 94–109)
CHLORIDE SERPL-SCNC: 113 MMOL/L (ref 94–109)
CO2 BLD-SCNC: 25 MMOL/L (ref 21–28)
CO2 BLD-SCNC: 26 MMOL/L (ref 21–28)
CO2 BLD-SCNC: 27 MMOL/L (ref 21–28)
CO2 BLDCOV-SCNC: 25 MMOL/L (ref 21–28)
CO2 SERPL-SCNC: 23 MMOL/L (ref 20–32)
CO2 SERPL-SCNC: 24 MMOL/L (ref 20–32)
CPB APPLIED: ABNORMAL
CREAT SERPL-MCNC: 0.9 MG/DL (ref 0.66–1.25)
CREAT SERPL-MCNC: 0.9 MG/DL (ref 0.66–1.25)
ERYTHROCYTE [DISTWIDTH] IN BLOOD BY AUTOMATED COUNT: 12.5 % (ref 10–15)
ERYTHROCYTE [DISTWIDTH] IN BLOOD BY AUTOMATED COUNT: 12.6 % (ref 10–15)
FIBRINOGEN PPP-MCNC: 179 MG/DL (ref 200–420)
GFR SERPL CREATININE-BSD FRML MDRD: >90 ML/MIN/{1.73_M2}
GFR SERPL CREATININE-BSD FRML MDRD: >90 ML/MIN/{1.73_M2}
GLUCOSE BLDC GLUCOMTR-MCNC: 134 MG/DL (ref 70–99)
GLUCOSE BLDC GLUCOMTR-MCNC: 139 MG/DL (ref 70–99)
GLUCOSE BLDC GLUCOMTR-MCNC: 142 MG/DL (ref 70–99)
GLUCOSE BLDC GLUCOMTR-MCNC: 144 MG/DL (ref 70–99)
GLUCOSE BLDC GLUCOMTR-MCNC: 177 MG/DL (ref 70–99)
GLUCOSE BLDC GLUCOMTR-MCNC: 98 MG/DL (ref 70–99)
GLUCOSE SERPL-MCNC: 137 MG/DL (ref 70–99)
GLUCOSE SERPL-MCNC: 143 MG/DL (ref 70–99)
HCO3 BLD-SCNC: 24 MMOL/L (ref 21–28)
HCO3 BLDV-SCNC: 25 MMOL/L (ref 21–28)
HCT VFR BLD AUTO: 35.2 % (ref 40–53)
HCT VFR BLD AUTO: 39 % (ref 40–53)
HCT VFR BLD CALC: 30 %PCV (ref 40–53)
HCT VFR BLD CALC: 32 %PCV (ref 40–53)
HCT VFR BLD CALC: 32 %PCV (ref 40–53)
HCT VFR BLD CALC: 33 %PCV (ref 40–53)
HCT VFR BLD CALC: 33 %PCV (ref 40–53)
HCT VFR BLD CALC: 34 %PCV (ref 40–53)
HCT VFR BLD CALC: 39 %PCV (ref 40–53)
HGB BLD CALC-MCNC: 10.2 G/DL (ref 13.3–17.7)
HGB BLD CALC-MCNC: 10.9 G/DL (ref 13.3–17.7)
HGB BLD CALC-MCNC: 10.9 G/DL (ref 13.3–17.7)
HGB BLD CALC-MCNC: 11.2 G/DL (ref 13.3–17.7)
HGB BLD CALC-MCNC: 11.2 G/DL (ref 13.3–17.7)
HGB BLD CALC-MCNC: 11.6 G/DL (ref 13.3–17.7)
HGB BLD CALC-MCNC: 13.3 G/DL (ref 13.3–17.7)
HGB BLD-MCNC: 11.9 G/DL (ref 13.3–17.7)
HGB BLD-MCNC: 13.3 G/DL (ref 13.3–17.7)
INR PPP: 1.23 (ref 0.86–1.14)
INR PPP: 1.59 (ref 0.86–1.14)
LACTATE BLD-SCNC: 0.6 MMOL/L (ref 0.7–2.1)
LACTATE BLD-SCNC: 0.6 MMOL/L (ref 0.7–2.1)
LACTATE BLD-SCNC: 0.7 MMOL/L (ref 0.7–2.1)
LACTATE BLD-SCNC: 0.8 MMOL/L (ref 0.7–2.1)
LACTATE BLD-SCNC: 1 MMOL/L (ref 0.7–2.1)
LACTATE BLD-SCNC: 1.1 MMOL/L (ref 0.7–2)
MAGNESIUM SERPL-MCNC: 3.5 MG/DL (ref 1.6–2.3)
MCH RBC QN AUTO: 29.8 PG (ref 26.5–33)
MCH RBC QN AUTO: 29.8 PG (ref 26.5–33)
MCHC RBC AUTO-ENTMCNC: 33.8 G/DL (ref 31.5–36.5)
MCHC RBC AUTO-ENTMCNC: 34.1 G/DL (ref 31.5–36.5)
MCV RBC AUTO: 87 FL (ref 78–100)
MCV RBC AUTO: 88 FL (ref 78–100)
MRSA DNA SPEC QL NAA+PROBE: NEGATIVE
NUM BPU REQUESTED: 4
O2/TOTAL GAS SETTING VFR VENT: ABNORMAL %
O2/TOTAL GAS SETTING VFR VENT: NORMAL %
OXYHGB MFR BLD: 99 % (ref 92–100)
OXYHGB MFR BLDV: 80 %
PCO2 BLD: 41 MM HG (ref 35–45)
PCO2 BLD: 43 MM HG (ref 35–45)
PCO2 BLD: 44 MM HG (ref 35–45)
PCO2 BLD: 44 MM HG (ref 35–45)
PCO2 BLD: 45 MM HG (ref 35–45)
PCO2 BLD: 47 MM HG (ref 35–45)
PCO2 BLD: 52 MM HG (ref 35–45)
PCO2 BLDV: 46 MM HG (ref 40–50)
PCO2 BLDV: 56 MM HG (ref 40–50)
PH BLD: 7.32 PH (ref 7.35–7.45)
PH BLD: 7.34 PH (ref 7.35–7.45)
PH BLD: 7.36 PH (ref 7.35–7.45)
PH BLD: 7.37 PH (ref 7.35–7.45)
PH BLD: 7.38 PH (ref 7.35–7.45)
PH BLDV: 7.26 PH (ref 7.32–7.43)
PH BLDV: 7.35 PH (ref 7.32–7.43)
PHOSPHATE SERPL-MCNC: 1.9 MG/DL (ref 2.5–4.5)
PHOSPHATE SERPL-MCNC: 3.9 MG/DL (ref 2.5–4.5)
PLATELET # BLD AUTO: 112 10E9/L (ref 150–450)
PLATELET # BLD AUTO: 128 10E9/L (ref 150–450)
PO2 BLD: 249 MM HG (ref 80–105)
PO2 BLD: 288 MM HG (ref 80–105)
PO2 BLD: 318 MM HG (ref 80–105)
PO2 BLD: 340 MM HG (ref 80–105)
PO2 BLD: 355 MM HG (ref 80–105)
PO2 BLD: 387 MM HG (ref 80–105)
PO2 BLD: 567 MM HG (ref 80–105)
PO2 BLDV: 46 MM HG (ref 25–47)
PO2 BLDV: 57 MM HG (ref 25–47)
POTASSIUM BLD-SCNC: 4.1 MMOL/L (ref 3.4–5.3)
POTASSIUM BLD-SCNC: 4.1 MMOL/L (ref 3.4–5.3)
POTASSIUM BLD-SCNC: 4.4 MMOL/L (ref 3.4–5.3)
POTASSIUM BLD-SCNC: 5.1 MMOL/L (ref 3.4–5.3)
POTASSIUM BLD-SCNC: 5.3 MMOL/L (ref 3.4–5.3)
POTASSIUM BLD-SCNC: 5.5 MMOL/L (ref 3.4–5.3)
POTASSIUM BLD-SCNC: 6.1 MMOL/L (ref 3.4–5.3)
POTASSIUM SERPL-SCNC: 4.1 MMOL/L (ref 3.4–5.3)
POTASSIUM SERPL-SCNC: 4.4 MMOL/L (ref 3.4–5.3)
PROT SERPL-MCNC: 5.8 G/DL (ref 6.8–8.8)
RBC # BLD AUTO: 4 10E12/L (ref 4.4–5.9)
RBC # BLD AUTO: 4.46 10E12/L (ref 4.4–5.9)
SAO2 % BLDA FROM PO2: 100 % (ref 92–100)
SAO2 % BLDV FROM PO2: 84 %
SODIUM BLD-SCNC: 139 MMOL/L (ref 133–144)
SODIUM BLD-SCNC: 140 MMOL/L (ref 133–144)
SODIUM BLD-SCNC: 140 MMOL/L (ref 133–144)
SODIUM BLD-SCNC: 141 MMOL/L (ref 133–144)
SODIUM SERPL-SCNC: 141 MMOL/L (ref 133–144)
SODIUM SERPL-SCNC: 143 MMOL/L (ref 133–144)
SPECIMEN EXP DATE BLD: NORMAL
SPECIMEN SOURCE: NORMAL
TRANSFUSION STATUS PATIENT QL: NORMAL
WBC # BLD AUTO: 10 10E9/L (ref 4–11)
WBC # BLD AUTO: 13.5 10E9/L (ref 4–11)

## 2019-10-25 PROCEDURE — 37000008 ZZH ANESTHESIA TECHNICAL FEE, 1ST 30 MIN: Performed by: SURGERY

## 2019-10-25 PROCEDURE — 93325 DOPPLER ECHO COLOR FLOW MAPG: CPT | Mod: 26 | Performed by: INTERNAL MEDICINE

## 2019-10-25 PROCEDURE — 86901 BLOOD TYPING SEROLOGIC RH(D): CPT | Performed by: SURGERY

## 2019-10-25 PROCEDURE — 41000023 ZZH PERA-PERFUSION, SH ONLY,  EACH ADDTL 15 MIN: Performed by: SURGERY

## 2019-10-25 PROCEDURE — 84100 ASSAY OF PHOSPHORUS: CPT | Performed by: PHYSICIAN ASSISTANT

## 2019-10-25 PROCEDURE — 93325 DOPPLER ECHO COLOR FLOW MAPG: CPT

## 2019-10-25 PROCEDURE — 40000985 XR CHEST PORT 1 VW

## 2019-10-25 PROCEDURE — 85730 THROMBOPLASTIN TIME PARTIAL: CPT | Performed by: PHYSICIAN ASSISTANT

## 2019-10-25 PROCEDURE — 93005 ELECTROCARDIOGRAM TRACING: CPT

## 2019-10-25 PROCEDURE — 25000128 H RX IP 250 OP 636: Performed by: SURGERY

## 2019-10-25 PROCEDURE — 25000128 H RX IP 250 OP 636: Performed by: PHYSICIAN ASSISTANT

## 2019-10-25 PROCEDURE — P9016 RBC LEUKOCYTES REDUCED: HCPCS | Performed by: SURGERY

## 2019-10-25 PROCEDURE — 85730 THROMBOPLASTIN TIME PARTIAL: CPT | Performed by: SURGERY

## 2019-10-25 PROCEDURE — 5A1221Z PERFORMANCE OF CARDIAC OUTPUT, CONTINUOUS: ICD-10-PCS | Performed by: SURGERY

## 2019-10-25 PROCEDURE — 27810325 ZZHC OR IMPLANT OTHER OPNP: Performed by: SURGERY

## 2019-10-25 PROCEDURE — 83605 ASSAY OF LACTIC ACID: CPT | Performed by: PHYSICIAN ASSISTANT

## 2019-10-25 PROCEDURE — 25800030 ZZH RX IP 258 OP 636: Performed by: SURGERY

## 2019-10-25 PROCEDURE — 27210794 ZZH OR GENERAL SUPPLY STERILE: Performed by: SURGERY

## 2019-10-25 PROCEDURE — 99291 CRITICAL CARE FIRST HOUR: CPT | Performed by: PHYSICIAN ASSISTANT

## 2019-10-25 PROCEDURE — 85027 COMPLETE CBC AUTOMATED: CPT | Performed by: SURGERY

## 2019-10-25 PROCEDURE — 85610 PROTHROMBIN TIME: CPT | Performed by: PHYSICIAN ASSISTANT

## 2019-10-25 PROCEDURE — C9113 INJ PANTOPRAZOLE SODIUM, VIA: HCPCS | Performed by: PHYSICIAN ASSISTANT

## 2019-10-25 PROCEDURE — 86850 RBC ANTIBODY SCREEN: CPT | Performed by: SURGERY

## 2019-10-25 PROCEDURE — 40000275 ZZH STATISTIC RCP TIME EA 10 MIN

## 2019-10-25 PROCEDURE — 25000125 ZZHC RX 250: Performed by: ANESTHESIOLOGY

## 2019-10-25 PROCEDURE — 93312 ECHO TRANSESOPHAGEAL: CPT | Mod: 26 | Performed by: INTERNAL MEDICINE

## 2019-10-25 PROCEDURE — 82803 BLOOD GASES ANY COMBINATION: CPT

## 2019-10-25 PROCEDURE — 85384 FIBRINOGEN ACTIVITY: CPT | Performed by: SURGERY

## 2019-10-25 PROCEDURE — 85027 COMPLETE CBC AUTOMATED: CPT | Performed by: PHYSICIAN ASSISTANT

## 2019-10-25 PROCEDURE — 80048 BASIC METABOLIC PNL TOTAL CA: CPT | Performed by: SURGERY

## 2019-10-25 PROCEDURE — 02UG08Z SUPPLEMENT MITRAL VALVE WITH ZOOPLASTIC TISSUE, OPEN APPROACH: ICD-10-PCS | Performed by: SURGERY

## 2019-10-25 PROCEDURE — 25000128 H RX IP 250 OP 636: Performed by: ANESTHESIOLOGY

## 2019-10-25 PROCEDURE — 85014 HEMATOCRIT: CPT

## 2019-10-25 PROCEDURE — 83605 ASSAY OF LACTIC ACID: CPT

## 2019-10-25 PROCEDURE — 02U90JZ SUPPLEMENT CHORDAE TENDINEAE WITH SYNTHETIC SUBSTITUTE, OPEN APPROACH: ICD-10-PCS | Performed by: SURGERY

## 2019-10-25 PROCEDURE — 00000146 ZZHCL STATISTIC GLUCOSE BY METER IP

## 2019-10-25 PROCEDURE — 86923 COMPATIBILITY TEST ELECTRIC: CPT | Performed by: SURGERY

## 2019-10-25 PROCEDURE — 25000132 ZZH RX MED GY IP 250 OP 250 PS 637: Performed by: PHYSICIAN ASSISTANT

## 2019-10-25 PROCEDURE — 93010 ELECTROCARDIOGRAM REPORT: CPT | Performed by: INTERNAL MEDICINE

## 2019-10-25 PROCEDURE — 84100 ASSAY OF PHOSPHORUS: CPT | Performed by: SURGERY

## 2019-10-25 PROCEDURE — 87640 STAPH A DNA AMP PROBE: CPT | Performed by: SURGERY

## 2019-10-25 PROCEDURE — 82805 BLOOD GASES W/O2 SATURATION: CPT | Performed by: PHYSICIAN ASSISTANT

## 2019-10-25 PROCEDURE — 40000171 ZZH STATISTIC PRE-PROCEDURE ASSESSMENT III: Performed by: SURGERY

## 2019-10-25 PROCEDURE — C1779 LEAD, PMKR, TRANSVENOUS VDD: HCPCS | Performed by: SURGERY

## 2019-10-25 PROCEDURE — 36000069 ZZH SURGERY LEVEL 5 EA 15 ADDTL MIN: Performed by: SURGERY

## 2019-10-25 PROCEDURE — 80053 COMPREHEN METABOLIC PANEL: CPT | Performed by: PHYSICIAN ASSISTANT

## 2019-10-25 PROCEDURE — 20000003 ZZH R&B ICU

## 2019-10-25 PROCEDURE — 85610 PROTHROMBIN TIME: CPT | Performed by: SURGERY

## 2019-10-25 PROCEDURE — 25000125 ZZHC RX 250: Performed by: PHYSICIAN ASSISTANT

## 2019-10-25 PROCEDURE — 25000125 ZZHC RX 250: Performed by: SURGERY

## 2019-10-25 PROCEDURE — 25000128 H RX IP 250 OP 636

## 2019-10-25 PROCEDURE — 25800030 ZZH RX IP 258 OP 636

## 2019-10-25 PROCEDURE — 87641 MR-STAPH DNA AMP PROBE: CPT | Performed by: SURGERY

## 2019-10-25 PROCEDURE — 36415 COLL VENOUS BLD VENIPUNCTURE: CPT | Performed by: SURGERY

## 2019-10-25 PROCEDURE — 83735 ASSAY OF MAGNESIUM: CPT | Performed by: PHYSICIAN ASSISTANT

## 2019-10-25 PROCEDURE — 84295 ASSAY OF SERUM SODIUM: CPT

## 2019-10-25 PROCEDURE — C1713 ANCHOR/SCREW BN/BN,TIS/BN: HCPCS | Performed by: SURGERY

## 2019-10-25 PROCEDURE — 86900 BLOOD TYPING SEROLOGIC ABO: CPT | Performed by: SURGERY

## 2019-10-25 PROCEDURE — 84132 ASSAY OF SERUM POTASSIUM: CPT

## 2019-10-25 PROCEDURE — 25800030 ZZH RX IP 258 OP 636: Performed by: ANESTHESIOLOGY

## 2019-10-25 PROCEDURE — 93321 DOPPLER ECHO F-UP/LMTD STD: CPT | Mod: 26 | Performed by: INTERNAL MEDICINE

## 2019-10-25 PROCEDURE — P9041 ALBUMIN (HUMAN),5%, 50ML: HCPCS

## 2019-10-25 PROCEDURE — 82330 ASSAY OF CALCIUM: CPT | Performed by: PHYSICIAN ASSISTANT

## 2019-10-25 PROCEDURE — 25000132 ZZH RX MED GY IP 250 OP 250 PS 637: Performed by: SURGERY

## 2019-10-25 PROCEDURE — 82330 ASSAY OF CALCIUM: CPT

## 2019-10-25 PROCEDURE — 36000067 ZZH SURGERY LEVEL 5 1ST 30 MIN: Performed by: SURGERY

## 2019-10-25 PROCEDURE — 25000125 ZZHC RX 250

## 2019-10-25 PROCEDURE — 25800030 ZZH RX IP 258 OP 636: Performed by: PHYSICIAN ASSISTANT

## 2019-10-25 PROCEDURE — 41000022 ZZH PER-PERFUSION, SH ONLY,  1ST 30 MIN: Performed by: SURGERY

## 2019-10-25 PROCEDURE — 25000565 ZZH ISOFLURANE, EA 15 MIN: Performed by: SURGERY

## 2019-10-25 PROCEDURE — 37000009 ZZH ANESTHESIA TECHNICAL FEE, EACH ADDTL 15 MIN: Performed by: SURGERY

## 2019-10-25 DEVICE — IMPLANTABLE DEVICE: Type: IMPLANTABLE DEVICE | Site: HEART | Status: FUNCTIONAL

## 2019-10-25 DEVICE — SU DEVICE ENDO COR KNOT QUICK LOAD 030850: Type: IMPLANTABLE DEVICE | Site: HEART | Status: FUNCTIONAL

## 2019-10-25 DEVICE — SU DEVICE COR-KNOT MINI 4X14MM 031350: Type: IMPLANTABLE DEVICE | Site: HEART | Status: FUNCTIONAL

## 2019-10-25 DEVICE — LEAD PACER MYOCARDIAL BIPOLAR TEMPORARY 53CM 6495F: Type: IMPLANTABLE DEVICE | Site: HEART | Status: FUNCTIONAL

## 2019-10-25 RX ORDER — MEPERIDINE HYDROCHLORIDE 25 MG/ML
12.5-25 INJECTION INTRAMUSCULAR; INTRAVENOUS; SUBCUTANEOUS
Status: DISCONTINUED | OUTPATIENT
Start: 2019-10-25 | End: 2019-10-26

## 2019-10-25 RX ORDER — OXYCODONE HYDROCHLORIDE 5 MG/1
5-10 TABLET ORAL
Status: DISCONTINUED | OUTPATIENT
Start: 2019-10-25 | End: 2019-10-28 | Stop reason: HOSPADM

## 2019-10-25 RX ORDER — FENTANYL CITRATE 50 UG/ML
25 INJECTION, SOLUTION INTRAMUSCULAR; INTRAVENOUS EVERY 5 MIN PRN
Status: ACTIVE | OUTPATIENT
Start: 2019-10-25 | End: 2019-10-26

## 2019-10-25 RX ORDER — AMOXICILLIN 250 MG
2 CAPSULE ORAL 2 TIMES DAILY
Status: DISCONTINUED | OUTPATIENT
Start: 2019-10-25 | End: 2019-10-28 | Stop reason: HOSPADM

## 2019-10-25 RX ORDER — POTASSIUM CHLORIDE 29.8 MG/ML
20 INJECTION INTRAVENOUS
Status: DISCONTINUED | OUTPATIENT
Start: 2019-10-25 | End: 2019-10-28 | Stop reason: HOSPADM

## 2019-10-25 RX ORDER — EPHEDRINE SULFATE 50 MG/ML
INJECTION, SOLUTION INTRAMUSCULAR; INTRAVENOUS; SUBCUTANEOUS PRN
Status: DISCONTINUED | OUTPATIENT
Start: 2019-10-25 | End: 2019-10-25

## 2019-10-25 RX ORDER — POTASSIUM CHLORIDE 7.45 MG/ML
10 INJECTION INTRAVENOUS
Status: DISCONTINUED | OUTPATIENT
Start: 2019-10-25 | End: 2019-10-28 | Stop reason: HOSPADM

## 2019-10-25 RX ORDER — POTASSIUM CHLORIDE, SODIUM CHLORIDE, CALCIUM CHLORIDE, AND MAGNESIUM CHLORIDE 17.6; 325.3; 119.3; 643 MG/100ML; MG/100ML; MG/100ML; MG/100ML
INJECTION, SOLUTION INTRA-ARTERIAL CONTINUOUS
Status: DISCONTINUED | OUTPATIENT
Start: 2019-10-25 | End: 2019-10-25

## 2019-10-25 RX ORDER — MUPIROCIN 20 MG/G
OINTMENT TOPICAL 2 TIMES DAILY
Status: DISCONTINUED | OUTPATIENT
Start: 2019-10-25 | End: 2019-10-25 | Stop reason: HOSPADM

## 2019-10-25 RX ORDER — FENTANYL CITRATE 50 UG/ML
INJECTION, SOLUTION INTRAMUSCULAR; INTRAVENOUS PRN
Status: DISCONTINUED | OUTPATIENT
Start: 2019-10-25 | End: 2019-10-25

## 2019-10-25 RX ORDER — LIDOCAINE HYDROCHLORIDE 20 MG/ML
INJECTION, SOLUTION INFILTRATION; PERINEURAL PRN
Status: DISCONTINUED | OUTPATIENT
Start: 2019-10-25 | End: 2019-10-25

## 2019-10-25 RX ORDER — KETOROLAC TROMETHAMINE 30 MG/ML
30 INJECTION, SOLUTION INTRAMUSCULAR; INTRAVENOUS
Status: COMPLETED | OUTPATIENT
Start: 2019-10-25 | End: 2019-10-26

## 2019-10-25 RX ORDER — FUROSEMIDE 10 MG/ML
20 INJECTION INTRAMUSCULAR; INTRAVENOUS
Status: COMPLETED | OUTPATIENT
Start: 2019-10-25 | End: 2019-10-26

## 2019-10-25 RX ORDER — CEFAZOLIN SODIUM 2 G/100ML
2 INJECTION, SOLUTION INTRAVENOUS EVERY 8 HOURS
Status: COMPLETED | OUTPATIENT
Start: 2019-10-25 | End: 2019-10-26

## 2019-10-25 RX ORDER — HEPARIN SODIUM 1000 [USP'U]/ML
INJECTION, SOLUTION INTRAVENOUS; SUBCUTANEOUS PRN
Status: DISCONTINUED | OUTPATIENT
Start: 2019-10-25 | End: 2019-10-25

## 2019-10-25 RX ORDER — PROCHLORPERAZINE MALEATE 5 MG
10 TABLET ORAL EVERY 6 HOURS PRN
Status: DISCONTINUED | OUTPATIENT
Start: 2019-10-25 | End: 2019-10-28 | Stop reason: HOSPADM

## 2019-10-25 RX ORDER — ESMOLOL HYDROCHLORIDE 10 MG/ML
INJECTION INTRAVENOUS PRN
Status: DISCONTINUED | OUTPATIENT
Start: 2019-10-25 | End: 2019-10-25

## 2019-10-25 RX ORDER — SODIUM CHLORIDE 9 MG/ML
INJECTION, SOLUTION INTRAVENOUS CONTINUOUS PRN
Status: DISCONTINUED | OUTPATIENT
Start: 2019-10-25 | End: 2019-10-25

## 2019-10-25 RX ORDER — NICOTINE POLACRILEX 4 MG
15-30 LOZENGE BUCCAL
Status: DISCONTINUED | OUTPATIENT
Start: 2019-10-25 | End: 2019-10-28 | Stop reason: HOSPADM

## 2019-10-25 RX ORDER — ONDANSETRON 2 MG/ML
4 INJECTION INTRAMUSCULAR; INTRAVENOUS EVERY 6 HOURS PRN
Status: DISCONTINUED | OUTPATIENT
Start: 2019-10-25 | End: 2019-10-28 | Stop reason: HOSPADM

## 2019-10-25 RX ORDER — DEXTROSE MONOHYDRATE, SODIUM CHLORIDE, AND POTASSIUM CHLORIDE 50; 1.49; 4.5 G/1000ML; G/1000ML; G/1000ML
INJECTION, SOLUTION INTRAVENOUS CONTINUOUS
Status: DISCONTINUED | OUTPATIENT
Start: 2019-10-25 | End: 2019-10-27

## 2019-10-25 RX ORDER — DEXTROSE MONOHYDRATE 25 G/50ML
25-50 INJECTION, SOLUTION INTRAVENOUS
Status: DISCONTINUED | OUTPATIENT
Start: 2019-10-25 | End: 2019-10-28 | Stop reason: HOSPADM

## 2019-10-25 RX ORDER — HYDRALAZINE HYDROCHLORIDE 20 MG/ML
10 INJECTION INTRAMUSCULAR; INTRAVENOUS EVERY 30 MIN PRN
Status: DISCONTINUED | OUTPATIENT
Start: 2019-10-25 | End: 2019-10-28 | Stop reason: HOSPADM

## 2019-10-25 RX ORDER — GABAPENTIN 300 MG/1
300 CAPSULE ORAL 2 TIMES DAILY
Status: DISCONTINUED | OUTPATIENT
Start: 2019-10-25 | End: 2019-10-27

## 2019-10-25 RX ORDER — SODIUM CHLORIDE, SODIUM LACTATE, POTASSIUM CHLORIDE, CALCIUM CHLORIDE 600; 310; 30; 20 MG/100ML; MG/100ML; MG/100ML; MG/100ML
INJECTION, SOLUTION INTRAVENOUS CONTINUOUS PRN
Status: DISCONTINUED | OUTPATIENT
Start: 2019-10-25 | End: 2019-10-25

## 2019-10-25 RX ORDER — HEPARIN SODIUM 5000 [USP'U]/.5ML
5000 INJECTION, SOLUTION INTRAVENOUS; SUBCUTANEOUS EVERY 8 HOURS
Status: DISCONTINUED | OUTPATIENT
Start: 2019-10-26 | End: 2019-10-28 | Stop reason: HOSPADM

## 2019-10-25 RX ORDER — POTASSIUM CHLORIDE 1.5 G/1.58G
20-40 POWDER, FOR SOLUTION ORAL
Status: DISCONTINUED | OUTPATIENT
Start: 2019-10-25 | End: 2019-10-28 | Stop reason: HOSPADM

## 2019-10-25 RX ORDER — ALBUMIN, HUMAN INJ 5% 5 %
500-1000 SOLUTION INTRAVENOUS
Status: DISCONTINUED | OUTPATIENT
Start: 2019-10-25 | End: 2019-10-28 | Stop reason: HOSPADM

## 2019-10-25 RX ORDER — AMOXICILLIN 250 MG
1 CAPSULE ORAL 2 TIMES DAILY
Status: DISCONTINUED | OUTPATIENT
Start: 2019-10-25 | End: 2019-10-28 | Stop reason: HOSPADM

## 2019-10-25 RX ORDER — PROPOFOL 10 MG/ML
5-75 INJECTION, EMULSION INTRAVENOUS CONTINUOUS
Status: DISCONTINUED | OUTPATIENT
Start: 2019-10-25 | End: 2019-10-26

## 2019-10-25 RX ORDER — ONDANSETRON 4 MG/1
4 TABLET, ORALLY DISINTEGRATING ORAL EVERY 6 HOURS PRN
Status: DISCONTINUED | OUTPATIENT
Start: 2019-10-25 | End: 2019-10-28 | Stop reason: HOSPADM

## 2019-10-25 RX ORDER — HYDROXYZINE HYDROCHLORIDE 25 MG/1
25 TABLET, FILM COATED ORAL EVERY 6 HOURS PRN
Status: DISCONTINUED | OUTPATIENT
Start: 2019-10-25 | End: 2019-10-28 | Stop reason: HOSPADM

## 2019-10-25 RX ORDER — CEFAZOLIN SODIUM 1 G/3ML
1 INJECTION, POWDER, FOR SOLUTION INTRAMUSCULAR; INTRAVENOUS SEE ADMIN INSTRUCTIONS
Status: DISCONTINUED | OUTPATIENT
Start: 2019-10-25 | End: 2019-10-25 | Stop reason: HOSPADM

## 2019-10-25 RX ORDER — NALOXONE HYDROCHLORIDE 0.4 MG/ML
.1-.4 INJECTION, SOLUTION INTRAMUSCULAR; INTRAVENOUS; SUBCUTANEOUS
Status: DISCONTINUED | OUTPATIENT
Start: 2019-10-25 | End: 2019-10-25

## 2019-10-25 RX ORDER — NITROGLYCERIN 10 MG/100ML
INJECTION INTRAVENOUS PRN
Status: DISCONTINUED | OUTPATIENT
Start: 2019-10-25 | End: 2019-10-25

## 2019-10-25 RX ORDER — KETOROLAC TROMETHAMINE 30 MG/ML
15 INJECTION, SOLUTION INTRAMUSCULAR; INTRAVENOUS EVERY 6 HOURS PRN
Status: COMPLETED | OUTPATIENT
Start: 2019-10-25 | End: 2019-10-26

## 2019-10-25 RX ORDER — NITROGLYCERIN 20 MG/100ML
.07-2 INJECTION INTRAVENOUS CONTINUOUS
Status: DISCONTINUED | OUTPATIENT
Start: 2019-10-25 | End: 2019-10-26

## 2019-10-25 RX ORDER — ACETAMINOPHEN 325 MG/1
975 TABLET ORAL EVERY 8 HOURS
Status: COMPLETED | OUTPATIENT
Start: 2019-10-25 | End: 2019-10-28

## 2019-10-25 RX ORDER — METOCLOPRAMIDE 5 MG/1
10 TABLET ORAL EVERY 6 HOURS PRN
Status: DISCONTINUED | OUTPATIENT
Start: 2019-10-25 | End: 2019-10-28 | Stop reason: HOSPADM

## 2019-10-25 RX ORDER — METOPROLOL TARTRATE 25 MG/1
25 TABLET, FILM COATED ORAL EVERY 12 HOURS
Status: DISCONTINUED | OUTPATIENT
Start: 2019-10-26 | End: 2019-10-26

## 2019-10-25 RX ORDER — FENTANYL CITRATE 50 UG/ML
50-100 INJECTION, SOLUTION INTRAMUSCULAR; INTRAVENOUS
Status: DISCONTINUED | OUTPATIENT
Start: 2019-10-25 | End: 2019-10-26

## 2019-10-25 RX ORDER — PROPOFOL 10 MG/ML
INJECTION, EMULSION INTRAVENOUS PRN
Status: DISCONTINUED | OUTPATIENT
Start: 2019-10-25 | End: 2019-10-25

## 2019-10-25 RX ORDER — POTASSIUM CL/LIDO/0.9 % NACL 10MEQ/0.1L
10 INTRAVENOUS SOLUTION, PIGGYBACK (ML) INTRAVENOUS
Status: DISCONTINUED | OUTPATIENT
Start: 2019-10-25 | End: 2019-10-28 | Stop reason: HOSPADM

## 2019-10-25 RX ORDER — PROPOFOL 10 MG/ML
INJECTION, EMULSION INTRAVENOUS CONTINUOUS PRN
Status: DISCONTINUED | OUTPATIENT
Start: 2019-10-25 | End: 2019-10-25

## 2019-10-25 RX ORDER — POTASSIUM CHLORIDE 1500 MG/1
20-40 TABLET, EXTENDED RELEASE ORAL
Status: DISCONTINUED | OUTPATIENT
Start: 2019-10-25 | End: 2019-10-28 | Stop reason: HOSPADM

## 2019-10-25 RX ORDER — MAGNESIUM SULFATE HEPTAHYDRATE 40 MG/ML
2 INJECTION, SOLUTION INTRAVENOUS DAILY PRN
Status: DISCONTINUED | OUTPATIENT
Start: 2019-10-25 | End: 2019-10-28 | Stop reason: HOSPADM

## 2019-10-25 RX ORDER — LIDOCAINE 40 MG/G
CREAM TOPICAL
Status: DISCONTINUED | OUTPATIENT
Start: 2019-10-25 | End: 2019-10-28 | Stop reason: HOSPADM

## 2019-10-25 RX ORDER — METOCLOPRAMIDE HYDROCHLORIDE 5 MG/ML
10 INJECTION INTRAMUSCULAR; INTRAVENOUS EVERY 6 HOURS PRN
Status: DISCONTINUED | OUTPATIENT
Start: 2019-10-25 | End: 2019-10-28 | Stop reason: HOSPADM

## 2019-10-25 RX ORDER — PROTAMINE SULFATE 10 MG/ML
INJECTION, SOLUTION INTRAVENOUS PRN
Status: DISCONTINUED | OUTPATIENT
Start: 2019-10-25 | End: 2019-10-25

## 2019-10-25 RX ORDER — METHOCARBAMOL 750 MG/1
750 TABLET, FILM COATED ORAL 4 TIMES DAILY PRN
Status: DISCONTINUED | OUTPATIENT
Start: 2019-10-25 | End: 2019-10-28 | Stop reason: HOSPADM

## 2019-10-25 RX ORDER — VECURONIUM BROMIDE 1 MG/ML
INJECTION, POWDER, LYOPHILIZED, FOR SOLUTION INTRAVENOUS PRN
Status: DISCONTINUED | OUTPATIENT
Start: 2019-10-25 | End: 2019-10-25

## 2019-10-25 RX ORDER — DEXMEDETOMIDINE HYDROCHLORIDE 4 UG/ML
.2-.7 INJECTION, SOLUTION INTRAVENOUS CONTINUOUS
Status: DISCONTINUED | OUTPATIENT
Start: 2019-10-25 | End: 2019-10-26

## 2019-10-25 RX ORDER — MAGNESIUM SULFATE HEPTAHYDRATE 40 MG/ML
4 INJECTION, SOLUTION INTRAVENOUS EVERY 4 HOURS PRN
Status: DISCONTINUED | OUTPATIENT
Start: 2019-10-25 | End: 2019-10-28 | Stop reason: HOSPADM

## 2019-10-25 RX ORDER — FENTANYL CITRATE 50 UG/ML
50 INJECTION, SOLUTION INTRAMUSCULAR; INTRAVENOUS
Status: COMPLETED | OUTPATIENT
Start: 2019-10-25 | End: 2019-10-25

## 2019-10-25 RX ORDER — PHENYLEPHRINE HCL IN 0.9% NACL 50MG/250ML
.5-2 PLASTIC BAG, INJECTION (ML) INTRAVENOUS CONTINUOUS
Status: DISCONTINUED | OUTPATIENT
Start: 2019-10-25 | End: 2019-10-26

## 2019-10-25 RX ORDER — SODIUM CHLORIDE, SODIUM LACTATE, POTASSIUM CHLORIDE, CALCIUM CHLORIDE 600; 310; 30; 20 MG/100ML; MG/100ML; MG/100ML; MG/100ML
INJECTION, SOLUTION INTRAVENOUS CONTINUOUS
Status: DISCONTINUED | OUTPATIENT
Start: 2019-10-25 | End: 2019-10-25 | Stop reason: HOSPADM

## 2019-10-25 RX ORDER — ACETAMINOPHEN 325 MG/1
650 TABLET ORAL EVERY 4 HOURS PRN
Status: DISCONTINUED | OUTPATIENT
Start: 2019-10-28 | End: 2019-10-28 | Stop reason: HOSPADM

## 2019-10-25 RX ORDER — BUPIVACAINE HYDROCHLORIDE 5 MG/ML
INJECTION, SOLUTION PERINEURAL PRN
Status: DISCONTINUED | OUTPATIENT
Start: 2019-10-25 | End: 2019-10-25 | Stop reason: HOSPADM

## 2019-10-25 RX ORDER — CEFAZOLIN SODIUM 2 G/100ML
2 INJECTION, SOLUTION INTRAVENOUS
Status: COMPLETED | OUTPATIENT
Start: 2019-10-25 | End: 2019-10-25

## 2019-10-25 RX ORDER — ALBUMIN, HUMAN INJ 5% 5 %
SOLUTION INTRAVENOUS CONTINUOUS PRN
Status: DISCONTINUED | OUTPATIENT
Start: 2019-10-25 | End: 2019-10-25

## 2019-10-25 RX ORDER — MUPIROCIN 20 MG/G
0.5 OINTMENT TOPICAL 2 TIMES DAILY
Status: DISCONTINUED | OUTPATIENT
Start: 2019-10-25 | End: 2019-10-26 | Stop reason: CLARIF

## 2019-10-25 RX ORDER — NALOXONE HYDROCHLORIDE 0.4 MG/ML
.1-.4 INJECTION, SOLUTION INTRAMUSCULAR; INTRAVENOUS; SUBCUTANEOUS
Status: DISCONTINUED | OUTPATIENT
Start: 2019-10-25 | End: 2019-10-28 | Stop reason: HOSPADM

## 2019-10-25 RX ORDER — METOPROLOL TARTRATE 25 MG/1
25 TABLET, FILM COATED ORAL
Status: DISCONTINUED | OUTPATIENT
Start: 2019-10-25 | End: 2019-10-25 | Stop reason: HOSPADM

## 2019-10-25 RX ADMIN — MIDAZOLAM HYDROCHLORIDE 1 MG: 1 INJECTION, SOLUTION INTRAMUSCULAR; INTRAVENOUS at 09:37

## 2019-10-25 RX ADMIN — LIDOCAINE HYDROCHLORIDE 100 MG: 20 INJECTION, SOLUTION INFILTRATION; PERINEURAL at 07:47

## 2019-10-25 RX ADMIN — PHENYLEPHRINE HYDROCHLORIDE 50 MCG: 10 INJECTION INTRAVENOUS at 08:05

## 2019-10-25 RX ADMIN — PROPOFOL 50 MCG/KG/MIN: 10 INJECTION, EMULSION INTRAVENOUS at 12:15

## 2019-10-25 RX ADMIN — KETOROLAC TROMETHAMINE 15 MG: 30 INJECTION, SOLUTION INTRAMUSCULAR at 22:20

## 2019-10-25 RX ADMIN — CEFAZOLIN SODIUM 1 G: 2 INJECTION, SOLUTION INTRAVENOUS at 10:12

## 2019-10-25 RX ADMIN — PROCHLORPERAZINE EDISYLATE 10 MG: 5 INJECTION, SOLUTION INTRAMUSCULAR; INTRAVENOUS at 17:43

## 2019-10-25 RX ADMIN — FENTANYL CITRATE 50 MCG: 50 INJECTION, SOLUTION INTRAMUSCULAR; INTRAVENOUS at 12:15

## 2019-10-25 RX ADMIN — PROPOFOL 30 MG: 10 INJECTION, EMULSION INTRAVENOUS at 11:42

## 2019-10-25 RX ADMIN — FENTANYL CITRATE 250 MCG: 50 INJECTION, SOLUTION INTRAMUSCULAR; INTRAVENOUS at 07:49

## 2019-10-25 RX ADMIN — VECURONIUM BROMIDE 5 MG: 1 INJECTION, POWDER, LYOPHILIZED, FOR SOLUTION INTRAVENOUS at 09:15

## 2019-10-25 RX ADMIN — SODIUM CHLORIDE, POTASSIUM CHLORIDE, SODIUM LACTATE AND CALCIUM CHLORIDE: 600; 310; 30; 20 INJECTION, SOLUTION INTRAVENOUS at 07:36

## 2019-10-25 RX ADMIN — SODIUM CHLORIDE, POTASSIUM CHLORIDE, SODIUM LACTATE AND CALCIUM CHLORIDE: 600; 310; 30; 20 INJECTION, SOLUTION INTRAVENOUS at 07:40

## 2019-10-25 RX ADMIN — OXYCODONE HYDROCHLORIDE 10 MG: 5 TABLET ORAL at 20:02

## 2019-10-25 RX ADMIN — MIDAZOLAM HYDROCHLORIDE 1 MG: 1 INJECTION, SOLUTION INTRAMUSCULAR; INTRAVENOUS at 06:52

## 2019-10-25 RX ADMIN — AMINOCAPROIC ACID 1 G/HR: 250 INJECTION, SOLUTION INTRAVENOUS at 07:52

## 2019-10-25 RX ADMIN — PHENYLEPHRINE HYDROCHLORIDE 50 MCG: 10 INJECTION INTRAVENOUS at 08:20

## 2019-10-25 RX ADMIN — FENTANYL CITRATE 250 MCG: 50 INJECTION, SOLUTION INTRAMUSCULAR; INTRAVENOUS at 07:47

## 2019-10-25 RX ADMIN — OXYCODONE HYDROCHLORIDE 10 MG: 5 TABLET ORAL at 17:03

## 2019-10-25 RX ADMIN — PHENYLEPHRINE HYDROCHLORIDE 50 MCG: 10 INJECTION INTRAVENOUS at 08:42

## 2019-10-25 RX ADMIN — PHENYLEPHRINE HYDROCHLORIDE 100 MCG: 10 INJECTION INTRAVENOUS at 11:40

## 2019-10-25 RX ADMIN — PANTOPRAZOLE SODIUM 40 MG: 40 INJECTION, POWDER, FOR SOLUTION INTRAVENOUS at 14:07

## 2019-10-25 RX ADMIN — SODIUM CHLORIDE, POTASSIUM CHLORIDE, SODIUM LACTATE AND CALCIUM CHLORIDE: 600; 310; 30; 20 INJECTION, SOLUTION INTRAVENOUS at 06:20

## 2019-10-25 RX ADMIN — ONDANSETRON 4 MG: 2 INJECTION INTRAMUSCULAR; INTRAVENOUS at 13:38

## 2019-10-25 RX ADMIN — ACETAMINOPHEN 975 MG: 325 TABLET, FILM COATED ORAL at 22:12

## 2019-10-25 RX ADMIN — PROPOFOL 110 MG: 10 INJECTION, EMULSION INTRAVENOUS at 07:47

## 2019-10-25 RX ADMIN — FENTANYL CITRATE 100 MCG: 50 INJECTION, SOLUTION INTRAMUSCULAR; INTRAVENOUS at 09:09

## 2019-10-25 RX ADMIN — FENTANYL CITRATE 100 MCG: 50 INJECTION, SOLUTION INTRAMUSCULAR; INTRAVENOUS at 08:52

## 2019-10-25 RX ADMIN — Medication 10 MG: at 11:41

## 2019-10-25 RX ADMIN — PROPOFOL 50 MG: 10 INJECTION, EMULSION INTRAVENOUS at 11:44

## 2019-10-25 RX ADMIN — PROTAMINE SULFATE 20 MG: 10 INJECTION, SOLUTION INTRAVENOUS at 11:44

## 2019-10-25 RX ADMIN — VECURONIUM BROMIDE 2 MG: 1 INJECTION, POWDER, LYOPHILIZED, FOR SOLUTION INTRAVENOUS at 11:15

## 2019-10-25 RX ADMIN — CEFAZOLIN SODIUM 2 G: 2 INJECTION, SOLUTION INTRAVENOUS at 20:02

## 2019-10-25 RX ADMIN — MIDAZOLAM HYDROCHLORIDE 2 MG: 1 INJECTION, SOLUTION INTRAMUSCULAR; INTRAVENOUS at 06:29

## 2019-10-25 RX ADMIN — LIDOCAINE HYDROCHLORIDE 0.3 ML: 10 INJECTION, SOLUTION EPIDURAL; INFILTRATION; INTRACAUDAL; PERINEURAL at 06:20

## 2019-10-25 RX ADMIN — GABAPENTIN 300 MG: 300 CAPSULE ORAL at 22:12

## 2019-10-25 RX ADMIN — MIDAZOLAM HYDROCHLORIDE 1 MG: 1 INJECTION, SOLUTION INTRAMUSCULAR; INTRAVENOUS at 06:48

## 2019-10-25 RX ADMIN — SENNOSIDES AND DOCUSATE SODIUM 1 TABLET: 8.6; 5 TABLET ORAL at 22:12

## 2019-10-25 RX ADMIN — PHENYLEPHRINE HYDROCHLORIDE 200 MCG: 10 INJECTION INTRAVENOUS at 11:41

## 2019-10-25 RX ADMIN — MIDAZOLAM HYDROCHLORIDE 2 MG: 1 INJECTION, SOLUTION INTRAMUSCULAR; INTRAVENOUS at 07:36

## 2019-10-25 RX ADMIN — OXYCODONE HYDROCHLORIDE 10 MG: 5 TABLET ORAL at 13:43

## 2019-10-25 RX ADMIN — VECURONIUM BROMIDE 5 MG: 1 INJECTION, POWDER, LYOPHILIZED, FOR SOLUTION INTRAVENOUS at 08:30

## 2019-10-25 RX ADMIN — SODIUM CHLORIDE: 9 INJECTION, SOLUTION INTRAVENOUS at 12:30

## 2019-10-25 RX ADMIN — VANCOMYCIN HYDROCHLORIDE 1500 MG: 5 INJECTION, POWDER, LYOPHILIZED, FOR SOLUTION INTRAVENOUS at 07:18

## 2019-10-25 RX ADMIN — FENTANYL CITRATE 100 MCG: 50 INJECTION, SOLUTION INTRAMUSCULAR; INTRAVENOUS at 11:43

## 2019-10-25 RX ADMIN — CEFAZOLIN SODIUM 1 G: 2 INJECTION, SOLUTION INTRAVENOUS at 12:12

## 2019-10-25 RX ADMIN — POTASSIUM CHLORIDE, DEXTROSE MONOHYDRATE AND SODIUM CHLORIDE: 150; 5; 450 INJECTION, SOLUTION INTRAVENOUS at 13:30

## 2019-10-25 RX ADMIN — POTASSIUM PHOSPHATE, MONOBASIC AND POTASSIUM PHOSPHATE, DIBASIC 20 MMOL: 224; 236 INJECTION, SOLUTION INTRAVENOUS at 14:48

## 2019-10-25 RX ADMIN — FENTANYL CITRATE 50 MCG: 50 INJECTION, SOLUTION INTRAMUSCULAR; INTRAVENOUS at 06:32

## 2019-10-25 RX ADMIN — ACETAMINOPHEN 975 MG: 325 TABLET, FILM COATED ORAL at 13:43

## 2019-10-25 RX ADMIN — ESMOLOL HYDROCHLORIDE 30 MG: 10 INJECTION, SOLUTION INTRAVENOUS at 11:47

## 2019-10-25 RX ADMIN — NITROGLYCERIN 20 MCG: 10 INJECTION INTRAVENOUS at 11:44

## 2019-10-25 RX ADMIN — FENTANYL CITRATE 50 MCG: 50 INJECTION, SOLUTION INTRAMUSCULAR; INTRAVENOUS at 08:43

## 2019-10-25 RX ADMIN — SODIUM CHLORIDE: 9 INJECTION, SOLUTION INTRAVENOUS at 07:40

## 2019-10-25 RX ADMIN — HEPARIN SODIUM 36000 UNITS: 1000 INJECTION, SOLUTION INTRAVENOUS; SUBCUTANEOUS at 08:54

## 2019-10-25 RX ADMIN — RANITIDINE 150 MG: 150 TABLET ORAL at 06:10

## 2019-10-25 RX ADMIN — MIDAZOLAM HYDROCHLORIDE 1 MG: 1 INJECTION, SOLUTION INTRAMUSCULAR; INTRAVENOUS at 08:43

## 2019-10-25 RX ADMIN — FENTANYL CITRATE 50 MCG: 50 INJECTION, SOLUTION INTRAMUSCULAR; INTRAVENOUS at 09:11

## 2019-10-25 RX ADMIN — VANCOMYCIN HYDROCHLORIDE 1500 MG: 5 INJECTION, POWDER, LYOPHILIZED, FOR SOLUTION INTRAVENOUS at 19:54

## 2019-10-25 RX ADMIN — ROCURONIUM BROMIDE 50 MG: 10 INJECTION INTRAVENOUS at 07:47

## 2019-10-25 RX ADMIN — ALBUMIN HUMAN: 0.05 INJECTION, SOLUTION INTRAVENOUS at 11:41

## 2019-10-25 RX ADMIN — CEFAZOLIN SODIUM 2 G: 2 INJECTION, SOLUTION INTRAVENOUS at 08:12

## 2019-10-25 RX ADMIN — FENTANYL CITRATE 50 MCG: 50 INJECTION, SOLUTION INTRAMUSCULAR; INTRAVENOUS at 12:20

## 2019-10-25 RX ADMIN — FENTANYL CITRATE 50 MCG: 50 INJECTION, SOLUTION INTRAMUSCULAR; INTRAVENOUS at 06:30

## 2019-10-25 RX ADMIN — EPINEPHRINE 0.02 MCG/KG/MIN: 1 INJECTION INTRAMUSCULAR; INTRAVENOUS; SUBCUTANEOUS at 11:23

## 2019-10-25 RX ADMIN — VECURONIUM BROMIDE 2 MG: 1 INJECTION, POWDER, LYOPHILIZED, FOR SOLUTION INTRAVENOUS at 11:00

## 2019-10-25 RX ADMIN — FENTANYL CITRATE 50 MCG: 50 INJECTION, SOLUTION INTRAMUSCULAR; INTRAVENOUS at 10:15

## 2019-10-25 RX ADMIN — METHOCARBAMOL TABLETS 750 MG: 750 TABLET, COATED ORAL at 20:22

## 2019-10-25 RX ADMIN — PHENYLEPHRINE HYDROCHLORIDE 50 MCG: 10 INJECTION INTRAVENOUS at 07:57

## 2019-10-25 RX ADMIN — PHENYLEPHRINE HYDROCHLORIDE 50 MCG: 10 INJECTION INTRAVENOUS at 08:00

## 2019-10-25 RX ADMIN — PHENYLEPHRINE HYDROCHLORIDE 50 MCG: 10 INJECTION INTRAVENOUS at 07:54

## 2019-10-25 RX ADMIN — MIDAZOLAM HYDROCHLORIDE 1 MG: 1 INJECTION, SOLUTION INTRAMUSCULAR; INTRAVENOUS at 11:00

## 2019-10-25 ASSESSMENT — ACTIVITIES OF DAILY LIVING (ADL)
SWALLOWING: 0-->SWALLOWS FOODS/LIQUIDS WITHOUT DIFFICULTY
ADLS_ACUITY_SCORE: 10
COGNITION: 0 - NO COGNITION ISSUES REPORTED
RETIRED_EATING: 0-->INDEPENDENT
TOILETING: 0-->INDEPENDENT
FALL_HISTORY_WITHIN_LAST_SIX_MONTHS: NO
TRANSFERRING: 0-->INDEPENDENT
AMBULATION: 0-->INDEPENDENT
BATHING: 0-->INDEPENDENT
RETIRED_COMMUNICATION: 0-->UNDERSTANDS/COMMUNICATES WITHOUT DIFFICULTY
DRESS: 0-->INDEPENDENT
ADLS_ACUITY_SCORE: 10

## 2019-10-25 ASSESSMENT — MIFFLIN-ST. JEOR: SCORE: 1731.8

## 2019-10-25 ASSESSMENT — ENCOUNTER SYMPTOMS
SEIZURES: 0
ORTHOPNEA: 0

## 2019-10-25 NOTE — PROGRESS NOTES
Patient suctioned and electively extubated per physician order at 1442. Placed on 10L 40% Cool aerosol mask, breath sounds were clear, no stridor noted, labs pending. Patient tolerated procedure well without any immediate complications.    Kerri Maravilla,  RT  10/25/2019 2:46 PM

## 2019-10-25 NOTE — ANESTHESIA PROCEDURE NOTES
ARTERIAL LINE PROCEDURE NOTE:   Pre-Procedure    Location: pre-op      Pre-Anesthestic Checklist: patient identified, IV checked, monitors and equipment checked, pre-op evaluation and at physician/surgeon's request    Timeout  Correct Patient: Yes   Correct Procedure: Yes   Correct Site: Yes   Correct Laterality: N/A   Correct Position: Yes   Site Marked: N/A   .   Procedure Documentation  Procedure: arterial line  ASA 3  Supine  Insertion Site:radial, right.Skin infiltrated with mL of 1% lidocaine. Injection technique: Seldinger Technique and ultrasound guided  .  .  Patient Prep/Sterile Barriers; chlorhexidine gluconate and isopropyl alcohol    Assessment/Narrative    Catheter: 20 gauge, 1.75 in/4.5 cm quick cath (integral wire)     Secured by suture and other  Tegaderm dressing used.    Arterial waveform: Yes IBP within 10% of NIBP: Yes    Comments:  Patient tolerated procedure well   No complications        Findings: No abnormal anatomical findings with ultrasound at site.

## 2019-10-25 NOTE — CONSULTS
CARDIAC SURGERY NUTRITION CONSULT    Received standing order to assess and educate patient.    Will follow and complete assessment once patient is extubated and/or is transferred to medical unit.    Patient will receive nutrition education during the Outpatient Cardiac Rehab Program (nutrition classes/dietitian counseling).    Gillian Hagan RD, LD, [facility]   Clinical Dietitian - [facility]

## 2019-10-25 NOTE — BRIEF OP NOTE
[facility]    Brief Operative Note    Pre-operative diagnosis: MITRAL VALVE PROLAPSE  Post-operative diagnosis SAME    Procedure: Procedure(s):  MINIMALLY INVASIVE MITRAL VALVE REPAIR WITH MITRAL ANNULOPLASTY RING KEVIN-KAUR PHYSIO II SIZE: 36MM  (ON PUMP OXYGENATOR ; INTRAOPERATIVE DEEPAK BY CARDIOLOGIST DR. VARELA)  Surgeon: Surgeon(s) and Role:     * Rigoberto Downs MD - Primary     * Radha Moreau PA-C - Assisting  Anesthesia: General   Estimated blood loss: 392 ml  Drains: 1 yovana & 28 Faroese in R pleural space  Specimens: * No specimens in log *  Findings:   dictated.  Complications: None.  Implants:   Implant Name Type Inv. Item Serial No.  Lot No. LRB No. Used   IMP KIT SUTURE COR-KNOT MINI [id number] Metallic Hardware/[place] IMP KIT SUTURE COR-KNOT MINI 4X14MM [id number]  [employer]  N/A 1   LEAD ARTIRIAL PACING TEMPORARY 53CM [id number] Leads LEAD ARTIRIAL PACING TEMPORARY 53CM [id number]  [employer] Cardiac Pa  N/A 1   DEVICE [place] ENDO COR KNOT QUICK LOAD [id number] Wire DEVICE [place] ENDO COR KNOT QUICK LOAD [id number]  [employer] SOLUTIONS  N/A 2   MITRAL ANNULOPLASTY RING KEVIN-KAUR PHYSIO II SIZE: 36MM Annuloplasty Ring  [id number]  N/A 1

## 2019-10-25 NOTE — ANESTHESIA PROCEDURE NOTES
ARTERIAL LINE PROCEDURE NOTE:   Pre-Procedure    Location: pre-op      Pre-Anesthestic Checklist: patient identified, IV checked, monitors and equipment checked, pre-op evaluation and at physician/surgeon's request    Timeout  Correct Patient: Yes   Correct Procedure: Yes   Correct Site: Yes   Correct Laterality: N/A   Correct Position: Yes   Site Marked: N/A   .   Procedure Documentation  Procedure: arterial line and new line  ASA 3  Supine  Insertion Site:radial, right.Skin infiltrated with mL of 1% lidocaine. Injection technique: Seldinger Technique and ultrasound guided  .  .  Patient Prep/Sterile Barriers; chlorhexidine gluconate and isopropyl alcohol    Assessment/Narrative    Catheter: 20 gauge, 1.75 in/4.5 cm quick cath (integral wire)     Secured by suture and other  Tegaderm dressing used.    Arterial waveform: Yes IBP within 10% of NIBP: Yes    Comments:  Patient tolerated procedure well   No complications  No ultrasound image saved         Findings: No abnormal anatomical findings with ultrasound at site.

## 2019-10-25 NOTE — CONSULTS
[facility]  History and Physical/Consult  Critical Care Service  Date of Admission: 10/25/2019  Date of Service (when I saw the patient): 10/25/19  Assessment & Plan   Frederick Nguyen is a 44 year old male with PMH mitral valve prolapse and regurgitation who presents to the ICU after a mitral valve repair done by Dr. Downs.  Intensivist was consulted for postoperative care in the setting of shock and respiratory failure.      Neuro:  Dx: Post-operative pain and sedation needs  - Propofol infusion to achieve RASS goal 0 to -1  - Hydromorphone, robaxin, and oxycodone available prn  - Scheduled acetaminophen, lidocaine patch, and gapabentin    CV:  Dx: History endocarditis mitral valve  Dx: Mitral valve prolapse and regurgitation s/p minimally invasive repair  Dx: Shock secondary to vasoplegia postoperatively  - Wean vasopressors and inotropes as able per CV surgery  - Continue ASA and beta blocker.    - Epicardial V pacing wires in place, currently not paced    Resp:  Dx: Acute respiratory failure due to inability to protect airway.    - Assist control/CMV  - VAP Bundle  - SBT when awake  - IS and OOB for pulmonary hygiene when extubated    GI/Nutrition:  Dx: Nutrition  - NPO until extubated    Renal:  Dx: No acute issues   - Monitor function and electrolytes as needed with replacement per ICU protocols.   - Avoid nephrotoxic agents such as NSAID, IV contrast unless specifically required  - Adjust medications as needed for renal clearance  - Park for strict I/O in post-operative period    ID:  Dx: History strep bacteremia, oral source March 2018 with probable associated osteomyelitis, mitral valve endocarditis.    - Complete alem-operative antibiotics (ancef and vancomycin)  - Monitor fevers and leukocytosis consistent with stress response from procedure, no cultures for fevers in POD 1-2    Endocrine:  Dx: Stress induced hyperglycemia  - POC glucose every 2 hours for the first 4 hours and  then 4 hours for 48 hours, goal to maintain less than 150  - Insulin drip to maintain blood glucose <150    Heme:  Dx: Acute blood loss anemia  Dx: Thrombocytopenia related to bypass  - Monitor chest tube output  - Transfuse for hemoglobin <7    MSK:  Dx: Sternotomy  - Sternal precautions per protocol    General cares:  DVT Prophylaxis: Defer to primary service  GI Prophylaxis: PPI per primary service  Restraints needed: YES  Family update by me today: No     Current lines are required for patient management  Access:  - Right internal jugular  - Arterial line  - ETT  - OG  - Park  - Chest tube x2    Claudia Magaña PA-C    Time Spent on this Encounter   Billing: I spent 60 minutes bedside and on the inpatient unit today managing the critical care of Frederick Nguyen in relation to the issues listed in this note.    Code Status   Full Code    Primary Care Physician   Physician No Ref-Primary    Chief Complaint     History is obtained from the chart. The patient is unable to participate in interview secondary to intubation and sedation.     History of Present Illness   Frederick Nguyen is a 44 year old male with PMH mitral valve prolapse and regurgitation initially found in 2012 as an incidental finding on exam.  He was hospitalized in March of 2018 for strep mitis bacteremia (suspected oral source) with suspicion of osteomyelitis and concern for mitral valve endocarditis.  There was no overt endocarditis on TTE, but there was increased mitral valve regurge as well as thickening of the mitral valve, new from prior study.  He was eventually discharged on 8 weeks of IV Ceftriaxone.  He has remained asymptomatic of his mitral valve regurgitation, but serial echos demonstrate progression to severe regurgitation with flail posterior leaflet.  He was referred to Dr. Downs for mitral valve repair.      Patient was an easy intubation with glidoscope. The procedure was uncomlicated. CPB was 145 minutes. Cross clamp  time was 102 minutes. Patient received 1925 L crystalloid,  196 mL cell saver. EBL was 392 mL and  mL.     Past Medical History    PMH is obtained from the chart. The patient is unable to participate in interview secondary to intubation and sedation.  I have reviewed this patient's medical history and updated it with pertinent information if needed.   Past Medical History:   Diagnosis Date     Cervical spondylosis without myelopathy      Heart murmur      Osteomyelitis, L5S1 03/15/2018    secondary to endocarditis       Past Surgical History   Surgical history is obtained from the chart. The patient is unable to participate in interview secondary to intubation and sedation.  I have reviewed this patient's surgical history and updated it with pertinent information if needed.  Past Surgical History:   Procedure Laterality Date     APPENDECTOMY  1987     CHEILECTOMY Right 4/10/2019    Procedure: Right Cheilectomy;  Surgeon: Sawyer Crowell MD;  Location:  OR     CV CORONARY ANGIOGRAM N/A 9/9/2019    Procedure: CV CORONARY ANGIOGRAM;  Surgeon: Pernell Bar MD;  Location:  HEART CARDIAC CATH LAB     CV RIGHT HEART CATH N/A 9/9/2019    Procedure: CV RIGHT HEART CATH;  Surgeon: Pernell Bar MD;  Location:  HEART CARDIAC CATH LAB     INJECT SACROILIAC JOINT Right 5/9/2018    Procedure: INJECT SACROILIAC JOINT;  Right Sacroiliac Joint Injection;  Surgeon: Thom Williamson MD;  Location:  OR     ORTHOPEDIC SURGERY  4/10/19    Bone Spur Removal       Prior to Admission Medications   Prior to Admission Medications   Prescriptions Last Dose Informant Patient Reported? Taking?   amoxicillin (AMOXIL) 500 MG capsule more than a month at prn Self No Yes   Sig: Take 4 caps 1 hour prior to dental procedure.   diclofenac (VOLTAREN) 1 % topical gel Past Week at prn Self No Yes   Sig: Place onto the skin 4 times daily   Patient taking differently: Place onto the skin 4 times daily as needed     multivitamin, therapeutic (THERA-VIT) TABS tablet more than a week Self Yes Yes   Sig: Take 1 tablet by mouth daily      Facility-Administered Medications: None     Allergies   No Known Allergies    Social History   Social history is obtained from the chart. The patient is unable to participate in interview secondary to intubation and sedation.  I have reviewed this patient's social history and updated it with pertinent information if needed. Frederick Nguyen  reports that he has never smoked. He has never used smokeless tobacco. He reports previous alcohol use. He reports that he does not use drugs.    Family History   Family history is obtained from the chart. The patient is unable to participate in interview secondary to intubation and sedation.  I have reviewed this patient's family history and updated it with pertinent information if needed.   Family History   Problem Relation Age of Onset     Glaucoma No family hx of      Macular Degeneration No family hx of        Review of Systems   Unable to perform complete review of systems secondary to intubation and sedation.    Physical Exam   Temp: 97.6  F (36.4  C) Temp src: Temporal Temp  Min: 97.6  F (36.4  C)  Max: 97.6  F (36.4  C) BP: (!) 136/92 Pulse: 75 Heart Rate: 75 Resp: 16 SpO2: 98 % O2 Device: None (Room air)    Vital Signs with Ranges  Temp:  [97.6  F (36.4  C)] 97.6  F (36.4  C)  Pulse:  [75] 75  Heart Rate:  [75] 75  Resp:  [16] 16  BP: (136)/(92) 136/92  SpO2:  [98 %] 98 %  182 lbs 0 oz    GEN: NAD  EYES: No icterus  HEENT:  Normocephalic, trachea midline, ETT secure  CV: RRR  PULM/CHEST: Clear breath sounds bilaterally without rhonchi, crackles or wheeze, symmetric chest rise  GI: Bowel sounds present, abdomen soft and non-tender, no masses  : Prak catheter in place, urine yellow and clear  NEURO: Unable to assess due to sedation and NMB.   SKIN: No rashes noted. Sternotomy incision covered with dressing, clean/dry/intact. Chest tube  insertion sites without drainage.    Imaging personally reviewed: CXR    Data   Results for orders placed or performed during the hospital encounter of 10/25/19 (from the past 24 hour(s))   ABO/Rh type and screen   Result Value Ref Range    Units Ordered 4     ABO A     RH(D) Pos     Antibody Screen Neg     Test Valid Only At [facility]        Specimen Expires 10/28/2019     Crossmatch Red Blood Cells    Blood component   Result Value Ref Range    Unit Number [id number]     Blood Component Type Red Blood Cells Leukocyte Reduced     Division Number 00     Status of Unit Ready for patient 10/25/2019 0643     Blood Product Code V7040A19     Unit Status [facility]    Blood component   Result Value Ref Range    Unit Number [id number]     Blood Component Type Red Blood Cells Leukocyte Reduced     Division Number 00     Status of Unit Ready for patient 10/25/2019 0643     Blood Product Code O8032E71     Unit Status [facility]    Blood component   Result Value Ref Range    Unit Number [id number]     Blood Component Type Red Blood Cells Leukocyte Reduced     Division Number 00     Status of Unit Released to care unit 10/25/2019 0738     Blood Product Code Q3957C41     Unit Status ISS    Blood component   Result Value Ref Range    Unit Number [id number]     Blood Component Type Red Blood Cells Leukocyte Reduced     Division Number 00     Status of Unit Released to care unit 10/25/2019 0738     Blood Product Code O6569Q08     Unit Status ISS    Glucose by meter   Result Value Ref Range    Glucose 98 70 - 99 mg/dL

## 2019-10-25 NOTE — ANESTHESIA PROCEDURE NOTES
CENTRAL LINE INSERTION PROCEDURE NOTE:   Pre-Procedure  Performed by  in the presence of a teaching physician  Asad Bates MD  Location: pre-op      Pre-Anesthestic Checklist: patient identified, risks and benefits discussed, informed consent, monitors and equipment checked, pre-op evaluation and at physician/surgeon's request    Timeout  Correct Patient: Yes   Correct Procedure: Yes   Correct Site: Yes   Correct Laterality: N/A   Correct Position: Yes   Site Marked: N/A   .   Procedure Documentation   Procedure: central line and new line  Position: supine  Patient Prep/Sterile Barriers; chlorhexidine gluconate and isopropyl alcohol, maximum sterile barriers used during central venous catheter insertion      Insertion Site:right, internal jugular    Skin infiltrated with 3 mL of 1% lidocaine.  Catheter: 9 Georgian, 10 cm (sheath introducer), PA Catheter  Assessment/Narrative         Secured by suture  Tegaderm and Biopatch dressing used.  blood aspirated (Aspirated first, then flushed) from all lumens  All lumens flushed: Yes  Verification method: Placement to be verified post-op, X-ray and Ultrasound  Comments:  Seldinger technique to place CVL under real-time U/S guidance.    U/S used to identify vein, and used for real-time guidance to watch needle, IV Cath, and wire insertion into IJ.    Permanent U/S image recorded.    Pt tolerated well.     Findings: No abnormal anatomical findings with ultrasound at site.

## 2019-10-25 NOTE — PROGRESS NOTES
Admission medication history interview status for the 10/25/2019  admission is complete. See EPIC admission navigator for prior to admission medications     Medication history source reliability:Good    Medication history interview source(s):Patient    Medication history resources (including written lists, pill bottles, clinic record):None    Primary pharmacy[facility]    Additional medication history information not noted on PTA med list :None    Time spent in this activity: 25 minutes    Prior to Admission medications    Medication Sig Last Dose Taking? Auth Provider   amoxicillin (AMOXIL) 500 MG capsule Take 4 caps 1 hour prior to dental procedure. more than a month at prn Yes Darnell Jamil MD   diclofenac (VOLTAREN) 1 % topical gel Place onto the skin 4 times daily  Patient taking differently: Place onto the skin 4 times daily as needed  Past Week at prn Yes Manoj Zepeda DPM   multivitamin, therapeutic (THERA-VIT) TABS tablet Take 1 tablet by mouth daily more than a week Yes Unknown, Entered By History

## 2019-10-25 NOTE — ANESTHESIA POSTPROCEDURE EVALUATION
Patient: Frederick Nguyen    Procedure(s):  MINIMALLY INVASIVE MITRAL VALVE REPAIR WITH MITRAL ANNULOPLASTY RING KEVIN-KAUR PHYSIO II SIZE: 36MM  (ON PUMP OXYGENATOR ; INTRAOPERATIVE DEEPAK BY CARDIOLOGIST DR. VARELA)    Diagnosis:MITRAL VALVE PROLAPSE  Diagnosis Additional Information: No value filed.    Anesthesia Type:  General, ETT    Note:  Anesthesia Post Evaluation    Patient location during evaluation: ICU  Patient participation: Unable to evaluate secondary to administered sedation  Level of consciousness: awake (intubated and sedated)  Pain management: adequate  Airway patency: patent  Cardiovascular status: acceptable  Respiratory status: acceptable and ETT  Hydration status: acceptable  PONV: none     Anesthetic complications: None    Comments: Patient transported to ICU intubated/ sedated and placed on mechanical ventilation    1732 patient resting comfortably in bed extubated.        Last vitals:  Vitals:    10/25/19 1700 10/25/19 1715 10/25/19 1730   BP:   108/81   Pulse:   102   Resp: 12 17 10   Temp: 36.9  C (98.4  F) 36.9  C (98.4  F) 36.9  C (98.4  F)   SpO2: 100% 100% 100%         Electronically Signed By: Asad Bates MD  October 25, 2019  5:37 PM

## 2019-10-25 NOTE — PROGRESS NOTES
Patient meeting CV Surgical Pathway Goals: extubated, dangled, up to chair.     Tele SR/ST with first degree AVB. PVC and PAC noted, phos replaced. No pressors needed at any point post-op.   Extubated to aerosol mask, remains in place for patient comfort. LS clear. Encouraged to deep breathe, pt c/o pain at CT site and with inhalation. PRN medication given with some relief. Education provided that discomfort is to be expected.   Tolerating ice chips and sips of water, some nausea, PRN medications with relief of symptoms.   Tolerated being up in chair for about 15 min. Strong, minimal assistance by RN.   Spouse was at bedside this PM, questions answered, went home.     Plan as scheduled via CV surgery. PRN pain meds as able. IS improvement.

## 2019-10-25 NOTE — ANESTHESIA CARE TRANSFER NOTE
Patient: Frederick Nguyen    Procedure(s):  MINIMALLY INVASIVE MITRAL VALVE REPAIR WITH MITRAL ANNULOPLASTY RING KEVIN-KAUR PHYSIO II SIZE: 36MM  (ON PUMP OXYGENATOR ; INTRAOPERATIVE DEEPAK BY CARDIOLOGIST DR. VARELA)    Diagnosis: MITRAL VALVE PROLAPSE  Diagnosis Additional Information: No value filed.    Anesthesia Type:   General, ETT     Note:  Airway :ETT  Patient transferred to:ICU  Comments: Pt transported to ICU, ETT in place, ventilated via ambu with 10 L O2  All necessary monitors on, alarms audible.  Placed on vent in ICU per RT, Vt 500 mL, rate 16, peep 5, fiO2 60%  Report given to RN, VSS.ICU Handoff: Call for PAUSE to initiate/utilize ICU HANDOFF, Identified Patient, Identified Responsible Provider, Reviewed the Pertinent Medical History, Discussed Surgical Course, Reviewed Intra-OP Anesthesia Management and Issues during Anesthesia, Set Expectations for Post Procedure Period and Allowed Opportunity for Questions and Acknowledgement of Understanding      Vitals: (Last set prior to Anesthesia Care Transfer)    CRNA VITALS  10/25/2019 1227 - 10/25/2019 1327      10/25/2019             Resp Rate (set):  14                Electronically Signed By: BENNY Mccracken CRNA  October 25, 2019  1:29 PM

## 2019-10-26 ENCOUNTER — APPOINTMENT (OUTPATIENT)
Dept: GENERAL RADIOLOGY | Facility: CLINIC | Age: 44
DRG: 219 | End: 2019-10-26
Attending: PHYSICIAN ASSISTANT
Payer: COMMERCIAL

## 2019-10-26 ENCOUNTER — APPOINTMENT (OUTPATIENT)
Dept: OCCUPATIONAL THERAPY | Facility: CLINIC | Age: 44
DRG: 219 | End: 2019-10-26
Attending: PHYSICIAN ASSISTANT
Payer: COMMERCIAL

## 2019-10-26 LAB
ALBUMIN SERPL-MCNC: 3.6 G/DL (ref 3.4–5)
ALP SERPL-CCNC: 35 U/L (ref 40–150)
ALT SERPL W P-5'-P-CCNC: 16 U/L (ref 0–70)
ANION GAP SERPL CALCULATED.3IONS-SCNC: 2 MMOL/L (ref 3–14)
AST SERPL W P-5'-P-CCNC: 70 U/L (ref 0–45)
BILIRUB SERPL-MCNC: 0.9 MG/DL (ref 0.2–1.3)
BLD PROD TYP BPU: NORMAL
BLD PROD TYP BPU: NORMAL
BLD UNIT ID BPU: 0
BLD UNIT ID BPU: 0
BLOOD PRODUCT CODE: NORMAL
BLOOD PRODUCT CODE: NORMAL
BPU ID: NORMAL
BPU ID: NORMAL
BUN SERPL-MCNC: 14 MG/DL (ref 7–30)
CA-I BLD-MCNC: 4.3 MG/DL (ref 4.4–5.2)
CALCIUM SERPL-MCNC: 7.7 MG/DL (ref 8.5–10.1)
CHLORIDE SERPL-SCNC: 108 MMOL/L (ref 94–109)
CO2 SERPL-SCNC: 27 MMOL/L (ref 20–32)
CREAT SERPL-MCNC: 1.04 MG/DL (ref 0.66–1.25)
ERYTHROCYTE [DISTWIDTH] IN BLOOD BY AUTOMATED COUNT: 13 % (ref 10–15)
GFR SERPL CREATININE-BSD FRML MDRD: 88 ML/MIN/{1.73_M2}
GLUCOSE BLDC GLUCOMTR-MCNC: 103 MG/DL (ref 70–99)
GLUCOSE BLDC GLUCOMTR-MCNC: 104 MG/DL (ref 70–99)
GLUCOSE BLDC GLUCOMTR-MCNC: 104 MG/DL (ref 70–99)
GLUCOSE BLDC GLUCOMTR-MCNC: 106 MG/DL (ref 70–99)
GLUCOSE BLDC GLUCOMTR-MCNC: 122 MG/DL (ref 70–99)
GLUCOSE BLDC GLUCOMTR-MCNC: 134 MG/DL (ref 70–99)
GLUCOSE SERPL-MCNC: 137 MG/DL (ref 70–99)
HCT VFR BLD AUTO: 39.3 % (ref 40–53)
HGB BLD-MCNC: 13.1 G/DL (ref 13.3–17.7)
MAGNESIUM SERPL-MCNC: 2.7 MG/DL (ref 1.6–2.3)
MCH RBC QN AUTO: 30 PG (ref 26.5–33)
MCHC RBC AUTO-ENTMCNC: 33.3 G/DL (ref 31.5–36.5)
MCV RBC AUTO: 90 FL (ref 78–100)
PHOSPHATE SERPL-MCNC: 4.2 MG/DL (ref 2.5–4.5)
PLATELET # BLD AUTO: 125 10E9/L (ref 150–450)
POTASSIUM SERPL-SCNC: 4.4 MMOL/L (ref 3.4–5.3)
PROT SERPL-MCNC: 6.3 G/DL (ref 6.8–8.8)
RBC # BLD AUTO: 4.36 10E12/L (ref 4.4–5.9)
SODIUM SERPL-SCNC: 137 MMOL/L (ref 133–144)
TRANSFUSION STATUS PATIENT QL: NORMAL
WBC # BLD AUTO: 9.9 10E9/L (ref 4–11)

## 2019-10-26 PROCEDURE — 97110 THERAPEUTIC EXERCISES: CPT | Mod: GO | Performed by: OCCUPATIONAL THERAPIST

## 2019-10-26 PROCEDURE — 25000128 H RX IP 250 OP 636: Performed by: NURSE PRACTITIONER

## 2019-10-26 PROCEDURE — 25000128 H RX IP 250 OP 636: Performed by: PHYSICIAN ASSISTANT

## 2019-10-26 PROCEDURE — 71045 X-RAY EXAM CHEST 1 VIEW: CPT

## 2019-10-26 PROCEDURE — 40000275 ZZH STATISTIC RCP TIME EA 10 MIN

## 2019-10-26 PROCEDURE — 25800030 ZZH RX IP 258 OP 636: Performed by: PHYSICIAN ASSISTANT

## 2019-10-26 PROCEDURE — 82330 ASSAY OF CALCIUM: CPT | Performed by: PHYSICIAN ASSISTANT

## 2019-10-26 PROCEDURE — 97535 SELF CARE MNGMENT TRAINING: CPT | Mod: GO | Performed by: OCCUPATIONAL THERAPIST

## 2019-10-26 PROCEDURE — 25000132 ZZH RX MED GY IP 250 OP 250 PS 637: Performed by: NURSE PRACTITIONER

## 2019-10-26 PROCEDURE — 94799 UNLISTED PULMONARY SVC/PX: CPT

## 2019-10-26 PROCEDURE — 97165 OT EVAL LOW COMPLEX 30 MIN: CPT | Mod: GO | Performed by: OCCUPATIONAL THERAPIST

## 2019-10-26 PROCEDURE — 25000132 ZZH RX MED GY IP 250 OP 250 PS 637: Performed by: PHYSICIAN ASSISTANT

## 2019-10-26 PROCEDURE — 00000146 ZZHCL STATISTIC GLUCOSE BY METER IP

## 2019-10-26 PROCEDURE — 93005 ELECTROCARDIOGRAM TRACING: CPT

## 2019-10-26 PROCEDURE — 85027 COMPLETE CBC AUTOMATED: CPT | Performed by: PHYSICIAN ASSISTANT

## 2019-10-26 PROCEDURE — 97530 THERAPEUTIC ACTIVITIES: CPT | Mod: GO | Performed by: OCCUPATIONAL THERAPIST

## 2019-10-26 PROCEDURE — 93010 ELECTROCARDIOGRAM REPORT: CPT | Performed by: INTERNAL MEDICINE

## 2019-10-26 PROCEDURE — 84100 ASSAY OF PHOSPHORUS: CPT | Performed by: PHYSICIAN ASSISTANT

## 2019-10-26 PROCEDURE — 83735 ASSAY OF MAGNESIUM: CPT | Performed by: PHYSICIAN ASSISTANT

## 2019-10-26 PROCEDURE — 80053 COMPREHEN METABOLIC PANEL: CPT | Performed by: PHYSICIAN ASSISTANT

## 2019-10-26 PROCEDURE — 12000000 ZZH R&B MED SURG/OB

## 2019-10-26 RX ORDER — MULTIVITAMIN,THERAPEUTIC
1 TABLET ORAL DAILY
Status: DISCONTINUED | OUTPATIENT
Start: 2019-10-26 | End: 2019-10-28 | Stop reason: HOSPADM

## 2019-10-26 RX ORDER — HEPARIN SODIUM 5000 [USP'U]/.5ML
5000 INJECTION, SOLUTION INTRAVENOUS; SUBCUTANEOUS EVERY 8 HOURS
Status: DISCONTINUED | OUTPATIENT
Start: 2019-10-26 | End: 2019-10-26

## 2019-10-26 RX ORDER — POLYETHYLENE GLYCOL 3350 17 G/17G
17 POWDER, FOR SOLUTION ORAL 2 TIMES DAILY
Status: DISCONTINUED | OUTPATIENT
Start: 2019-10-26 | End: 2019-10-28 | Stop reason: HOSPADM

## 2019-10-26 RX ORDER — PANTOPRAZOLE SODIUM 40 MG/1
40 TABLET, DELAYED RELEASE ORAL
Status: DISCONTINUED | OUTPATIENT
Start: 2019-10-26 | End: 2019-10-28 | Stop reason: HOSPADM

## 2019-10-26 RX ADMIN — METHOCARBAMOL TABLETS 750 MG: 750 TABLET, COATED ORAL at 21:55

## 2019-10-26 RX ADMIN — OXYCODONE HYDROCHLORIDE 10 MG: 5 TABLET ORAL at 20:39

## 2019-10-26 RX ADMIN — Medication 12.5 MG: at 20:34

## 2019-10-26 RX ADMIN — OXYCODONE HYDROCHLORIDE 10 MG: 5 TABLET ORAL at 16:36

## 2019-10-26 RX ADMIN — METHOCARBAMOL TABLETS 750 MG: 750 TABLET, COATED ORAL at 14:24

## 2019-10-26 RX ADMIN — ASPIRIN 325 MG: 325 TABLET, DELAYED RELEASE ORAL at 08:30

## 2019-10-26 RX ADMIN — VANCOMYCIN HYDROCHLORIDE 1500 MG: 5 INJECTION, POWDER, LYOPHILIZED, FOR SOLUTION INTRAVENOUS at 07:10

## 2019-10-26 RX ADMIN — ACETAMINOPHEN 975 MG: 325 TABLET, FILM COATED ORAL at 06:15

## 2019-10-26 RX ADMIN — SENNOSIDES AND DOCUSATE SODIUM 1 TABLET: 8.6; 5 TABLET ORAL at 20:35

## 2019-10-26 RX ADMIN — PANTOPRAZOLE SODIUM 40 MG: 40 TABLET, DELAYED RELEASE ORAL at 08:30

## 2019-10-26 RX ADMIN — KETOROLAC TROMETHAMINE 30 MG: 30 INJECTION, SOLUTION INTRAMUSCULAR at 17:47

## 2019-10-26 RX ADMIN — KETOROLAC TROMETHAMINE 15 MG: 30 INJECTION, SOLUTION INTRAMUSCULAR at 11:55

## 2019-10-26 RX ADMIN — FUROSEMIDE 20 MG: 10 INJECTION, SOLUTION INTRAVENOUS at 02:11

## 2019-10-26 RX ADMIN — POLYETHYLENE GLYCOL 3350 17 G: 17 POWDER, FOR SOLUTION ORAL at 08:30

## 2019-10-26 RX ADMIN — SENNOSIDES AND DOCUSATE SODIUM 1 TABLET: 8.6; 5 TABLET ORAL at 08:30

## 2019-10-26 RX ADMIN — ACETAMINOPHEN 975 MG: 325 TABLET, FILM COATED ORAL at 14:24

## 2019-10-26 RX ADMIN — OXYCODONE HYDROCHLORIDE 10 MG: 5 TABLET ORAL at 02:11

## 2019-10-26 RX ADMIN — OXYCODONE HYDROCHLORIDE 10 MG: 5 TABLET ORAL at 08:30

## 2019-10-26 RX ADMIN — POLYETHYLENE GLYCOL 3350 17 G: 17 POWDER, FOR SOLUTION ORAL at 20:35

## 2019-10-26 RX ADMIN — THERA TABS 1 TABLET: TAB at 11:55

## 2019-10-26 RX ADMIN — KETOROLAC TROMETHAMINE 15 MG: 30 INJECTION, SOLUTION INTRAMUSCULAR at 04:25

## 2019-10-26 RX ADMIN — GABAPENTIN 300 MG: 300 CAPSULE ORAL at 08:30

## 2019-10-26 RX ADMIN — HEPARIN SODIUM 5000 UNITS: 5000 INJECTION, SOLUTION INTRAVENOUS; SUBCUTANEOUS at 11:55

## 2019-10-26 RX ADMIN — CEFAZOLIN SODIUM 2 G: 2 INJECTION, SOLUTION INTRAVENOUS at 11:55

## 2019-10-26 RX ADMIN — HEPARIN SODIUM 5000 UNITS: 5000 INJECTION, SOLUTION INTRAVENOUS; SUBCUTANEOUS at 20:35

## 2019-10-26 RX ADMIN — CEFAZOLIN SODIUM 2 G: 2 INJECTION, SOLUTION INTRAVENOUS at 04:41

## 2019-10-26 RX ADMIN — GABAPENTIN 300 MG: 300 CAPSULE ORAL at 20:34

## 2019-10-26 ASSESSMENT — ACTIVITIES OF DAILY LIVING (ADL)
ADLS_ACUITY_SCORE: 9
ADLS_ACUITY_SCORE: 10
ADLS_ACUITY_SCORE: 9
ADLS_ACUITY_SCORE: 9

## 2019-10-26 ASSESSMENT — MIFFLIN-ST. JEOR
SCORE: 1742.68
SCORE: 1735.25

## 2019-10-26 NOTE — PLAN OF CARE
Discharge Planner OT   Patient plan for discharge: home with wife A  Current status: Eval completed and treatment initiated. Pt lives in a house with his wife and I with all ADL./IADLs and functional mobility, pt was working full time for [employer], office work.     Supine <> sit and sit <> stand with SBA and cues for PLB to A with decreasing pain. Pt ambulated to doorway and back, approx 30 ft with SBA/CGA for safety and A for line mgmt for safety. Pt completed 2 calisthenic seated ex approx 30 sec to 1 min each, some fatigue with walking and R sided pain. Vitals all stable with activity, see vital sign flow sheet for details. Pt requires MOD A for LE dress.   Barriers to return to prior living situation: none with return home with wife A as needed with ADL/IADLs  Recommendations for discharge: home with wife A with UE and LE dress, tub transfer, yardwork, transportation, etc as needed and OP CR  Rationale for recommendations: pt doing well for POD #1, wife will be able to A if needed with ADL/IADLs due to pain mainly, R side. Pt will require OP CR for monitored progressive exercise and risk factor education and modification at discharge.        Entered by: Ginny Escobedo 10/26/2019 2:28 PM

## 2019-10-26 NOTE — PROGRESS NOTES
Brief ICU Note    Doing well today.  Discussed with [facility].  We will sign off.    Blademar Falk MD  ICU

## 2019-10-26 NOTE — PROGRESS NOTES
10/26/19 1347   Quick Adds   Type of Visit Initial Occupational Therapy Evaluation   Living Environment   Lives With spouse   Living Arrangements house   Home Accessibility stairs to enter home;stairs within home   Number of Stairs, Main Entrance 3   Number of Stairs, Within Home, Primary   (has a basement, laundry doesn't have to go down. wife will A)   Transportation Anticipated family or friend will provide;car, drives self   Self-Care   Usual Activity Tolerance excellent   Current Activity Tolerance fair   Regular Exercise Yes   Activity/Exercise Type   (elliptical at gym)   Activity/Exercise/Self-Care Comment tub/shower and standard height toilet on L side of toilet   Functional Level   Ambulation 0-->independent   Transferring 0-->independent   Toileting 0-->independent   Bathing 0-->independent   Dressing 0-->independent   Eating 0-->independent   Communication 0-->understands/communicates without difficulty   Swallowing 0-->swallows foods/liquids without difficulty   Cognition 0 - no cognition issues reported   Fall history within last six months no   Prior Functional Level Comment pt works full time doing office work.    General Information   Onset of Illness/Injury or Date of Surgery - Date 10/25/19   Referring Physician Regina Edwards, KAYDEN   Patient/Family Goals Statement home with wife A   Additional Occupational Profile Info/Pertinent History of Current Problem Frederick Nguyen is a 44 year old male with a PMH of mitral valve prolapse, prior bacteremia, questionable endocarditis, appendectomy and cervical spondylitis with now severe mitral valve regurgitation. POD # 1 s/p Right mini thoracotomy, mitral valve repair with a [place] NeoChord implantation to the P1 and P2 prolapse, implantation of a 36 mm Garrett Physio II annuloplasty ring, T2/P3 cleft closure, right common femoral artery and vein cannulation for cardiopulmonary bypass, intraoperative DEEPAK on 10/26/2019 by Dr. Rigoberto Downs.    Precautions/Limitations fall precautions;oxygen therapy device and L/min  (chest tubes, O2 2 L, catheter)   Cognitive Status Examination   Orientation orientation to person, place and time   Level of Consciousness alert   Follows Commands (Cognition) WNL   Memory intact   Attention No deficits were identified   Organization/Problem Solving No deficits were identified   Executive Function No deficits were identified   Sensory Examination   Sensory Quick Adds No deficits were identified   Pain Assessment   Patient Currently in Pain   (1/10 at rest, increase with mobility. )   Range of Motion (ROM)   ROM Comment L UE AROM WFL, R shoulder flex limited to approx 60 degrees due to pain R side   Strength   Strength Comments L UE strength WFL, R UE NT due to pain   Bed Mobility Skill: Rolling/Turning   Level of [place] - Bed Mobility Skill Rolling Turning stand-by assist   Assistive Device:  Rolling/Turning bed rails   Bed Mobility Skill: Scooting/Bridging   Level of [place]: Scooting/Bridging stand-by assist   Bed Mobility Skill: Sit to Supine   Level of [place]: Sit/Supine stand-by assist   Bed Mobility Skill: Supine to Sit   Level of [place]: Supine/Sit stand-by assist   Transfer Skill: Bed to Chair/Chair to Bed   Level of [place]: Bed to Chair contact guard   Transfer Skill: Sit to Stand   Level of [place]: Sit/Stand stand-by assist   Upper Body Dressing   Level of [place]: Dress Upper Body moderate assist (50% patients effort)   Lower Body Dressing   Level of [place]: Dress Lower Body maximum assist (25% patients effort)   Instrumental Activities of Daily Living (IADL)   Previous Responsibilities medication management;finances;driving;work;meal prep;housekeeping;laundry;shopping;yardwork  (wife A with IADLs until I)   Activities of Daily Living Analysis   Impairments Contributing to Impaired Activities of Daily Living pain;post surgical precautions;strength  "decreased  (decreased activity tolerance)   General Therapy Interventions   Planned Therapy Interventions ADL retraining;transfer training;progressive activity/exercise;home program guidelines   Clinical Impression   Criteria for Skilled Therapeutic Interventions Met yes, treatment indicated   OT Diagnosis decreased ADL/IADLs and functional mobility   Influenced by the following impairments impaired activity tolerance, pain, strength overall decreased   Assessment of Occupational Performance 1-3 Performance Deficits   Identified Performance Deficits impaired I with dressing, toilet and tub transfer, yardwork, laundry, etc   Clinical Decision Making (Complexity) Low complexity   Therapy Frequency 2x/day   Predicted Duration of Therapy Intervention (days/wks) 4 days   Anticipated Discharge Disposition Home with Assist;Home with Outpatient Therapy  (wife A as needed ADL/IADLs and OP CR )   Risks and Benefits of Treatment have been explained. Yes   Patient, Family & other staff in agreement with plan of care Yes   [facility] AM-PAC  \"6 Clicks\" Daily Activity Inpatient Short Form   1. Putting on and taking off regular lower body clothing? 2 - A Lot   2. Bathing (including washing, rinsing, drying)? 3 - A Little   3. Toileting, which includes using toilet, bedpan or urinal? 3 - A Little   4. Putting on and taking off regular upper body clothing? 3 - A Little   5. Taking care of personal grooming such as brushing teeth? 4 - None   6. Eating meals? 4 - None   Daily Activity Raw Score (Score out of 24.Lower scores equate to lower levels of function) 19   Total Evaluation Time   Total Evaluation Time (Minutes) 10     "

## 2019-10-26 NOTE — OP NOTE
Procedure Date: 10/25/2019      REFERRING CARDIOLOGIST:  CARLOS Bush MD      PREOPERATIVE DIAGNOSIS:  Severe mitral valve regurgitation.      POSTOPERATIVE DIAGNOSIS:  Severe mitral valve regurgitation.      SURGEON:  Rigoberto Downs MD      ASSISTANT:  Radha Moreau PA-C      NAME OF OPERATION:  Right mini thoracotomy, mitral valve repair with a [place] NeoChord implantation to the P1 and P2 prolapse, implantation of a 36 mm Garrett Physio II annuloplasty ring, P2/P3 cleft closure, right common femoral artery and vein cannulation for cardiopulmonary bypass, intraoperative DEEPAK.      ANESTHESIA:  General orotracheal.      INDICATIONS FOR PROCEDURE:  Mr. Nguyen is a very pleasant 44-year-old gentleman with a history of mitral valve prolapse and mitral valve regurgitation dating back to at least 2012.  On a recent echocardiogram he was found to have severe mitral valve regurgitation with a flail P1, P2 segment.  He was recently referred to me for mitral valve repair.  He was taken to the operating room today for a minimally invasive mitral valve repair.      OPERATIVE FINDINGS:  The patient had an overall normal LV systolic size and function.  His left atrium was severely dilated.  His anterior leaflet was normal.  His posterior leaflet was severely myxomatous and had a flail P1, P2 segment with some leaflet thickening.  He had 1 large ruptured cord to the P2 segment, which was resected.  There is no evidence of endocarditis or healed endocarditis/vegetations.      DESCRIPTION OF PROCEDURE:  After informed consent was obtained, the patient was brought down to the operating room and was placed on the OR table in a supine position.  Intravenous and intra-arterial lines were begun.  While monitoring his blood pressure and EKG tracing, he was anesthetized and intubated using a single lumen endotracheal tube.  His entire chest, abdomen, both groins and legs were prepped down to the knees using multiple layers of  DuraPrep.  He was draped in a sterile field.  A right mini thoracotomy incision was made and the fourth intercostal space was entered.  A small right groin incision was made 1 cm above and parallel to the inguinal crease and the right common femoral artery and vein were dissected out.  The patient was fully heparinized.  5-0 Prolene pursestring sutures were used to cannulate the right common femoral artery and vein using a 17-Citizen of [place] femoral arterial cannula and a 25-Citizen of [place] multistage venous cannula, respectively.  After appropriate ACT level was achieved, cardiopulmonary bypass was established and the patient was kept normothermic during the entire operation.  Attention was turned back towards the right mini thoracotomy incision.  An Quintin soft tissue wound retractor and the mini thoracotomy retractor were used for exposure.  The right phrenic nerve was identified and protected.  The pericardium was opened anteriorly and parallel to the phrenic nerve.  The interatrial groove was also dissected out using electrocautery.  Carbon dioxide was flooded into the right pleural space to prevent air embolism.  An antegrade needle/aortic root was placed in the ascending aorta.  The aorta was then cross-clamped and a liter of [place] antegrade cardioplegia was given to fully arrest the heart.  The patient went into good diastolic arrest without any LV distention.      A standard left atriotomy was made and the [employer] left atrial lift system was used to expose the mitral valve.  Our exposure was excellent.  Findings are noted above.  We first began by placing the annular sutures using interrupted 2-0 Ethibond sutures.  We then inspected the valve carefully and findings are summarized above.  We decided to repair the valve using [place] NeoChords.  The drumbi adjustable Chord-X NeoChord system was brought to the field and the system was anchored down to the anterolateral papillary muscle head.  We then used 3 pairs of  [place] sutures to fix the flail P1, P2 segment.  This was done by sewing each of the [place] suture arms to the prolapsing edge of the P1 and P2 segments.  While pressurizing the left ventricle, we then adjusted each of the [place] NeoChords to the appropriate height to restore normal coaptation zone and height against the anterior leaflet.  Once we were happy with the height of each of the [place] NeoChords, all 3 pairs of the NeoChords were tied to each other securely to lock the length.  We tested the valve and the valve was fully competent except for a small leak along a cleft between the P2 and P3 segments.  This was repaired using a single interrupted CV4 [place] suture.  We tested the valve again by pressurizing the left ventricle and there was no residual regurgitation and we had excellent leaflet coaptation.  We then sized the annulus to a 36 mm Physio II ring.  The ring was brought to the field and all sutures were brought through the sewing ring and the ring was seated down without difficulty.  All sutures were tied down securely using the Cor-Knot device.  The left atrium was then deaired and the atriotomy was closed in 2 layers of running 4-0 Prolene.  Warm blood antegrade hot shot was then given and the aortic crossclamp was removed.  Aortic crossclamp time was 102 minutes.  The ascending aorta was continuously vented to deair the heart.  The patient was eventually weaned off cardiopulmonary bypass with low dose epinephrine and James-Synephrine drips.  Total cardiopulmonary bypass time was 146 minutes.  Once the patient remained stable off bypass, the femoral venous cannula was removed and protamine was administered.  The femoral arterial cannula was subsequently removed as well.  5-0 Prolene repair sutures were used to reinforce the venotomy and the arteriotomy sites.  The groin incision was irrigated out and was closed in layers of running Vicryl suture.  Skin was closed using 3-0 Vicryl and was  sealed using Dermabond.      The right lung reinflated nicely.  A 24-Swedish Julio César drain was placed in the right pleural space as well as a 28-Swedish straight chest tube in the mediastinum.  Temporary ventricular pacing wires were placed in the RV muscle as well.  These were all brought out percutaneously below the thoracotomy incision and secured to skin using 2-0 Ethibond.  After hemostasis was confirmed, the rib spaces were reapproximated using 2 interrupted #5 Ethibond sutures.  The mini thoracotomy incision was then closed in layers of running Vicryl suture.  Skin was closed using 3-0 Vicryl and was sealed using Dermabond.      There were no intraoperative complications and the patient tolerated the operation well.  No blood products were given intraoperatively.  All sponge counts, needle counts and instrument counts were correct x 2 at the end the operation.      ESTIMATED BLOOD LOSS:  Unknown.      SPECIMEN REMOVED:  Ruptured cord of the P2 segment.        The patient was brought to the ICU in hemodynamically stable condition and remained intubated.         CAT BLAIR MD             D: 10/25/2019   T: 10/25/2019   MT: YULI      Name:     ALEKSANDER NEWTON   MRN:      [id number]        Account:        [id number]   :      1975           Procedure Date: 10/25/2019      Document: [id number]       cc: CARLOS Bush MD

## 2019-10-26 NOTE — PROGRESS NOTES
[facility]  Cardiovascular and Thoracic Surgery Daily Note  October 26, 2019           Assessment and Plan:   Frederick Nguyen is a 44 year old male with a PMH of mitral valve prolapse, prior bacteremia, questionable endocarditis, appendectomy and cervical spondylitis with now severe mitral valve regurgitation. He agreed to undergo repair as outlined below.    POD # 1 s/p Right mini thoracotomy, mitral valve repair with a [place] NeoChord implantation to the P1 and P2 prolapse, implantation of a 36 mm Garrett Physio II annuloplasty ring, T2/P3 cleft closure, right common femoral artery and vein cannulation for cardiopulmonary bypass, intraoperative DEEPAK on 10/26/2019 by Dr. Rigoberto Downs.    Main Plans for today:  Mobilize/work with therapies  Pulm hygiene  Transfer to station 33    CVS:   # Severe mitral valve regurgitation s/p mitral valve repair as above.  - metoprolol 12.5 mg BID  -  mg daily  Most recent echocardiogram demonstrated an LVEF of 60%  BP /60s; HR 80-90/NSR  CT output 250 mL/overnight- leave in    Pulm:  Extubated per protocol; Saturating well on 2 LPM cannula, IS to 500 mL; Encourage IS, C&DB, ambulating    Neuro/MSK  Intact; pain well controlled with current regimen  # Cervical spondylitis    /Renal:  BL Creat 0.8-0.9, today 1.04; Preop weight 82.6 kg  UO adequate, will hold off on diuresis today  Vitals:    10/25/19 0724 10/26/19 0500   Weight: 82.6 kg (182 lb) 82.9 kg (182 lb 12.2 oz)      Park present - strict I/O, limited mobility  BMP in am    GI/FEN:   Na+ 137; K+4.4; Replace lytes as needed; Tolerating clears, ADAT  Orders Placed This Encounter      Regular Diet Adult     Has not passed flatus; no BM  Continue bowel program  # Stress ulcer prophylaxis: Pantoprazole 40 mg daily    Endo:  Lab Results   Component Value Date    A1C 4.9 10/07/2019    Managed on insulin drip postop, will transition to sliding scale goal BG <180    Heme/ID:   Temp (24hrs),  Av.3  F (36.8  C), Min:95.7  F (35.4  C), Max:99.5  F (37.5  C)  # Stress induced leukocytosis: WBC 9.9   Completed perioperative antibiotics  # Acute blood loss anemia and thrombocytopenia: Hgb 13.1; Plt 125  CBC in am    Tubes/Drains:  RIght internal jugular intro  PIV  Arterial line  CT/TPWs  Park catheter    Proph:   BB, no statin as no CAD, ASA, subcutaneous heparin, PCD, PPI    Dispo:   Transfer to station 33.            Interval History:   No acute events overnight. States pain is well managed on current regimen, slept fairly well overnight. Tolerating diet, is not passing flatus, no BM. Breathing is good, able to reach 500 mL on IS. Up in chair for meals. Denies chest pain, palpitations, dizziness, syncopal symptoms, chills, myalgias or sternal popping/clicking.          Medications:       acetaminophen  975 mg Oral Q8H     aspirin  325 mg Oral Daily     ceFAZolin  2 g Intravenous Q8H     gabapentin  300 mg Oral BID     heparin ANTICOAGULANT  5,000 Units Subcutaneous Q8H     insulin aspart  1-7 Units Subcutaneous TID AC     insulin aspart  1-5 Units Subcutaneous At Bedtime     metoprolol  12.5 mg Per NG tube Q12H    Or     metoprolol tartrate  12.5 mg Oral Q12H     pantoprazole  40 mg Oral QAM AC     polyethylene glycol  17 g Oral BID     senna-docusate  1 tablet Oral BID    Or     senna-docusate  2 tablet Oral BID     sodium chloride (PF)  3 mL Intracatheter Q8H     [START ON 10/28/2019] acetaminophen, albumin human, glucose **OR** dextrose **OR** glucagon, hydrALAZINE, hydrOXYzine, ketorolac, ketorolac, lidocaine 4%, lidocaine (buffered or not buffered), magnesium sulfate, magnesium sulfate, methocarbamol, metoclopramide **OR** metoclopramide, midazolam, naloxone, ondansetron **OR** ondansetron, oxyCODONE, potassium chloride, potassium chloride with lidocaine, potassium chloride, potassium chloride, potassium chloride, potassium phosphate (KPHOS) in D5W IV, potassium phosphate (KPHOS) in D5W IV,  "potassium phosphate (KPHOS) in D5W IV, potassium phosphate (KPHOS) in D5W IV, potassium phosphate (KPHOS) in D5W IV, prochlorperazine **OR** prochlorperazine, sodium chloride (PF)          Physical Exam:   Vitals were reviewed  Blood pressure 117/77, pulse 83, temperature 98.8  F (37.1  C), temperature source Bladder, resp. rate 12, height 1.778 m (5' 10\"), weight 82.9 kg (182 lb 12.2 oz), SpO2 94 %.  Vitals:    10/25/19 0724 10/26/19 0500   Weight: 82.6 kg (182 lb) 82.9 kg (182 lb 12.2 oz)      I/O last 3 completed shifts:  In: 3711.99 [I.V.:3265.99; Other:196]  Out: 3949 [Urine:3325; Blood:392; Chest Tube:232]    General: NAD    Neuro: Intact    Rhythm: S1, S2, RRR, no m/r/g    Lungs: CTA/dim anteriorly    Cardiovascular: S1, S2, RRR, no m/g, +rub    Abdomen: S/NT/ND, +BS    Extremeties: No edema    Incision(s): Right thoracotomy and right femoral incisions CDI    CT: to suction; no airleak, no crepitus         Data:    All labs and imaging reviewed by me.  Labs:    Data   Recent Labs   Lab 10/26/19  0412 10/25/19  1325 10/25/19  1204 10/25/19  1201   WBC 9.9 10.0  --  13.5*   HGB 13.1* 13.3 10.9* 11.9*   MCV 90 87  --  88   * 112*  --  128*   INR  --  1.23*  --  1.59*    143 140 141   POTASSIUM 4.4 4.1 4.4 4.4   CHLORIDE 108 113*  --  111*   CO2 27 24  --  23   BUN 14 12  --  13   CR 1.04 0.90  --  0.90   ANIONGAP 2* 6  --  7   JEANNINE 7.7* 7.6*  --  7.7*   * 137*  --  143*   ALBUMIN 3.6 3.5  --   --    PROTTOTAL 6.3* 5.8*  --   --    BILITOTAL 0.9 0.7  --   --    ALKPHOS 35* 35*  --   --    ALT 16 16  --   --    AST 70* 45  --   --      Recent Labs   Lab 10/26/19  0816 10/26/19  0412 10/26/19  0407 10/26/19  0008 10/25/19  2227 10/25/19  2017 10/25/19  1710 10/25/19  1325  10/25/19  1201   GLC  --  137*  --   --   --   --   --  137*  --  143*   *  --  122* 134* 144* 139* 177* 134*   < >  --     < > = values in this interval not displayed.        CXR:  IMPRESSION: Chest tubes and right IJ " sheath remain in place, remaining  lines have been removed. No pneumothorax. Minimal basilar atelectasis.       JEANETTE Orta, CCRN-CSC  CV Surgery  [facility] Pager: [phone]  Personal Pager: [phone]

## 2019-10-26 NOTE — PROGRESS NOTES
SBAR given to station 33 RN, pt transferred via wheelchair. All belongings sent with pt- clothes and shoes, pt is missing his eye glass. Continue to monitor

## 2019-10-26 NOTE — PLAN OF CARE
A&O. C/o right sided incisional pain, improved with iv toradol. Weaned to 2L nasal cannula. Friction rub present, pacer wires capped. Dewey removed and arterial line removed. Low urine output overnight, IV lasix given x1 with improvement. Transfer to floor today.

## 2019-10-27 ENCOUNTER — APPOINTMENT (OUTPATIENT)
Dept: OCCUPATIONAL THERAPY | Facility: CLINIC | Age: 44
DRG: 219 | End: 2019-10-27
Attending: SURGERY
Payer: COMMERCIAL

## 2019-10-27 ENCOUNTER — APPOINTMENT (OUTPATIENT)
Dept: GENERAL RADIOLOGY | Facility: CLINIC | Age: 44
DRG: 219 | End: 2019-10-27
Attending: NURSE PRACTITIONER
Payer: COMMERCIAL

## 2019-10-27 LAB
ALBUMIN UR-MCNC: 10 MG/DL
ANION GAP SERPL CALCULATED.3IONS-SCNC: 3 MMOL/L (ref 3–14)
APPEARANCE UR: CLEAR
BILIRUB UR QL STRIP: NEGATIVE
BUN SERPL-MCNC: 14 MG/DL (ref 7–30)
CALCIUM SERPL-MCNC: 8.4 MG/DL (ref 8.5–10.1)
CHLORIDE SERPL-SCNC: 104 MMOL/L (ref 94–109)
CO2 SERPL-SCNC: 30 MMOL/L (ref 20–32)
COLOR UR AUTO: YELLOW
CREAT SERPL-MCNC: 1.3 MG/DL (ref 0.66–1.25)
ERYTHROCYTE [DISTWIDTH] IN BLOOD BY AUTOMATED COUNT: 12.8 % (ref 10–15)
GFR SERPL CREATININE-BSD FRML MDRD: 67 ML/MIN/{1.73_M2}
GLUCOSE BLDC GLUCOMTR-MCNC: 104 MG/DL (ref 70–99)
GLUCOSE BLDC GLUCOMTR-MCNC: 106 MG/DL (ref 70–99)
GLUCOSE BLDC GLUCOMTR-MCNC: 156 MG/DL (ref 70–99)
GLUCOSE SERPL-MCNC: 106 MG/DL (ref 70–99)
GLUCOSE SERPL-MCNC: 109 MG/DL (ref 70–99)
GLUCOSE UR STRIP-MCNC: NEGATIVE MG/DL
HCT VFR BLD AUTO: 36.1 % (ref 40–53)
HGB BLD-MCNC: 11.9 G/DL (ref 13.3–17.7)
HGB UR QL STRIP: ABNORMAL
KETONES UR STRIP-MCNC: NEGATIVE MG/DL
LACTATE BLD-SCNC: 1.2 MMOL/L (ref 0.7–2)
LEUKOCYTE ESTERASE UR QL STRIP: NEGATIVE
MAGNESIUM SERPL-MCNC: 2.5 MG/DL (ref 1.6–2.3)
MCH RBC QN AUTO: 30.1 PG (ref 26.5–33)
MCHC RBC AUTO-ENTMCNC: 33 G/DL (ref 31.5–36.5)
MCV RBC AUTO: 91 FL (ref 78–100)
MUCOUS THREADS #/AREA URNS LPF: PRESENT /LPF
NITRATE UR QL: NEGATIVE
PH UR STRIP: 6 PH (ref 5–7)
PHOSPHATE SERPL-MCNC: 2.9 MG/DL (ref 2.5–4.5)
PLATELET # BLD AUTO: 108 10E9/L (ref 150–450)
POTASSIUM SERPL-SCNC: 4.7 MMOL/L (ref 3.4–5.3)
PROCALCITONIN SERPL-MCNC: 0.21 NG/ML
RBC # BLD AUTO: 3.96 10E12/L (ref 4.4–5.9)
RBC #/AREA URNS AUTO: 18 /HPF (ref 0–2)
SODIUM SERPL-SCNC: 137 MMOL/L (ref 133–144)
SOURCE: ABNORMAL
SP GR UR STRIP: 1.01 (ref 1–1.03)
UROBILINOGEN UR STRIP-MCNC: NORMAL MG/DL (ref 0–2)
WBC # BLD AUTO: 10.2 10E9/L (ref 4–11)
WBC #/AREA URNS AUTO: 2 /HPF (ref 0–5)

## 2019-10-27 PROCEDURE — 83735 ASSAY OF MAGNESIUM: CPT | Performed by: NURSE PRACTITIONER

## 2019-10-27 PROCEDURE — 25000132 ZZH RX MED GY IP 250 OP 250 PS 637: Performed by: NURSE PRACTITIONER

## 2019-10-27 PROCEDURE — 12000000 ZZH R&B MED SURG/OB

## 2019-10-27 PROCEDURE — 97530 THERAPEUTIC ACTIVITIES: CPT | Mod: GO | Performed by: OCCUPATIONAL THERAPIST

## 2019-10-27 PROCEDURE — 00000146 ZZHCL STATISTIC GLUCOSE BY METER IP

## 2019-10-27 PROCEDURE — 80048 BASIC METABOLIC PNL TOTAL CA: CPT | Performed by: NURSE PRACTITIONER

## 2019-10-27 PROCEDURE — 97110 THERAPEUTIC EXERCISES: CPT | Mod: GO | Performed by: OCCUPATIONAL THERAPIST

## 2019-10-27 PROCEDURE — 25000128 H RX IP 250 OP 636: Performed by: NURSE PRACTITIONER

## 2019-10-27 PROCEDURE — 85027 COMPLETE CBC AUTOMATED: CPT | Performed by: NURSE PRACTITIONER

## 2019-10-27 PROCEDURE — 84100 ASSAY OF PHOSPHORUS: CPT | Performed by: NURSE PRACTITIONER

## 2019-10-27 PROCEDURE — 83605 ASSAY OF LACTIC ACID: CPT | Performed by: SURGERY

## 2019-10-27 PROCEDURE — 36415 COLL VENOUS BLD VENIPUNCTURE: CPT | Performed by: SURGERY

## 2019-10-27 PROCEDURE — 82947 ASSAY GLUCOSE BLOOD QUANT: CPT | Performed by: SURGERY

## 2019-10-27 PROCEDURE — 87040 BLOOD CULTURE FOR BACTERIA: CPT | Performed by: NURSE PRACTITIONER

## 2019-10-27 PROCEDURE — 71046 X-RAY EXAM CHEST 2 VIEWS: CPT

## 2019-10-27 PROCEDURE — 81001 URINALYSIS AUTO W/SCOPE: CPT | Performed by: NURSE PRACTITIONER

## 2019-10-27 PROCEDURE — 84145 PROCALCITONIN (PCT): CPT | Performed by: SURGERY

## 2019-10-27 PROCEDURE — 36415 COLL VENOUS BLD VENIPUNCTURE: CPT | Performed by: NURSE PRACTITIONER

## 2019-10-27 RX ADMIN — OXYCODONE HYDROCHLORIDE 10 MG: 5 TABLET ORAL at 16:36

## 2019-10-27 RX ADMIN — OXYCODONE HYDROCHLORIDE 10 MG: 5 TABLET ORAL at 11:18

## 2019-10-27 RX ADMIN — SODIUM CHLORIDE 500 ML: 9 INJECTION, SOLUTION INTRAVENOUS at 18:00

## 2019-10-27 RX ADMIN — Medication 12.5 MG: at 20:39

## 2019-10-27 RX ADMIN — THERA TABS 1 TABLET: TAB at 08:14

## 2019-10-27 RX ADMIN — OXYCODONE HYDROCHLORIDE 5 MG: 5 TABLET ORAL at 23:59

## 2019-10-27 RX ADMIN — ACETAMINOPHEN 975 MG: 325 TABLET, FILM COATED ORAL at 08:13

## 2019-10-27 RX ADMIN — HEPARIN SODIUM 5000 UNITS: 5000 INJECTION, SOLUTION INTRAVENOUS; SUBCUTANEOUS at 11:18

## 2019-10-27 RX ADMIN — SENNOSIDES AND DOCUSATE SODIUM 2 TABLET: 8.6; 5 TABLET ORAL at 20:38

## 2019-10-27 RX ADMIN — ACETAMINOPHEN 975 MG: 325 TABLET, FILM COATED ORAL at 23:59

## 2019-10-27 RX ADMIN — ACETAMINOPHEN 975 MG: 325 TABLET, FILM COATED ORAL at 00:22

## 2019-10-27 RX ADMIN — ASPIRIN 325 MG: 325 TABLET, DELAYED RELEASE ORAL at 08:13

## 2019-10-27 RX ADMIN — OXYCODONE HYDROCHLORIDE 10 MG: 5 TABLET ORAL at 20:38

## 2019-10-27 RX ADMIN — POLYETHYLENE GLYCOL 3350 17 G: 17 POWDER, FOR SOLUTION ORAL at 08:13

## 2019-10-27 RX ADMIN — METHOCARBAMOL TABLETS 750 MG: 750 TABLET, COATED ORAL at 12:37

## 2019-10-27 RX ADMIN — PANTOPRAZOLE SODIUM 40 MG: 40 TABLET, DELAYED RELEASE ORAL at 06:10

## 2019-10-27 RX ADMIN — METHOCARBAMOL TABLETS 750 MG: 750 TABLET, COATED ORAL at 23:59

## 2019-10-27 RX ADMIN — HEPARIN SODIUM 5000 UNITS: 5000 INJECTION, SOLUTION INTRAVENOUS; SUBCUTANEOUS at 05:04

## 2019-10-27 RX ADMIN — OXYCODONE HYDROCHLORIDE 10 MG: 5 TABLET ORAL at 08:14

## 2019-10-27 RX ADMIN — OXYCODONE HYDROCHLORIDE 10 MG: 5 TABLET ORAL at 00:22

## 2019-10-27 RX ADMIN — GABAPENTIN 300 MG: 300 CAPSULE ORAL at 08:14

## 2019-10-27 RX ADMIN — SENNOSIDES AND DOCUSATE SODIUM 2 TABLET: 8.6; 5 TABLET ORAL at 08:14

## 2019-10-27 RX ADMIN — DICLOFENAC 2 G: 10 GEL TOPICAL at 00:45

## 2019-10-27 RX ADMIN — Medication 12.5 MG: at 08:16

## 2019-10-27 RX ADMIN — HEPARIN SODIUM 5000 UNITS: 5000 INJECTION, SOLUTION INTRAVENOUS; SUBCUTANEOUS at 20:39

## 2019-10-27 RX ADMIN — OXYCODONE HYDROCHLORIDE 10 MG: 5 TABLET ORAL at 05:04

## 2019-10-27 RX ADMIN — HYDROXYZINE HYDROCHLORIDE 25 MG: 25 TABLET ORAL at 00:44

## 2019-10-27 RX ADMIN — ACETAMINOPHEN 975 MG: 325 TABLET, FILM COATED ORAL at 16:36

## 2019-10-27 RX ADMIN — METHOCARBAMOL TABLETS 750 MG: 750 TABLET, COATED ORAL at 06:10

## 2019-10-27 ASSESSMENT — MIFFLIN-ST. JEOR: SCORE: 1739.05

## 2019-10-27 ASSESSMENT — ACTIVITIES OF DAILY LIVING (ADL)
ADLS_ACUITY_SCORE: 9
ADLS_ACUITY_SCORE: 11

## 2019-10-27 NOTE — PROGRESS NOTES
[facility]  Cardiovascular and Thoracic Surgery Daily Note  October 27, 2019           Assessment and Plan:   Frederick Nguyen is a 44 year old male with a PMH of mitral valve prolapse, prior bacteremia, questionable endocarditis, appendectomy and cervical spondylitis with now severe mitral valve regurgitation. He agreed to undergo repair as outlined below.    POD # 2 s/p Right mini thoracotomy, mitral valve repair with a [place] NeoChord implantation to the P1 and P2 prolapse, implantation of a 36 mm Garrett Physio II annuloplasty ring, T2/P3 cleft closure, right common femoral artery and vein cannulation for cardiopulmonary bypass, intraoperative DEEPAK on 10/26/2019 by Dr. Rigoberto Downs.    Main Plans for today:  Mobilize/work with therapies  Pulm hygiene  Discontinue CTs and Park (done)    CVS:   # Severe mitral valve regurgitation s/p mitral valve repair as above.  - metoprolol 12.5 mg BID  -  mg daily  Most recent echocardiogram demonstrated an LVEF of 60%  -130/70-80s; HR 90/NSR with 1' AVB  CT output 230 mL/24 hours- discontinue today    Pulm:  Extubated per protocol; Saturating well on 2 LPM cannula, IS to 1000 mL; Encourage IS, C&DB, ambulating  Post CT pull CXR in am    Neuro/MSK  Intact; pain well controlled with current regimen  # Cervical spondylitis    /Renal:  # Mild CHUCKY. BL Creat 0.8-0.9, today 1.3; Preop weight 82.6 kg  UO adequate, no diuresis needed; gabapentin and toradol discontinued  Vitals:    10/26/19 0500 10/26/19 1300 10/27/19 0520   Weight: 82.9 kg (182 lb 12.2 oz) 83.6 kg (184 lb 6.4 oz) 83.3 kg (183 lb 9.6 oz)      Xavier present - strict I/O, limited mobility- remove today    GI/FEN:   Lytes stable; Replace as needed; Tolerating diet, ADAT  Orders Placed This Encounter      Regular Diet Adult     Has passed flatus; no BM  Continue bowel program  # Stress ulcer prophylaxis: Pantoprazole 40 mg daily    Endo:  Lab Results   Component Value Date    A1C  4.9 10/07/2019    Managed on insulin drip postop, will transition to sliding scale goal BG <180    Heme/ID:   Temp (24hrs), Av.2  F (36.8  C), Min:97.5  F (36.4  C), Max:99.5  F (37.5  C)  # Stress induced leukocytosis: WBC pending  Completed perioperative antibiotics  # Acute blood loss anemia and thrombocytopenia: labs pending    Tubes/Drains:  PIV  CT/TPWs  Park catheter    Proph:   BB, no statin as no CAD, ASA, subcutaneous heparin, PCD, PPI    Dispo:   Continue on station 33.            Interval History:   No acute events overnight. States pain is well managed on current regimen, slept well overnight. Tolerating diet, is passing flatus, no BM. Breathing is good, able to reach 1000 on IS. Up in chair, working with therapies and ambulating in halls. Denies chest pain, palpitations, dizziness, syncopal symptoms, chills, myalgias.         Medications:       acetaminophen  975 mg Oral Q8H     aspirin  325 mg Oral Daily     gabapentin  300 mg Oral BID     heparin ANTICOAGULANT  5,000 Units Subcutaneous Q8H     insulin aspart  1-7 Units Subcutaneous TID AC     insulin aspart  1-5 Units Subcutaneous At Bedtime     metoprolol  12.5 mg Per NG tube Q12H    Or     metoprolol tartrate  12.5 mg Oral Q12H     multivitamin, therapeutic  1 tablet Oral Daily     pantoprazole  40 mg Oral QAM AC     polyethylene glycol  17 g Oral BID     senna-docusate  1 tablet Oral BID    Or     senna-docusate  2 tablet Oral BID     sodium chloride (PF)  3 mL Intracatheter Q8H     [START ON 10/28/2019] acetaminophen, albumin human, glucose **OR** dextrose **OR** glucagon, diclofenac, hydrALAZINE, hydrOXYzine, lidocaine 4%, lidocaine (buffered or not buffered), magnesium sulfate, magnesium sulfate, methocarbamol, metoclopramide **OR** metoclopramide, midazolam, naloxone, ondansetron **OR** ondansetron, oxyCODONE, potassium chloride, potassium chloride with lidocaine, potassium chloride, potassium chloride, potassium chloride, potassium  "phosphate (KPHOS) in D5W IV, potassium phosphate (KPHOS) in D5W IV, potassium phosphate (KPHOS) in D5W IV, potassium phosphate (KPHOS) in D5W IV, potassium phosphate (KPHOS) in D5W IV, prochlorperazine **OR** prochlorperazine, sodium chloride (PF)          Physical Exam:   Vitals were reviewed  Blood pressure 111/59, pulse 90, temperature 99.5  F (37.5  C), temperature source Oral, resp. rate 16, height 1.778 m (5' 10\"), weight 83.3 kg (183 lb 9.6 oz), SpO2 94 %.  Vitals:    10/26/19 0500 10/26/19 1300 10/27/19 0520   Weight: 82.9 kg (182 lb 12.2 oz) 83.6 kg (184 lb 6.4 oz) 83.3 kg (183 lb 9.6 oz)      I/O last 3 completed shifts:  In: 1150 [P.O.:700; I.V.:450]  Out: 3320 [Urine:3040; Chest Tube:280]    General: NAD    Neuro: Intact    Rhythm: S1, S2, RRR, no m/r/g    Lungs: CTA    Cardiovascular: S1, S2, RRR, no m/g, +rub    Abdomen: S/NT/ND, +BS    Extremeties: No edema    Incision(s): Right thoracotomy and right femoral incisions CDI    CT: to suction; no airleak, no crepitus         Data:    All labs and imaging reviewed by me.  Labs:    Data   Recent Labs   Lab 10/26/19  0412 10/25/19  1325 10/25/19  1204 10/25/19  1201   WBC 9.9 10.0  --  13.5*   HGB 13.1* 13.3 10.9* 11.9*   MCV 90 87  --  88   * 112*  --  128*   INR  --  1.23*  --  1.59*    143 140 141   POTASSIUM 4.4 4.1 4.4 4.4   CHLORIDE 108 113*  --  111*   CO2 27 24  --  23   BUN 14 12  --  13   CR 1.04 0.90  --  0.90   ANIONGAP 2* 6  --  7   JEANNINE 7.7* 7.6*  --  7.7*   * 137*  --  143*   ALBUMIN 3.6 3.5  --   --    PROTTOTAL 6.3* 5.8*  --   --    BILITOTAL 0.9 0.7  --   --    ALKPHOS 35* 35*  --   --    ALT 16 16  --   --    AST 70* 45  --   --      Recent Labs   Lab 10/27/19  0759 10/26/19  2122 10/26/19  1724 10/26/19  1200 10/26/19  0816 10/26/19  0412 10/26/19  0407  10/25/19  1325  10/25/19  1201   GLC  --   --   --   --   --  137*  --   --  137*  --  143*   * 106* 103* 104* 104*  --  122*   < > 134*   < >  --     < > = " values in this interval not displayed.        CXR:  No new imaging today    JEANETTE Orta, CCRN-CSC  CV Surgery  [facility] Pager: [phone]  Personal Pager: [phone]

## 2019-10-27 NOTE — PLAN OF CARE
A&O. VSS on 1-2L NC while sleeping. Tele:  SR with 1st degree AVB. Lung sounds diminished. Pulmonary toileting encouraged. Incisions WNL. Chest tube to suction. Pts biggest complaint was of back spasms even after robaxin and other pain meds, did have some relief with heat. Adequate urinary output to eubanks.

## 2019-10-27 NOTE — PROVIDER NOTIFICATION
"Page out to ORTIZ Orta \"pts temp 101.1 F, BP fine, HR tachy in 110's, tele SR, lactic 1.2, WBC 10.2 this am, having chills/shivering, respiratory status is fine, and reports \"just doesn't feel good\"  tylenol given.  New orders received for 500ml NS bolus, blood cultures, UA/UC, and 2 view CXR.  "

## 2019-10-27 NOTE — PLAN OF CARE
Discharge Planner OT   Patient plan for discharge: home with family A  Current status: supine <> sit with SBA, sit <> stand SBA and ambulates with SBA/CGA for safety, pushing IV pole for line mgmt with SBA, pt ambulated approx 13 mins some fatigue and stable CV response, reports some R sided pain.     2nd treatment: pt completed TDM with gradual increase to .9 mph for 10 mins and completed 15 stairs with some fatigue and R sided incisional pain, stable vitals, pt reports I with LE dress and toilet transfer. See vital sign flow sheet for details.   Barriers to return to prior living situation: none with family A with ADL/IADL's as needed.   Recommendations for discharge: home with family A with ADL/IADL's as needed ie yard work, vacuuming and OP CR   Rationale for recommendations: pt making good progress and will require OP CR for monitored progressive exercise and risk factor education and modification at discharge.       Entered by: Ginny Escobedo 10/27/2019 10:53 AM

## 2019-10-27 NOTE — PLAN OF CARE
VSS on room air, 2LPM for comfort during sleep. Tele NSR with 1st degree AVB. A/Ox4. Incision on right thorax TEDDY with liquid bandage. CTx2 to one atrium, -20suction, serosanguinous output. Pain controlled with PRN toradol, oxycodone, and robaxin. Up with Asstx1, ambulated on shift with therapy.  Regular diet, minimal appetite. BS active, Flatus +. Voiding adequately to eubanks. LS clear. IS to 1000. Acapella done.

## 2019-10-28 ENCOUNTER — APPOINTMENT (OUTPATIENT)
Dept: OCCUPATIONAL THERAPY | Facility: CLINIC | Age: 44
DRG: 219 | End: 2019-10-28
Attending: SURGERY
Payer: COMMERCIAL

## 2019-10-28 VITALS
RESPIRATION RATE: 18 BRPM | DIASTOLIC BLOOD PRESSURE: 84 MMHG | BODY MASS INDEX: 25.53 KG/M2 | OXYGEN SATURATION: 93 % | HEIGHT: 70 IN | WEIGHT: 178.35 LBS | SYSTOLIC BLOOD PRESSURE: 128 MMHG | HEART RATE: 114 BPM | TEMPERATURE: 98.1 F

## 2019-10-28 PROBLEM — R73.9 TRANSIENT HYPERGLYCEMIA POST PROCEDURE: Status: ACTIVE | Noted: 2019-10-28

## 2019-10-28 PROBLEM — E87.70 FLUID OVERLOAD: Status: ACTIVE | Noted: 2019-10-28

## 2019-10-28 LAB — GLUCOSE BLDC GLUCOMTR-MCNC: 105 MG/DL (ref 70–99)

## 2019-10-28 PROCEDURE — 25000132 ZZH RX MED GY IP 250 OP 250 PS 637: Performed by: NURSE PRACTITIONER

## 2019-10-28 PROCEDURE — 97110 THERAPEUTIC EXERCISES: CPT | Mod: GO | Performed by: OCCUPATIONAL THERAPIST

## 2019-10-28 PROCEDURE — 25000128 H RX IP 250 OP 636: Performed by: NURSE PRACTITIONER

## 2019-10-28 PROCEDURE — 25000132 ZZH RX MED GY IP 250 OP 250 PS 637: Performed by: PHYSICIAN ASSISTANT

## 2019-10-28 PROCEDURE — 00000146 ZZHCL STATISTIC GLUCOSE BY METER IP

## 2019-10-28 RX ORDER — AMOXICILLIN 250 MG
2 CAPSULE ORAL 2 TIMES DAILY PRN
Qty: 20 TABLET | Refills: 1 | Status: SHIPPED | OUTPATIENT
Start: 2019-10-28 | End: 2019-11-04

## 2019-10-28 RX ORDER — ASPIRIN 325 MG
325 TABLET, DELAYED RELEASE (ENTERIC COATED) ORAL DAILY
Qty: 30 TABLET | Refills: 3 | Status: SHIPPED | OUTPATIENT
Start: 2019-10-29 | End: 2019-12-16 | Stop reason: DRUGHIGH

## 2019-10-28 RX ORDER — PANTOPRAZOLE SODIUM 40 MG/1
40 TABLET, DELAYED RELEASE ORAL
Qty: 14 TABLET | Refills: 0 | Status: SHIPPED | OUTPATIENT
Start: 2019-10-29 | End: 2019-12-16

## 2019-10-28 RX ORDER — ACETAMINOPHEN 325 MG/1
650 TABLET ORAL EVERY 4 HOURS PRN
Qty: 60 TABLET | Refills: 0 | Status: SHIPPED | OUTPATIENT
Start: 2019-10-28 | End: 2019-12-16

## 2019-10-28 RX ORDER — POLYETHYLENE GLYCOL 3350 17 G/17G
17 POWDER, FOR SOLUTION ORAL 2 TIMES DAILY
Qty: 7 PACKET | Refills: 0 | Status: SHIPPED | OUTPATIENT
Start: 2019-10-28 | End: 2019-11-04

## 2019-10-28 RX ORDER — METOPROLOL SUCCINATE 50 MG/1
50 TABLET, EXTENDED RELEASE ORAL 2 TIMES DAILY
Qty: 60 TABLET | Refills: 3 | Status: SHIPPED | OUTPATIENT
Start: 2019-10-28 | End: 2019-12-16

## 2019-10-28 RX ORDER — OXYCODONE HYDROCHLORIDE 5 MG/1
5-10 TABLET ORAL
Qty: 60 TABLET | Refills: 0 | Status: SHIPPED | OUTPATIENT
Start: 2019-10-28 | End: 2019-12-16

## 2019-10-28 RX ORDER — MAGNESIUM CARB/ALUMINUM HYDROX 105-160MG
148 TABLET,CHEWABLE ORAL ONCE
Status: DISCONTINUED | OUTPATIENT
Start: 2019-10-28 | End: 2019-10-28 | Stop reason: HOSPADM

## 2019-10-28 RX ORDER — METOPROLOL TARTRATE 25 MG/1
25 TABLET, FILM COATED ORAL EVERY 12 HOURS
Status: DISCONTINUED | OUTPATIENT
Start: 2019-10-28 | End: 2019-10-28 | Stop reason: HOSPADM

## 2019-10-28 RX ORDER — METHOCARBAMOL 750 MG/1
750 TABLET, FILM COATED ORAL 4 TIMES DAILY PRN
Qty: 60 TABLET | Refills: 1 | Status: SHIPPED | OUTPATIENT
Start: 2019-10-28 | End: 2019-11-04

## 2019-10-28 RX ADMIN — PANTOPRAZOLE SODIUM 40 MG: 40 TABLET, DELAYED RELEASE ORAL at 07:02

## 2019-10-28 RX ADMIN — SENNOSIDES AND DOCUSATE SODIUM 1 TABLET: 8.6; 5 TABLET ORAL at 08:35

## 2019-10-28 RX ADMIN — Medication 12.5 MG: at 08:37

## 2019-10-28 RX ADMIN — ASPIRIN 325 MG: 325 TABLET, DELAYED RELEASE ORAL at 08:35

## 2019-10-28 RX ADMIN — ACETAMINOPHEN 975 MG: 325 TABLET, FILM COATED ORAL at 08:35

## 2019-10-28 RX ADMIN — OXYCODONE HYDROCHLORIDE 5 MG: 5 TABLET ORAL at 04:54

## 2019-10-28 RX ADMIN — Medication 12.5 MG: at 10:30

## 2019-10-28 RX ADMIN — HEPARIN SODIUM 5000 UNITS: 5000 INJECTION, SOLUTION INTRAVENOUS; SUBCUTANEOUS at 04:54

## 2019-10-28 RX ADMIN — OXYCODONE HYDROCHLORIDE 5 MG: 5 TABLET ORAL at 08:35

## 2019-10-28 RX ADMIN — METHOCARBAMOL TABLETS 750 MG: 750 TABLET, COATED ORAL at 10:43

## 2019-10-28 RX ADMIN — Medication 12.5 MG: at 12:00

## 2019-10-28 RX ADMIN — THERA TABS 1 TABLET: TAB at 08:35

## 2019-10-28 ASSESSMENT — ACTIVITIES OF DAILY LIVING (ADL)
ADLS_ACUITY_SCORE: 11
ADLS_ACUITY_SCORE: 11
ADLS_ACUITY_SCORE: 9
ADLS_ACUITY_SCORE: 9
ADLS_ACUITY_SCORE: 11

## 2019-10-28 ASSESSMENT — MIFFLIN-ST. JEOR: SCORE: 1715.25

## 2019-10-28 NOTE — PLAN OF CARE
VSS on room air, ex Tmax 101.1 at 1630, tylenol given. KAYDEN Tapia aware, see note. Temp at 1900 down to 98.8. Tele NSR. A/Ox4. Incision on right under arm with liquid bandage, TEDDY. CT's and eubanks removed at 1130. Pain controlled with PRN oxycodone, flexeril, and scheduled tylenol. Up independently in room, SBA in halls.  Regular diet, good appetite. BS active, Flatus +, BM+. Voiding adequately to urinal. LS clear. IS to 1000.

## 2019-10-28 NOTE — PROGRESS NOTES
CV Surgery    S: Sitting up in bed, breathing fine, tolerating diet, wanting to go home, had a BM    O: B/P: 148/98, T: 98.3, P: 107, R: 18  General: NAD  Heart: s1/s2, no m/r/g  Lungs: clear  Abd: s/nt/nd, +BS  Sternum: cdi  Extremities: no LE edema    Lab Results   Component Value Date    WBC 10.2 10/27/2019     Lab Results   Component Value Date    RBC 3.96 10/27/2019     Lab Results   Component Value Date    HGB 11.9 10/27/2019     Lab Results   Component Value Date    HCT 36.1 10/27/2019     No components found for: MCT  Lab Results   Component Value Date    MCV 91 10/27/2019     Lab Results   Component Value Date    MCH 30.1 10/27/2019     Lab Results   Component Value Date    MCHC 33.0 10/27/2019     Lab Results   Component Value Date    RDW 12.8 10/27/2019     Lab Results   Component Value Date     10/27/2019       Last Basic Metabolic Panel:  Lab Results   Component Value Date     10/27/2019      Lab Results   Component Value Date    POTASSIUM 4.7 10/27/2019     Lab Results   Component Value Date    CHLORIDE 104 10/27/2019     Lab Results   Component Value Date    JEANNINE 8.4 10/27/2019     Lab Results   Component Value Date    CO2 30 10/27/2019     Lab Results   Component Value Date    BUN 14 10/27/2019     Lab Results   Component Value Date    CR 1.30 10/27/2019     Lab Results   Component Value Date     10/27/2019       A/P: POD#3 s/p Right mini thoracotomy, mitral valve repair with a [place] NeoChord implantation to the P1 and P2 prolapse, implantation of a 36 mm Garrett Physio II annuloplasty ring, T2/P3 cleft closure, right common femoral artery and vein cannulation for cardiopulmonary bypass, intraoperative DEEPAK on 10/26/2019 by Dr. Rigoberto Moreau PA-C  Pager [phone]

## 2019-10-28 NOTE — CONSULTS
"NUTRITION ASSESSMENT      REASON FOR ASSESSMENT:  Cardiac Surgery Nutrition Consult    CURRENT DIET / INTAKE:  Regular - Pt eating well, notes a fair appetite.  Has questions regarding quantity and type of protein source, as well as a general cardiac diet.     ANTHROPOMETRICS:   Ht: 5' 10\"  Wt: 82.6 kg  BMI: 26.11 kg/m2  IBW: 75.5 kg  Weight Status: Overweight BMI 25-29.9  %IBW: 109%    MALNUTRITION:  Patient does not meet two of the following criteria necessary for diagnosing malnutrition:  significant weight loss, reduced intake, subcutaneous fat loss, muscle loss or fluid retention. Nutrition Focused Physical Assessment (NFPA) not appropriate at this time.    NUTRITION DIAGNOSIS:   Predicted inadequate nutrient intake (protein) r/t increased needs post op.     INTERVENTIONS:    Nutrition Prescription:  Regular diet + emphasis on protein     Implementation:  Nutrition Education (Content):  Discussed the importance of adequate nutrition post-op for healing and energy, emphasizing protein foods, and encouraged patient to order a protein food at each meal.    Goals:  Patient to consume ~75% at meals in the next 3 - 5 days    Follow Up/Monitoring (InPatient):  Food and Fluid intake -  Monitor for adequacy    Follow Up/Monitoring (OutPatient):  Patient will participate in out-patient cardiac rehab and attend nutrition classes during the program    Dot Junior RD, LD  [facility] [place], [address], [address], [facility]   Pager: [phone]  Weekend Pager: [phone]    "

## 2019-10-28 NOTE — PLAN OF CARE
A&O. VSS on room air. Afebrile. Tele:  SR with 1st degree AVB. Lung sounds clear. Pulmonary toileting encouraged. Incisions WNL. Voiding. Pain controlled with tylenol, oxycodone, and robaxin. Up independently.

## 2019-10-28 NOTE — DISCHARGE SUMMARY
"Discharge Summary    Frederick Nguyen MRN# [id number]   YOB: 1975 Age: 44 year old     Date of Admission:  10/25/2019  Date of Discharge:  10/28/2019  Admitting Physician:  Rigoberto Downs MD  Discharge Physician:  Rigoberto Downs,*  Discharging Service:  Cardiovascular and Thoracic Surgery     Home clinic: list was provided to patient  Primary Provider: No Ref-Primary, Physician          Admission Diagnoses:   MITRAL VALVE PROLAPSE  S/P MVR (mitral valve repair)          Discharge Diagnosis:   Patient Active Problem List   Diagnosis     Cervical spondylosis without myelopathy     Cervicalgia     Duane syndrome of left eye     Mitral valve prolapse     Mitral valve regurgitation     Nonallopathic lesion of lumbar region     Spasm of muscle     Nonallopathic lesion of thoracic region     Bacteremia     Infective endocarditis     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 rate 18, height 1.778 m (5' 10\"), weight 80.9 kg (178 lb 5.6 oz), SpO2 97 %.     Code status on discharge: Full Code           Procedures:   On 10/25/19 Mr. Nguyen successfully underwent Right mini thoracotomy, mitral valve repair with a [place] NeoChord implantation to the P1 and P2 prolapse, implantation of a 36 mm Garrett Physio II annuloplasty ring, T2/P3 cleft closure, right common femoral artery and vein cannulation for cardiopulmonary bypass, intraoperative DEEPAK by Dr. Rigoberto Downs.           Medications Prior to Admission: "     Medications Prior to Admission   Medication Sig Dispense Refill Last Dose     amoxicillin (AMOXIL) 500 MG capsule Take 4 caps 1 hour prior to dental procedure. 4 capsule 0 more than a month at prn     diclofenac (VOLTAREN) 1 % topical gel Place onto the skin 4 times daily (Patient taking differently: Place onto the skin 4 times daily as needed ) 100 g 3 Past Week at prn     multivitamin, therapeutic (THERA-VIT) TABS tablet Take 1 tablet by mouth daily   more than a week             Discharge Medications:     Current Discharge Medication List      START taking these medications    Details   acetaminophen (TYLENOL) 325 MG tablet Take 2 tablets (650 mg) by mouth every 4 hours as needed for mild pain  Qty: 60 tablet, Refills: 0    Associated Diagnoses: S/P MVR (mitral valve repair)      aspirin (ASA) 325 MG EC tablet Take 1 tablet (325 mg) by mouth daily  Qty: 30 tablet, Refills: 3    Associated Diagnoses: S/P MVR (mitral valve repair)      methocarbamol (ROBAXIN) 750 MG tablet Take 1 tablet (750 mg) by mouth 4 times daily as needed for muscle spasms  Qty: 60 tablet, Refills: 1    Associated Diagnoses: S/P MVR (mitral valve repair)      metoprolol succinate ER (TOPROL-XL) 50 MG 24 hr tablet Take 1 tablet (50 mg) by mouth 2 times daily  Qty: 60 tablet, Refills: 3    Associated Diagnoses: S/P MVR (mitral valve repair)      oxyCODONE (ROXICODONE) 5 MG tablet Take 1-2 tablets (5-10 mg) by mouth every 3 hours as needed for moderate to severe pain  Qty: 60 tablet, Refills: 0    Associated Diagnoses: S/P MVR (mitral valve repair)      pantoprazole (PROTONIX) 40 MG EC tablet Take 1 tablet (40 mg) by mouth every morning (before breakfast) for 14 days  Qty: 14 tablet, Refills: 0    Associated Diagnoses: S/P MVR (mitral valve repair)      polyethylene glycol (MIRALAX/GLYCOLAX) packet Take 17 g by mouth 2 times daily  Qty: 7 packet, Refills: 0    Associated Diagnoses: S/P MVR (mitral valve repair)      senna-docusate  (SENOKOT-S/PERICOLACE) 8.6-50 MG tablet Take 2 tablets by mouth 2 times daily as needed for constipation  Qty: 20 tablet, Refills: 1    Associated Diagnoses: S/P MVR (mitral valve repair)         CONTINUE these medications which have NOT CHANGED    Details   amoxicillin (AMOXIL) 500 MG capsule Take 4 caps 1 hour prior to dental procedure.  Qty: 4 capsule, Refills: 0    Associated Diagnoses: Mitral valve prolapse      diclofenac (VOLTAREN) 1 % topical gel Place onto the skin 4 times daily  Qty: 100 g, Refills: 3    Associated Diagnoses: Hallux limitus of right foot; Pain in joint involving right ankle and foot      multivitamin, therapeutic (THERA-VIT) TABS tablet Take 1 tablet by mouth daily                   Consultations:   Nutrition, Intensivist             Brief History of Illness:   Mr. Nguyen is a very pleasant 44-year-old gentleman with a history of mitral valve prolapse and mitral valve regurgitation dating back to at least 2012.  On a recent echocardiogram he was found to have severe mitral valve regurgitation with a flail P1, P2 segment.  He was recently referred to me for mitral valve repair.  He was taken to the operating room today for a minimally invasive mitral valve repair.           Hospital Course:   On 10/25/19 Mr. Nguyen successfully underwent Right mini thoracotomy, mitral valve repair with a [place] NeoChord implantation to the P1 and P2 prolapse, implantation of a 36 mm Garrett Physio II annuloplasty ring, T2/P3 cleft closure, right common femoral artery and vein cannulation for cardiopulmonary bypass, intraoperative DEEPAK by Dr. Rigoberto Downs.   Was extubated within 24 hours of surgery. Once he was weaned off hemodynamic stabilizing gtt's, transferred to the surgical floor.   Had some transient hyperglycemia treated with an insulin gtt, transitioned to sliding scale insulin and has no need at discharge.  Had some fluid overload treated with diuretic medication. At discharge he is 2 kg  below his pre-op weight and is not sent with any diuretics.   He had some tachycardia and his BB was up titrated as his blood pressure would allow. Heart rhythm remained stable. He progressed well with cardiac rehab. They are discharged to home on pod# 3 with the help of their family.              Significant Results:   Lab Results   Component Value Date    WBC 10.2 10/27/2019     Lab Results   Component Value Date    RBC 3.96 10/27/2019     Lab Results   Component Value Date    HGB 11.9 10/27/2019     Lab Results   Component Value Date    HCT 36.1 10/27/2019     No components found for: MCT  Lab Results   Component Value Date    MCV 91 10/27/2019     Lab Results   Component Value Date    MCH 30.1 10/27/2019     Lab Results   Component Value Date    MCHC 33.0 10/27/2019     Lab Results   Component Value Date    RDW 12.8 10/27/2019     Lab Results   Component Value Date     10/27/2019       Last Basic Metabolic Panel:  Lab Results   Component Value Date     10/27/2019      Lab Results   Component Value Date    POTASSIUM 4.7 10/27/2019     Lab Results   Component Value Date    CHLORIDE 104 10/27/2019     Lab Results   Component Value Date    JEANNINE 8.4 10/27/2019     Lab Results   Component Value Date    CO2 30 10/27/2019     Lab Results   Component Value Date    BUN 14 10/27/2019     Lab Results   Component Value Date    CR 1.30 10/27/2019     Lab Results   Component Value Date     10/27/2019                  Pending Results:   None           Discharge Instructions and Follow-Up:   Discharge diet: Regular   Discharge activity: Daily weights: Call if weight gain 2-3 lbs over 24 hours or if greater than 5 lbs in 1 week.  No lifting more than 10 lbs for 8-12 weeks.  No driving for 1 month.  Call for pain medication refill.  Call for temperature greater than 101.0  Daily shower with antibacterial soap.   Discharge follow-up: *Follow up primary care provider in 7-10 days after discharge in order to review  your medication, vital signs, obtain any necessary lab work your doctor may want, and to update them on your hospitalization and medical issues.   *Follow up with Radha/Nora/Regina Anthony with Dr Downs, heart surgeon, at [facility] at [facility] [address] on 11/11/19 at 3:30 PM. If any questions or concerns call [phone].  You will see us once at this visit and then if everything is going well you will not need to see us again.  You will follow long term with your cardiologist.   *Follow up with Dr Bush, cardiologist, in 3 months. Our schedulers will make this apt for you. Will give specifics at follow up apt. This is who you will follow with long term about your heart issues. [phone].   *Please schedule outpatient cardiac rehab within 5 days of discharge from TCU, call [phone].    Outpatient therapy: Cardiac rehab   Home Care agency: None    Supplies and equipment: None   Lines and drains: None    Wound care: Wash incision daily with antibacterial soap   Other instructions: None

## 2019-10-28 NOTE — PROGRESS NOTES
Resumed care at 1500. Pt IV removed, Ride at 1630 discharged with wife. Ride to door. Belongings sent with pt.

## 2019-10-28 NOTE — PLAN OF CARE
SR. Up independently incisions clean and intact shower done. . Discharged today. Information reviewed with patient.  Awaiting ride @ 430 pm

## 2019-10-28 NOTE — PLAN OF CARE
Discharge Planner OT   Patient plan for discharge: Home with OP CR  Current status: Ambulated on treadmill x 15 min at 1.2 mph with normal CV response, rates effort at 2/10.   Barriers to return to prior living situation: none  Recommendations for discharge: home with OP CR and assist for heavy chores  Rationale for recommendations: Pt will need assist from family for heavy chores due to lifting restriction and will benefit from OP CR for continued monitored exercise s/p MVR.       Entered by: Nolvia Zaldivar 10/28/2019 11:27 AM

## 2019-10-28 NOTE — PROGRESS NOTES
Met with patient, post op instructions reviewed. Plan discharge to home with outpatient cardiac rehab. Will follow along.

## 2019-10-28 NOTE — PLAN OF CARE
OT/CR: Pt not seen for CR in pm as he is in the process of discharging home, going over discharge instructions with RN.    Occupational Therapy Discharge Summary    Reason for therapy discharge:    Discharged to home with outpatient therapy.    Progress towards therapy goal(s). See goals on Care Plan in [facility] electronic health record for goal details.  Goals met (treadmill goal met at 15 min due to time constraints on day of discharge)    Therapy recommendation(s):    Continued therapy is recommended.  Rationale/Recommendations:  phase 2 OP CR.

## 2019-10-29 ENCOUNTER — TELEPHONE (OUTPATIENT)
Dept: FAMILY MEDICINE | Facility: CLINIC | Age: 44
End: 2019-10-29

## 2019-10-29 LAB
INTERPRETATION ECG - MUSE: NORMAL
INTERPRETATION ECG - MUSE: NORMAL

## 2019-10-29 NOTE — TELEPHONE ENCOUNTER
This patient was discharged from [facility] on 10/28/2019.    Discharge Diagnosis: Mitral Valve Prolapse, S/P Mvr (Mitral Valve Repair)    Follow-up instructions: *Follow up primary care provider in 7-10 days after discharge in order to review your medication, vital signs, obtain any necessary lab work your doctor may want, and to update them on your hospitalization and medical issues.     A follow-up visit has not been scheduled.      Please follow-up with patient.

## 2019-10-29 NOTE — TELEPHONE ENCOUNTER
Patient was last seen by Dr. Jamil 6/3/19.    ED / Discharge Outreach Protocol    Patient Contact    Attempt # 1    Was call answered?  No.  Left message on voicemail with information to call me back.    Kerri Soriano RN  [facility]

## 2019-10-30 ENCOUNTER — TELEPHONE (OUTPATIENT)
Dept: OTHER | Facility: CLINIC | Age: 44
End: 2019-10-30

## 2019-10-30 NOTE — TELEPHONE ENCOUNTER
ED / Discharge Outreach Protocol    Patient Contact    Attempt # 2    Was call answered?  No.  Left message on voicemail with information to call me back.  Ginger Davenport RN,BSN  [facility]

## 2019-10-31 ENCOUNTER — TELEPHONE (OUTPATIENT)
Dept: OTHER | Facility: CLINIC | Age: 44
End: 2019-10-31

## 2019-10-31 NOTE — TELEPHONE ENCOUNTER
"ED/Discharge Protocol    \"Hi, my name is Ginger Davenport RN, a registered nurse, and I am calling on behalf of Dr. Jamil's    office at [facility].  I am calling to follow up and see how things are going for you after your recent visit.\"    \"I see that you were in the (ER/UC/IP) on discharged on 10/28/19  How are you doing now that you are home?\" good, just tired    Is patient experiencing symptoms that may require a hospital visit?  no    Discharge Instructions    \"Let's review your discharge instructions.  What is/are the follow-up recommendations?  Pt. Response: follow up with a primary provider and cardiology    \"Were you instructed to make a follow-up appointment?\"  Pt. Response: No.       \"When you see the provider, I would recommend that you bring your discharge instructions with you.    Medications    \"How many new medications are you on since your hospitalization/ED visit?\"    0-1  \"How many of your current medicines changed (dose, timing, name, etc.) while you were in the hospital/ED visit?\"   0-1  \"Do you have questions about your medications?\"   No  \"Were you newly diagnosed with heart failure, COPD, diabetes or did you have a heart attack?\"   No  For patients on insulin: \"Did you start on insulin in the hospital or did you have your insulin dose changed?\"   No  Post Discharge Medication Reconciliation Status: discharge medications reconciled and changed, per note/orders (see AVS). Patient was inpatient hospital through [facility].    Was MTM referral placed (*Make sure to put transitions as reason for referral)?   No    Call Summary    \"Do you have any questions or concerns about your condition or care plan at the moment?\"    No  Triage nurse advice given: advised to follow up with a PCP.  Appointment Made with Dr. Anderson for Monday as he would like to see a IM.    Patient was in ER x 1 in the past year (assess appropriateness of ER visits.)      \"If you have questions or things don't continue to " "improve, we encourage you contact us through the main clinic number,  [phone].  Even if the clinic is not open, triage nurses are available 24/7 to help you.     We would like you to know that our clinic has extended hours (provide information).  We also have urgent care (provide details on closest location and hours/contact info)\"      \"Thank you for your time and take care!\"    Ginger Davenport RN,BSN  [facility]        "

## 2019-10-31 NOTE — TELEPHONE ENCOUNTER
48 hour post op call    ACTIVITY  How are you doing with activity? I am just walking   Are you continuing to use your IS 3-4 times a day and do you have any shortness of breath. No I am not having any shortness of breath   Are you weighing yourself daily and has it been stable?. I have not been weighing myself but I am not retaining any fluid that I can tell.     PAIN  How is your pain, what are you doing for your pain? I am doing ok. Tylenol during the day, Oxycodone at bedtime.    Are you still doing sternal precautions? NA minimally Invasive    Do you hear any clicking when you are moving or taking a deep breath? Na     INCISION: It looks good.     ASSISTANCE  Do you have someone at home to help you with your daily activities and transportation needs? My wife      MEDICATIONS  I would like to review your discharge medications and answer any questions you may have.   Reviewed     Are you on a blood thinner/Coumadin  NO    Who is managing your Coumadin dosing/INR? No       FOLLOW UP       Scheduled for cardiac rehab? 11/6   Scheduled to see our PA or Surgeon? 11/11  Scheduled to see your cardiologist ? 1/7 1:30  Scheduled to see your primary care physician? Denise 11/1  Do you have our contact information? Yes    STOPLIGHT TOOL      Were you happy with your care? Yes I was .   Could we have done anything better? No

## 2019-11-02 LAB
BACTERIA SPEC CULT: NO GROWTH
BACTERIA SPEC CULT: NO GROWTH
Lab: NORMAL
Lab: NORMAL
SPECIMEN SOURCE: NORMAL
SPECIMEN SOURCE: NORMAL

## 2019-11-03 ENCOUNTER — HEALTH MAINTENANCE LETTER (OUTPATIENT)
Age: 44
End: 2019-11-03

## 2019-11-04 ENCOUNTER — OFFICE VISIT (OUTPATIENT)
Dept: FAMILY MEDICINE | Facility: CLINIC | Age: 44
End: 2019-11-04
Payer: COMMERCIAL

## 2019-11-04 VITALS
DIASTOLIC BLOOD PRESSURE: 88 MMHG | HEART RATE: 89 BPM | SYSTOLIC BLOOD PRESSURE: 123 MMHG | WEIGHT: 181 LBS | TEMPERATURE: 97.6 F | BODY MASS INDEX: 25.97 KG/M2 | OXYGEN SATURATION: 99 %

## 2019-11-04 DIAGNOSIS — E78.5 HYPERLIPIDEMIA LDL GOAL <100: Primary | ICD-10-CM

## 2019-11-04 PROCEDURE — 99495 TRANSJ CARE MGMT MOD F2F 14D: CPT | Performed by: INTERNAL MEDICINE

## 2019-11-04 NOTE — PROGRESS NOTES
Subjective     Frederick Nguyen is a 44 year old male who presents to clinic today for the following health issues:    HPI   en    Hospital Follow-up Visit:    Hospital/Nursing Home/IP Rehab Facility: [facility]  Date of Admission: 10/25/19  Date of Discharge: 10/28/19  Reason(s) for Admission: Mitral valve prolapse            Problems taking medications regularly:  None       Medication changes since discharge: None       Problems adhering to non-medication therapy:  None    Summary of hospitalization:  [facility] discharge summary reviewed  Diagnostic Tests/Treatments reviewed.  Follow up needed: none  Other Healthcare Providers Involved in Patient s Care:         None  Update since discharge: improved. Having some mild swelling and thickening around scars in schest and groin      Post Discharge Medication Reconciliation: discharge medications reconciled and changed, per note/orders (see AVS).  Plan of care communicated with patient     Coding guidelines for this visit:  Type of Medical   Decision Making Face-to-Face Visit       within 7 Days of discharge Face-to-Face Visit        within 14 days of discharge   Moderate Complexity [id number]   High Complexity [id number]            Patient tolerated repair of mitral leaflet with minimally invasive procedure  Not on life long anticoagulation as it is his own valve      Patient Active Problem List   Diagnosis     Cervical spondylosis without myelopathy     Cervicalgia     Duane syndrome of left eye     Mitral valve prolapse     Mitral valve regurgitation     Nonallopathic lesion of lumbar region     Spasm of muscle     Nonallopathic lesion of thoracic region     Bacteremia     Infective endocarditis     Infection by Streptococcus, viridans group     Hallux limitus of right foot     Abnormal findings on diagnostic imaging of heart/coronary circulation     Status post coronary angiogram     S/P MVR (mitral valve repair)     Fluid overload      Transient hyperglycemia post procedure     Past Surgical History:   Procedure Laterality Date     APPENDECTOMY  1987     CHEILECTOMY Right 4/10/2019    Procedure: Right Cheilectomy;  Surgeon: Sawyer Crowell MD;  Location:  OR     CV CORONARY ANGIOGRAM N/A 9/9/2019    Procedure: CV CORONARY ANGIOGRAM;  Surgeon: Pernell Bar MD;  Location:  HEART CARDIAC CATH LAB     CV RIGHT HEART CATH N/A 9/9/2019    Procedure: CV RIGHT HEART CATH;  Surgeon: Pernell Bar MD;  Location:  HEART CARDIAC CATH LAB     INJECT SACROILIAC JOINT Right 5/9/2018    Procedure: INJECT SACROILIAC JOINT;  Right Sacroiliac Joint Injection;  Surgeon: Thom Williamson MD;  Location:  OR     ORTHOPEDIC SURGERY  4/10/19    Bone Spur Removal     REPAIR VALVE MITRAL MINIMALLY INVASIVE N/A 10/25/2019    Procedure: MINIMALLY INVASIVE MITRAL VALVE REPAIR WITH MITRAL ANNULOPLASTY RING KEVIN-KAUR PHYSIO II SIZE: 36MM  (ON PUMP OXYGENATOR ; INTRAOPERATIVE DEEPAK BY CARDIOLOGIST DR. VARELA);  Surgeon: Rigoberto Downs MD;  Location:  OR       Social History     Tobacco Use     Smoking status: Never Smoker     Smokeless tobacco: Never Used   Substance Use Topics     Alcohol use: Not Currently     Comment: Foot surgery + illness     Family History   Problem Relation Age of Onset     Glaucoma No family hx of      Macular Degeneration No family hx of          Current Outpatient Medications   Medication Sig Dispense Refill     acetaminophen (TYLENOL) 325 MG tablet Take 2 tablets (650 mg) by mouth every 4 hours as needed for mild pain 60 tablet 0     aspirin (ASA) 325 MG EC tablet Take 1 tablet (325 mg) by mouth daily 30 tablet 3     diclofenac (VOLTAREN) 1 % topical gel Place onto the skin 4 times daily 100 g 3     metoprolol succinate ER (TOPROL-XL) 50 MG 24 hr tablet Take 1 tablet (50 mg) by mouth 2 times daily 60 tablet 3     oxyCODONE (ROXICODONE) 5 MG tablet Take 1-2 tablets (5-10 mg) by mouth every 3 hours as  needed for moderate to severe pain 60 tablet 0     pantoprazole (PROTONIX) 40 MG EC tablet Take 1 tablet (40 mg) by mouth every morning (before breakfast) for 14 days 14 tablet 0     multivitamin, therapeutic (THERA-VIT) TABS tablet Take 1 tablet by mouth daily       No Known Allergies  Recent Labs   Lab Test 10/27/19  1058 10/26/19  0412 10/25/19  1325  10/07/19  0646  04/24/18  1505   A1C  --   --   --   --  4.9  --   --    ALT  --  16 16  --  17  --  43   CR 1.30* 1.04 0.90   < > 1.11   < > 0.91   GFRESTIMATED 67 88 >90   < > 81   < > >90   GFRESTBLACK 78 >90 >90   < > >90   < > >90   POTASSIUM 4.7 4.4 4.1   < > 3.8   < > 4.0   TSH  --   --   --   --   --   --  2.53    < > = values in this interval not displayed.      BP Readings from Last 3 Encounters:   11/04/19 123/88   10/28/19 128/84   10/14/19 130/87    Wt Readings from Last 3 Encounters:   11/04/19 82.1 kg (181 lb)   10/28/19 80.9 kg (178 lb 5.6 oz)   09/19/19 86.6 kg (191 lb)                      Reviewed and updated as needed this visit by Provider         Review of Systems   ROS COMP: Constitutional, HEENT, cardiovascular, pulmonary, gi and gu systems are negative, except as otherwise noted.      Objective    /88 (BP Location: Right arm, Patient Position: Sitting, Cuff Size: Adult Regular)   Pulse 89   Temp 97.6  F (36.4  C) (Oral)   Wt 82.1 kg (181 lb)   SpO2 99%   BMI 25.97 kg/m    Body mass index is 25.97 kg/m .  Physical Exam   GENERAL: healthy, alert and no distress  EYES: Eyes grossly normal to inspection, PERRL and conjunctivae and sclerae normal  HENT: ear canals and TM's normal, nose and mouth without ulcers or lesions  NECK: no adenopathy, no asymmetry, masses, or scars and thyroid normal to palpation  RESP: lungs clear to auscultation - no rales, rhonchi or wheezes  CV: regular rate and rhythm, normal S1 S2, no S3 or S4, no murmur, click or rub, no peripheral edema and peripheral pulses strong  ABDOMEN: soft, nontender, no  "hepatosplenomegaly, no masses and bowel sounds normal  MS: no gross musculoskeletal defects noted, no edema  SKIN: right chest wounds, healing right inguinal wound with some induration but no infection and intact wound edges  No drainage no bruising o  BACK: no CVA tenderness, no paralumbar tenderness  PSYCH: mentation appears normal, affect normal/bright  LYMPH: no cervical, supraclavicular, axillary, or inguinal adenopathy    Diagnostic Test Results:  Labs reviewed in Epic  No results found for any visits on 11/04/19.        Assessment & Plan       ICD-10-CM    1. Hyperlipidemia LDL goal <100 E78.5 Lipid panel reflex to direct LDL Fasting        BMI:   Estimated body mass index is 25.97 kg/m  as calculated from the following:    Height as of 10/25/19: 1.778 m (5' 10\").    Weight as of this encounter: 82.1 kg (181 lb).   Weight management plan: Discussed healthy diet and exercise guidelines      ICD-10-CM    1. Hyperlipidemia LDL goal <100 E78.5 Lipid panel reflex to direct LDL Fasting         Work on weight loss  Regular exercise    No follow-ups on file.    Gomez Anderson MD  [facility]      "

## 2019-11-06 ENCOUNTER — HOSPITAL ENCOUNTER (OUTPATIENT)
Dept: CARDIAC REHAB | Facility: CLINIC | Age: 44
End: 2019-11-06
Attending: PHYSICIAN ASSISTANT
Payer: COMMERCIAL

## 2019-11-06 DIAGNOSIS — Z98.890 S/P MVR (MITRAL VALVE REPAIR): ICD-10-CM

## 2019-11-06 PROCEDURE — 40000116 ZZH STATISTIC OP CR VISIT

## 2019-11-06 PROCEDURE — 93798 PHYS/QHP OP CAR RHAB W/ECG: CPT

## 2019-11-08 ENCOUNTER — HOSPITAL ENCOUNTER (OUTPATIENT)
Dept: CARDIAC REHAB | Facility: CLINIC | Age: 44
End: 2019-11-08
Attending: PHYSICIAN ASSISTANT
Payer: COMMERCIAL

## 2019-11-08 PROCEDURE — 93798 PHYS/QHP OP CAR RHAB W/ECG: CPT

## 2019-11-08 PROCEDURE — 40000116 ZZH STATISTIC OP CR VISIT

## 2019-11-11 ENCOUNTER — OFFICE VISIT (OUTPATIENT)
Dept: CARDIOLOGY | Facility: CLINIC | Age: 44
End: 2019-11-11
Payer: COMMERCIAL

## 2019-11-11 VITALS
DIASTOLIC BLOOD PRESSURE: 91 MMHG | BODY MASS INDEX: 26.05 KG/M2 | SYSTOLIC BLOOD PRESSURE: 139 MMHG | WEIGHT: 182 LBS | HEIGHT: 70 IN | HEART RATE: 99 BPM

## 2019-11-11 DIAGNOSIS — Z98.890 S/P MVR (MITRAL VALVE REPAIR): Primary | ICD-10-CM

## 2019-11-11 RX ORDER — METOPROLOL SUCCINATE 50 MG/1
50 TABLET, EXTENDED RELEASE ORAL 2 TIMES DAILY
Qty: 60 TABLET | Refills: 3 | Status: SHIPPED | OUTPATIENT
Start: 2019-11-11 | End: 2019-12-16

## 2019-11-11 ASSESSMENT — MIFFLIN-ST. JEOR: SCORE: 1731.8

## 2019-11-11 NOTE — PROGRESS NOTES
"CV Surgery Post Op Follow Up Note:     Assessment/Plan:     Mr. Nguyen is doing well in his post operative period. He has been out shopping, walking, going to cardiac rehab, and reports being \"bored\". He wants to return to work part time to which I have emailed the HR contact at his work. He was encouraged to wait 3 months before dental cleaning and 6 months before any dental work. He does NOT need to be pre-medicated with abx prior to dental work-discussed with surgeon.   His incisions are healed and appear stable. His weight is stable and his appetite is back to pre surgical times. He has seen his PCP and will see his cardiologist at the  in early Jan.     All of the patient's questions were answered. Our contact information was given to him if they should have any further questions/concerns. No further follow-up is needed with us.    S: From discharge summary: On 10/25/19 Mr. Nguyen successfully underwent Right mini thoracotomy, mitral valve repair with a [place] NeoChord implantation to the P1 and P2 prolapse, implantation of a 36 mm Garrett Physio II annuloplasty ring, T2/P3 cleft closure, right common femoral artery and vein cannulation for cardiopulmonary bypass, intraoperative DEEPAK by Dr. Rigoberto Downs.   Was extubated within 24 hours of surgery. Once he was weaned off hemodynamic stabilizing gtt's, transferred to the surgical floor.   Had some transient hyperglycemia treated with an insulin gtt, transitioned to sliding scale insulin and has no need at discharge.  Had some fluid overload treated with diuretic medication. At discharge he is 2 kg below his pre-op weight and is not sent with any diuretics.   He had some tachycardia and his BB was up titrated as his blood pressure would allow. Heart rhythm remained stable. He progressed well with cardiac rehab. They are discharged to home on pod# 3 with the help of their family.     Allergies: No Known Allergies    Medications:   Current Outpatient " "Medications:      acetaminophen (TYLENOL) 325 MG tablet, Take 2 tablets (650 mg) by mouth every 4 hours as needed for mild pain, Disp: 60 tablet, Rfl: 0     aspirin (ASA) 325 MG EC tablet, Take 1 tablet (325 mg) by mouth daily, Disp: 30 tablet, Rfl: 3     diclofenac (VOLTAREN) 1 % topical gel, Place onto the skin 4 times daily, Disp: 100 g, Rfl: 3     metoprolol succinate ER (TOPROL-XL) 50 MG 24 hr tablet, Take 1 tablet (50 mg) by mouth 2 times daily, Disp: 60 tablet, Rfl: 3     multivitamin, therapeutic (THERA-VIT) TABS tablet, Take 1 tablet by mouth daily, Disp: , Rfl:      oxyCODONE (ROXICODONE) 5 MG tablet, Take 1-2 tablets (5-10 mg) by mouth every 3 hours as needed for moderate to severe pain, Disp: 60 tablet, Rfl: 0     pantoprazole (PROTONIX) 40 MG EC tablet, Take 1 tablet (40 mg) by mouth every morning (before breakfast) for 14 days, Disp: 14 tablet, Rfl: 0  No current facility-administered medications for this visit.     Facility-Administered Medications Ordered in Other Visits:      sodium chloride (PF) 0.9% PF flush 10 mL, 10 mL, Intracatheter, Q8H, Andi Masters MD    Physical Exam: BP (!) 139/91   Pulse 99   Ht 1.778 m (5' 10\")   Wt 82.6 kg (182 lb)   BMI 26.11 kg/m  Recheck blood pressure 122/84    Constitutional: healthy, alert and no distress  Lungs: clear  CV: s1/s2, no m/r/g  Incisional site: cdi  Extremities: no LE edema        Radha Moreau PA-C  CV Surgery  [facility]  Pager: [phone]    "

## 2019-11-13 ENCOUNTER — HOSPITAL ENCOUNTER (OUTPATIENT)
Dept: CARDIAC REHAB | Facility: CLINIC | Age: 44
End: 2019-11-13
Attending: PHYSICIAN ASSISTANT
Payer: COMMERCIAL

## 2019-11-13 PROCEDURE — 93798 PHYS/QHP OP CAR RHAB W/ECG: CPT

## 2019-11-13 PROCEDURE — 40000116 ZZH STATISTIC OP CR VISIT

## 2019-11-15 ENCOUNTER — HOSPITAL ENCOUNTER (OUTPATIENT)
Dept: CARDIAC REHAB | Facility: CLINIC | Age: 44
End: 2019-11-15
Attending: PHYSICIAN ASSISTANT
Payer: COMMERCIAL

## 2019-11-15 PROCEDURE — 93798 PHYS/QHP OP CAR RHAB W/ECG: CPT

## 2019-11-15 PROCEDURE — 40000116 ZZH STATISTIC OP CR VISIT

## 2019-11-20 ENCOUNTER — HOSPITAL ENCOUNTER (OUTPATIENT)
Dept: CARDIAC REHAB | Facility: CLINIC | Age: 44
End: 2019-11-20
Attending: PHYSICIAN ASSISTANT
Payer: COMMERCIAL

## 2019-11-20 PROCEDURE — 93798 PHYS/QHP OP CAR RHAB W/ECG: CPT

## 2019-11-20 PROCEDURE — 40000116 ZZH STATISTIC OP CR VISIT

## 2019-11-22 ENCOUNTER — HOSPITAL ENCOUNTER (OUTPATIENT)
Dept: CARDIAC REHAB | Facility: CLINIC | Age: 44
End: 2019-11-22
Attending: PHYSICIAN ASSISTANT
Payer: COMMERCIAL

## 2019-11-22 PROCEDURE — 40000116 ZZH STATISTIC OP CR VISIT

## 2019-11-22 PROCEDURE — 93798 PHYS/QHP OP CAR RHAB W/ECG: CPT

## 2019-11-25 ENCOUNTER — HOSPITAL ENCOUNTER (OUTPATIENT)
Dept: CARDIAC REHAB | Facility: CLINIC | Age: 44
End: 2019-11-25
Attending: PHYSICIAN ASSISTANT
Payer: COMMERCIAL

## 2019-11-25 PROCEDURE — 40000116 ZZH STATISTIC OP CR VISIT

## 2019-11-25 PROCEDURE — 93798 PHYS/QHP OP CAR RHAB W/ECG: CPT

## 2019-12-02 ENCOUNTER — HOSPITAL ENCOUNTER (OUTPATIENT)
Dept: CARDIAC REHAB | Facility: CLINIC | Age: 44
End: 2019-12-02
Attending: PHYSICIAN ASSISTANT
Payer: COMMERCIAL

## 2019-12-02 PROCEDURE — 93798 PHYS/QHP OP CAR RHAB W/ECG: CPT

## 2019-12-02 PROCEDURE — 40000116 ZZH STATISTIC OP CR VISIT

## 2019-12-04 ENCOUNTER — HOSPITAL ENCOUNTER (OUTPATIENT)
Dept: CARDIAC REHAB | Facility: CLINIC | Age: 44
End: 2019-12-04
Attending: PHYSICIAN ASSISTANT
Payer: COMMERCIAL

## 2019-12-04 PROCEDURE — 40000116 ZZH STATISTIC OP CR VISIT

## 2019-12-04 PROCEDURE — 93798 PHYS/QHP OP CAR RHAB W/ECG: CPT

## 2019-12-16 ENCOUNTER — ANCILLARY PROCEDURE (OUTPATIENT)
Dept: GENERAL RADIOLOGY | Facility: CLINIC | Age: 44
End: 2019-12-16
Attending: INTERNAL MEDICINE
Payer: COMMERCIAL

## 2019-12-16 ENCOUNTER — OFFICE VISIT (OUTPATIENT)
Dept: FAMILY MEDICINE | Facility: CLINIC | Age: 44
End: 2019-12-16
Payer: COMMERCIAL

## 2019-12-16 VITALS
SYSTOLIC BLOOD PRESSURE: 118 MMHG | WEIGHT: 188 LBS | BODY MASS INDEX: 26.92 KG/M2 | HEIGHT: 70 IN | DIASTOLIC BLOOD PRESSURE: 82 MMHG | OXYGEN SATURATION: 96 % | HEART RATE: 90 BPM

## 2019-12-16 DIAGNOSIS — R93.1 ABNORMAL FINDINGS ON DIAGNOSTIC IMAGING OF HEART/CORONARY CIRCULATION: ICD-10-CM

## 2019-12-16 DIAGNOSIS — M89.8X1 PAIN OF RIGHT SCAPULA: Primary | ICD-10-CM

## 2019-12-16 DIAGNOSIS — S29.012A STRAIN OF RHOMBOID MUSCLE, INITIAL ENCOUNTER: ICD-10-CM

## 2019-12-16 DIAGNOSIS — M89.8X1 PAIN OF RIGHT SCAPULA: ICD-10-CM

## 2019-12-16 DIAGNOSIS — Z98.890 S/P MVR (MITRAL VALVE REPAIR): ICD-10-CM

## 2019-12-16 PROCEDURE — 73010 X-RAY EXAM OF SHOULDER BLADE: CPT | Mod: RT

## 2019-12-16 PROCEDURE — 99214 OFFICE O/P EST MOD 30 MIN: CPT | Performed by: INTERNAL MEDICINE

## 2019-12-16 PROCEDURE — 71046 X-RAY EXAM CHEST 2 VIEWS: CPT

## 2019-12-16 RX ORDER — ASPIRIN 81 MG/1
81 TABLET ORAL DAILY
COMMUNITY
End: 2020-08-15

## 2019-12-16 RX ORDER — METOPROLOL SUCCINATE 50 MG/1
50 TABLET, EXTENDED RELEASE ORAL 2 TIMES DAILY
Qty: 180 TABLET | Refills: 3 | Status: SHIPPED | OUTPATIENT
Start: 2019-12-16 | End: 2020-03-11

## 2019-12-16 ASSESSMENT — MIFFLIN-ST. JEOR: SCORE: 1759.01

## 2019-12-16 NOTE — PROGRESS NOTES
Subjective     Frederick Nguyen is a 44 year old male who presents to clinic today for the following health issues:    Pain in the right shoulder  Pain is worsening in the area  Tried heat and ice  No sweeling or pulsation  Massage  Then 25 minutes pain returns  No previous injury to area  Pinch in the trapezious muscle 2008 ( had PT  Had extention and muscle pain  10/25 /2019      Getting into the car bending and sliding into place  Hurts incision   Not healed .  Bone spur removal (arthritis)  Sever low back sprain a few years ago    Pain meds, muscle relaxer steroids.  2 -3 times steroids      HPI   Joint Pain    Onset: Oct 2019    Description:   Location: right shoulder  Character: Sharp    Intensity: Moderate    Progression of Symptoms: worse    Accompanying Signs & Symptoms:  Other symptoms: none    History:   Previous similar pain: no       Precipitating factors:   Trauma or overuse: Had heart surgery in Oct 2019; shoulder pain is a side effect from surgery      Alleviating factors:  Improved by: nothing    Therapies Tried and outcome: Heat/Ice, Back Massage,Oxycodone           Patient Active Problem List   Diagnosis     Cervical spondylosis without myelopathy     Cervicalgia     Duane syndrome of left eye     Mitral valve prolapse     Mitral valve regurgitation     Nonallopathic lesion of lumbar region     Spasm of muscle     Nonallopathic lesion of thoracic region     Bacteremia     Infective endocarditis     Infection by Streptococcus, viridans group     Hallux limitus of right foot     Abnormal findings on diagnostic imaging of heart/coronary circulation     Status post coronary angiogram     S/P MVR (mitral valve repair)     Fluid overload     Transient hyperglycemia post procedure     Past Surgical History:   Procedure Laterality Date     APPENDECTOMY  1987     CHEILECTOMY Right 4/10/2019    Procedure: Right Cheilectomy;  Surgeon: Sawyer Crowell MD;  [facility]: [facility]     CV CORONARY  ANGIOGRAM N/A 9/9/2019    Procedure: CV CORONARY ANGIOGRAM;  Surgeon: Pernell Bar MD;  [facility]     CV RIGHT HEART CATH N/A 9/9/2019    Procedure: CV RIGHT HEART CATH;  Surgeon: Pernell Bar MD;  Location:  HEART CARDIAC CATH LAB     INJECT SACROILIAC JOINT Right 5/9/2018    Procedure: INJECT SACROILIAC JOINT;  Right Sacroiliac Joint Injection;  Surgeon: Thom Williamson MD;  Location:  OR     ORTHOPEDIC SURGERY  4/10/19    Bone Spur Removal     REPAIR VALVE MITRAL MINIMALLY INVASIVE N/A 10/25/2019    Procedure: MINIMALLY INVASIVE MITRAL VALVE REPAIR WITH MITRAL ANNULOPLASTY RING KEVIN-KAUR PHYSIO II SIZE: 36MM  (ON PUMP OXYGENATOR ; INTRAOPERATIVE DEEPAK BY CARDIOLOGIST DR. VARELA);  Surgeon: Rigoberto Downs MD;  Location:  OR       Social History     Tobacco Use     Smoking status: Never Smoker     Smokeless tobacco: Never Used   Substance Use Topics     Alcohol use: Not Currently     Comment: Foot surgery + illness     Family History   Problem Relation Age of Onset     Glaucoma No family hx of      Macular Degeneration No family hx of          Current Outpatient Medications   Medication Sig Dispense Refill     aspirin 81 MG EC tablet Take 81 mg by mouth daily       metoprolol succinate ER (TOPROL-XL) 50 MG 24 hr tablet Take 1 tablet (50 mg) by mouth 2 times daily 180 tablet 3     No Known Allergies  Recent Labs   Lab Test 10/27/19  1058 10/26/19  0412 10/25/19  1325  10/07/19  0646  04/24/18  1505   A1C  --   --   --   --  4.9  --   --    ALT  --  16 16  --  17  --  43   CR 1.30* 1.04 0.90   < > 1.11   < > 0.91   GFRESTIMATED 67 88 >90   < > 81   < > >90   GFRESTBLACK 78 >90 >90   < > >90   < > >90   POTASSIUM 4.7 4.4 4.1   < > 3.8   < > 4.0   TSH  --   --   --   --   --   --  2.53    < > = values in this interval not displayed.          Reviewed and updated as needed this visit by Provider         Review of Systems   ROS COMP: Constitutional, HEENT,  "cardiovascular, pulmonary, gi and gu systems are negative, except as otherwise noted.      Objective    /82 (BP Location: Right arm, Patient Position: Chair, Cuff Size: Adult Regular)   Pulse 90   Ht 1.778 m (5' 10\")   Wt 85.3 kg (188 lb)   SpO2 96%   BMI 26.98 kg/m    Body mass index is 26.98 kg/m .  Physical Exam   GENERAL: healthy, alert and no distress  EYES: Eyes grossly normal to inspection, PERRL and conjunctivae and sclerae normal  HENT: ear canals and TM's normal, nose and mouth without ulcers or lesions  NECK: no adenopathy, no asymmetry, masses, or scars and thyroid normal to palpation  RESP: lungs clear to auscultation - no rales, rhonchi or wheezes  CV: regular rate and rhythm, normal S1 S2, no S3 or S4, no murmur, click or rub, no peripheral edema and peripheral pulses strong  ABDOMEN: soft, nontender, no hepatosplenomegaly, no masses and bowel sounds normal  MS: no gross musculoskeletal defects noted, no edema  SKIN: no suspicious lesions or rashes  NEURO: Normal strength and tone, mentation intact and speech normal  BACK: no CVA tenderness, no paralumbar tenderness  PSYCH: mentation appears normal, affect normal/bright  LYMPH: no cervical, supraclavicular, axillary, or inguinal adenopathy    Diagnostic Test Results:  Labs reviewed in Epic  No results found for any visits on 12/16/19.        Assessment & Plan       ICD-10-CM    1. Pain of right scapula M89.8X1 XR Scapula Bilateral   2. Abnormal findings on diagnostic imaging of heart/coronary circulation R93.1 XR Chest 2 Views   3. Strain of rhomboid muscle, initial encounter S29.012A ORTHOPEDICS ADULT REFERRAL   4. S/P MVR (mitral valve repair) Z98.890 metoprolol succinate ER (TOPROL-XL) 50 MG 24 hr tablet      There is pain localized to the medial surface right scapulae  Shoulder and rotator cuff appear intact  Started after mitral valve repair  Appears in the rhomboides major distribution    Not sure if further imaging PT or additional " testing can confirm       Work on weight loss  Regular exercise    No follow-ups on file.    Gomez Anderson MD  [facility]

## 2019-12-26 ENCOUNTER — HOSPITAL ENCOUNTER (OUTPATIENT)
Dept: CARDIAC REHAB | Facility: CLINIC | Age: 44
End: 2019-12-26
Attending: PHYSICIAN ASSISTANT
Payer: COMMERCIAL

## 2019-12-26 PROCEDURE — 93798 PHYS/QHP OP CAR RHAB W/ECG: CPT

## 2019-12-26 PROCEDURE — 40000116 ZZH STATISTIC OP CR VISIT

## 2019-12-31 NOTE — PROGRESS NOTES
Sports Medicine Clinic Visit  PCP: No Ref-Primary, Physician    Frederick Nguyen is a 44 year old male who is seen  in consultation at the request of Gomez Anderson M.D. presenting with right scapula pain    Injury: Patient reports right scapular pain for the past 3 months. Stated it started after he was in the hospital for heart surgery.  Right scapula, ok in the morning, worse as the day goes on.  No radiating pain, no neck pain.    Location of Pain: right scapula  Duration of Pain: 3 month(s)  Rating of Pain at worst: 8/10  Rating of Pain Currently: 2/10  Symptoms are better with: laying down  Symptoms are worse with: movement, sitting up, sitting at a desk typing  Additional Features:   Positive: pain   Negative: swelling, bruising, popping, grinding, catching, locking, instability, paresthesias, numbness, weakness and pain in other joints  Other evaluation and/or treatments so far consists of: Ice, Heat and massage  Prior History of related problems: none    Social History: office work    Review of Systems  Skin: no bruising, yes swelling  Musculoskeletal: as above  Neurologic: no numbness, paresthesias  Remainder of review of systems is negative including constitutional, CV, pulmonary, GI, except as noted in HPI or medical history.    Patient's current problem list, past medical and surgical history, and family history were reviewed.    Patient Active Problem List   Diagnosis     Cervical spondylosis without myelopathy     Cervicalgia     Duane syndrome of left eye     Mitral valve prolapse     Mitral valve regurgitation     Nonallopathic lesion of lumbar region     Spasm of muscle     Nonallopathic lesion of thoracic region     Bacteremia     Infective endocarditis     Infection by Streptococcus, viridans group     Hallux limitus of right foot     Abnormal findings on diagnostic imaging of heart/coronary circulation     Status post coronary angiogram     S/P MVR (mitral valve repair)     Fluid overload  "    Transient hyperglycemia post procedure     Past Medical History:   Diagnosis Date     Cervical spondylosis without myelopathy      Heart murmur      Osteomyelitis, L5S1 03/15/2018    secondary to endocarditis     Past Surgical History:   Procedure Laterality Date     APPENDECTOMY  1987     CHEILECTOMY Right 4/10/2019    Procedure: Right Cheilectomy;  Surgeon: Sawyer Crowell MD;  [facility]     CV CORONARY ANGIOGRAM N/A 9/9/2019    Procedure: CV CORONARY ANGIOGRAM;  Surgeon: Pernell Bar MD;  [facility]: [facility]     CV RIGHT HEART CATH N/A 9/9/2019    Procedure: CV RIGHT HEART CATH;  Surgeon: Pernell Bar MD;  [facility]     INJECT SACROILIAC JOINT Right 5/9/2018    Procedure: INJECT SACROILIAC JOINT;  Right Sacroiliac Joint Injection;  Surgeon: Thom Williamson MD;  Location:  OR     ORTHOPEDIC SURGERY  4/10/19    Bone Spur Removal     REPAIR VALVE MITRAL MINIMALLY INVASIVE N/A 10/25/2019    Procedure: MINIMALLY INVASIVE MITRAL VALVE REPAIR WITH MITRAL ANNULOPLASTY RING KEVIN-KAUR PHYSIO II SIZE: 36MM  (ON PUMP OXYGENATOR ; INTRAOPERATIVE DEEPAK BY CARDIOLOGIST DR. VARELA);  Surgeon: Rigoberto Downs MD;  Location:  OR     Family History   Problem Relation Age of Onset     Glaucoma No family hx of      Macular Degeneration No family hx of          Objective  BP (!) 132/93 (BP Location: Right arm, Patient Position: Chair, Cuff Size: Adult Regular)   Ht 1.778 m (5' 10\")   Wt 85.3 kg (188 lb)   BMI 26.98 kg/m      GENERAL APPEARANCE: healthy, alert and no distress   GAIT: NORMAL  SKIN: no suspicious lesions or rashes  HEENT: Sclera clear, anicteric  CV: good peripheral pulses  RESP: Breathing not labored  NEURO: Normal strength and tone, mentation intact and speech normal  PSYCH:  mentation appears normal and affect normal/bright    Bilateral Shoulder exam    Inspection and Posture:       rounded shoulders and upper " back    Skin:        no visible deformities    Tender:        periscapular region right    Non Tender:       remainder of shoulder bilateral    ROM:        Full active and passive ROM with flexion, extension, abduction, internal and external rotation bilateral       asymmetric scapular motion    Painful motions:       none    Strength:        abduction 5/5 bilateral       flexion 5/5 bilateral       internal rotation 5/5 bilateral       external rotation 5/5 bilateral    Impingement testing:       neg (-) Neer right       neg (-) Palmer right       neg (-) O'deni right    Sensation:        normal sensation over shoulder and upper extremity       Radiology  I ordered, visualized and reviewed these images with the patient  XR SCAPULA BILATERAL 12/16/2019 3:49 PM   HISTORY: Pain of right scapula  COMPARISON: None.                                                           IMPRESSION: Normal.    Assessment:  1. Pain of right scapula    2. Scapular dyskinesis      Scapular dyskinesis, low suspicion for shoulder rotator cuff problems.  Recommend physical therapy.  Will consider further work up pending clinical course.    Plan:  - Today's Plan of Care:  Rehab: Physical Therapy: [facility] - [phone]    Follow Up: 6 - 8 weeks and as needed  Frederick to follow up with Primary Care provider regarding elevated blood pressure.    Concerning signs and symptoms were reviewed.  The patient expressed understanding of this management plan and all questions were answered at this time.    Thanks for the opportunity to participate in the care of this patient, I will keep you updated on their progress.    CC: Gomez Galdamez MD CAQ  Primary Care Sports Medicine  [facility]

## 2020-01-02 ENCOUNTER — OFFICE VISIT (OUTPATIENT)
Dept: ORTHOPEDICS | Facility: CLINIC | Age: 45
End: 2020-01-02
Payer: COMMERCIAL

## 2020-01-02 VITALS
WEIGHT: 188 LBS | DIASTOLIC BLOOD PRESSURE: 93 MMHG | HEIGHT: 70 IN | SYSTOLIC BLOOD PRESSURE: 132 MMHG | BODY MASS INDEX: 26.92 KG/M2

## 2020-01-02 DIAGNOSIS — M89.8X1 PAIN OF RIGHT SCAPULA: Primary | ICD-10-CM

## 2020-01-02 DIAGNOSIS — G25.89 SCAPULAR DYSKINESIS: ICD-10-CM

## 2020-01-02 PROCEDURE — 99243 OFF/OP CNSLTJ NEW/EST LOW 30: CPT | Performed by: PEDIATRICS

## 2020-01-02 ASSESSMENT — MIFFLIN-ST. JEOR: SCORE: 1759.01

## 2020-01-02 NOTE — PATIENT INSTRUCTIONS
Plan:  - Today's Plan of Care:  Rehab: Physical Therapy: [facility] - [phone]    Follow Up: 6 - 8 weeks and as needed  Frederick to follow up with Primary Care provider regarding elevated blood pressure.    If you have any further questions for your physician or physician s care team you can call [phone] and use option 3 to leave a voice message. Calls received during business hours will be returned same day.

## 2020-01-02 NOTE — LETTER
1/2/2020         RE: Frederick Nguyen  [address] [facility] [address]        Dear Colleague,    Thank you for referring your patient, Frederick Nguyen, to the [facility] [place]. Please see a copy of my visit note below.    Sports Medicine Clinic Visit  PCP: No Ref-Primary, Physician    Frederick Nguyen is a 44 year old male who is seen  in consultation at the request of Gomez Anderson M.D. presenting with right scapula pain    Injury: Patient reports right scapular pain for the past 3 months. Stated it started after he was in the hospital for heart surgery.  Right scapula, ok in the morning, worse as the day goes on.  No radiating pain, no neck pain.    Location of Pain: right scapula  Duration of Pain: 3 month(s)  Rating of Pain at worst: 8/10  Rating of Pain Currently: 2/10  Symptoms are better with: laying down  Symptoms are worse with: movement, sitting up, sitting at a desk typing  Additional Features:   Positive: pain   Negative: swelling, bruising, popping, grinding, catching, locking, instability, paresthesias, numbness, weakness and pain in other joints  Other evaluation and/or treatments so far consists of: Ice, Heat and massage  Prior History of related problems: none    Social History: office work    Review of Systems  Skin: no bruising, yes swelling  Musculoskeletal: as above  Neurologic: no numbness, paresthesias  Remainder of review of systems is negative including constitutional, CV, pulmonary, GI, except as noted in HPI or medical history.    Patient's current problem list, past medical and surgical history, and family history were reviewed.    Patient Active Problem List   Diagnosis     Cervical spondylosis without myelopathy     Cervicalgia     Duane syndrome of left eye     Mitral valve prolapse     Mitral valve regurgitation     Nonallopathic lesion of lumbar region     Spasm of muscle     Nonallopathic lesion of thoracic region      "Bacteremia     Infective endocarditis     Infection by Streptococcus, viridans group     Hallux limitus of right foot     Abnormal findings on diagnostic imaging of heart/coronary circulation     Status post coronary angiogram     S/P MVR (mitral valve repair)     Fluid overload     Transient hyperglycemia post procedure     Past Medical History:   Diagnosis Date     Cervical spondylosis without myelopathy      Heart murmur      Osteomyelitis, L5S1 03/15/2018    secondary to endocarditis     Past Surgical History:   Procedure Laterality Date     APPENDECTOMY  1987     CHEILECTOMY Right 4/10/2019    Procedure: Right Cheilectomy;  Surgeon: Sawyer Crowell MD;  [facility]     CV CORONARY ANGIOGRAM N/A 9/9/2019    Procedure: CV CORONARY ANGIOGRAM;  Surgeon: Pernell Bar MD;  [facility]     CV RIGHT HEART CATH N/A 9/9/2019    Procedure: CV RIGHT HEART CATH;  Surgeon: Pernell Bar MD;  [facility]     INJECT SACROILIAC JOINT Right 5/9/2018    Procedure: INJECT SACROILIAC JOINT;  Right Sacroiliac Joint Injection;  Surgeon: Thom Williamson MD;  Location:  OR     ORTHOPEDIC SURGERY  4/10/19    Bone Spur Removal     REPAIR VALVE MITRAL MINIMALLY INVASIVE N/A 10/25/2019    Procedure: MINIMALLY INVASIVE MITRAL VALVE REPAIR WITH MITRAL ANNULOPLASTY RING KEVIN-KAUR PHYSIO II SIZE: 36MM  (ON PUMP OXYGENATOR ; INTRAOPERATIVE DEEPAK BY CARDIOLOGIST DR. VARELA);  Surgeon: Rigoberto Downs MD;  Location:  OR     Family History   Problem Relation Age of Onset     Glaucoma No family hx of      Macular Degeneration No family hx of          Objective  BP (!) 132/93 (BP Location: Right arm, Patient Position: Chair, Cuff Size: Adult Regular)   Ht 1.778 m (5' 10\")   Wt 85.3 kg (188 lb)   BMI 26.98 kg/m       GENERAL APPEARANCE: healthy, alert and no distress   GAIT: NORMAL  SKIN: no suspicious lesions or rashes  HEENT: Sclera clear, anicteric  CV: " good peripheral pulses  RESP: Breathing not labored  NEURO: Normal strength and tone, mentation intact and speech normal  PSYCH:  mentation appears normal and affect normal/bright    Bilateral Shoulder exam    Inspection and Posture:       rounded shoulders and upper back    Skin:        no visible deformities    Tender:        periscapular region right    Non Tender:       remainder of shoulder bilateral    ROM:        Full active and passive ROM with flexion, extension, abduction, internal and external rotation bilateral       asymmetric scapular motion    Painful motions:       none    Strength:        abduction 5/5 bilateral       flexion 5/5 bilateral       internal rotation 5/5 bilateral       external rotation 5/5 bilateral    Impingement testing:       neg (-) Neer right       neg (-) Palmer right       neg (-) O'deni right    Sensation:        normal sensation over shoulder and upper extremity       Radiology  I ordered, visualized and reviewed these images with the patient  XR SCAPULA BILATERAL 12/16/2019 3:49 PM   HISTORY: Pain of right scapula  COMPARISON: None.                                                           IMPRESSION: Normal.    Assessment:  1. Pain of right scapula    2. Scapular dyskinesis      Scapular dyskinesis, low suspicion for shoulder rotator cuff problems.  Recommend physical therapy.  Will consider further work up pending clinical course.    Plan:  - Today's Plan of Care:  Rehab: Physical Therapy: [facility] - [phone]    Follow Up: 6 - 8 weeks and as needed  Frederick to follow up with Primary Care provider regarding elevated blood pressure.    Concerning signs and symptoms were reviewed.  The patient expressed understanding of this management plan and all questions were answered at this time.    Thanks for the opportunity to participate in the care of this patient, I will keep you updated on their progress.    CC: Gomez Blankenship  MD [facility]  Primary Care Sports Medicine  [facility]    Again, thank you for allowing me to participate in the care of your patient.        Sincerely,        Pattie Galdamez MD

## 2020-01-28 ENCOUNTER — THERAPY VISIT (OUTPATIENT)
Dept: PHYSICAL THERAPY | Facility: CLINIC | Age: 45
End: 2020-01-28
Payer: COMMERCIAL

## 2020-01-28 DIAGNOSIS — G25.89 SCAPULAR DYSKINESIS: ICD-10-CM

## 2020-01-28 DIAGNOSIS — M54.12 CERVICAL RADICULOPATHY: ICD-10-CM

## 2020-01-28 DIAGNOSIS — M89.8X1 PAIN OF RIGHT SCAPULA: ICD-10-CM

## 2020-01-28 PROCEDURE — 97140 MANUAL THERAPY 1/> REGIONS: CPT | Mod: GP | Performed by: PHYSICAL THERAPIST

## 2020-01-28 PROCEDURE — 97161 PT EVAL LOW COMPLEX 20 MIN: CPT | Mod: GP | Performed by: PHYSICAL THERAPIST

## 2020-01-28 PROCEDURE — 97530 THERAPEUTIC ACTIVITIES: CPT | Mod: GP | Performed by: PHYSICAL THERAPIST

## 2020-01-28 NOTE — PROGRESS NOTES
KEY PT FINDINGS:  1) Suspicious of cervical radiculopathy  2) Low irritability present on examination - difficult to assess pain pattern, provoking factors  3) No myotome or dermatome findings  4) Trial of traction - if positive, transfer to [facility] for mechanical traction    Physical Therapy Initial Evaluation: Subjective History     Injury/Condition Details:  Presenting Complaint Right shoulder blade (right hand dominant)   Onset Timing/Date October 2019   Mechanism After he had a mitral valve repair, he noticed it after surgery while he was in the hospital bed.   The pain kept getting worse while he was in the hospital.      Symptom Behavior Details    Primary Symptoms Constant symptoms; worsen with activity, pain (Location: Right shoulder blade, medial aspect, Quality: Aching/Throbbing), stiffness, no headaches, no arm pain, numbness anterior chest from chest surgery   Worst Pain 10/10 (with end of the day)   Symptom Provocators Sleeping (cannot lay on right or left side)   Sitting (stands at his desk primarily)  No issues with lifting weights.   Best Pain 3/10    Symptom Relievers Morning   Time of day dependent? No   Recent symptom change? no change in symptoms, gets worse over the day     Prior Testing/Intervention for current condition:  Prior Tests  x-ray   Prior Treatment none     Lifestyle & General Medical History:  Employment Computer work - working full time   Usual physical activities  (within past year) Has completed cardiac rehab  Cycling on stationary bike (Feels ok)   Orthopaedic history See Epic Chart   Notable medical history See Epic Chart   Patient Reported Health good     Answers for HPI/ROS submitted by the patient on 1/28/2020   History Reported by Patient  Reason for Visit:: Shoulder Pain  When problem began:: 10/25/2019  Where?: other  How problem occurred:: Hospital bed  Number scale: 4/10  Pain quality: aching, sharp  Frequency: constant  Pain is: the same all the time  Progression  "since onset: unchanged  Special tests: x-ray  General health as reported by patient: good  Surgeries: orthopedic surgery, heart surgery  Medications you are currently taking: other  Other Meds Detail: Post Heart Surgery Blood Pressure Med  What are your primary job tasks: computer work  Work restrictions: working in normal job without restrictions  Barriers at home/work: none as reported by patient    PHYSICAL THERAPY CERVICAL EXAMINATION    Posture: Forward head, rounded shoulders    Cervical Spine ROM Screen   RIGHT LEFT   Cervical Spine ROM   Flexion No limit, no symptom change   Extension Limited at lower cervical spine, no symptom change   Rotation No limit No limit   Sidebend - -   Seated Thoracic Spine ROM: deferred     Passive Joint Mobility Screen: Next - assess    Tender to palpation at the following structures: None - palpation of the medial scapular boarder does not increase or decrease symptoms    SUSPECTED DIRECTIONAL PREFERENCE: Flexion (based on daily pattern of symptoms)     Upper Extremity Neural System Examination   Right Left   NEURAL CONDUCTION     Dermatome Screen (sensation) - -   Myotome Screen (motor) - -     Upper Extremity Flexibility Screen:   Right Left   Pec Major + +   Pec Minor + +   Upper Trap + +   Levator Scapula + +   - = normal  + = mild tightness  ++ = moderate tightness  +++ = significant tightness    Response to Traction: \"felt good\", difficult to assess changes to symptoms due to low irritability in the morning    ASSESSMENT/PLAN:  Patient is a 44 year old male with cervical complaints.    Patient has the following significant findings with corresponding treatment plan.                Diagnosis 1:  Cervical Radiculpathy  Pain -  hot/cold therapy, manual therapy, STS, splint/taping/bracing/orthotics, self management and education  Decreased ROM/flexibility - manual therapy, therapeutic exercise and home program  Decreased joint mobility - manual therapy, therapeutic exercise " and home program  Decreased function - therapeutic activities and home program  Impaired posture - neuro re-education and home program    Therapy Evaluation Codes:   1) History comprised of:   Personal factors that impact the plan of care:      None.    Comorbidity factors that impact the plan of care are:      None.     Medications impacting care: None.  2) Examination of Body Systems comprised of:   Body structures and functions that impact the plan of care:      Cervical spine.   Activity limitations that impact the plan of care are:      Reading/Computer work and Sleeping.  3) Clinical presentation characteristics are:   Stable/Uncomplicated.  4) Decision-Making    Low complexity using standardized patient assessment instrument and/or measureable assessment of functional outcome.  Cumulative Therapy Evaluation is: Low complexity.    Previous and current functional limitations:  (See Goal Flow Sheet for this information)    Short term and Long term goals: (See Goal Flow Sheet for this information)     Communication ability:  Patient appears to be able to clearly communicate and understand verbal and written communication and follow directions correctly.  Treatment Explanation - The following has been discussed with the patient:   RX ordered/plan of care  Anticipated outcomes  Possible risks and side effects  This patient would benefit from PT intervention to resume normal activities.   Rehab potential is good.    Frequency:  1 X week, once daily  Duration:  for 6 weeks  Discharge Plan:  Achieve all LTG.  Independent in home treatment program.  Reach maximal therapeutic benefit.    Please refer to the daily flowsheet for treatment today, total treatment time and time spent performing 1:1 timed codes.

## 2020-03-06 PROBLEM — M54.12 CERVICAL RADICULOPATHY: Status: RESOLVED | Noted: 2020-01-28 | Resolved: 2020-03-06

## 2020-03-06 PROBLEM — M89.8X1 PAIN OF RIGHT SCAPULA: Status: RESOLVED | Noted: 2020-01-28 | Resolved: 2020-03-06

## 2020-03-11 ENCOUNTER — OFFICE VISIT (OUTPATIENT)
Dept: FAMILY MEDICINE | Facility: CLINIC | Age: 45
End: 2020-03-11
Payer: COMMERCIAL

## 2020-03-11 VITALS
BODY MASS INDEX: 27.85 KG/M2 | TEMPERATURE: 97.9 F | HEIGHT: 69 IN | WEIGHT: 188 LBS | OXYGEN SATURATION: 97 % | SYSTOLIC BLOOD PRESSURE: 127 MMHG | DIASTOLIC BLOOD PRESSURE: 82 MMHG | HEART RATE: 77 BPM

## 2020-03-11 DIAGNOSIS — Z98.890 S/P MVR (MITRAL VALVE REPAIR): Primary | ICD-10-CM

## 2020-03-11 DIAGNOSIS — Z23 NEED FOR TDAP VACCINATION: ICD-10-CM

## 2020-03-11 PROCEDURE — 90715 TDAP VACCINE 7 YRS/> IM: CPT | Performed by: FAMILY MEDICINE

## 2020-03-11 PROCEDURE — 90471 IMMUNIZATION ADMIN: CPT | Performed by: FAMILY MEDICINE

## 2020-03-11 PROCEDURE — 99213 OFFICE O/P EST LOW 20 MIN: CPT | Mod: 25 | Performed by: FAMILY MEDICINE

## 2020-03-11 RX ORDER — METOPROLOL SUCCINATE 50 MG/1
50 TABLET, EXTENDED RELEASE ORAL 2 TIMES DAILY
Qty: 180 TABLET | Refills: 3 | Status: SHIPPED | OUTPATIENT
Start: 2020-03-11 | End: 2021-04-19

## 2020-03-11 ASSESSMENT — MIFFLIN-ST. JEOR: SCORE: 1750.88

## 2020-03-11 ASSESSMENT — PAIN SCALES - GENERAL: PAINLEVEL: NO PAIN (0)

## 2020-03-11 NOTE — PROGRESS NOTES
Subjective     Frederick Nguyen is a 44 year old male who presents to clinic today for the following health issues:    HPI :  Patient with history of mitral valve regurgitation,  prolapse, status post repair October 2019 comes in today to have his medication renewed.  He is on Toprol-XL twice daily.  He does follow-up with cardiology here.  6 months or so.    Patient denies any other symptoms he has no chest pain, does not feel short of breath, does not feel dizzy or lightheaded.    He is due for his tetanus vaccine.    Medication Followup of Metoprolol    Taking Medication as prescribed: yes    Side Effects:  None    Medication Helping Symptoms:  yes     Patient is also here for TD vaccine.    Patient Active Problem List   Diagnosis     Cervical spondylosis without myelopathy     Cervicalgia     Duane syndrome of left eye     Mitral valve prolapse     Mitral valve regurgitation     Nonallopathic lesion of lumbar region     Spasm of muscle     Nonallopathic lesion of thoracic region     Bacteremia     Infective endocarditis     Infection by Streptococcus, viridans group     Hallux limitus of right foot     Abnormal findings on diagnostic imaging of heart/coronary circulation     Status post coronary angiogram     S/P MVR (mitral valve repair)     Fluid overload     Transient hyperglycemia post procedure     Past Surgical History:   Procedure Laterality Date     APPENDECTOMY  1987     CHEILECTOMY Right 4/10/2019    Procedure: Right Cheilectomy;  Surgeon: Sawyer Crowell MD;  [facility]     CV CORONARY ANGIOGRAM N/A 9/9/2019    Procedure: CV CORONARY ANGIOGRAM;  Surgeon: Pernell Bar MD;  Location:  HEART CARDIAC CATH LAB     CV RIGHT HEART CATH N/A 9/9/2019    Procedure: CV RIGHT HEART CATH;  Surgeon: Pernell Bar MD;  Location:  HEART CARDIAC CATH LAB     INJECT SACROILIAC JOINT Right 5/9/2018    Procedure: INJECT SACROILIAC JOINT;  Right Sacroiliac Joint Injection;  Surgeon: Teri  MD Thom;  Location:  OR     ORTHOPEDIC SURGERY  4/10/19    Bone Spur Removal     REPAIR VALVE MITRAL MINIMALLY INVASIVE N/A 10/25/2019    Procedure: MINIMALLY INVASIVE MITRAL VALVE REPAIR WITH MITRAL ANNULOPLASTY RING KEVIN-KAUR PHYSIO II SIZE: 36MM  (ON PUMP OXYGENATOR ; INTRAOPERATIVE DEEPAK BY CARDIOLOGIST DR. VARELA);  Surgeon: Rigoberto Downs MD;  Location:  OR       Social History     Tobacco Use     Smoking status: Never Smoker     Smokeless tobacco: Never Used   Substance Use Topics     Alcohol use: Not Currently     Comment: Foot surgery + illness     Family History   Problem Relation Age of Onset     Glaucoma No family hx of      Macular Degeneration No family hx of          Current Outpatient Medications   Medication Sig Dispense Refill     aspirin 81 MG EC tablet Take 81 mg by mouth daily       metoprolol succinate ER (TOPROL-XL) 50 MG 24 hr tablet Take 1 tablet (50 mg) by mouth 2 times daily 180 tablet 3       Reviewed and updated as needed this visit by Provider         Review of Systems   ROS COMP: Constitutional, HEENT, cardiovascular, pulmonary, gi and gu systems are negative, except as otherwise noted.      Objective    There were no vitals taken for this visit.  There is no height or weight on file to calculate BMI.  Physical Exam   GENERAL: healthy, alert and no distress  NECK: no adenopathy, no asymmetry, masses, or scars and thyroid normal to palpation  RESP: lungs clear to auscultation - no rales, rhonchi or wheezes  CV: regular rate and rhythm, normal S1 S2, no S3 or S4, no murmur, click or rub, no peripheral edema and peripheral pulses strong  ABDOMEN: soft, nontender, no hepatosplenomegaly, no masses and bowel sounds normal  MS: no gross musculoskeletal defects noted, no edema    Diagnostic Test Results:  Labs reviewed in Epic  none         Assessment & Plan       ICD-10-CM    1. S/P MVR (mitral valve repair)  Z98.890 metoprolol succinate ER (TOPROL-XL) 50 MG 24 hr  tablet   2. Need for Tdap vaccination  Z23 TDAP, IM (10 - 64 YRS) - Adacel     VACCINE ADMINISTRATION, INITIAL     SCREENING QUESTIONS FOR ADULT IMMUNIZATIONS     Follow-up with cardiology every 6 months or so.    I did renew his medication.  He was given his tetanus vaccine.    There are no Patient Instructions on file for this visit.    Return in about 1 year (around 3/11/2021) for Physical Exam.    Frances Gonzalez MD  [facility]

## 2020-08-15 ENCOUNTER — OFFICE VISIT (OUTPATIENT)
Dept: URGENT CARE | Facility: URGENT CARE | Age: 45
End: 2020-08-15
Payer: COMMERCIAL

## 2020-08-15 VITALS
OXYGEN SATURATION: 96 % | WEIGHT: 177 LBS | BODY MASS INDEX: 26.22 KG/M2 | SYSTOLIC BLOOD PRESSURE: 122 MMHG | HEIGHT: 69 IN | DIASTOLIC BLOOD PRESSURE: 85 MMHG | HEART RATE: 103 BPM

## 2020-08-15 DIAGNOSIS — R00.0 SINUS TACHYCARDIA: Primary | ICD-10-CM

## 2020-08-15 LAB
BASOPHILS # BLD AUTO: 0 10E9/L (ref 0–0.2)
BASOPHILS NFR BLD AUTO: 0.2 %
DIFFERENTIAL METHOD BLD: ABNORMAL
EOSINOPHIL # BLD AUTO: 0.2 10E9/L (ref 0–0.7)
EOSINOPHIL NFR BLD AUTO: 1.3 %
ERYTHROCYTE [DISTWIDTH] IN BLOOD BY AUTOMATED COUNT: 12.9 % (ref 10–15)
HCT VFR BLD AUTO: 48.5 % (ref 40–53)
HGB BLD-MCNC: 16.4 G/DL (ref 13.3–17.7)
LYMPHOCYTES # BLD AUTO: 1.9 10E9/L (ref 0.8–5.3)
LYMPHOCYTES NFR BLD AUTO: 15.7 %
MCH RBC QN AUTO: 30.8 PG (ref 26.5–33)
MCHC RBC AUTO-ENTMCNC: 33.8 G/DL (ref 31.5–36.5)
MCV RBC AUTO: 91 FL (ref 78–100)
MONOCYTES # BLD AUTO: 1.7 10E9/L (ref 0–1.3)
MONOCYTES NFR BLD AUTO: 14 %
NEUTROPHILS # BLD AUTO: 8.3 10E9/L (ref 1.6–8.3)
NEUTROPHILS NFR BLD AUTO: 68.8 %
PLATELET # BLD AUTO: 190 10E9/L (ref 150–450)
RBC # BLD AUTO: 5.32 10E12/L (ref 4.4–5.9)
WBC # BLD AUTO: 12 10E9/L (ref 4–11)

## 2020-08-15 PROCEDURE — 85025 COMPLETE CBC W/AUTO DIFF WBC: CPT | Performed by: PHYSICIAN ASSISTANT

## 2020-08-15 PROCEDURE — 36415 COLL VENOUS BLD VENIPUNCTURE: CPT | Performed by: PHYSICIAN ASSISTANT

## 2020-08-15 PROCEDURE — 99214 OFFICE O/P EST MOD 30 MIN: CPT | Performed by: PHYSICIAN ASSISTANT

## 2020-08-15 PROCEDURE — 93000 ELECTROCARDIOGRAM COMPLETE: CPT | Performed by: PHYSICIAN ASSISTANT

## 2020-08-15 ASSESSMENT — MIFFLIN-ST. JEOR: SCORE: 1693.25

## 2020-08-15 NOTE — PROGRESS NOTES
CHIEF COMPLAINT:   Chief Complaint   Patient presents with     Urgent Care     Pt in clinic c/o rapid heart rate.     Tachycardia       HPI: Frederick Nguyen is a 44 year old male with a history mitral valve repair, who presents to clinic today for evaluation of tachycardia.  On 812, patient did strenuous exercise.  After exercise he developed mild soreness throughout his chest, which would did not occur after exercise.  He has had this in the past where he feels generalized soreness at the tip of inspiration.  On 813, he was alerted by his Apple Watch that his heart rate was greater than 100.  He felt his heart rate at that time and it did not feel erratic.  This has continued for the past 2 days where his heart rate has been between 101-110 bpm. Patient denies having fever, chills, chest pain, shortness of breath, abdominal pain, nausea, vomiting, diarrhea, dysuria, hematuria, leg swelling, recent surgery or travel, history of cancer or any other symptoms.   Additionally, has not been around others who are ill.     Past Medical History:   Diagnosis Date     Cervical spondylosis without myelopathy      Heart murmur      Osteomyelitis, L5S1 03/15/2018    secondary to endocarditis     Past Surgical History:   Procedure Laterality Date     APPENDECTOMY  1987     CHEILECTOMY Right 4/10/2019    Procedure: Right Cheilectomy;  Surgeon: Sawyer Crowell MD;  Location:  OR      CORONARY ANGIOGRAM N/A 9/9/2019    Procedure: CV CORONARY ANGIOGRAM;  Surgeon: Pernell Bar MD;  Location:  HEART CARDIAC CATH LAB     CV RIGHT HEART CATH N/A 9/9/2019    Procedure: CV RIGHT HEART CATH;  Surgeon: Pernell Bar MD;  Location:  HEART CARDIAC CATH LAB     INJECT SACROILIAC JOINT Right 5/9/2018    Procedure: INJECT SACROILIAC JOINT;  Right Sacroiliac Joint Injection;  Surgeon: Thom Williamson MD;  Location:  OR     ORTHOPEDIC SURGERY  4/10/19    Bone Spur Removal     REPAIR VALVE MITRAL MINIMALLY INVASIVE  "N/A 10/25/2019    Procedure: MINIMALLY INVASIVE MITRAL VALVE REPAIR WITH MITRAL ANNULOPLASTY RING KEVIN-KAUR PHYSIO II SIZE: 36MM  (ON PUMP OXYGENATOR ; INTRAOPERATIVE DEEPAK BY CARDIOLOGIST DR. VARELA);  Surgeon: Rigoberto Downs MD;  Location:  OR     Social History     Tobacco Use     Smoking status: Never Smoker     Smokeless tobacco: Never Used   Substance Use Topics     Alcohol use: Not Currently     Comment: Foot surgery + illness     Current Outpatient Medications   Medication     metoprolol succinate ER (TOPROL-XL) 50 MG 24 hr tablet     No current facility-administered medications for this visit.      Facility-Administered Medications Ordered in Other Visits   Medication     sodium chloride (PF) 0.9% PF flush 10 mL     No Known Allergies    10 point ROS of systems including Constitutional, Eyes, Respiratory, Cardiovascular, Gastroenterology, Genitourinary, Integumentary, Muscularskeletal, Psychiatric were all negative except for pertinent positives noted in my HPI.        Exam:  /85   Pulse 103   Ht 1.753 m (5' 9\")   Wt 80.3 kg (177 lb)   SpO2 96%   BMI 26.14 kg/m    Constitutional: healthy, alert and no distress  Head: Normocephalic, atraumatic.  Eyes: conjunctiva clear, no drainage  ENT: TMs clear and shiny jennifer, nasal mucosa pink and moist, throat without tonsillar hypertrophy or erythema  Neck: neck is supple, no cervical lymphadenopathy or nuchal rigidity  Cardiovascular: RRR  Respiratory: CTA bilaterally, no rhonchi or rales  Gastrointestinal: soft and nontender  Skin: no rashes  Neurologic: Speech clear, gait normal. Moves all extremities.    Results for orders placed or performed in visit on 08/15/20   CBC with platelets and differential     Status: Abnormal   Result Value Ref Range    WBC 12.0 (H) 4.0 - 11.0 10e9/L    RBC Count 5.32 4.4 - 5.9 10e12/L    Hemoglobin 16.4 13.3 - 17.7 g/dL    Hematocrit 48.5 40.0 - 53.0 %    MCV 91 78 - 100 fl    MCH 30.8 26.5 - 33.0 pg    " MCHC 33.8 31.5 - 36.5 g/dL    RDW 12.9 10.0 - 15.0 %    Platelet Count 190 150 - 450 10e9/L    Diff Method Automated Method     % Neutrophils 68.8 %    % Lymphocytes 15.7 %    % Monocytes 14.0 %    % Eosinophils 1.3 %    % Basophils 0.2 %    Absolute Neutrophil 8.3 1.6 - 8.3 10e9/L    Absolute Lymphocytes 1.9 0.8 - 5.3 10e9/L    Absolute Monocytes 1.7 (H) 0.0 - 1.3 10e9/L    Absolute Eosinophils 0.2 0.0 - 0.7 10e9/L    Absolute Basophils 0.0 0.0 - 0.2 10e9/L     EKG -- Sinus tachycardia with PAC    ASSESSMENT/PLAN:  1. Sinus tachycardia  44-year-old male with history of mitral valve repair presents the clinic for evaluation of tachycardia.  On exam, he looks well.  Tachycardia is noted and EKG shows sinus tachycardia as well.  He does not have leg swelling, Homans sign is negative, shortness of breath or provoking factors to suggest PE.  Blood pressure is normal and is not complaining of chest pain, he also does not have angina with exercise so I doubt ACS/MI.  Did perform a very hard workout 3 days prior to this, where his heart rate never went above 117, and did not have any chest pain during that but does have some burning bilaterally in ribs at the tip with inspiration.  White blood cell count is slightly elevated, question if there is a new onset illness going on.  He has a history of sepsis several years ago, but at this time denies having any history of fever, URI symptoms, dysuria, abdominal pain or rash, so infectious etiology is is not clear.  We did check his heart rate 1 more time during the clinic visit, and it did come down to 86. I advised him to push fluids and rest, and to continue monitoring his heart rate.  I would like him to contact his cardiologist, would likely need a Holter monitor to evaluate burden of PACs.  Discussed red flag symptoms for follow-up in ER including but not limited to, fever, chills, body aches, rigors, abdominal pain, erratic heart rate, chest pain or shortness of breath.   Patient agrees with treatment plan.  - EKG 12-lead complete w/read - Clinics  - CBC with platelets and differential  - JUST IN CASE    Diagnosis and treatment plan was reviewed with patient and/or family.   We went over any labs or imaging. Discussed worsening symptoms or little to no relief despite treatment plan to follow-up in ER.   Patient verbalizes understanding. All questions were addressed and answered.   Veronica Serna PA-C

## 2020-08-15 NOTE — PATIENT INSTRUCTIONS
I want you to contact your Cardiologist in regards to sinus tachycardia and have a holter monitor to assess PAC burden  In the meantime, try to relax and push fluids  If you develop chest pain, shortness of breath, fever or erratic heart beats, I would like you to follow-up in ER.

## 2020-08-18 ENCOUNTER — VIRTUAL VISIT (OUTPATIENT)
Dept: FAMILY MEDICINE | Facility: CLINIC | Age: 45
End: 2020-08-18
Payer: COMMERCIAL

## 2020-08-18 DIAGNOSIS — Z53.9 ERRONEOUS ENCOUNTER--DISREGARD: Primary | ICD-10-CM

## 2020-08-18 NOTE — PROGRESS NOTES
Frederick LAMAS Patrick is a 44 year old male who is being evaluated via a billable video visit.        Visit was delayed, patient unable to participate with video visit. Will reschedule.

## 2020-08-19 ENCOUNTER — OFFICE VISIT (OUTPATIENT)
Dept: URGENT CARE | Facility: URGENT CARE | Age: 45
End: 2020-08-19
Payer: COMMERCIAL

## 2020-08-19 VITALS
WEIGHT: 178 LBS | RESPIRATION RATE: 14 BRPM | HEART RATE: 78 BPM | DIASTOLIC BLOOD PRESSURE: 64 MMHG | BODY MASS INDEX: 25.48 KG/M2 | TEMPERATURE: 99 F | HEIGHT: 70 IN | SYSTOLIC BLOOD PRESSURE: 108 MMHG

## 2020-08-19 DIAGNOSIS — R61 NIGHT SWEATS: Primary | ICD-10-CM

## 2020-08-19 LAB
ALBUMIN UR-MCNC: NEGATIVE MG/DL
APPEARANCE UR: CLEAR
BACTERIA #/AREA URNS HPF: ABNORMAL /HPF
BASOPHILS # BLD AUTO: 0.1 10E9/L (ref 0–0.2)
BASOPHILS NFR BLD AUTO: 0.8 %
BILIRUB UR QL STRIP: NEGATIVE
COLOR UR AUTO: YELLOW
CRP SERPL-MCNC: 130 MG/L (ref 0–8)
DIFFERENTIAL METHOD BLD: NORMAL
EOSINOPHIL # BLD AUTO: 0.3 10E9/L (ref 0–0.7)
EOSINOPHIL NFR BLD AUTO: 4.1 %
ERYTHROCYTE [DISTWIDTH] IN BLOOD BY AUTOMATED COUNT: 12.2 % (ref 10–15)
GLUCOSE UR STRIP-MCNC: NEGATIVE MG/DL
HCT VFR BLD AUTO: 45.5 % (ref 40–53)
HGB BLD-MCNC: 15.2 G/DL (ref 13.3–17.7)
HGB UR QL STRIP: NEGATIVE
KETONES UR STRIP-MCNC: ABNORMAL MG/DL
LEUKOCYTE ESTERASE UR QL STRIP: NEGATIVE
LYMPHOCYTES # BLD AUTO: 1.7 10E9/L (ref 0.8–5.3)
LYMPHOCYTES NFR BLD AUTO: 21.4 %
MCH RBC QN AUTO: 30.2 PG (ref 26.5–33)
MCHC RBC AUTO-ENTMCNC: 33.4 G/DL (ref 31.5–36.5)
MCV RBC AUTO: 91 FL (ref 78–100)
MONOCYTES # BLD AUTO: 1.1 10E9/L (ref 0–1.3)
MONOCYTES NFR BLD AUTO: 14.4 %
MUCOUS THREADS #/AREA URNS LPF: PRESENT /LPF
NEUTROPHILS # BLD AUTO: 4.6 10E9/L (ref 1.6–8.3)
NEUTROPHILS NFR BLD AUTO: 59.3 %
NITRATE UR QL: NEGATIVE
NON-SQ EPI CELLS #/AREA URNS LPF: ABNORMAL /LPF
PH UR STRIP: 6 PH (ref 5–7)
PLATELET # BLD AUTO: 281 10E9/L (ref 150–450)
RBC # BLD AUTO: 5.03 10E12/L (ref 4.4–5.9)
RBC #/AREA URNS AUTO: ABNORMAL /HPF
SOURCE: ABNORMAL
SP GR UR STRIP: 1.02 (ref 1–1.03)
UROBILINOGEN UR STRIP-ACNC: 1 EU/DL (ref 0.2–1)
WBC # BLD AUTO: 7.8 10E9/L (ref 4–11)
WBC #/AREA URNS AUTO: ABNORMAL /HPF

## 2020-08-19 PROCEDURE — 99214 OFFICE O/P EST MOD 30 MIN: CPT | Performed by: PHYSICIAN ASSISTANT

## 2020-08-19 PROCEDURE — 86481 TB AG RESPONSE T-CELL SUSP: CPT | Performed by: PHYSICIAN ASSISTANT

## 2020-08-19 PROCEDURE — 87040 BLOOD CULTURE FOR BACTERIA: CPT | Performed by: PHYSICIAN ASSISTANT

## 2020-08-19 PROCEDURE — 36415 COLL VENOUS BLD VENIPUNCTURE: CPT | Performed by: PHYSICIAN ASSISTANT

## 2020-08-19 PROCEDURE — 87389 HIV-1 AG W/HIV-1&-2 AB AG IA: CPT | Performed by: PHYSICIAN ASSISTANT

## 2020-08-19 PROCEDURE — 81001 URINALYSIS AUTO W/SCOPE: CPT | Performed by: PHYSICIAN ASSISTANT

## 2020-08-19 PROCEDURE — 80050 GENERAL HEALTH PANEL: CPT | Performed by: PHYSICIAN ASSISTANT

## 2020-08-19 PROCEDURE — 86140 C-REACTIVE PROTEIN: CPT | Performed by: PHYSICIAN ASSISTANT

## 2020-08-19 RX ORDER — ASPIRIN 81 MG/1
81 TABLET, CHEWABLE ORAL DAILY
COMMUNITY
End: 2022-09-13

## 2020-08-19 ASSESSMENT — MIFFLIN-ST. JEOR: SCORE: 1705.71

## 2020-08-19 NOTE — PROGRESS NOTES
SUBJECTIVE:  Frederick is a 44 year old male who presents to urgent care with night sweats x3 nights. Last night soaked through shirt. Had a dry cough for 2 days but this is resolved now.  He reports minimal fatigue and having less appetite.  He denies headache, changes in vision, ear pain, nasal congestion, sinus pain, sore throat, wheeze, shortness of breath, lymphadenopathy, current cough, chest pain, chest tightness, palpitations, nausea, vomiting, diarrhea, abdominal pain, constipation, joint pains, muscle aches, swelling.  He denies any urinary symptoms.  Has been quite secluded since March.  Wife and daughter are not symptomatic.  No other sexual partner.  No recent travel.  No recent tick bite.  No changes in his medication and no over-the-counter supplements taken.    Chief Complaint   Patient presents with     Urgent Care     Perspiration     night sweats x 3 nights. Had bacterial blood infection in 2018 and had night sweats then so this is concerning.      ROS: See HPI    Current Outpatient Medications   Medication     aspirin (ASA) 81 MG chewable tablet     metoprolol succinate ER (TOPROL-XL) 50 MG 24 hr tablet     No current facility-administered medications for this visit.      Facility-Administered Medications Ordered in Other Visits   Medication     sodium chloride (PF) 0.9% PF flush 10 mL      Patient Active Problem List   Diagnosis     Cervical spondylosis without myelopathy     Cervicalgia     Duane syndrome of left eye     Mitral valve prolapse     Mitral valve regurgitation     Nonallopathic lesion of lumbar region     Spasm of muscle     Nonallopathic lesion of thoracic region     Bacteremia     Infective endocarditis     Infection by Streptococcus, viridans group     Hallux limitus of right foot     Abnormal findings on diagnostic imaging of heart/coronary circulation     Status post coronary angiogram     S/P MVR (mitral valve repair)     Fluid overload     Transient hyperglycemia post  "procedure        Past Medical History:   Diagnosis Date     Cervical spondylosis without myelopathy      Heart murmur      Osteomyelitis, L5S1 03/15/2018    secondary to endocarditis     Past Surgical History:   Procedure Laterality Date     APPENDECTOMY  1987     CHEILECTOMY Right 4/10/2019    Procedure: Right Cheilectomy;  Surgeon: Sawyer Crowell MD;  Location:  OR     CV CORONARY ANGIOGRAM N/A 9/9/2019    Procedure: CV CORONARY ANGIOGRAM;  Surgeon: Pernell Bar MD;  Location: [facility]     CV RIGHT HEART CATH N/A 9/9/2019    Procedure: CV RIGHT HEART CATH;  Surgeon: Pernell Bar MD;  [facility]     INJECT SACROILIAC JOINT Right 5/9/2018    Procedure: INJECT SACROILIAC JOINT;  Right Sacroiliac Joint Injection;  Surgeon: Thom Williamson MD;  Location:  OR     ORTHOPEDIC SURGERY  4/10/19    Bone Spur Removal     REPAIR VALVE MITRAL MINIMALLY INVASIVE N/A 10/25/2019    Procedure: MINIMALLY INVASIVE MITRAL VALVE REPAIR WITH MITRAL ANNULOPLASTY RING KEVIN-KAUR PHYSIO II SIZE: 36MM  (ON PUMP OXYGENATOR ; INTRAOPERATIVE DEEPAK BY CARDIOLOGIST DR. VARELA);  Surgeon: Rigoberto Downs MD;  Location:  OR     Family History   Problem Relation Age of Onset     Glaucoma No family hx of      Macular Degeneration No family hx of      Social History     Tobacco Use     Smoking status: Never Smoker     Smokeless tobacco: Never Used   Substance Use Topics     Alcohol use: Not Currently     Comment: Foot surgery + illness        OBJECTIVE:  /64   Pulse 78   Temp 99  F (37.2  C) (Oral)   Resp 14   Ht 1.765 m (5' 9.5\")   Wt 80.7 kg (178 lb)   BMI 25.91 kg/m       GENERAL APPEARANCE: healthy, alert and no distress     EYES: EOMI     HENT:  nose and mouth without ulcers or lesions     NECK: no adenopathy, no asymmetry, masses, or scars     RESP: lungs clear to auscultation - no rales, rhonchi or wheezes     CV: regular rates and rhythm, normal " S1 S2, no S3 or S4 and no murmur, click or rub -     ABDOMEN:  soft, nontender, no HSM or masses and bowel sounds normal     MS: extremities normal- no gross deformities noted, no evidence of inflammation in joints, FROM in all extremities.     SKIN: no suspicious lesions or rashes     NEURO: Normal strength and tone, sensory exam grossly normal, mentation intact and speech normal     PSYCH: mentation appears normal. and affect normal/bright     LYMPHATICS: No cervical, or supraclavicular nodes    DIAGNOSTICS    Results for orders placed or performed in visit on 08/19/20   CBC with platelets and differential     Status: None   Result Value Ref Range    WBC 7.8 4.0 - 11.0 10e9/L    RBC Count 5.03 4.4 - 5.9 10e12/L    Hemoglobin 15.2 13.3 - 17.7 g/dL    Hematocrit 45.5 40.0 - 53.0 %    MCV 91 78 - 100 fl    MCH 30.2 26.5 - 33.0 pg    MCHC 33.4 31.5 - 36.5 g/dL    RDW 12.2 10.0 - 15.0 %    Platelet Count 281 150 - 450 10e9/L    Diff Method Automated Method     % Neutrophils 59.3 %    % Lymphocytes 21.4 %    % Monocytes 14.4 %    % Eosinophils 4.1 %    % Basophils 0.8 %    Absolute Neutrophil 4.6 1.6 - 8.3 10e9/L    Absolute Lymphocytes 1.7 0.8 - 5.3 10e9/L    Absolute Monocytes 1.1 0.0 - 1.3 10e9/L    Absolute Eosinophils 0.3 0.0 - 0.7 10e9/L    Absolute Basophils 0.1 0.0 - 0.2 10e9/L   UA with Microscopic reflex to Culture     Status: Abnormal    Specimen: Midstream Urine   Result Value Ref Range    Color Urine Yellow     Appearance Urine Clear     Glucose Urine Negative NEG^Negative mg/dL    Bilirubin Urine Negative NEG^Negative    Ketones Urine Trace (A) NEG^Negative mg/dL    Specific Gravity Urine 1.020 1.003 - 1.035    pH Urine 6.0 5.0 - 7.0 pH    Protein Albumin Urine Negative NEG^Negative mg/dL    Urobilinogen Urine 1.0 0.2 - 1.0 EU/dL    Nitrite Urine Negative NEG^Negative    Blood Urine Negative NEG^Negative    Leukocyte Esterase Urine Negative NEG^Negative    Source Midstream Urine     WBC Urine 0 - 5 OTO5^0  - 5 /HPF    RBC Urine O - 2 OTO2^O - 2 /HPF    Squamous Epithelial /LPF Urine Few FEW^Few /LPF    Bacteria Urine Few (A) NEG^Negative /HPF    Mucous Urine Present (A) NEG^Negative /LPF        ASSESSMENT/PLAN:  Frederick was seen today for urgent care and perspiration.    Diagnoses and all orders for this visit:    Night sweats  -     CBC with platelets and differential  -     TSH with free T4 reflex  -     UA with Microscopic reflex to Culture  -     Comprehensive metabolic panel (BMP + Alb, Alk Phos, ALT, AST, Total. Bili, TP)  -     CRP, inflammation  -     Blood culture  -     Quantiferon TB Gold Plus  -     HIV Antigen Antibody Combo  -     Symptomatic COVID-19 Virus (Coronavirus) by PCR; Future    Will begin work-up by drawing labs in the office today and patient will follow-up with primary care in 2 to 3 days to discuss these results and perform any other tests necessary which may include imaging.  Differential diagnosis is wide and includes infection, endocrine disorder, malignancy, neurologic, medication among others.  He does have a history of bacteremia and had a similar presentation.  The other review of symptoms were negative besides a dry cough for 2 days that has resolved.  COVID-19 is in the differential and that was also ordered today.  This will be the beginning of the work-up and patient will need to follow-up with his primary care provider.    Kobi Horne PA-C

## 2020-08-20 LAB
ALBUMIN SERPL-MCNC: 3.3 G/DL (ref 3.4–5)
ALP SERPL-CCNC: 191 U/L (ref 40–150)
ALT SERPL W P-5'-P-CCNC: 57 U/L (ref 0–70)
ANION GAP SERPL CALCULATED.3IONS-SCNC: 6 MMOL/L (ref 3–14)
AST SERPL W P-5'-P-CCNC: 43 U/L (ref 0–45)
BILIRUB SERPL-MCNC: 0.5 MG/DL (ref 0.2–1.3)
BUN SERPL-MCNC: 17 MG/DL (ref 7–30)
CALCIUM SERPL-MCNC: 8.7 MG/DL (ref 8.5–10.1)
CHLORIDE SERPL-SCNC: 106 MMOL/L (ref 94–109)
CO2 SERPL-SCNC: 25 MMOL/L (ref 20–32)
CREAT SERPL-MCNC: 1.06 MG/DL (ref 0.66–1.25)
GFR SERPL CREATININE-BSD FRML MDRD: 86 ML/MIN/{1.73_M2}
GLUCOSE SERPL-MCNC: 82 MG/DL (ref 70–99)
HIV 1+2 AB+HIV1 P24 AG SERPL QL IA: NONREACTIVE
POTASSIUM SERPL-SCNC: 4.3 MMOL/L (ref 3.4–5.3)
PROT SERPL-MCNC: 8.2 G/DL (ref 6.8–8.8)
SODIUM SERPL-SCNC: 137 MMOL/L (ref 133–144)
TSH SERPL DL<=0.005 MIU/L-ACNC: 2.61 MU/L (ref 0.4–4)

## 2020-08-21 ENCOUNTER — OFFICE VISIT (OUTPATIENT)
Dept: URGENT CARE | Facility: URGENT CARE | Age: 45
End: 2020-08-21
Payer: COMMERCIAL

## 2020-08-21 ENCOUNTER — ANCILLARY PROCEDURE (OUTPATIENT)
Dept: GENERAL RADIOLOGY | Facility: CLINIC | Age: 45
End: 2020-08-21
Attending: FAMILY MEDICINE
Payer: COMMERCIAL

## 2020-08-21 VITALS
SYSTOLIC BLOOD PRESSURE: 120 MMHG | OXYGEN SATURATION: 98 % | TEMPERATURE: 99.1 F | HEIGHT: 70 IN | WEIGHT: 178 LBS | DIASTOLIC BLOOD PRESSURE: 84 MMHG | RESPIRATION RATE: 16 BRPM | BODY MASS INDEX: 25.48 KG/M2 | HEART RATE: 82 BPM

## 2020-08-21 DIAGNOSIS — R61 NIGHT SWEATS: ICD-10-CM

## 2020-08-21 DIAGNOSIS — R74.8 ELEVATED ALKALINE PHOSPHATASE LEVEL: ICD-10-CM

## 2020-08-21 DIAGNOSIS — R79.82 ELEVATED C-REACTIVE PROTEIN (CRP): ICD-10-CM

## 2020-08-21 DIAGNOSIS — R61 NIGHT SWEATS: Primary | ICD-10-CM

## 2020-08-21 LAB
CRP SERPL-MCNC: 43 MG/L (ref 0–8)
ERYTHROCYTE [SEDIMENTATION RATE] IN BLOOD BY WESTERGREN METHOD: 24 MM/H (ref 0–15)
GAMMA INTERFERON BACKGROUND BLD IA-ACNC: 0.04 IU/ML
M TB IFN-G CD4+ BCKGRND COR BLD-ACNC: 9.96 IU/ML
M TB TUBERC IFN-G BLD QL: NEGATIVE
MITOGEN IGNF BCKGRD COR BLD-ACNC: 0 IU/ML
MITOGEN IGNF BCKGRD COR BLD-ACNC: 0.01 IU/ML

## 2020-08-21 PROCEDURE — U0003 INFECTIOUS AGENT DETECTION BY NUCLEIC ACID (DNA OR RNA); SEVERE ACUTE RESPIRATORY SYNDROME CORONAVIRUS 2 (SARS-COV-2) (CORONAVIRUS DISEASE [COVID-19]), AMPLIFIED PROBE TECHNIQUE, MAKING USE OF HIGH THROUGHPUT TECHNOLOGIES AS DESCRIBED BY CMS-2020-01-R: HCPCS | Performed by: FAMILY MEDICINE

## 2020-08-21 PROCEDURE — 36415 COLL VENOUS BLD VENIPUNCTURE: CPT | Performed by: FAMILY MEDICINE

## 2020-08-21 PROCEDURE — 85652 RBC SED RATE AUTOMATED: CPT | Performed by: FAMILY MEDICINE

## 2020-08-21 PROCEDURE — 80053 COMPREHEN METABOLIC PANEL: CPT | Performed by: FAMILY MEDICINE

## 2020-08-21 PROCEDURE — 99214 OFFICE O/P EST MOD 30 MIN: CPT | Performed by: FAMILY MEDICINE

## 2020-08-21 PROCEDURE — 86140 C-REACTIVE PROTEIN: CPT | Performed by: FAMILY MEDICINE

## 2020-08-21 PROCEDURE — 71046 X-RAY EXAM CHEST 2 VIEWS: CPT

## 2020-08-21 PROCEDURE — 87040 BLOOD CULTURE FOR BACTERIA: CPT | Performed by: FAMILY MEDICINE

## 2020-08-21 ASSESSMENT — MIFFLIN-ST. JEOR: SCORE: 1705.71

## 2020-08-21 NOTE — PATIENT INSTRUCTIONS
We will reply on the labs.    If you feels worse (fever, sweats, aches, short of breath) - any concerning symptoms - go to an E R.

## 2020-08-21 NOTE — PROGRESS NOTES
"CHIEF COMPLAINT    H/O night sweats.    Elevated CRP on recent labs.      HISTORY    Patient was asked to return for re check. His CRP was very high. He has a 3-4 day h/o night sweats although he didn't have last night.     Further HX. He had noted elevated HR 8-13 and slight pain with inhalation. He was seen 8-15 and again 8-19. Labs on second visit. He noted temps 97.7 up to 99.9 max. He has had a dry cough. But in general feels better today.    No known Covid exposure.    He does have a h/o mitral valve repair .      Patient Active Problem List   Diagnosis     Cervical spondylosis without myelopathy     Cervicalgia     Duane syndrome of left eye     Mitral valve prolapse     Mitral valve regurgitation     Nonallopathic lesion of lumbar region     Spasm of muscle     Nonallopathic lesion of thoracic region     Bacteremia     Infective endocarditis     Infection by Streptococcus, viridans group     Hallux limitus of right foot     Abnormal findings on diagnostic imaging of heart/coronary circulation     Status post coronary angiogram     S/P MVR (mitral valve repair)     Fluid overload     Transient hyperglycemia post procedure       Current Outpatient Medications   Medication Sig Dispense Refill     aspirin (ASA) 81 MG chewable tablet Take 81 mg by mouth daily       metoprolol succinate ER (TOPROL-XL) 50 MG 24 hr tablet Take 1 tablet (50 mg) by mouth 2 times daily 180 tablet 3       REVIEW OF SYSTEMS    No malaise.  No ST or congestion  No SOB  No CP or palpitation  No V or D  No dysuria  No rash  No weakness      Past Medical History:   Diagnosis Date     Cervical spondylosis without myelopathy      Heart murmur      Osteomyelitis, L5S1 03/15/2018    secondary to endocarditis         EXAM  /84   Pulse 82   Temp 99.1  F (37.3  C) (Oral)   Resp 16   Ht 1.765 m (5' 9.5\")   Wt 80.7 kg (178 lb)   SpO2 98%   BMI 25.91 kg/m      NAD, HR nl.  Eye - sclera non icteric  Pharynx - no redness  Neck - " neg  Chest - clear  CV - RSR, no murmur  Abd- non tender  Skin - neg  Extrems - neg    Labs from 8-19 reviewed.        Results for orders placed or performed in visit on 08/21/20   XR Chest 2 Views     Status: None    Narrative    CHEST TWO VIEWS   8/21/2020 1:32 PM     HISTORY: Night sweats.    COMPARISON: Chest x-ray 12/16/2019.      Impression    IMPRESSION: PA and lateral views of the chest. Lungs are clear. Heart  is normal in size. No effusions are evident. No pneumothorax. Aortic  valve replacement. No definite evidence of mediastinal or hilar lymph  node enlargement.    ANNALISA SOLOMON MD   Results for orders placed or performed in visit on 08/21/20   ESR: Erythrocyte sedimentation rate     Status: Abnormal   Result Value Ref Range    Sed Rate 24 (H) 0 - 15 mm/h   CRP, inflammation     Status: Abnormal   Result Value Ref Range    CRP Inflammation 43.0 (H) 0.0 - 8.0 mg/L   Blood culture     Status: None (Preliminary result)    Specimen: Blood    Left Arm   Result Value Ref Range    Specimen Description Blood Left Arm     Culture Micro No growth after 11 hours            MDM -   CRP and Alk Phos elevation undetermined. Night sweats.  Consider SBE, Covid, other viral illness, other inflammatory process.  May be spontaneously improving.      (R61) Night sweats  (primary encounter diagnosis)  Comment:   Plan: XR Chest 2 Views, ESR: Erythrocyte         sedimentation rate, Comprehensive metabolic         panel (BMP + Alb, Alk Phos, ALT, AST, Total.         Bili, TP), Symptomatic COVID-19 Virus         (Coronavirus) by PCR, COVID-19 Virus         (Coronavirus) Antibody & Titer Reflex, Blood         culture            (R79.82) Elevated C-reactive protein (CRP)  Comment:   Plan: CRP, inflammation            (R74.8) Elevated alkaline phosphatase level  Comment:   Plan: Comprehensive metabolic panel (BMP + Alb, Alk         Phos, ALT, AST, Total. Bili, TP)

## 2020-08-22 LAB
ALBUMIN SERPL-MCNC: 3.4 G/DL (ref 3.4–5)
ALP SERPL-CCNC: 165 U/L (ref 40–150)
ALT SERPL W P-5'-P-CCNC: 34 U/L (ref 0–70)
ANION GAP SERPL CALCULATED.3IONS-SCNC: 8 MMOL/L (ref 3–14)
AST SERPL W P-5'-P-CCNC: 23 U/L (ref 0–45)
BILIRUB SERPL-MCNC: 0.4 MG/DL (ref 0.2–1.3)
BUN SERPL-MCNC: 17 MG/DL (ref 7–30)
CALCIUM SERPL-MCNC: 9.1 MG/DL (ref 8.5–10.1)
CHLORIDE SERPL-SCNC: 107 MMOL/L (ref 94–109)
CO2 SERPL-SCNC: 24 MMOL/L (ref 20–32)
CREAT SERPL-MCNC: 1.13 MG/DL (ref 0.66–1.25)
GFR SERPL CREATININE-BSD FRML MDRD: 79 ML/MIN/{1.73_M2}
GLUCOSE SERPL-MCNC: 95 MG/DL (ref 70–99)
POTASSIUM SERPL-SCNC: 4.6 MMOL/L (ref 3.4–5.3)
PROT SERPL-MCNC: 7.9 G/DL (ref 6.8–8.8)
SARS-COV-2 RNA SPEC QL NAA+PROBE: NOT DETECTED
SODIUM SERPL-SCNC: 139 MMOL/L (ref 133–144)
SPECIMEN SOURCE: NORMAL

## 2020-08-25 LAB
BACTERIA SPEC CULT: NO GROWTH
SPECIMEN SOURCE: NORMAL

## 2020-08-27 PROBLEM — Z98.890 H/O MITRAL VALVE REPAIR: Status: ACTIVE | Noted: 2019-10-25

## 2020-08-27 LAB
BACTERIA SPEC CULT: NO GROWTH
SPECIMEN SOURCE: NORMAL

## 2020-08-28 ENCOUNTER — OFFICE VISIT (OUTPATIENT)
Dept: FAMILY MEDICINE | Facility: CLINIC | Age: 45
End: 2020-08-28
Payer: COMMERCIAL

## 2020-08-28 VITALS
WEIGHT: 192 LBS | TEMPERATURE: 97.9 F | OXYGEN SATURATION: 99 % | DIASTOLIC BLOOD PRESSURE: 89 MMHG | BODY MASS INDEX: 27.95 KG/M2 | SYSTOLIC BLOOD PRESSURE: 126 MMHG | HEART RATE: 77 BPM

## 2020-08-28 DIAGNOSIS — R79.82 ELEVATED C-REACTIVE PROTEIN (CRP): Primary | ICD-10-CM

## 2020-08-28 DIAGNOSIS — Z86.19 HISTORY OF GRAM POSITIVE SEPSIS: ICD-10-CM

## 2020-08-28 DIAGNOSIS — Z13.6 CARDIOVASCULAR SCREENING; LDL GOAL LESS THAN 130: ICD-10-CM

## 2020-08-28 DIAGNOSIS — Z98.890 H/O MITRAL VALVE REPAIR: ICD-10-CM

## 2020-08-28 LAB
CHOLEST SERPL-MCNC: 139 MG/DL
CRP SERPL-MCNC: 3.5 MG/L (ref 0–8)
HDLC SERPL-MCNC: 41 MG/DL
LDLC SERPL CALC-MCNC: 70 MG/DL
NONHDLC SERPL-MCNC: 98 MG/DL
TRIGL SERPL-MCNC: 140 MG/DL

## 2020-08-28 PROCEDURE — 99214 OFFICE O/P EST MOD 30 MIN: CPT | Performed by: INTERNAL MEDICINE

## 2020-08-28 PROCEDURE — 36415 COLL VENOUS BLD VENIPUNCTURE: CPT | Performed by: INTERNAL MEDICINE

## 2020-08-28 PROCEDURE — 86140 C-REACTIVE PROTEIN: CPT | Performed by: INTERNAL MEDICINE

## 2020-08-28 PROCEDURE — 80061 LIPID PANEL: CPT | Performed by: INTERNAL MEDICINE

## 2020-08-28 NOTE — RESULT ENCOUNTER NOTE
Frederick Nguyen    Good news:  CRP is normal    Cholesterol looks fantastic.     You were right, it has resolved.     Best,    TED DICKSON M.D.

## 2020-08-28 NOTE — PROGRESS NOTES
Subjective     Frederick Nguyen is a 45 year old male who presents to clinic today for the following health issues:    HPI   43 y/o M here for f/u labs.  H/o  mitral valve regurgitation,  MV Repair 10/2019.   In 2/2018, treated for viridians strep bacteremia  ...  It was speculated he may have had  L5/S1 discitis -- this had NOT been heralded by dental procedures        Presented to  with night sweats recently.  on  8/19/20 then 45 on 8/21/20... TSH, BMP, CBC all wnl.  ESR 24.  COVID PCR (-)  BCx all negative... He never had antibiotic treatment   No Recent dental procedures..    Now he is back to baseline.         ED/ Followup:    Facility:  [facility]  Date of visit: 8/19 and 8/21  Reason for visit: night sweats  Current Status: better             Review of Systems   Constitutional, HEENT, cardiovascular, pulmonary, gi and gu systems are negative, except as otherwise noted.      Objective    /89 (BP Location: Right arm, Patient Position: Sitting, Cuff Size: Adult Regular)   Pulse 77   Temp 97.9  F (36.6  C) (Oral)   Wt 87.1 kg (192 lb)   SpO2 99%   BMI 27.95 kg/m    Body mass index is 27.95 kg/m .  Physical Exam   GENERAL: healthy, alert and no distress  NECK: no adenopathy, no asymmetry, masses, or scars and thyroid normal to palpation  RESP: lungs clear to auscultation - no rales, rhonchi or wheezes  CV: regular rate and rhythm, normal S1 S2, no S3 or S4, no murmur, click or rub, no peripheral edema and peripheral pulses strong  MS: no gross musculoskeletal defects noted, no edema  PSYCH: mentation appears normal, affect normal/bright             Assessment & Plan     Frederick was seen today for recheck, prenatal care and health maintenance.    Diagnoses and all orders for this visit:    Elevated C-reactive protein (CRP)  -     CRP, inflammation    H/O mitral valve repair    History of Gram positive sepsis    CARDIOVASCULAR SCREENING; LDL GOAL LESS THAN 130  -     Lipid panel  "reflex to direct LDL Non-fasting    he is better   No objective findings today  Will check CRP for trending.   Intends to f/u with cardiology, routine, s/p MV repair.        BMI:   Estimated body mass index is 27.95 kg/m  as calculated from the following:    Height as of 8/21/20: 1.765 m (5' 9.5\").    Weight as of this encounter: 87.1 kg (192 lb).   Weight management plan: portions             Return in about 1 year (around 8/28/2021) for Physical Exam.    Ivette Mtz MD  [facility]    "

## 2020-09-11 ENCOUNTER — MYC MEDICAL ADVICE (OUTPATIENT)
Dept: FAMILY MEDICINE | Facility: CLINIC | Age: 45
End: 2020-09-11

## 2020-09-14 ENCOUNTER — OFFICE VISIT (OUTPATIENT)
Dept: FAMILY MEDICINE | Facility: CLINIC | Age: 45
End: 2020-09-14
Payer: COMMERCIAL

## 2020-09-14 VITALS
TEMPERATURE: 98 F | WEIGHT: 189 LBS | SYSTOLIC BLOOD PRESSURE: 110 MMHG | DIASTOLIC BLOOD PRESSURE: 85 MMHG | HEART RATE: 79 BPM | BODY MASS INDEX: 27.51 KG/M2

## 2020-09-14 DIAGNOSIS — Z23 NEED FOR PROPHYLACTIC VACCINATION AND INOCULATION AGAINST INFLUENZA: Primary | ICD-10-CM

## 2020-09-14 DIAGNOSIS — Z98.890 H/O MITRAL VALVE REPAIR: ICD-10-CM

## 2020-09-14 PROCEDURE — 90686 IIV4 VACC NO PRSV 0.5 ML IM: CPT | Performed by: INTERNAL MEDICINE

## 2020-09-14 PROCEDURE — 99214 OFFICE O/P EST MOD 30 MIN: CPT | Mod: 25 | Performed by: INTERNAL MEDICINE

## 2020-09-14 PROCEDURE — 90471 IMMUNIZATION ADMIN: CPT | Performed by: INTERNAL MEDICINE

## 2020-09-14 RX ORDER — DIPHENHYDRAMINE HCL 25 MG
25 TABLET ORAL EVERY 6 HOURS PRN
COMMUNITY
End: 2020-10-30

## 2020-09-14 NOTE — TELEPHONE ENCOUNTER
Patient was already seen today for OV.  Ginger Davenport,RN,BSN  [facility]

## 2020-09-14 NOTE — PROGRESS NOTES
Subjective     Frederick Nguyen is a 45 year old male who presents to clinic today for the following health issues:    HPI   45 y/o M here for f/u HTN and flu shot.  H/o  mitral valve regurgitation,  MV Repair 10/2019.   In 2/2018, treated for viridians strep bacteremia  ...  It was speculated he may have had  L5/S1 discitis -- this had NOT been heralded by dental procedures     Last month he had sx of night sweats along with some pleurisy.  COVID (-).  CRP was 130.  The sx along with the CRP elevation have resolved.       He is wondering if he might be overmedicated.  He feels orthostatic hypotension symptoms several times a day.  This was not the case when starting the metoprolol bid last Fall.  He plans to f/u with cardiology in the future.  Does not have a home BP monitor.       Hypertension Follow-up      Do you check your blood pressure regularly outside of the clinic? No     Are you following a low salt diet? No    Are your blood pressures ever more than 140 on the top number (systolic) OR more   than 90 on the bottom number (diastolic), for example 140/90? No      How many servings of fruits and vegetables do you eat daily?  4 or more    On average, how many sweetened beverages do you drink each day (Examples: soda, juice, sweet tea, etc.  Do NOT count diet or artificially sweetened beverages)?   0    How many days per week do you exercise enough to make your heart beat faster? 6    How many minutes a day do you exercise enough to make your heart beat faster? 30 - 60    How many days per week do you miss taking your medication? 0         Review of Systems   Constitutional, HEENT, cardiovascular, pulmonary, gi and gu systems are negative, except as otherwise noted.      Objective    /85 (BP Location: Left arm, Patient Position: Sitting, Cuff Size: Adult Regular)   Pulse 79   Temp 98  F (36.7  C) (Oral)   Wt 85.7 kg (189 lb)   BMI 27.51 kg/m    Body mass index is 27.51 kg/m .  Physical Exam    GENERAL: healthy, alert and no distress  EYES: Eyes grossly normal to inspection, PERRL and conjunctivae and sclerae normal  MS: no gross musculoskeletal defects noted, no edema  SKIN: no suspicious lesions or rashes  NEURO: Normal strength and tone, mentation intact and speech normal  PSYCH: mentation appears normal, affect normal/bright             Assessment & Plan     Need for prophylactic vaccination and inoculation against influenza     - INFLUENZA VACCINE IM > 6 MONTHS VALENT IIV4 [25653]  - Vaccine Administration, Initial [72701]    H/O mitral valve repair  On BB but may be having orthostatic symptoms -- try dose and timing changes as stated below, discussed with patient.  He is due for f/u Cardiology sometime next month or so      Patient Instructions   Goal BP is less than 135/85    Try one metoprolol daily   ---   If BP not at goal, then try going back to two metoprolol daily, but you can take them at the same time of day    Keep BP diary along with dosing diary.   Omron    See cardiology in the next 3 months.       90/60 is a low BP        Return in about 1 year (around 9/14/2021) for Physical Exam.    Ivette Mtz MD  [facility]

## 2020-09-14 NOTE — PATIENT INSTRUCTIONS
Goal BP is less than 135/85    Try one metoprolol daily   ---   If BP not at goal, then try going back to two metoprolol daily, but you can take them at the same time of day    Keep BP diary along with dosing diary.   Omron    See cardiology in the next 3 months.       90/60 is a low BP

## 2020-10-19 ENCOUNTER — TELEPHONE (OUTPATIENT)
Dept: FAMILY MEDICINE | Facility: CLINIC | Age: 45
End: 2020-10-19

## 2020-10-19 NOTE — TELEPHONE ENCOUNTER
Notified patient that he may come in for a face to face.    Patient stated understanding and agreeable with the plan of care. Ginger Davenport,RN,BSN, [facility] Triage.

## 2020-10-19 NOTE — TELEPHONE ENCOUNTER
Sore throat, body aches, loss of appetite and fatigue  Are the sx that patient wants to be seen for.  Please see message chantal.      Ginger DavenportRN,BSN  [facility]

## 2020-10-19 NOTE — TELEPHONE ENCOUNTER
Reason for Call:  Other - Appointment Question    Detailed comments: Patient called and stated he received a voicemail to change appointment tomorrow to video or telephone visit. Patient stated he had these exact symptoms in August 2020 and was seen in person and had a lot of blood drawn. Patient was negative for COVID that time, however the blood work didn't explain why he was having symptoms. Patient asked if Dr. Gonzalez would be willing to see him in person for tomorrow's visit so he can have labs drawn as well. Please advise and call back to discuss.    Phone Number Patient can be reached at: Home number on file [phone] (home)    Best Time: Anytime    Can we leave a detailed message on this number? YES    Call taken on 10/19/2020 at 1:02 PM by Mary Jo Camargo

## 2020-10-20 ENCOUNTER — OFFICE VISIT (OUTPATIENT)
Dept: FAMILY MEDICINE | Facility: CLINIC | Age: 45
End: 2020-10-20
Payer: COMMERCIAL

## 2020-10-20 VITALS
TEMPERATURE: 99.1 F | SYSTOLIC BLOOD PRESSURE: 108 MMHG | DIASTOLIC BLOOD PRESSURE: 76 MMHG | HEART RATE: 100 BPM | BODY MASS INDEX: 26.34 KG/M2 | WEIGHT: 184 LBS | HEIGHT: 70 IN | OXYGEN SATURATION: 97 %

## 2020-10-20 DIAGNOSIS — R53.83 OTHER FATIGUE: Primary | ICD-10-CM

## 2020-10-20 LAB
ALBUMIN SERPL-MCNC: 3.7 G/DL (ref 3.4–5)
ALP SERPL-CCNC: 75 U/L (ref 40–150)
ALT SERPL W P-5'-P-CCNC: 15 U/L (ref 0–70)
ANION GAP SERPL CALCULATED.3IONS-SCNC: 4 MMOL/L (ref 3–14)
AST SERPL W P-5'-P-CCNC: 10 U/L (ref 0–45)
BASOPHILS # BLD AUTO: 0.1 10E9/L (ref 0–0.2)
BASOPHILS NFR BLD AUTO: 0.4 %
BILIRUB SERPL-MCNC: 1.2 MG/DL (ref 0.2–1.3)
BUN SERPL-MCNC: 19 MG/DL (ref 7–30)
CALCIUM SERPL-MCNC: 9.1 MG/DL (ref 8.5–10.1)
CHLORIDE SERPL-SCNC: 105 MMOL/L (ref 94–109)
CO2 SERPL-SCNC: 27 MMOL/L (ref 20–32)
CREAT SERPL-MCNC: 1.28 MG/DL (ref 0.66–1.25)
CRP SERPL-MCNC: 154 MG/L (ref 0–8)
DIFFERENTIAL METHOD BLD: ABNORMAL
EOSINOPHIL # BLD AUTO: 0.1 10E9/L (ref 0–0.7)
EOSINOPHIL NFR BLD AUTO: 0.4 %
ERYTHROCYTE [DISTWIDTH] IN BLOOD BY AUTOMATED COUNT: 12.6 % (ref 10–15)
ERYTHROCYTE [SEDIMENTATION RATE] IN BLOOD BY WESTERGREN METHOD: 10 MM/H (ref 0–15)
GFR SERPL CREATININE-BSD FRML MDRD: 68 ML/MIN/{1.73_M2}
GLUCOSE SERPL-MCNC: 108 MG/DL (ref 70–99)
HCT VFR BLD AUTO: 49 % (ref 40–53)
HGB BLD-MCNC: 16.6 G/DL (ref 13.3–17.7)
LYMPHOCYTES # BLD AUTO: 1.6 10E9/L (ref 0.8–5.3)
LYMPHOCYTES NFR BLD AUTO: 11.1 %
MCH RBC QN AUTO: 30.3 PG (ref 26.5–33)
MCHC RBC AUTO-ENTMCNC: 33.9 G/DL (ref 31.5–36.5)
MCV RBC AUTO: 90 FL (ref 78–100)
MONOCYTES # BLD AUTO: 2 10E9/L (ref 0–1.3)
MONOCYTES NFR BLD AUTO: 13.2 %
NEUTROPHILS # BLD AUTO: 11.1 10E9/L (ref 1.6–8.3)
NEUTROPHILS NFR BLD AUTO: 74.9 %
PLATELET # BLD AUTO: 245 10E9/L (ref 150–450)
POTASSIUM SERPL-SCNC: 4.3 MMOL/L (ref 3.4–5.3)
PROCALCITONIN SERPL-MCNC: <0.05 NG/ML
PROT SERPL-MCNC: 8.1 G/DL (ref 6.8–8.8)
RBC # BLD AUTO: 5.47 10E12/L (ref 4.4–5.9)
SODIUM SERPL-SCNC: 136 MMOL/L (ref 133–144)
WBC # BLD AUTO: 14.8 10E9/L (ref 4–11)

## 2020-10-20 PROCEDURE — 84145 PROCALCITONIN (PCT): CPT | Performed by: FAMILY MEDICINE

## 2020-10-20 PROCEDURE — 99000 SPECIMEN HANDLING OFFICE-LAB: CPT | Performed by: FAMILY MEDICINE

## 2020-10-20 PROCEDURE — 99N1036 PR STATISTIC TOTAL PROTEIN: Performed by: FAMILY MEDICINE

## 2020-10-20 PROCEDURE — 99214 OFFICE O/P EST MOD 30 MIN: CPT | Performed by: FAMILY MEDICINE

## 2020-10-20 PROCEDURE — 86140 C-REACTIVE PROTEIN: CPT | Performed by: FAMILY MEDICINE

## 2020-10-20 PROCEDURE — 80053 COMPREHEN METABOLIC PANEL: CPT | Performed by: FAMILY MEDICINE

## 2020-10-20 PROCEDURE — 36415 COLL VENOUS BLD VENIPUNCTURE: CPT | Performed by: FAMILY MEDICINE

## 2020-10-20 PROCEDURE — 86769 SARS-COV-2 COVID-19 ANTIBODY: CPT | Performed by: FAMILY MEDICINE

## 2020-10-20 PROCEDURE — 85025 COMPLETE CBC W/AUTO DIFF WBC: CPT | Performed by: FAMILY MEDICINE

## 2020-10-20 PROCEDURE — 85652 RBC SED RATE AUTOMATED: CPT | Performed by: FAMILY MEDICINE

## 2020-10-20 PROCEDURE — 84165 PROTEIN E-PHORESIS SERUM: CPT | Performed by: STUDENT IN AN ORGANIZED HEALTH CARE EDUCATION/TRAINING PROGRAM

## 2020-10-20 PROCEDURE — 84999 UNLISTED CHEMISTRY PROCEDURE: CPT | Mod: 90 | Performed by: FAMILY MEDICINE

## 2020-10-20 RX ORDER — LORATADINE 10 MG/1
TABLET ORAL
COMMUNITY
Start: 2020-09-20 | End: 2022-09-13

## 2020-10-20 ASSESSMENT — MIFFLIN-ST. JEOR: SCORE: 1730.87

## 2020-10-20 ASSESSMENT — PAIN SCALES - GENERAL: PAINLEVEL: NO PAIN (0)

## 2020-10-20 NOTE — PROGRESS NOTES
Subjective     Frederick Nguyen is a 45 year old male who presents to clinic today for the following health issues:    HPI    Patient comes in today reporting has been having fatigue, body ache and chills for the past week or so.  He has no cough, denies diarrhea abdominal pain, denies headache, denies being lightheaded or dizzy.  No skin rashes.  Recently,  had a few days of sore throat.  He has no cough.  No sick contact, no history of travel.  Patient's been working from home has not gone anywhere.  He does not have any other family been sick.  Patient reports a year ago, he did have mitral valve repair.  Had no complication.  Denies short of breath, denies chest pain, denies being lightheaded or dizzy.  He did have a previous infective endocarditis.    Back in August patient had similar symptom for 2 weeks.  Is a previous work-up initially had elevated white blood count, elevated sed rate.  However,  These came down after a week.  Other work-up including TSH, UA, chest x-ray, QuantiFERON came back normal.  He also had a negative Covid testing.  Negative blood culture.  Positive for  white blood count, sed rate, C-reactive protein all came back normal after a week .    Patient reports he feels better, until a week ago when he started having similar symptoms again.  Currently he does have a low-grade fever 99.1.  Denies cough, sinus congestion.  No diarrhea no vomiting no stomach upset.  Denies frequency urgency.  No chest pain or pressure.    Patient denies history of camping.  No history of ticks bite.     Concern - fatigue and body aches  Onset: about a week  Description: full body aches and fatigue  Intensity: moderate  Progression of Symptoms:  intermittent  Accompanying Signs & Symptoms: lack of appetite, night sweats, and intermittent sore throat.  Previous history of similar problem: yes back in August  Precipitating factors:        Worsened by: unsure  Alleviating factors:        Improved by: hot  shower and heating pad  Therapies tried and outcome: Cyclobenzaprine       Review of Systems   Constitutional, HEENT, cardiovascular, pulmonary, GI, , musculoskeletal, neuro, skin, endocrine and psych systems are negative, except as otherwise noted.      Objective    There were no vitals taken for this visit.  There is no height or weight on file to calculate BMI.  Physical Exam   GENERAL: healthy, alert and no distress  HENT: ear canals and TM's normal, nose and mouth without ulcers or lesions  NECK: no adenopathy, no asymmetry, masses, or scars and thyroid normal to palpation  RESP: lungs clear to auscultation - no rales, rhonchi or wheezes  CV: regular rate and rhythm, normal S1 S2, no S3 or S4, no murmur, click or rub, no peripheral edema and peripheral pulses strong  ABDOMEN: soft, nontender, no hepatosplenomegaly, no masses and bowel sounds normal  MS: no gross musculoskeletal defects noted, no edema  SKIN: no suspicious lesions or rashes  NEURO: Normal strength and tone, mentation intact and speech normal  PSYCH: mentation appears normal, affect normal/bright    Orders Placed This Encounter   Procedures     CRP, inflammation     ESR: Erythrocyte sedimentation rate     CBC with platelets and differential     Last Lab Result: Hemoglobin (g/dL)       Date                     Value                 08/19/2020               15.2             ----------     Comprehensive metabolic panel (BMP + Alb, Alk Phos, ALT, AST, Total. Bili, TP)     COVID-19 Virus (Coronavirus) Antibody & Titer Reflex     Order Specific Question:   Healthcare Worker?     Answer:   No     Order Specific Question:   If no:     Answer:   Exposed to known COVID >14 days ago     Antistreptolysin O     Procalcitonin     Protein electrophoresis           Assessment & Plan     Other fatigue  Patient previous work-up come back negative.  Except with minor elevation of white blood count, sed rate.  This came back normal.    Recurrent symptom.  I will  repeat his lab work today.  Discussed with patient if he continued to have elevated white blood count sed rate.  Also will need further work-up.  Possibly refer him to infectious disease, or and obtain a 2D echo, and a CT scans  - CRP, inflammation  - ESR: Erythrocyte sedimentation rate  - CBC with platelets and differential  - Comprehensive metabolic panel (BMP + Alb, Alk Phos, ALT, AST, Total. Bili, TP)  - COVID-19 Virus (Coronavirus) Antibody & Titer Reflex  - Antistreptolysin O  - Procalcitonin  - Protein electrophoresis        There are no Patient Instructions on file for this visit.    No follow-ups on file.    Frances Gonzalez MD  [facility]

## 2020-10-21 DIAGNOSIS — Z98.890 H/O MITRAL VALVE REPAIR: Primary | ICD-10-CM

## 2020-10-21 DIAGNOSIS — R53.83 OTHER FATIGUE: ICD-10-CM

## 2020-10-21 DIAGNOSIS — R53.83 OTHER FATIGUE: Primary | ICD-10-CM

## 2020-10-21 DIAGNOSIS — D72.829 LEUKOCYTOSIS, UNSPECIFIED TYPE: ICD-10-CM

## 2020-10-21 LAB
ALBUMIN SERPL ELPH-MCNC: 4 G/DL (ref 3.7–5.1)
ALPHA1 GLOB SERPL ELPH-MCNC: 0.5 G/DL (ref 0.2–0.4)
ALPHA2 GLOB SERPL ELPH-MCNC: 1 G/DL (ref 0.5–0.9)
B-GLOBULIN SERPL ELPH-MCNC: 0.9 G/DL (ref 0.6–1)
GAMMA GLOB SERPL ELPH-MCNC: 1 G/DL (ref 0.7–1.6)
M PROTEIN SERPL ELPH-MCNC: 0 G/DL
PROT PATTERN SERPL ELPH-IMP: ABNORMAL

## 2020-10-21 NOTE — PROGRESS NOTES
Called patient, regarding his recent lab result.  He still has elevated white blood count, elevated C-reactive protein.  Previous history of endocarditis, with status post mitral valve repair.  Otherwise, patient denies chest pain, short of breath, denies headache, has no cough.    Patient will be scheduled to have a 2D echo, in addition blood culture, repeat blood culture.  C-reactive protein.  With blood culture with blood morphology peripheral smear.

## 2020-10-22 LAB
COVID-19 SPIKE RBD ABY TITER: NORMAL
COVID-19 SPIKE RBD ABY: NEGATIVE
RESULT: NORMAL
SEND OUTS MISC TEST CODE: NORMAL
SEND OUTS MISC TEST SPECIMEN: NORMAL
TEST NAME: NORMAL

## 2020-10-23 DIAGNOSIS — R53.83 OTHER FATIGUE: ICD-10-CM

## 2020-10-23 DIAGNOSIS — Z98.890 H/O MITRAL VALVE REPAIR: ICD-10-CM

## 2020-10-23 DIAGNOSIS — D72.829 LEUKOCYTOSIS, UNSPECIFIED TYPE: ICD-10-CM

## 2020-10-23 LAB
BASOPHILS # BLD AUTO: 0.1 10E9/L (ref 0–0.2)
BASOPHILS NFR BLD AUTO: 0.9 %
CRP SERPL-MCNC: 103 MG/L (ref 0–8)
DIFFERENTIAL METHOD BLD: NORMAL
EOSINOPHIL # BLD AUTO: 0.2 10E9/L (ref 0–0.7)
EOSINOPHIL NFR BLD AUTO: 3.2 %
ERYTHROCYTE [DISTWIDTH] IN BLOOD BY AUTOMATED COUNT: 12.2 % (ref 10–15)
HCT VFR BLD AUTO: 43.7 % (ref 40–53)
HGB BLD-MCNC: 14.9 G/DL (ref 13.3–17.7)
LYMPHOCYTES # BLD AUTO: 1.5 10E9/L (ref 0.8–5.3)
LYMPHOCYTES NFR BLD AUTO: 22.4 %
MCH RBC QN AUTO: 30.3 PG (ref 26.5–33)
MCHC RBC AUTO-ENTMCNC: 34.1 G/DL (ref 31.5–36.5)
MCV RBC AUTO: 89 FL (ref 78–100)
MONOCYTES # BLD AUTO: 0.9 10E9/L (ref 0–1.3)
MONOCYTES NFR BLD AUTO: 13 %
NEUTROPHILS # BLD AUTO: 4.2 10E9/L (ref 1.6–8.3)
NEUTROPHILS NFR BLD AUTO: 60.5 %
PLATELET # BLD AUTO: 260 10E9/L (ref 150–450)
RBC # BLD AUTO: 4.92 10E12/L (ref 4.4–5.9)
RETICS # AUTO: 53.9 10E9/L (ref 25–95)
RETICS/RBC NFR AUTO: 1.1 % (ref 0.5–2)
WBC # BLD AUTO: 6.9 10E9/L (ref 4–11)

## 2020-10-23 PROCEDURE — 36415 COLL VENOUS BLD VENIPUNCTURE: CPT | Performed by: FAMILY MEDICINE

## 2020-10-23 PROCEDURE — 86140 C-REACTIVE PROTEIN: CPT | Performed by: FAMILY MEDICINE

## 2020-10-23 PROCEDURE — 85025 COMPLETE CBC W/AUTO DIFF WBC: CPT | Performed by: FAMILY MEDICINE

## 2020-10-23 PROCEDURE — 85045 AUTOMATED RETICULOCYTE COUNT: CPT | Performed by: FAMILY MEDICINE

## 2020-10-23 PROCEDURE — 87040 BLOOD CULTURE FOR BACTERIA: CPT | Performed by: FAMILY MEDICINE

## 2020-10-23 PROCEDURE — 86665 EPSTEIN-BARR CAPSID VCA: CPT | Performed by: FAMILY MEDICINE

## 2020-10-26 LAB
EBV VCA IGG SER QL IA: >8 AI (ref 0–0.8)
EBV VCA IGM SER QL IA: <0.2 AI (ref 0–0.8)

## 2020-10-27 ENCOUNTER — ANCILLARY PROCEDURE (OUTPATIENT)
Dept: CARDIOLOGY | Facility: CLINIC | Age: 45
End: 2020-10-27
Attending: FAMILY MEDICINE
Payer: COMMERCIAL

## 2020-10-27 DIAGNOSIS — R53.83 OTHER FATIGUE: ICD-10-CM

## 2020-10-27 DIAGNOSIS — D72.829 LEUKOCYTOSIS, UNSPECIFIED TYPE: ICD-10-CM

## 2020-10-27 DIAGNOSIS — Z98.890 H/O MITRAL VALVE REPAIR: ICD-10-CM

## 2020-10-27 PROCEDURE — 93306 TTE W/DOPPLER COMPLETE: CPT | Performed by: STUDENT IN AN ORGANIZED HEALTH CARE EDUCATION/TRAINING PROGRAM

## 2020-10-29 LAB
BACTERIA SPEC CULT: NO GROWTH
BACTERIA SPEC CULT: NO GROWTH
COPATH REPORT: NORMAL
SPECIMEN SOURCE: NORMAL
SPECIMEN SOURCE: NORMAL

## 2020-10-30 ENCOUNTER — OFFICE VISIT (OUTPATIENT)
Dept: FAMILY MEDICINE | Facility: CLINIC | Age: 45
End: 2020-10-30
Payer: COMMERCIAL

## 2020-10-30 VITALS
HEART RATE: 78 BPM | DIASTOLIC BLOOD PRESSURE: 81 MMHG | SYSTOLIC BLOOD PRESSURE: 124 MMHG | WEIGHT: 190 LBS | TEMPERATURE: 97.3 F | BODY MASS INDEX: 27.51 KG/M2 | OXYGEN SATURATION: 98 %

## 2020-10-30 DIAGNOSIS — R79.82 ELEVATED C-REACTIVE PROTEIN (CRP): Primary | ICD-10-CM

## 2020-10-30 DIAGNOSIS — Z86.19 HISTORY OF SEPSIS: ICD-10-CM

## 2020-10-30 DIAGNOSIS — R50.9 FEVER AND CHILLS: ICD-10-CM

## 2020-10-30 DIAGNOSIS — D48.119 DESMOID TUMOR: ICD-10-CM

## 2020-10-30 LAB
BASOPHILS # BLD AUTO: 0.1 10E9/L (ref 0–0.2)
BASOPHILS NFR BLD AUTO: 1 %
CRP SERPL-MCNC: 6.8 MG/L (ref 0–8)
DIFFERENTIAL METHOD BLD: NORMAL
EOSINOPHIL # BLD AUTO: 0.2 10E9/L (ref 0–0.7)
EOSINOPHIL NFR BLD AUTO: 3 %
ERYTHROCYTE [SEDIMENTATION RATE] IN BLOOD BY WESTERGREN METHOD: 8 MM/H (ref 0–15)
LYMPHOCYTES # BLD AUTO: 1.8 10E9/L (ref 0.8–5.3)
LYMPHOCYTES NFR BLD AUTO: 28 %
MONOCYTES # BLD AUTO: 0.6 10E9/L (ref 0–1.3)
MONOCYTES NFR BLD AUTO: 10 %
NEUTROPHILS # BLD AUTO: 3.7 10E9/L (ref 1.6–8.3)
NEUTROPHILS NFR BLD AUTO: 58 %
PLATELET # BLD EST: NORMAL 10*3/UL
RBC MORPH BLD: NORMAL
VARIANT LYMPHS BLD QL SMEAR: PRESENT
WBC # BLD AUTO: 6.4 10E9/L (ref 4–11)

## 2020-10-30 PROCEDURE — 85652 RBC SED RATE AUTOMATED: CPT | Performed by: INTERNAL MEDICINE

## 2020-10-30 PROCEDURE — 86140 C-REACTIVE PROTEIN: CPT | Performed by: INTERNAL MEDICINE

## 2020-10-30 PROCEDURE — U0003 INFECTIOUS AGENT DETECTION BY NUCLEIC ACID (DNA OR RNA); SEVERE ACUTE RESPIRATORY SYNDROME CORONAVIRUS 2 (SARS-COV-2) (CORONAVIRUS DISEASE [COVID-19]), AMPLIFIED PROBE TECHNIQUE, MAKING USE OF HIGH THROUGHPUT TECHNOLOGIES AS DESCRIBED BY CMS-2020-01-R: HCPCS | Performed by: INTERNAL MEDICINE

## 2020-10-30 PROCEDURE — 86780 TREPONEMA PALLIDUM: CPT | Mod: 90 | Performed by: INTERNAL MEDICINE

## 2020-10-30 PROCEDURE — 99215 OFFICE O/P EST HI 40 MIN: CPT | Performed by: INTERNAL MEDICINE

## 2020-10-30 PROCEDURE — 86769 SARS-COV-2 COVID-19 ANTIBODY: CPT | Performed by: INTERNAL MEDICINE

## 2020-10-30 PROCEDURE — 99000 SPECIMEN HANDLING OFFICE-LAB: CPT | Performed by: INTERNAL MEDICINE

## 2020-10-30 PROCEDURE — 36415 COLL VENOUS BLD VENIPUNCTURE: CPT | Performed by: INTERNAL MEDICINE

## 2020-10-30 PROCEDURE — 85048 AUTOMATED LEUKOCYTE COUNT: CPT | Performed by: INTERNAL MEDICINE

## 2020-10-30 PROCEDURE — 85004 AUTOMATED DIFF WBC COUNT: CPT | Performed by: INTERNAL MEDICINE

## 2020-10-30 NOTE — PATIENT INSTRUCTIONS
Call to schedule imaging   :  CT scan (once Covid  Results are known)      P: [facility] (Infect[facility]) - [place] [phone]

## 2020-10-30 NOTE — PROGRESS NOTES
Subjective     Frederick Nguyen is a 45 year old male who presents to clinic today for the following health issues:    HPI         Follow up on elevated CRP  The CRP elevated again:  He had this rechecked 10/20/20 due to symptoms of fatigue, malaise, fever, sore throat and ear ache... his CRP was 154 then. WBC 14...     THen 3 days later (10/23) he rechecked it and was still fatigued with body aches.  CRP was 123, WBC normal.     Currently he feels well     The above episode is similar to August 2020 when he presented to  after 3 day of drenching night sweats with dry cough...  At that time the following were negative/normal:  TSH, Procalcitonin, CBC, COVID, UA, Blood Cx (8/19, 8/21, 10/23 twice ), COVID (PCR and AB), EBV, HIV, Quantiferon, HIV, Sed Rate, LFTs.     H/o Sepsis 2018, at the time it was thought to be due to vertebral infection  His current symptoms are not similar to when he had sepsis.     His Mitral Valve repair took place 10/2019.     No IVDA.  Weight stable.         Review of Systems   Constitutional, HEENT, cardiovascular, pulmonary, gi and gu systems are negative, except as otherwise noted.      Objective    /81 (BP Location: Right arm, Patient Position: Sitting, Cuff Size: Adult Regular)   Pulse 78   Temp 97.3  F (36.3  C) (Oral)   Wt 86.2 kg (190 lb)   SpO2 98%   BMI 27.51 kg/m    Body mass index is 27.51 kg/m .  Physical Exam   GENERAL: healthy, alert and no distress  EYES: Eyes grossly normal to inspection, PERRL and conjunctivae and sclerae normal  HENT: ear canals and TM's normal, nose and mouth without ulcers or lesions  NECK: no adenopathy, no asymmetry, masses, or scars and thyroid normal to palpation  RESP: lungs clear to auscultation - no rales, rhonchi or wheezes  CV: regular rate and rhythm, normal S1 S2, no S3 or S4,  systilic crescendo Murmur heard at LLSB aboud 2-/6 , no click or rub, no peripheral edema and peripheral pulses strong  ABDOMEN: soft, nontender, no  hepatosplenomegaly, no masses and bowel sounds normal  MS: no gross musculoskeletal defects noted, no edema  SKIN: no suspicious lesions or rashes  SKIN: no rash on skin /palms/soles.  No splinter hemorrhages.  No osler nodes.   NEURO: Normal strength and tone, mentation intact and speech normal  PSYCH: mentation appears normal, affect normal/bright  LYMPH: no cervical, supraclavicular, axillary, or inguinal adenopathy    See orders.          Assessment & Plan     Elevated C-reactive protein (CRP)  Recurrent elevation with recurrent fevers/malaise since August.  The elevated correlates with symptoms   - CT Chest Abdomen Pelvis w/o & w Contrast; Future  - INFECTIOUS DISEASE REFERRAL  - CRP, inflammation  - ESR: Erythrocyte sedimentation rate  - WBC with Diff    H/o Mitral Valve repair a year ago.  Recent TTE normal.  He has murmur at LLSB.  Not MRI safe.  Not sure if reimaging is helpful, would ask ID if further cardiac imaging helpful.  4/4 BCx negative to date (out patient)    Fever and chills  See above  - CT Chest Abdomen Pelvis w/o & w Contrast; Future -- will  for findings.  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   - Asymptomatic COVID-19 Virus (Coronavirus) by PCR - 2nd test  - COVID-19 Virus (Coronavirus) Antibody & Titer Reflex -- 2nd test  - INFECTIOUS DISEASE REFERRAL  -- help evaluation.     - Treponema Abs w Reflex to RPR and Titer    History of sepsis   2018   Prior to MV repair.  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   He reports having problems scheduling with Inf Dx, will try again and if has problems will ask team to help.        Return in about 4 weeks (around 11/27/2020) for Routine Visit.    Ivette Mtz MD  [facility]

## 2020-10-31 LAB
COVID-19 SPIKE RBD ABY TITER: NORMAL
COVID-19 SPIKE RBD ABY: NEGATIVE
SARS-COV-2 RNA SPEC QL NAA+PROBE: NOT DETECTED
SPECIMEN SOURCE: NORMAL
T PALLIDUM AB SER QL: NONREACTIVE

## 2020-11-02 NOTE — RESULT ENCOUNTER NOTE
Frederick HILLMAN test negative.  Thank you for your patience with me during this testing!    Sincerely,     TED DICKSON M.D.

## 2020-11-03 ENCOUNTER — ANCILLARY PROCEDURE (OUTPATIENT)
Dept: CT IMAGING | Facility: CLINIC | Age: 45
End: 2020-11-03
Attending: INTERNAL MEDICINE
Payer: COMMERCIAL

## 2020-11-03 DIAGNOSIS — R50.9 FEVER AND CHILLS: ICD-10-CM

## 2020-11-03 DIAGNOSIS — Z86.19 HISTORY OF SEPSIS: ICD-10-CM

## 2020-11-03 DIAGNOSIS — R79.82 ELEVATED C-REACTIVE PROTEIN (CRP): ICD-10-CM

## 2020-11-03 PROCEDURE — 71260 CT THORAX DX C+: CPT | Mod: TC

## 2020-11-03 PROCEDURE — 74177 CT ABD & PELVIS W/CONTRAST: CPT | Mod: TC

## 2020-11-03 RX ORDER — IOPAMIDOL 755 MG/ML
93 INJECTION, SOLUTION INTRAVASCULAR ONCE
Status: COMPLETED | OUTPATIENT
Start: 2020-11-03 | End: 2020-11-03

## 2020-11-03 RX ADMIN — IOPAMIDOL 93 ML: 755 INJECTION, SOLUTION INTRAVASCULAR at 08:30

## 2020-11-03 NOTE — RESULT ENCOUNTER NOTE
Frederick Nguyen    Here are the results as we discussed moments ago.  We will have surgery evaluate and treat this finding.     Sincerely,     TED DICKSON M.D.

## 2020-11-10 ENCOUNTER — TELEPHONE (OUTPATIENT)
Dept: SURGERY | Facility: CLINIC | Age: 45
End: 2020-11-10

## 2020-11-10 ENCOUNTER — OFFICE VISIT (OUTPATIENT)
Dept: SURGERY | Facility: CLINIC | Age: 45
End: 2020-11-10
Payer: COMMERCIAL

## 2020-11-10 VITALS
HEART RATE: 81 BPM | WEIGHT: 193 LBS | SYSTOLIC BLOOD PRESSURE: 130 MMHG | HEIGHT: 69 IN | BODY MASS INDEX: 28.58 KG/M2 | DIASTOLIC BLOOD PRESSURE: 97 MMHG

## 2020-11-10 DIAGNOSIS — R93.5 ABNORMAL ABDOMINAL CT SCAN: Primary | ICD-10-CM

## 2020-11-10 PROCEDURE — 99204 OFFICE O/P NEW MOD 45 MIN: CPT | Performed by: SURGERY

## 2020-11-10 ASSESSMENT — MIFFLIN-ST. JEOR: SCORE: 1760.82

## 2020-11-10 NOTE — PATIENT INSTRUCTIONS
CT CHEST/ABDOMEN/PELVIS WITH CONTRAST 11/3/2020 8:21 AM     CLINICAL HISTORY: History of recurrent fevers, recurrent elevated CRP.  Elevated C-reactive protein (CRP). Fever and chills. History of  sepsis.     TECHNIQUE: CT scan of the chest, abdomen, and pelvis was performed  following injection of IV contrast. Multiplanar reformats were  obtained. Dose reduction techniques were used.   CONTRAST: 93 mL Isovue-370     COMPARISON: CT chest 10/14/2019.     FINDINGS:   LUNGS AND PLEURA: A few calcified granulomas are present. Probable  left major fissure lymph node measures 0.2 cm left upper lung on  series 5, image 103. Lungs are otherwise clear. No pleural fluid.     MEDIASTINUM/AXILLAE: Mitral valve replacement is noted. Heart is  normal in size. No pericardial fluid. Thoracic aorta is unremarkable.  No enlarged lymph nodes. Thyroid gland appears normal in size where  imaged.     HEPATOBILIARY: Normal.     PANCREAS: Normal.     SPLEEN: Normal.     ADRENAL GLANDS: Normal.     KIDNEYS/BLADDER: Normal.     BOWEL: No evidence of bowel obstruction, diverticulitis or  appendicitis. There is an indeterminate soft tissue mass in the small  bowel mesentery anterior left abdomen on image 234 of series 3  measuring 2.4 x 2.0 cm. No associated calcification. Surrounding  architectural distortion is noted. This finding represents a change  from the prior exam. This appears to be a solid mass and no similar  findings are noted elsewhere in the small bowel mesentery.     LYMPH NODES: No enlarged abdominal or pelvic lymph nodes.     PELVIC ORGANS: The bladder, prostate and rectum are unremarkable. No  free pelvic fluid or pelvic mass.     ADDITIONAL FINDINGS: None.     MUSCULOSKELETAL: Probable postop changes related to right inguinal  hernia repair.                                                                      IMPRESSION:  1.  Soft tissue mass with surrounding architectural distortion in the  small bowel mesentery anterior  left abdomen possibly a desmoid tumor.  No associated calcification to indicate carcinoid but this remains in  the differential as does lymphoma. No other enlarged lymph nodes in  the chest, abdomen or pelvis.  2.    3.  Stable calcified granulomas and indeterminate lung lesions when  compared to prior exam. No pulmonary infiltrate or consolidation to  indicate pneumonia.  4.    5.  No evidence of intra-abdominal or pelvic fluid collection to  suggest an abscess.     ANNALISA SOLOMON MD  Assessment: has a mass in the mesentery.   Due to covid no elective surgeries right now that are staying over night.  So if this is something that can be biopsied then  That may give us an answer.    Otherwise if I am going to remove this will need to most likely resect some bowel with this and then he will need 4-5 days in the hospital to allow the bowels to wake up.   We may need to convert to open if we get into bleeding.    Plan to do will see if this is something that can be biopsed.  .    Risks of surgery include damage to nerves, bleeding, infection, damage to  Vessels, recurrence.risk of damage to bowel and  General anesthesia .

## 2020-11-10 NOTE — LETTER
"    11/10/2020         RE: Frederick Nguyen  [address] [facility] [address]        Dear Colleague,    Thank you for referring your patient, Frederick Nguyen, to the [facility]. Please see a copy of my visit note below.    Dear Ivette Watkins  I was asked to see this patient by Ivette Mtz for please see below.  I have seen Frederick Nguyen and as you know his chief complaint is August and October got sick and had elevated wbc and high CRP.  No abdomen pain.   Had low grade fever for a day and no chills.   Rest of family had no symptoms  Has had the heart checked out several times to make sure the infection was not from the mitral valve.   HPI:  Patient is a 45 year old male  with complaints see above      Review Of Systems  Respiratory: No shortness of breath, dyspnea on exertion, cough, or hemoptysis  Cardiovascular: mitral valve repair replaced Oct  2019 and negative  Gastrointestinal: negative  Endocrine: negative  :  negative    10 Point review of systems all others are negative.   BP (!) 130/97   Pulse 81   Ht 1.753 m (5' 9\")   Wt 87.5 kg (193 lb)   BMI 28.50 kg/m      Past Medical History:   Diagnosis Date     Cervical spondylosis without myelopathy      Heart murmur      Osteomyelitis, L5S1 03/15/2018    secondary to endocarditis       Past Surgical History:   Procedure Laterality Date     APPENDECTOMY  1987     CHEILECTOMY Right 4/10/2019    Procedure: Right Cheilectomy;  Surgeon: Sawyer Crowell MD;  Location: [facility] OR     CV CORONARY ANGIOGRAM N/A 9/9/2019    Procedure: CV CORONARY ANGIOGRAM;  Surgeon: Pernell Bar MD;  Location:  HEART CARDIAC CATH LAB     CV RIGHT HEART CATH N/A 9/9/2019    Procedure: CV RIGHT HEART CATH;  Surgeon: Pernell Bar MD;  Location:  HEART CARDIAC CATH LAB     INJECT SACROILIAC JOINT Right 5/9/2018    Procedure: INJECT SACROILIAC JOINT;  Right Sacroiliac Joint Injection;  Surgeon: Teri" MD Thom;  Location:  OR     ORTHOPEDIC SURGERY  4/10/19    Bone Spur Removal     REPAIR VALVE MITRAL MINIMALLY INVASIVE N/A 10/25/2019    Procedure: MINIMALLY INVASIVE MITRAL VALVE REPAIR WITH MITRAL ANNULOPLASTY RING KEVIN-KAUR PHYSIO II SIZE: 36MM  (ON PUMP OXYGENATOR ; INTRAOPERATIVE DEEPAK BY CARDIOLOGIST DR. VARELA);  Surgeon: Rigoberto Downs MD;  Location:  OR       Social History     Socioeconomic History     Marital status:      Spouse name: Not on file     Number of children: Not on file     Years of education: Not on file     Highest education level: Not on file   Occupational History     Comment: desk work   Social Needs     Financial resource strain: Not on file     Food insecurity     Worry: Not on file     Inability: Not on file     Transportation needs     Medical: Not on file     Non-medical: Not on file   Tobacco Use     Smoking status: Never Smoker     Smokeless tobacco: Never Used   Substance and Sexual Activity     Alcohol use: Yes     Comment: rare     Drug use: No     Sexual activity: Yes     Partners: Female     Birth control/protection: Pill   Lifestyle     Physical activity     Days per week: Not on file     Minutes per session: Not on file     Stress: Not on file   Relationships     Social connections     Talks on phone: Not on file     Gets together: Not on file     Attends Confucianist service: Not on file     Active member of club or organization: Not on file     Attends meetings of clubs or organizations: Not on file     Relationship status: Not on file     Intimate partner violence     Fear of current or ex partner: Not on file     Emotionally abused: Not on file     Physically abused: Not on file     Forced sexual activity: Not on file   Other Topics Concern     Parent/sibling w/ CABG, MI or angioplasty before 65F 55M? No   Social History Narrative     Not on file       Current Outpatient Medications   Medication Sig Dispense Refill     aspirin (ASA) 81 MG  "chewable tablet Take 81 mg by mouth daily       loratadine (CLARITIN) 10 MG tablet        metoprolol succinate ER (TOPROL-XL) 50 MG 24 hr tablet Take 1 tablet (50 mg) by mouth 2 times daily 180 tablet 3       Above was reviewed  Physical exam: BP (!) 130/97   Pulse 81   Ht 1.753 m (5' 9\")   Wt 87.5 kg (193 lb)   BMI 28.50 kg/m     Patient able to get up on table without difficulty.   Patient is alert and orientated.   Head eyes, nose and mouth within normal limits.  No supraclavicular or cervical adenopathy palpated.  Thyroid within normal limits.  No carotid bruits auscultated.  Lungs are clear to auscultation  Heart is regular rate and rhythm with no murmur or thrills noted.  No costal vertebral angle tenderness noted.  Abdomen is abdomen is soft without significant tenderness, masses, organomegaly or guarding  bowel sounds are positive and no caput medusa noted.  No ventral hernias noted.     Skin was warm and pink  Normal Affect  Lower extremity edema is not present.      CT CHEST/ABDOMEN/PELVIS WITH CONTRAST 11/3/2020 8:21 AM     CLINICAL HISTORY: History of recurrent fevers, recurrent elevated CRP.  Elevated C-reactive protein (CRP). Fever and chills. History of  sepsis.     TECHNIQUE: CT scan of the chest, abdomen, and pelvis was performed  following injection of IV contrast. Multiplanar reformats were  obtained. Dose reduction techniques were used.   CONTRAST: 93 mL Isovue-370     COMPARISON: CT chest 10/14/2019.     FINDINGS:   LUNGS AND PLEURA: A few calcified granulomas are present. Probable  left major fissure lymph node measures 0.2 cm left upper lung on  series 5, image 103. Lungs are otherwise clear. No pleural fluid.     MEDIASTINUM/AXILLAE: Mitral valve replacement is noted. Heart is  normal in size. No pericardial fluid. Thoracic aorta is unremarkable.  No enlarged lymph nodes. Thyroid gland appears normal in size where  imaged.     HEPATOBILIARY: Normal.     PANCREAS: Normal.     SPLEEN: " Normal.     ADRENAL GLANDS: Normal.     KIDNEYS/BLADDER: Normal.     BOWEL: No evidence of bowel obstruction, diverticulitis or  appendicitis. There is an indeterminate soft tissue mass in the small  bowel mesentery anterior left abdomen on image 234 of series 3  measuring 2.4 x 2.0 cm. No associated calcification. Surrounding  architectural distortion is noted. This finding represents a change  from the prior exam. This appears to be a solid mass and no similar  findings are noted elsewhere in the small bowel mesentery.     LYMPH NODES: No enlarged abdominal or pelvic lymph nodes.     PELVIC ORGANS: The bladder, prostate and rectum are unremarkable. No  free pelvic fluid or pelvic mass.     ADDITIONAL FINDINGS: None.     MUSCULOSKELETAL: Probable postop changes related to right inguinal  hernia repair.                                                                      IMPRESSION:  1.  Soft tissue mass with surrounding architectural distortion in the  small bowel mesentery anterior left abdomen possibly a desmoid tumor.  No associated calcification to indicate carcinoid but this remains in  the differential as does lymphoma. No other enlarged lymph nodes in  the chest, abdomen or pelvis.  2.    3.  Stable calcified granulomas and indeterminate lung lesions when  compared to prior exam. No pulmonary infiltrate or consolidation to  indicate pneumonia.  4.    5.  No evidence of intra-abdominal or pelvic fluid collection to  suggest an abscess.     ANNALISA SOLOMON MD  Assessment: has a mass in the mesentery.   Due to covid no elective surgeries right now that are staying over night.  So if this is something that can be biopsied then  That may give us an answer.    Otherwise if I am going to remove this will need to most likely resect some bowel with this and then he will need 4-5 days in the hospital to allow the bowels to wake up.   We may need to convert to open if we get into bleeding.    Plan to do will see if this is  something that can be biopsed.  .  Will send this out to oncology and radiology.     Risks of surgery include damage to nerves, bleeding, infection, damage to  Vessels, recurrence.risk of damage to bowel and  General anesthesia .     Time spent with the patient with greater that 50% of the time in discussion was 45 minutes.  In discussing the plan.      Ferny Parsons MD        Again, thank you for allowing me to participate in the care of your patient.        Sincerely,        Ferny Parsons MD

## 2020-11-10 NOTE — PROGRESS NOTES
"Dear Ivette Watkins  I was asked to see this patient by Ivette Mtz for please see below.  I have seen Frederick Nguyen and as you know his chief complaint is August and October got sick and had elevated wbc and high CRP.  No abdomen pain.   Had low grade fever for a day and no chills.   Rest of family had no symptoms  Has had the heart checked out several times to make sure the infection was not from the mitral valve.   HPI:  Patient is a 45 year old male  with complaints see above      Review Of Systems  Respiratory: No shortness of breath, dyspnea on exertion, cough, or hemoptysis  Cardiovascular: mitral valve repair replaced Oct  2019 and negative  Gastrointestinal: negative  Endocrine: negative  :  negative    10 Point review of systems all others are negative.   BP (!) 130/97   Pulse 81   Ht 1.753 m (5' 9\")   Wt 87.5 kg (193 lb)   BMI 28.50 kg/m      Past Medical History:   Diagnosis Date     Cervical spondylosis without myelopathy      Heart murmur      Osteomyelitis, L5S1 03/15/2018    secondary to endocarditis       Past Surgical History:   Procedure Laterality Date     APPENDECTOMY  1987     CHEILECTOMY Right 4/10/2019    Procedure: Right Cheilectomy;  Surgeon: Sawyer Crowell MD;  Location:  OR     CV CORONARY ANGIOGRAM N/A 9/9/2019    Procedure: CV CORONARY ANGIOGRAM;  Surgeon: Pernell Bar MD;  Location:  HEART CARDIAC CATH LAB     CV RIGHT HEART CATH N/A 9/9/2019    Procedure: CV RIGHT HEART CATH;  Surgeon: Pernell Bar MD;  Location:  HEART CARDIAC CATH LAB     INJECT SACROILIAC JOINT Right 5/9/2018    Procedure: INJECT SACROILIAC JOINT;  Right Sacroiliac Joint Injection;  Surgeon: Thom Williamson MD;  Location:  OR     ORTHOPEDIC SURGERY  4/10/19    Bone Spur Removal     REPAIR VALVE MITRAL MINIMALLY INVASIVE N/A 10/25/2019    Procedure: MINIMALLY INVASIVE MITRAL VALVE REPAIR WITH MITRAL ANNULOPLASTY RING KEVIN-KAUR PHYSIO II SIZE: 36MM  " (ON PUMP OXYGENATOR ; INTRAOPERATIVE DEEPAK BY CARDIOLOGIST DR. VARELA);  Surgeon: Rigoberto Downs MD;  Location:  OR       Social History     Socioeconomic History     Marital status:      Spouse name: Not on file     Number of children: Not on file     Years of education: Not on file     Highest education level: Not on file   Occupational History     Comment: desk work   Social Needs     Financial resource strain: Not on file     Food insecurity     Worry: Not on file     Inability: Not on file     Transportation needs     Medical: Not on file     Non-medical: Not on file   Tobacco Use     Smoking status: Never Smoker     Smokeless tobacco: Never Used   Substance and Sexual Activity     Alcohol use: Yes     Comment: rare     Drug use: No     Sexual activity: Yes     Partners: Female     Birth control/protection: Pill   Lifestyle     Physical activity     Days per week: Not on file     Minutes per session: Not on file     Stress: Not on file   Relationships     Social connections     Talks on phone: Not on file     Gets together: Not on file     Attends Moravian service: Not on file     Active member of club or organization: Not on file     Attends meetings of clubs or organizations: Not on file     Relationship status: Not on file     Intimate partner violence     Fear of current or ex partner: Not on file     Emotionally abused: Not on file     Physically abused: Not on file     Forced sexual activity: Not on file   Other Topics Concern     Parent/sibling w/ CABG, MI or angioplasty before 65F 55M? No   Social History Narrative     Not on file       Current Outpatient Medications   Medication Sig Dispense Refill     aspirin (ASA) 81 MG chewable tablet Take 81 mg by mouth daily       loratadine (CLARITIN) 10 MG tablet        metoprolol succinate ER (TOPROL-XL) 50 MG 24 hr tablet Take 1 tablet (50 mg) by mouth 2 times daily 180 tablet 3       Above was reviewed  Physical exam: BP (!) 130/97    "Pulse 81   Ht 1.753 m (5' 9\")   Wt 87.5 kg (193 lb)   BMI 28.50 kg/m     Patient able to get up on table without difficulty.   Patient is alert and orientated.   Head eyes, nose and mouth within normal limits.  No supraclavicular or cervical adenopathy palpated.  Thyroid within normal limits.  No carotid bruits auscultated.  Lungs are clear to auscultation  Heart is regular rate and rhythm with no murmur or thrills noted.  No costal vertebral angle tenderness noted.  Abdomen is abdomen is soft without significant tenderness, masses, organomegaly or guarding  bowel sounds are positive and no caput medusa noted.  No ventral hernias noted.     Skin was warm and pink  Normal Affect  Lower extremity edema is not present.      CT CHEST/ABDOMEN/PELVIS WITH CONTRAST 11/3/2020 8:21 AM     CLINICAL HISTORY: History of recurrent fevers, recurrent elevated CRP.  Elevated C-reactive protein (CRP). Fever and chills. History of  sepsis.     TECHNIQUE: CT scan of the chest, abdomen, and pelvis was performed  following injection of IV contrast. Multiplanar reformats were  obtained. Dose reduction techniques were used.   CONTRAST: 93 mL Isovue-370     COMPARISON: CT chest 10/14/2019.     FINDINGS:   LUNGS AND PLEURA: A few calcified granulomas are present. Probable  left major fissure lymph node measures 0.2 cm left upper lung on  series 5, image 103. Lungs are otherwise clear. No pleural fluid.     MEDIASTINUM/AXILLAE: Mitral valve replacement is noted. Heart is  normal in size. No pericardial fluid. Thoracic aorta is unremarkable.  No enlarged lymph nodes. Thyroid gland appears normal in size where  imaged.     HEPATOBILIARY: Normal.     PANCREAS: Normal.     SPLEEN: Normal.     ADRENAL GLANDS: Normal.     KIDNEYS/BLADDER: Normal.     BOWEL: No evidence of bowel obstruction, diverticulitis or  appendicitis. There is an indeterminate soft tissue mass in the small  bowel mesentery anterior left abdomen on image 234 of series " 3  measuring 2.4 x 2.0 cm. No associated calcification. Surrounding  architectural distortion is noted. This finding represents a change  from the prior exam. This appears to be a solid mass and no similar  findings are noted elsewhere in the small bowel mesentery.     LYMPH NODES: No enlarged abdominal or pelvic lymph nodes.     PELVIC ORGANS: The bladder, prostate and rectum are unremarkable. No  free pelvic fluid or pelvic mass.     ADDITIONAL FINDINGS: None.     MUSCULOSKELETAL: Probable postop changes related to right inguinal  hernia repair.                                                                      IMPRESSION:  1.  Soft tissue mass with surrounding architectural distortion in the  small bowel mesentery anterior left abdomen possibly a desmoid tumor.  No associated calcification to indicate carcinoid but this remains in  the differential as does lymphoma. No other enlarged lymph nodes in  the chest, abdomen or pelvis.  2.    3.  Stable calcified granulomas and indeterminate lung lesions when  compared to prior exam. No pulmonary infiltrate or consolidation to  indicate pneumonia.  4.    5.  No evidence of intra-abdominal or pelvic fluid collection to  suggest an abscess.     ANNALISA SOLOMON MD  Assessment: has a mass in the mesentery.   Due to covid no elective surgeries right now that are staying over night.  So if this is something that can be biopsied then  That may give us an answer.    Otherwise if I am going to remove this will need to most likely resect some bowel with this and then he will need 4-5 days in the hospital to allow the bowels to wake up.   We may need to convert to open if we get into bleeding.    Plan to do will see if this is something that can be biopsed.  .  Will send this out to oncology and radiology.     Risks of surgery include damage to nerves, bleeding, infection, damage to  Vessels, recurrence.risk of damage to bowel and  General anesthesia .     Time spent with the  patient with greater that 50% of the time in discussion was 45 minutes.  In discussing the plan.      Ferny Parsons MD    [place] back from oncology and was felt that he needed this resected and I have set this up for the patient.    And called him and discussed this with him.   Ferny Parsons MD

## 2020-11-10 NOTE — TELEPHONE ENCOUNTER
Type of surgery: laparscopic excsion of small bowel mesentary mass.  possibe small bowel resection possible open.  CPT  25567, 22381, 82818   Abnormal abdominal CT scan R93.5    Location of surgery: [facility]  Date and time of surgery: 1-6-21  Surgeon: Dr. Parsons  Pre-Op Appt Date:   Post-Op Appt Date: 1-19-21   Packet sent out: Yes  Pre-cert/Authorization completed:No prior auth required per [employer] online 2021 list.  [facility] and OV form saved.  Date: 11/11/20    Tori Carson  Prior Authorization Dept  [phone]      Patient states he will have the same insurance in 2021.

## 2020-11-16 ENCOUNTER — HEALTH MAINTENANCE LETTER (OUTPATIENT)
Age: 45
End: 2020-11-16

## 2021-04-19 ENCOUNTER — MYC MEDICAL ADVICE (OUTPATIENT)
Dept: INTERNAL MEDICINE | Facility: CLINIC | Age: 46
End: 2021-04-19

## 2021-04-19 DIAGNOSIS — Z98.890 S/P MVR (MITRAL VALVE REPAIR): ICD-10-CM

## 2021-04-19 RX ORDER — METOPROLOL SUCCINATE 50 MG/1
50 TABLET, EXTENDED RELEASE ORAL 2 TIMES DAILY
Qty: 180 TABLET | Refills: 3 | Status: SHIPPED | OUTPATIENT
Start: 2021-04-19 | End: 2021-08-23

## 2021-08-23 ENCOUNTER — OFFICE VISIT (OUTPATIENT)
Dept: FAMILY MEDICINE | Facility: CLINIC | Age: 46
End: 2021-08-23
Payer: COMMERCIAL

## 2021-08-23 ENCOUNTER — TELEPHONE (OUTPATIENT)
Dept: LAB | Facility: CLINIC | Age: 46
End: 2021-08-23

## 2021-08-23 VITALS
SYSTOLIC BLOOD PRESSURE: 142 MMHG | TEMPERATURE: 98.2 F | BODY MASS INDEX: 26.05 KG/M2 | HEART RATE: 77 BPM | DIASTOLIC BLOOD PRESSURE: 104 MMHG | WEIGHT: 182 LBS | HEIGHT: 70 IN | OXYGEN SATURATION: 100 %

## 2021-08-23 DIAGNOSIS — M20.5X1 HALLUX LIMITUS OF RIGHT FOOT: ICD-10-CM

## 2021-08-23 DIAGNOSIS — Z98.890 S/P MVR (MITRAL VALVE REPAIR): Primary | ICD-10-CM

## 2021-08-23 DIAGNOSIS — R19.00 ABDOMINAL MASS, UNSPECIFIED ABDOMINAL LOCATION: ICD-10-CM

## 2021-08-23 DIAGNOSIS — R03.0 ELEVATED BP WITHOUT DIAGNOSIS OF HYPERTENSION: ICD-10-CM

## 2021-08-23 PROCEDURE — 99214 OFFICE O/P EST MOD 30 MIN: CPT | Performed by: FAMILY MEDICINE

## 2021-08-23 RX ORDER — METOPROLOL SUCCINATE 50 MG/1
25 TABLET, EXTENDED RELEASE ORAL EVERY MORNING
Qty: 180 TABLET | Refills: 0 | COMMUNITY
Start: 2021-08-23 | End: 2023-04-12

## 2021-08-23 RX ORDER — METOPROLOL SUCCINATE 50 MG/1
50 TABLET, EXTENDED RELEASE ORAL DAILY
Qty: 180 TABLET | Refills: 0 | COMMUNITY
Start: 2021-08-23 | End: 2021-08-23

## 2021-08-23 ASSESSMENT — MIFFLIN-ST. JEOR: SCORE: 1719.31

## 2021-08-23 NOTE — TELEPHONE ENCOUNTER
Prior Authorization Retail Medication Request    Medication/Dose: Diclofenac 1%  ICD code (if different than what is on RX):  M205x1  Previously Tried and Failed:  unknown  Rationale:  Product/service not covered    [facility]  Insurance ID:  [id number]  [phone]      Pharmacy Information (if different than what is on RX)  Name:  [facility]  Phone:  [phone]  Thank You,  Brielle Bryant CPhT  [facility]

## 2021-08-23 NOTE — TELEPHONE ENCOUNTER
PRIOR AUTHORIZATION DENIED    Medication: Diclofenac 1% - Denied     Denial Date: 8/23/2021    Denial Rational: The medication is not covered under patient's pharmacy benefits. Additional questions can be brought by calling the number on the back of the patient's insurance card.        Appeal Information:The patient will need to sign a form authorizing the Provider's Office/Central PA Team to act on their behalf for the appeal. If the provider would like to appeal this denial, please provide a letter of medical necessity. Please also include any therapies that the patient has tried and their outcomes. The patient's insurance company will also require the provider to address why the insurance preferred options are not appropriate in the patient's therapy.  The reason could be that the preferred options will harm the patient; either physically or mentally. They are contraindicated to the patient; or the patient has already been taking the requested medication and changing the therapy would change the outcome of their therapy.    Once it has been completed and placed in the patient's chart, notify the Central PA Team ([facility]) and the appeal can be initiated on behalf of the patient and provider.

## 2021-08-23 NOTE — TELEPHONE ENCOUNTER
Central Prior Authorization Team   Phone: [phone]      PA Initiation    Medication: Diclofenac 1%  Insurance Company: [employer] - Phone [phone] Fax [phone]  Pharmacy Filling the Rx: [facility] [address] [facility]  Filling Pharmacy Phone: [phone]  Filling Pharmacy Fax: [phone]  Start Date: 8/23/2021

## 2021-08-23 NOTE — PROGRESS NOTES
"    Assessment & Plan       ICD-10-CM    1. S/P MVR (mitral valve repair)  Z98.890 metoprolol succinate ER (TOPROL-XL) 50 MG 24 hr tablet     DISCONTINUED: metoprolol succinate ER (TOPROL-XL) 50 MG 24 hr tablet   2. Hallux limitus of right foot  M20.5X1 diclofenac (VOLTAREN) 1 % topical gel   3. Abdominal mass, unspecified abdominal location  R19.00 CT Abdomen Pelvis w Contrast   4. Elevated BP without diagnosis of hypertension  R03.0      His blood pressure is elevated today and has been at times in the past when he comes in the clinic, although home blood pressure readings have been better  I suggested he decrease the metoprolol XL 50 mg to 1/2 tablet daily try that for a couple of months and monitor heart rate and blood pressure on that dose  I will refill his diclofenac gel as requested  We will check a repeat abdominal CT scan and potentially refer him back to Dr. Hager of general surgery  I advised a tentative follow-up with his PCP in 2 months, but he may wind up seeing her sooner if he has a scheduled surgery on his abdomen in the meantime     BMI:   Estimated body mass index is 26.47 kg/m  as calculated from the following:    Height as of this encounter: 1.766 m (5' 9.53\").    Weight as of this encounter: 82.6 kg (182 lb).   Weight management plan: Discussed healthy diet and exercise guidelines    Return in about 2 months (around 10/23/2021).    Agapito Wilks MD  [facility] FLORI Mack is a 45 year old who presents for the following health issues     HPI     Patient would like to discuss stopping the metoprolol and would also like an Rx for diclofenac 1% topical gel.  Would also like to discuss his abdominal mass.    He has been on metoprolol for a couple of years associated with his mitral valve prolapse and subsequent mitral valve repair.  He was apparently placed on it to help control blood pressure and heart rate.  His dose has been cut back to 50 mg once daily, " but he still feels somewhat sluggish on the medicine and it is hard for him to exercise for to full capacity.  He has a little bit of erectile dysfunction as well.  He wonders if he could cut back the medicine further or perhaps stop it.  He has been using 1% diclofenac gel to his great toe where he has had surgery for bone spurs and he requests a refill on that medication.    Last year he was found to have a small abdominal mesenteric mass measuring 2.4 x 2 cm.  He was going to have a surgical biopsy/removal of that, but due to Covid this was postponed.  He would like to have that checked out again and then possibly reconnect with the surgeon for this.  See previous notes in chart.    Patient Active Problem List   Diagnosis     Cervical spondylosis without myelopathy     Cervicalgia     Duane syndrome of left eye     Nonallopathic lesion of lumbar region     Spasm of muscle     Nonallopathic lesion of thoracic region     Bacteremia     Infective endocarditis     Infection by Streptococcus, viridans group     Hallux limitus of right foot     Abnormal findings on diagnostic imaging of heart/coronary circulation     Status post coronary angiogram     H/O mitral valve repair     Fluid overload     Transient hyperglycemia post procedure     Desmoid tumor      Current Outpatient Medications   Medication     metoprolol succinate ER (TOPROL-XL) 50 MG 24 hr tablet     aspirin (ASA) 81 MG chewable tablet     loratadine (CLARITIN) 10 MG tablet     No current facility-administered medications for this visit.     Facility-Administered Medications Ordered in Other Visits   Medication     sodium chloride (PF) 0.9% PF flush 10 mL         Review of Systems   Constitutional, HEENT, cardiovascular, pulmonary, gi and gu systems are negative, except as otherwise noted.      Objective    BP (!) 142/103 (BP Location: Left arm, Patient Position: Sitting, Cuff Size: Adult Regular)   Pulse 77   Temp 98.2  F (36.8  C) (Oral)   Ht 1.766 m  "(5' 9.53\")   Wt 82.6 kg (182 lb)   SpO2 100%   BMI 26.47 kg/m    Body mass index is 26.47 kg/m .  Physical Exam   GENERAL: healthy, alert and no distress    His abdominal CT results from last fall were reviewed.            "

## 2021-08-24 ENCOUNTER — ANCILLARY PROCEDURE (OUTPATIENT)
Dept: CT IMAGING | Facility: CLINIC | Age: 46
End: 2021-08-24
Attending: FAMILY MEDICINE
Payer: COMMERCIAL

## 2021-08-24 DIAGNOSIS — R19.00 ABDOMINAL MASS, UNSPECIFIED ABDOMINAL LOCATION: ICD-10-CM

## 2021-08-24 PROCEDURE — 74177 CT ABD & PELVIS W/CONTRAST: CPT | Mod: TC | Performed by: RADIOLOGY

## 2021-08-24 RX ORDER — IOPAMIDOL 755 MG/ML
100 INJECTION, SOLUTION INTRAVASCULAR ONCE
Status: COMPLETED | OUTPATIENT
Start: 2021-08-24 | End: 2021-08-24

## 2021-08-24 RX ADMIN — IOPAMIDOL 100 ML: 755 INJECTION, SOLUTION INTRAVASCULAR at 07:50

## 2021-08-24 NOTE — RESULT ENCOUNTER NOTE
Audi,  The growth in your abdomen has grown, so I think it would be wise to follow-up with Dr. Hager again and likely undergo surgery to have that removed.  You may have contact information at home for him, but you could probably call [phone] and have someone assist you in getting an appointment to see him again.    Agapito Wilks MD

## 2021-08-25 ENCOUNTER — OFFICE VISIT (OUTPATIENT)
Dept: SURGERY | Facility: CLINIC | Age: 46
End: 2021-08-25
Payer: COMMERCIAL

## 2021-08-25 VITALS
SYSTOLIC BLOOD PRESSURE: 148 MMHG | BODY MASS INDEX: 26.62 KG/M2 | HEART RATE: 87 BPM | WEIGHT: 183 LBS | DIASTOLIC BLOOD PRESSURE: 94 MMHG

## 2021-08-25 DIAGNOSIS — K63.89 MESENTERIC MASS: Primary | ICD-10-CM

## 2021-08-25 PROCEDURE — 99214 OFFICE O/P EST MOD 30 MIN: CPT | Performed by: SURGERY

## 2021-08-25 NOTE — LETTER
8/25/2021         RE: Frederick Nguyen  [address] [facility] [address]        Dear Colleague,    Thank you for referring your patient, Frederick Nguyen, to the [facility]. Please see a copy of my visit note below.    Patient seen in consultation for mesenteric mass by Ivette Mtz    HPI:  Patient is a 45 year old male  with complaints of mesenteric mass seen on previous imaging  Was initially seen at the bottom of imaging done related to his mitral valve repair  Then had dedicated CT which showed a 2.4 cm mesenteric mass  Saw Dr Parsons and was initially set for resection but was cancelled with increasing covid cases then  Now repeat CT showed enlargement so wanting to reschedule resection.  Feels well. No abdominal pain, normal bowel movements, no bleeding  Patient has not family history of similar problems. No GI cancers, carcinoid, lymphoma history    Review Of Systems    Skin: negative  Ears/Nose/Throat: negative  Respiratory: No shortness of breath, dyspnea on exertion, cough, or hemoptysis  Cardiovascular: history of mitral valve repair otherwise negative  Gastrointestinal: as above  Genitourinary: negative  Musculoskeletal: negative  Neurologic: negative  Hematologic/Lymphatic/Immunologic: negative  Endocrine: negative      Past Medical History:   Diagnosis Date     Cervical spondylosis without myelopathy      Heart murmur      Osteomyelitis, L5S1 03/15/2018    secondary to endocarditis       Past Surgical History:   Procedure Laterality Date     APPENDECTOMY  1987     CHEILECTOMY Right 4/10/2019    Procedure: Right Cheilectomy;  Surgeon: Sawyer Crowell MD;  Location: [facility] OR     CV CORONARY ANGIOGRAM N/A 9/9/2019    Procedure: CV CORONARY ANGIOGRAM;  Surgeon: Pernell Bar MD;  Location:  HEART CARDIAC CATH LAB     CV RIGHT HEART CATH MEASUREMENTS RECORDED N/A 9/9/2019    Procedure: CV RIGHT HEART CATH;  Surgeon: Pernell Bar MD;   Location:  HEART CARDIAC CATH LAB     INJECT SACROILIAC JOINT Right 5/9/2018    Procedure: INJECT SACROILIAC JOINT;  Right Sacroiliac Joint Injection;  Surgeon: Thom Williamson MD;  Location:  OR     ORTHOPEDIC SURGERY  4/10/19    Bone Spur Removal     REPAIR VALVE MITRAL MINIMALLY INVASIVE N/A 10/25/2019    Procedure: MINIMALLY INVASIVE MITRAL VALVE REPAIR WITH MITRAL ANNULOPLASTY RING KEVIN-KAUR PHYSIO II SIZE: 36MM  (ON PUMP OXYGENATOR ; INTRAOPERATIVE DEEPAK BY CARDIOLOGIST DR. VARELA);  Surgeon: Rigoberto Downs MD;  Location:  OR       Social History     Socioeconomic History     Marital status:      Spouse name: Not on file     Number of children: Not on file     Years of education: Not on file     Highest education level: Not on file   Occupational History     Comment: desk work   Tobacco Use     Smoking status: Never Smoker     Smokeless tobacco: Never Used   Substance and Sexual Activity     Alcohol use: Yes     Comment: rare     Drug use: No     Sexual activity: Yes     Partners: Female     Birth control/protection: Pill   Other Topics Concern     Parent/sibling w/ CABG, MI or angioplasty before 65F 55M? No   Social History Narrative     Not on file     Social Determinants of Health     Financial Resource Strain:      Difficulty of Paying Living Expenses:    Food Insecurity:      Worried About Running Out of Food in the Last Year:      Ran Out of Food in the Last Year:    Transportation Needs:      Lack of Transportation (Medical):      Lack of Transportation (Non-Medical):    Physical Activity:      Days of Exercise per Week:      Minutes of Exercise per Session:    Stress:      Feeling of Stress :    Social Connections:      Frequency of Communication with Friends and Family:      Frequency of Social Gatherings with Friends and Family:      Attends Islam Services:      Active Member of Clubs or Organizations:      Attends Club or Organization Meetings:      Marital  Status:    Intimate Partner Violence:      Fear of Current or Ex-Partner:      Emotionally Abused:      Physically Abused:      Sexually Abused:        Current Outpatient Medications   Medication Sig Dispense Refill     metoprolol succinate ER (TOPROL-XL) 50 MG 24 hr tablet Take 0.5 tablets (25 mg) by mouth daily 180 tablet 0     aspirin (ASA) 81 MG chewable tablet Take 81 mg by mouth daily (Patient not taking: Reported on 8/23/2021)       diclofenac (VOLTAREN) 1 % topical gel Apply 2 g topically 2 times daily as needed for moderate pain (Patient not taking: Reported on 8/25/2021) 50 g 2     loratadine (CLARITIN) 10 MG tablet      not taking the asa currently    Medications and history reviewed    Physical exam:  Vitals: BP (!) 148/94   Pulse 87   Wt 83 kg (183 lb)   BMI 26.62 kg/m    BMI= Body mass index is 26.62 kg/m .    Constitutional: healthy, alert and no distress  Head: Normocephalic. No masses, lesions, tenderness or abnormalities  Cardiovascular: negative, PMI normal. No lifts, heaves, or thrills. RRR. No murmurs, clicks gallops or rub  Respiratory: negative, Percussion normal. Good diaphragmatic excursion. Lungs clear  Gastrointestinal: Abdomen soft, non-tender. BS normal. No masses, organomegaly.  Well-healed scar right lower quadrant from open appendectomy, no hernia.  : Deferred  Musculoskeletal: extremities normal- no gross deformities noted, gait normal and normal muscle tone  Skin: no suspicious lesions or rashes  Psychiatric: mentation appears normal and affect normal/bright      Imaging shows:  Personally reviewed  CT ABDOMEN AND PELVIS WITH CONTRAST  8/24/2021 7:53 AM     CLINICAL HISTORY: Abdominal mass, unspecified abdominal location.     TECHNIQUE: CT scan of the abdomen and pelvis was performed following  injection of IV contrast. Multiplanar reformats were obtained. Dose  reduction techniques were used.  CONTRAST: 100 mL Isovue-370     COMPARISON: CT abdomen and pelvis dated  11/3/2020.     FINDINGS:   LOWER CHEST: Postoperative changes of the mitral valve are again  noted. Visualized portions of the lung bases and mediastinal contents  are otherwise unremarkable.     HEPATOBILIARY: Normal.     PANCREAS: Normal.     SPLEEN: Normal.     ADRENAL GLANDS: Normal.     KIDNEYS/BLADDER: Kidneys enhance normally. No hydronephrosis,  nephrolithiasis, hydroureter or ureteral calculus is identified. There  is a small indentation of the posterior inferior aspect of the urinary  bladder due to the adjacent prostate gland. Urinary bladder is  otherwise unremarkable.     BOWEL: There are a few scattered colonic diverticula. No pericolonic  inflammatory change to suggest acute diverticulitis. Appendix is not  well seen. No pericecal inflammatory change to suggest appendicitis.  Small bowel is of normal caliber. Stomach contains a small amount of  fluid and air but is otherwise unremarkable.     PELVIC ORGANS: Prostate gland is enlarged and mildly heterogeneously  enhances. Seminal vesicles are unremarkable.     ADDITIONAL FINDINGS: Solid mass in the anterior mesentery is again  noted and has increased in size, now measuring 3.2 x 2.9 x 3.2 cm,  previously measuring 2.0 x 2.4 x 2.0 cm. No adenopathy, free fluid or  free air is seen in the peritoneal cavity. Aorta is grossly normal in  appearance. There is a tiny fat-containing left inguinal hernia.     MUSCULOSKELETAL: There are degenerative changes in the lower spine. No  aggressive osseous lesions or acute osseous fractures.                                                                      IMPRESSION:   1.  Significantly increased size of mesenteric mass now measuring up  to 3.2 cm in maximum dimension as compared to 2.4 cm in maximum  dimension on the prior study. There appears to be less architectural  distortion around this mass as compared to the prior study. Desmoid  tumor, carcinoid as well as others are in the differential. No  associated  calcifications. Tissue sampling may be necessary given the  increase in size. CT PET may also be helpful as clinically indicated.  2.  No other significant abnormalities are identified. No adenopathy  is seen.    Assessment:     ICD-10-CM    1. Mesenteric mass  K63.89 Adult Gastro Ref - Procedure Only     Kaylee-Operative Worksheet     Plan: Incidentally found and asymptomatic mesenteric mass though enlarging on most recent CT.  On review of images this is in distal small intestinal mesentery abutting the bowel and does not appear to be close to any major blood vessels.  No calcifications seen on imaging.  Differential still would include carcinoid, lymphoma, desmoid, other mass.  I do recommend proceeding with upper and lower endoscopy preoperatively to see if any other suspicious lesions able to be identified and biopsied.  Then would proceed with resection and anastomosis.  Would attempt to perform laparoscopically did discuss chance of converting to open procedure.  Would still need incision for specimen removal but if done laparoscopically he can be kept smaller than with an open procedure.  Due to bowel resection with anastomosis we will still plan to admit him after surgery to ensure bowel function.  Surgical plan discussed while going over the most recent CT images with the patient.  Questions answered.  Additional risks of wound infection, hernia, other wound healing problems, anesthesia, bleeding, injury to surrounding structures, potential need for additional resection or other procedures depending on intraoperative findings discussed as well.  Patient agreeable with the plan so we will work on scheduling the scopes and procedure    Yifan Ha MD        Again, thank you for allowing me to participate in the care of your patient.        Sincerely,        Yifan Ha MD

## 2021-08-25 NOTE — PROGRESS NOTES
Patient seen in consultation for mesenteric mass by Ivette Mtz    HPI:  Patient is a 45 year old male  with complaints of mesenteric mass seen on previous imaging  Was initially seen at the bottom of imaging done related to his mitral valve repair  Then had dedicated CT which showed a 2.4 cm mesenteric mass  Saw Dr Parsons and was initially set for resection but was cancelled with increasing covid cases then  Now repeat CT showed enlargement so wanting to reschedule resection.  Feels well. No abdominal pain, normal bowel movements, no bleeding  Patient has not family history of similar problems. No GI cancers, carcinoid, lymphoma history    Review Of Systems    Skin: negative  Ears/Nose/Throat: negative  Respiratory: No shortness of breath, dyspnea on exertion, cough, or hemoptysis  Cardiovascular: history of mitral valve repair otherwise negative  Gastrointestinal: as above  Genitourinary: negative  Musculoskeletal: negative  Neurologic: negative  Hematologic/Lymphatic/Immunologic: negative  Endocrine: negative      Past Medical History:   Diagnosis Date     Cervical spondylosis without myelopathy      Heart murmur      Osteomyelitis, L5S1 03/15/2018    secondary to endocarditis       Past Surgical History:   Procedure Laterality Date     APPENDECTOMY  1987     CHEILECTOMY Right 4/10/2019    Procedure: Right Cheilectomy;  Surgeon: Sawyer Crowell MD;  Location:  OR     CV CORONARY ANGIOGRAM N/A 9/9/2019    Procedure: CV CORONARY ANGIOGRAM;  Surgeon: Pernell Bar MD;  [facility]     CV RIGHT HEART CATH MEASUREMENTS RECORDED N/A 9/9/2019    Procedure: CV RIGHT HEART CATH;  Surgeon: Pernell Bar MD;  [facility]     INJECT SACROILIAC JOINT Right 5/9/2018    Procedure: INJECT SACROILIAC JOINT;  Right Sacroiliac Joint Injection;  Surgeon: Thom Williamson MD;  Location:  OR     ORTHOPEDIC SURGERY  4/10/19    Bone Spur Removal     REPAIR  VALVE MITRAL MINIMALLY INVASIVE N/A 10/25/2019    Procedure: MINIMALLY INVASIVE MITRAL VALVE REPAIR WITH MITRAL ANNULOPLASTY RING KEVIN-KAUR PHYSIO II SIZE: 36MM  (ON PUMP OXYGENATOR ; INTRAOPERATIVE DEEPAK BY CARDIOLOGIST DR. VARELA);  Surgeon: Rigoberto Downs MD;  Location:  OR       Social History     Socioeconomic History     Marital status:      Spouse name: Not on file     Number of children: Not on file     Years of education: Not on file     Highest education level: Not on file   Occupational History     Comment: desk work   Tobacco Use     Smoking status: Never Smoker     Smokeless tobacco: Never Used   Substance and Sexual Activity     Alcohol use: Yes     Comment: rare     Drug use: No     Sexual activity: Yes     Partners: Female     Birth control/protection: Pill   Other Topics Concern     Parent/sibling w/ CABG, MI or angioplasty before 65F 55M? No   Social History Narrative     Not on file     Social Determinants of Health     Financial Resource Strain:      Difficulty of Paying Living Expenses:    Food Insecurity:      Worried About Running Out of Food in the Last Year:      Ran Out of Food in the Last Year:    Transportation Needs:      Lack of Transportation (Medical):      Lack of Transportation (Non-Medical):    Physical Activity:      Days of Exercise per Week:      Minutes of Exercise per Session:    Stress:      Feeling of Stress :    Social Connections:      Frequency of Communication with Friends and Family:      Frequency of Social Gatherings with Friends and Family:      Attends Spiritism Services:      Active Member of Clubs or Organizations:      Attends Club or Organization Meetings:      Marital Status:    Intimate Partner Violence:      Fear of Current or Ex-Partner:      Emotionally Abused:      Physically Abused:      Sexually Abused:        Current Outpatient Medications   Medication Sig Dispense Refill     metoprolol succinate ER (TOPROL-XL) 50 MG 24 hr  tablet Take 0.5 tablets (25 mg) by mouth daily 180 tablet 0     aspirin (ASA) 81 MG chewable tablet Take 81 mg by mouth daily (Patient not taking: Reported on 8/23/2021)       diclofenac (VOLTAREN) 1 % topical gel Apply 2 g topically 2 times daily as needed for moderate pain (Patient not taking: Reported on 8/25/2021) 50 g 2     loratadine (CLARITIN) 10 MG tablet      not taking the asa currently    Medications and history reviewed    Physical exam:  Vitals: BP (!) 148/94   Pulse 87   Wt 83 kg (183 lb)   BMI 26.62 kg/m    BMI= Body mass index is 26.62 kg/m .    Constitutional: healthy, alert and no distress  Head: Normocephalic. No masses, lesions, tenderness or abnormalities  Cardiovascular: negative, PMI normal. No lifts, heaves, or thrills. RRR. No murmurs, clicks gallops or rub  Respiratory: negative, Percussion normal. Good diaphragmatic excursion. Lungs clear  Gastrointestinal: Abdomen soft, non-tender. BS normal. No masses, organomegaly.  Well-healed scar right lower quadrant from open appendectomy, no hernia.  : Deferred  Musculoskeletal: extremities normal- no gross deformities noted, gait normal and normal muscle tone  Skin: no suspicious lesions or rashes  Psychiatric: mentation appears normal and affect normal/bright      Imaging shows:  Personally reviewed  CT ABDOMEN AND PELVIS WITH CONTRAST  8/24/2021 7:53 AM     CLINICAL HISTORY: Abdominal mass, unspecified abdominal location.     TECHNIQUE: CT scan of the abdomen and pelvis was performed following  injection of IV contrast. Multiplanar reformats were obtained. Dose  reduction techniques were used.  CONTRAST: 100 mL Isovue-370     COMPARISON: CT abdomen and pelvis dated 11/3/2020.     FINDINGS:   LOWER CHEST: Postoperative changes of the mitral valve are again  noted. Visualized portions of the lung bases and mediastinal contents  are otherwise unremarkable.     HEPATOBILIARY: Normal.     PANCREAS: Normal.     SPLEEN: Normal.     ADRENAL GLANDS:  Normal.     KIDNEYS/BLADDER: Kidneys enhance normally. No hydronephrosis,  nephrolithiasis, hydroureter or ureteral calculus is identified. There  is a small indentation of the posterior inferior aspect of the urinary  bladder due to the adjacent prostate gland. Urinary bladder is  otherwise unremarkable.     BOWEL: There are a few scattered colonic diverticula. No pericolonic  inflammatory change to suggest acute diverticulitis. Appendix is not  well seen. No pericecal inflammatory change to suggest appendicitis.  Small bowel is of normal caliber. Stomach contains a small amount of  fluid and air but is otherwise unremarkable.     PELVIC ORGANS: Prostate gland is enlarged and mildly heterogeneously  enhances. Seminal vesicles are unremarkable.     ADDITIONAL FINDINGS: Solid mass in the anterior mesentery is again  noted and has increased in size, now measuring 3.2 x 2.9 x 3.2 cm,  previously measuring 2.0 x 2.4 x 2.0 cm. No adenopathy, free fluid or  free air is seen in the peritoneal cavity. Aorta is grossly normal in  appearance. There is a tiny fat-containing left inguinal hernia.     MUSCULOSKELETAL: There are degenerative changes in the lower spine. No  aggressive osseous lesions or acute osseous fractures.                                                                      IMPRESSION:   1.  Significantly increased size of mesenteric mass now measuring up  to 3.2 cm in maximum dimension as compared to 2.4 cm in maximum  dimension on the prior study. There appears to be less architectural  distortion around this mass as compared to the prior study. Desmoid  tumor, carcinoid as well as others are in the differential. No  associated calcifications. Tissue sampling may be necessary given the  increase in size. CT PET may also be helpful as clinically indicated.  2.  No other significant abnormalities are identified. No adenopathy  is seen.    Assessment:     ICD-10-CM    1. Mesenteric mass  K63.89 Adult Gastro  Ref - Procedure Only     Kaylee-Operative Worksheet     Plan: Incidentally found and asymptomatic mesenteric mass though enlarging on most recent CT.  On review of images this is in distal small intestinal mesentery abutting the bowel and does not appear to be close to any major blood vessels.  No calcifications seen on imaging.  Differential still would include carcinoid, lymphoma, desmoid, other mass.  I do recommend proceeding with upper and lower endoscopy preoperatively to see if any other suspicious lesions able to be identified and biopsied.  Then would proceed with resection and anastomosis.  Would attempt to perform laparoscopically did discuss chance of converting to open procedure.  Would still need incision for specimen removal but if done laparoscopically he can be kept smaller than with an open procedure.  Due to bowel resection with anastomosis we will still plan to admit him after surgery to ensure bowel function.  Surgical plan discussed while going over the most recent CT images with the patient.  Questions answered.  Additional risks of wound infection, hernia, other wound healing problems, anesthesia, bleeding, injury to surrounding structures, potential need for additional resection or other procedures depending on intraoperative findings discussed as well.  Patient agreeable with the plan so we will work on scheduling the scopes and procedure    Yifan Ha MD

## 2021-08-26 ENCOUNTER — TELEPHONE (OUTPATIENT)
Dept: SURGERY | Facility: CLINIC | Age: 46
End: 2021-08-26

## 2021-08-26 NOTE — TELEPHONE ENCOUNTER
Called patient to schedule surgery for 9-9-21 at [facility] as it is a Tier 2 but he needs a scope completed before the surgery. Due to needing a  he cannot do the Surgery on 9-8 because he has no  until after that. He will call to set up his EGD and Colonoscopy and call us back so we can schedule around that date.

## 2021-08-31 ENCOUNTER — HOSPITAL ENCOUNTER (OUTPATIENT)
Facility: AMBULATORY SURGERY CENTER | Age: 46
End: 2021-08-31
Attending: INTERNAL MEDICINE
Payer: COMMERCIAL

## 2021-08-31 ENCOUNTER — TELEPHONE (OUTPATIENT)
Dept: GASTROENTEROLOGY | Facility: OUTPATIENT CENTER | Age: 46
End: 2021-08-31

## 2021-08-31 NOTE — TELEPHONE ENCOUNTER
"Screening Questions  1. Are you active on mychart?y    2. What insurance is in the chart?     2.  Ordering/Referring Provider:     3. BMI : 5'10\"/183=26.3    4. Are you on daily home oxygen? n    5. Do you have a history of difficult airway? n    6. Have you had a heart, lung, or liver transplant? n    7. Are you currently on dialysis? n    8. Have you had a stroke or Transient ischemic atttack (TIA) within 6 months? n    9. In the past 6 months, have you had any heart related issues including cardiomyopathy or heart attack?         If yes, did it require cardiac stenting or other implantable device?n    10. Do you have any implantable devices in your body (pacemaker, defib, LVAD)? Mitrovalve repair 2019-ring to clean up valve itself    11. Do you take nitroglycerin? If yes, how often? N    12. Are you currently taking any blood thinners?N    13. Are you a diabetic?N     14. (Females) Are you currently pregnant? N/A  If yes, how many weeks?    15. Have you had a procedure in the past that was difficult to tolerate with conscious sedation? Any allergies to Fentanyl or Versed N    16. Are you taking any scheduled prescription narcotics more than once daily? N    17. Do you have any chemical dependencies such as alcohol, street drugs, or methadone? N    18. Do you have any history of post-traumatic stress syndrome or mental health issues? N    19. Do you transfer independently? Y    20.  Do you have any issues with constipation? : N    21. Preferred Pharmacy for Pre Prescription [facility] [address] [phone]    Scheduling Details    Which Colonoscopy Prep was Sent?: MIRALAX  Procedure Scheduled: EGD/COLON-CS  Provider/Surgeon: LEATHA  Date of Procedure: 10/22  [place] [facility] [place]  Caller (Please ask for phone number if not scheduled by patient): PATIENT      Sedation Type: CS  Conscious Sedation- Needs  for 6 hours after the procedure  MAC/General-Needs  for " 24 hours after procedure    Pre-op Required at [facility], [place], [place] and OR for MAC sedation:   (if yes advise patient they will need a pre-op prior to procedure)      Is patient on blood thinners? -N (If yes- inform patient to follow up with PCP or provider for follow up instructions)     Informed patient they will need an adult  Y  Cannot take any type of public or medical transportation alone    Informed Patient of COVID Test Requirement Y    Confirmed Nurse will call to complete assessment Y    Additional comments:

## 2021-09-18 ENCOUNTER — HEALTH MAINTENANCE LETTER (OUTPATIENT)
Age: 46
End: 2021-09-18

## 2021-09-28 DIAGNOSIS — Z11.59 ENCOUNTER FOR SCREENING FOR OTHER VIRAL DISEASES: ICD-10-CM

## 2021-10-01 ENCOUNTER — OFFICE VISIT (OUTPATIENT)
Dept: FAMILY MEDICINE | Facility: CLINIC | Age: 46
End: 2021-10-01
Payer: COMMERCIAL

## 2021-10-01 DIAGNOSIS — Z23 NEED FOR PROPHYLACTIC VACCINATION AND INOCULATION AGAINST INFLUENZA: Primary | ICD-10-CM

## 2021-10-01 PROCEDURE — 99207 PR NO CHARGE NURSE ONLY: CPT

## 2021-10-01 PROCEDURE — 90686 IIV4 VACC NO PRSV 0.5 ML IM: CPT

## 2021-10-01 PROCEDURE — 90471 IMMUNIZATION ADMIN: CPT

## 2021-10-01 NOTE — PROGRESS NOTES
Prior to injection, verified patient identity using patient's name and date of birth. Due to injection administration, patient instructed to remain in clinic for 15 minutes afterwards, and to report any adverse reaction to me immediately.  Nelida Bill MA

## 2021-10-12 ENCOUNTER — TELEPHONE (OUTPATIENT)
Dept: GASTROENTEROLOGY | Facility: CLINIC | Age: 46
End: 2021-10-12

## 2021-10-12 NOTE — TELEPHONE ENCOUNTER
Attempted to contact patient regarding upcoming colonoscopy/EGD procedure on 10/22/21 for pre assessment questions. No answer.     Left message to return call to [phone] #2    Covid test scheduled: 10/18/21    Arrival time: 0815    Facility location: [facility]    Sedation type: CS    Bowel prep recommendation: Miralax/Magnesium citrate/Dulcolax       Ginny Patrick RN

## 2021-10-14 NOTE — TELEPHONE ENCOUNTER
Writer reviewed pre-assessment questions with patient prior to upcoming colonoscopy/EGD on 10.22.2021.      Covid test scheduled: 10.18.2021    Arrival time: 0800    Facility location: [facility]    Sedation type: CS; pt states he gets nauseous with fentanyl.  Pt states that anti-nausea medication has helped in the past.  RN instructed pt to inform prep nurse.    Electronic Implantable devices? No    Blood thinners/Antiplatelet medication? No    Reviewed miralax and magnesium citrate prep instructions with patient.  No fiber/iron supplements or foods that contain nuts/seeds 7 days prior to procedure.     Designated  policy reviewed with patient.     Patient verbalized understanding.  No further questions or concerns.    Sandra Hinds RN

## 2021-10-18 ENCOUNTER — TELEPHONE (OUTPATIENT)
Dept: GASTROENTEROLOGY | Facility: CLINIC | Age: 46
End: 2021-10-18

## 2021-10-18 NOTE — TELEPHONE ENCOUNTER
Caller: ALEKSANDER    Procedure: EGD/COLON    Date of Procedure Cancelled: 10/22    Ordering Provider:Yifan Ha MD    Reason for cancel: NOT FEELING WELL WANTED A DIFFERENT DAY    Any Staff messages sent regarding case?: N                  Recipient and Sender: & *                  Message Details:       Rescheduled: Y     If rescheduled:    Date: 12/03   Location: [facility] [place]   Note any change or update to original order/sedation:

## 2021-11-22 ENCOUNTER — TELEPHONE (OUTPATIENT)
Dept: GASTROENTEROLOGY | Facility: CLINIC | Age: 46
End: 2021-11-22
Payer: COMMERCIAL

## 2021-11-22 NOTE — TELEPHONE ENCOUNTER
Caller: Frederick     Procedure: Upper/Lower Endo    Date, Location, and Surgeon of Procedure Cancelled: 12/03/2021, Jozef NATH     Ordering Provider: Yifan Ha MD     Reason for cancel (please be detailed, any staff messages or encounters to note?):      Change date per pt request to Feb 2022         Rescheduled: Yes      If rescheduled:    Date: 02/18/2022   [place]: [facility] [place]    Note any change or update to original order/sedation: n/a

## 2022-01-08 ENCOUNTER — HEALTH MAINTENANCE LETTER (OUTPATIENT)
Age: 47
End: 2022-01-08

## 2022-02-04 DIAGNOSIS — Z11.59 ENCOUNTER FOR SCREENING FOR OTHER VIRAL DISEASES: Primary | ICD-10-CM

## 2022-02-11 ENCOUNTER — TELEPHONE (OUTPATIENT)
Dept: GASTROENTEROLOGY | Facility: CLINIC | Age: 47
End: 2022-02-11
Payer: COMMERCIAL

## 2022-02-11 NOTE — TELEPHONE ENCOUNTER
Attempted to contact patient regarding upcoming colonoscopy/EGD procedure on 2/18/22 for pre assessment questions. No answer.     Left message to return call to [phone] #2    Covid test scheduled: 2/14/22    Arrival time: 0645    Facility location: [facility]    Sedation type: CS    Indication for procedure: mesenteric mass    Anticoagulants: ASA 81mg    Bowel prep recommendation: Miralax/Magnesium citrate/Dulcolax     Will send mychart message to return call.    Ginny Patrick RN

## 2022-02-11 NOTE — TELEPHONE ENCOUNTER
Patient returned call.     Pre assessment questions completed for upcoming colonoscopy/EGD procedure scheduled on 2/18/22    COVID test scheduled 2/14/22    Reviewed procedural arrival time 0645 and facility location [facility].    Designated  policy reviewed. Instructed to have someone stay 6 hours post procedure.     Reviewed Miralax prep instructions with patient. No fiber/iron supplements or foods that contain nuts/seeds 7 days prior to procedure.     Anticoagulation/blood thinners? no    Electronic implanted devices? no    Patient wanting to know if these procedures are still needed as this plan was from August 2021. Advised patient to consult with ordering provider . If the plan is to not complete procedures patient will call back to cancel.     Ginny Patrick RN       There are no Wet Read(s) to document.

## 2022-02-14 ENCOUNTER — TELEPHONE (OUTPATIENT)
Dept: GASTROENTEROLOGY | Facility: CLINIC | Age: 47
End: 2022-02-14
Payer: COMMERCIAL

## 2022-02-14 NOTE — TELEPHONE ENCOUNTER
Caller: Frederick    Procedure: EGD & Colon    Date, Location, and Surgeon of Procedure Cancelled: 2/18, [facility], Jozef    Ordering Provider:Maral    Reason for cancel (please be detailed, any staff messages or encounters to note?): [facility] will be on vacation , per prior chart msg        Rescheduled: Y     If rescheduled:    Date: 4/8   Location: [facility] [place] w/ [facility]   Note any change or update to original order/sedation: YULI Wright  Covid: 4/4 in [place]  I mentioned that the referral is for [facility]. Pt said that his discussion w/ [facility] just was to get the procedures done anywhere ahead of a procedure later w/ Tra[facility] at [facility].

## 2022-02-14 NOTE — TELEPHONE ENCOUNTER
Caller: Endo Sched     Procedure: Colon & EGD    Date, Location, and Surgeon of Procedure Cancelled: 2-18/ [facility]/ Jozef    Ordering Provider:Yifan Ha MD     Reason for cancel (please be detailed, any staff messages or encounters to note?): [facility] on vacation        Rescheduled: LVM for pt to call back to reschedule

## 2022-04-04 ENCOUNTER — LAB (OUTPATIENT)
Dept: LAB | Facility: CLINIC | Age: 47
End: 2022-04-04
Payer: COMMERCIAL

## 2022-04-04 DIAGNOSIS — Z11.59 ENCOUNTER FOR SCREENING FOR OTHER VIRAL DISEASES: ICD-10-CM

## 2022-04-04 PROCEDURE — U0005 INFEC AGEN DETEC AMPLI PROBE: HCPCS

## 2022-04-04 PROCEDURE — U0003 INFECTIOUS AGENT DETECTION BY NUCLEIC ACID (DNA OR RNA); SEVERE ACUTE RESPIRATORY SYNDROME CORONAVIRUS 2 (SARS-COV-2) (CORONAVIRUS DISEASE [COVID-19]), AMPLIFIED PROBE TECHNIQUE, MAKING USE OF HIGH THROUGHPUT TECHNOLOGIES AS DESCRIBED BY CMS-2020-01-R: HCPCS

## 2022-04-04 NOTE — TELEPHONE ENCOUNTER
Procedure rescheduled.     Patient scheduled for colonoscopy/EGD  on 4/8/22.     Covid test scheduled: 4/4/22    Arrival time: 0830    Facility location: [facility]     Sedation type: CS     Indication for procedure: mesenteric mass    Anticoagulations? ASA 81mg    Bowel prep recommendation: Miralax/Magnesium citrate/Dulcolax     Pre visit planning completed.    Ginny Patrick RN

## 2022-04-04 NOTE — TELEPHONE ENCOUNTER
Attempted to contact patient for pre assessment questions. No answer.     Left message to return call to [phone] #2    Ginny Patrick RN

## 2022-04-05 LAB — SARS-COV-2 RNA RESP QL NAA+PROBE: NEGATIVE

## 2022-04-06 NOTE — TELEPHONE ENCOUNTER
Returning pt's call.    Pre assessment questions completed for upcoming colonoscopy/EGD procedure scheduled on 4.8.2022    COVID test 4.4.2022-negative    Reviewed procedural arrival time 0830 and facility location [facility].    Designated  policy reviewed. Instructed to have someone stay 6 hours post procedure.     Anticoagulation/blood thinners? no    Electronic implanted devices? no    Reviewed Miralax/Magnesium citrate/Dulcolax prep instructions with patient.     Patient verbalized understanding and had no questions or concerns at this time.    Sandra Hinds RN

## 2022-04-08 ENCOUNTER — HOSPITAL ENCOUNTER (OUTPATIENT)
Facility: AMBULATORY SURGERY CENTER | Age: 47
Discharge: HOME | End: 2022-04-08
Attending: INTERNAL MEDICINE | Admitting: INTERNAL MEDICINE
Payer: COMMERCIAL

## 2022-04-08 VITALS
SYSTOLIC BLOOD PRESSURE: 125 MMHG | RESPIRATION RATE: 16 BRPM | WEIGHT: 170 LBS | DIASTOLIC BLOOD PRESSURE: 91 MMHG | HEART RATE: 87 BPM | HEIGHT: 69 IN | TEMPERATURE: 97.6 F | OXYGEN SATURATION: 96 % | BODY MASS INDEX: 25.18 KG/M2

## 2022-04-08 LAB
COLONOSCOPY: NORMAL
UPPER GI ENDOSCOPY: NORMAL

## 2022-04-08 PROCEDURE — 45378 DIAGNOSTIC COLONOSCOPY: CPT

## 2022-04-08 PROCEDURE — 43235 EGD DIAGNOSTIC BRUSH WASH: CPT

## 2022-04-08 RX ORDER — NALOXONE HYDROCHLORIDE 0.4 MG/ML
0.4 INJECTION, SOLUTION INTRAMUSCULAR; INTRAVENOUS; SUBCUTANEOUS
Status: DISCONTINUED | OUTPATIENT
Start: 2022-04-08 | End: 2022-04-09 | Stop reason: HOSPADM

## 2022-04-08 RX ORDER — SODIUM CHLORIDE, SODIUM LACTATE, POTASSIUM CHLORIDE, CALCIUM CHLORIDE 600; 310; 30; 20 MG/100ML; MG/100ML; MG/100ML; MG/100ML
INJECTION, SOLUTION INTRAVENOUS CONTINUOUS
Status: DISCONTINUED | OUTPATIENT
Start: 2022-04-08 | End: 2022-04-09 | Stop reason: HOSPADM

## 2022-04-08 RX ORDER — SODIUM CHLORIDE, SODIUM LACTATE, POTASSIUM CHLORIDE, CALCIUM CHLORIDE 600; 310; 30; 20 MG/100ML; MG/100ML; MG/100ML; MG/100ML
INJECTION, SOLUTION INTRAVENOUS CONTINUOUS
Status: DISCONTINUED | OUTPATIENT
Start: 2022-04-08 | End: 2022-04-08 | Stop reason: HOSPADM

## 2022-04-08 RX ORDER — PROCHLORPERAZINE MALEATE 10 MG
10 TABLET ORAL EVERY 6 HOURS PRN
Status: DISCONTINUED | OUTPATIENT
Start: 2022-04-08 | End: 2022-04-09 | Stop reason: HOSPADM

## 2022-04-08 RX ORDER — NALOXONE HYDROCHLORIDE 0.4 MG/ML
0.2 INJECTION, SOLUTION INTRAMUSCULAR; INTRAVENOUS; SUBCUTANEOUS
Status: DISCONTINUED | OUTPATIENT
Start: 2022-04-08 | End: 2022-04-09 | Stop reason: HOSPADM

## 2022-04-08 RX ORDER — ONDANSETRON 2 MG/ML
4 INJECTION INTRAMUSCULAR; INTRAVENOUS
Status: COMPLETED | OUTPATIENT
Start: 2022-04-08 | End: 2022-04-08

## 2022-04-08 RX ORDER — ONDANSETRON 4 MG/1
4 TABLET, ORALLY DISINTEGRATING ORAL EVERY 6 HOURS PRN
Status: DISCONTINUED | OUTPATIENT
Start: 2022-04-08 | End: 2022-04-09 | Stop reason: HOSPADM

## 2022-04-08 RX ORDER — FENTANYL CITRATE 50 UG/ML
25 INJECTION, SOLUTION INTRAMUSCULAR; INTRAVENOUS EVERY 5 MIN PRN
Status: DISCONTINUED | OUTPATIENT
Start: 2022-04-08 | End: 2022-04-08 | Stop reason: HOSPADM

## 2022-04-08 RX ORDER — ONDANSETRON 2 MG/ML
4 INJECTION INTRAMUSCULAR; INTRAVENOUS EVERY 6 HOURS PRN
Status: DISCONTINUED | OUTPATIENT
Start: 2022-04-08 | End: 2022-04-09 | Stop reason: HOSPADM

## 2022-04-08 RX ORDER — LIDOCAINE 40 MG/G
CREAM TOPICAL
Status: DISCONTINUED | OUTPATIENT
Start: 2022-04-08 | End: 2022-04-08 | Stop reason: HOSPADM

## 2022-04-08 RX ORDER — FLUMAZENIL 0.1 MG/ML
0.2 INJECTION, SOLUTION INTRAVENOUS
Status: DISCONTINUED | OUTPATIENT
Start: 2022-04-08 | End: 2022-04-09 | Stop reason: HOSPADM

## 2022-04-08 RX ORDER — HYDROMORPHONE HYDROCHLORIDE 1 MG/ML
0.2 INJECTION, SOLUTION INTRAMUSCULAR; INTRAVENOUS; SUBCUTANEOUS EVERY 5 MIN PRN
Status: DISCONTINUED | OUTPATIENT
Start: 2022-04-08 | End: 2022-04-08 | Stop reason: HOSPADM

## 2022-04-08 RX ORDER — ONDANSETRON 4 MG/1
4 TABLET, ORALLY DISINTEGRATING ORAL EVERY 30 MIN PRN
Status: DISCONTINUED | OUTPATIENT
Start: 2022-04-08 | End: 2022-04-09 | Stop reason: HOSPADM

## 2022-04-08 RX ORDER — FENTANYL CITRATE 50 UG/ML
25 INJECTION, SOLUTION INTRAMUSCULAR; INTRAVENOUS
Status: DISCONTINUED | OUTPATIENT
Start: 2022-04-08 | End: 2022-04-09 | Stop reason: HOSPADM

## 2022-04-08 RX ORDER — ONDANSETRON 2 MG/ML
4 INJECTION INTRAMUSCULAR; INTRAVENOUS EVERY 30 MIN PRN
Status: DISCONTINUED | OUTPATIENT
Start: 2022-04-08 | End: 2022-04-09 | Stop reason: HOSPADM

## 2022-04-08 RX ORDER — MEPERIDINE HYDROCHLORIDE 25 MG/ML
12.5 INJECTION INTRAMUSCULAR; INTRAVENOUS; SUBCUTANEOUS
Status: DISCONTINUED | OUTPATIENT
Start: 2022-04-08 | End: 2022-04-09 | Stop reason: HOSPADM

## 2022-04-08 RX ORDER — DIPHENHYDRAMINE HYDROCHLORIDE 50 MG/ML
INJECTION INTRAMUSCULAR; INTRAVENOUS PRN
Status: DISCONTINUED | OUTPATIENT
Start: 2022-04-08 | End: 2022-04-08 | Stop reason: HOSPADM

## 2022-04-08 RX ORDER — OXYCODONE HYDROCHLORIDE 5 MG/1
5 TABLET ORAL EVERY 4 HOURS PRN
Status: DISCONTINUED | OUTPATIENT
Start: 2022-04-08 | End: 2022-04-09 | Stop reason: HOSPADM

## 2022-04-08 RX ORDER — FENTANYL CITRATE 50 UG/ML
INJECTION, SOLUTION INTRAMUSCULAR; INTRAVENOUS PRN
Status: DISCONTINUED | OUTPATIENT
Start: 2022-04-08 | End: 2022-04-08 | Stop reason: HOSPADM

## 2022-04-08 RX ADMIN — SODIUM CHLORIDE, SODIUM LACTATE, POTASSIUM CHLORIDE, CALCIUM CHLORIDE: 600; 310; 30; 20 INJECTION, SOLUTION INTRAVENOUS at 08:43

## 2022-04-08 RX ADMIN — ONDANSETRON 4 MG: 2 INJECTION INTRAMUSCULAR; INTRAVENOUS at 08:42

## 2022-04-08 NOTE — H&P
Frederick Nguyen  [id number]  male  46 year old      Reason for procedure/surgery: pre-op evaluation, mesenteric mass    Patient Active Problem List   Diagnosis     Cervical spondylosis without myelopathy     Cervicalgia     Duane syndrome of left eye     Nonallopathic lesion of lumbar region     Spasm of muscle     Nonallopathic lesion of thoracic region     Bacteremia     Infective endocarditis     Infection by Streptococcus, viridans group     Hallux limitus of right foot     Abnormal findings on diagnostic imaging of heart/coronary circulation     Status post coronary angiogram     H/O mitral valve repair     Fluid overload     Transient hyperglycemia post procedure     Desmoid tumor       Past Surgical History:    Past Surgical History:   Procedure Laterality Date     APPENDECTOMY  1987     CHEILECTOMY Right 4/10/2019    Procedure: Right Cheilectomy;  Surgeon: Sawyer Crowell MD;  Location:  OR     CV CORONARY ANGIOGRAM N/A 9/9/2019    Procedure: CV CORONARY ANGIOGRAM;  Surgeon: Pernell Bar MD;  [facility]  [facility]     CV RIGHT HEART CATH MEASUREMENTS RECORDED N/A 9/9/2019    Procedure: CV RIGHT HEART CATH;  Surgeon: Pernell Bar MD;  Location:  [facility]     INJECT SACROILIAC JOINT Right 5/9/2018    Procedure: INJECT SACROILIAC JOINT;  Right Sacroiliac Joint Injection;  Surgeon: Thom Williamson MD;  Location:  OR     ORTHOPEDIC SURGERY  4/10/19    Bone Spur Removal     REPAIR VALVE MITRAL MINIMALLY INVASIVE N/A 10/25/2019    Procedure: MINIMALLY INVASIVE MITRAL VALVE REPAIR WITH MITRAL ANNULOPLASTY RING KEVIN-KAUR PHYSIO II SIZE: 36MM  (ON PUMP OXYGENATOR ; INTRAOPERATIVE DEEPAK BY CARDIOLOGIST DR. VARELA);  Surgeon: Rigoberto Downs MD;  Location:  OR       Past Medical History:   Past Medical History:   Diagnosis Date     Cervical spondylosis without myelopathy      Heart murmur      Osteomyelitis, L5S1 03/15/2018    secondary to  "endocarditis       Social History:   Social History     Tobacco Use     Smoking status: Never Smoker     Smokeless tobacco: Never Used   Substance Use Topics     Alcohol use: Yes     Comment: rare       Family History:   Family History   Problem Relation Age of Onset     Glaucoma No family hx of      Macular Degeneration No family hx of        Allergies: No Known Allergies    Active Medications:   Current Outpatient Medications   Medication Sig Dispense Refill     metoprolol succinate ER (TOPROL-XL) 50 MG 24 hr tablet Take 0.5 tablets (25 mg) by mouth daily 180 tablet 0     aspirin (ASA) 81 MG chewable tablet Take 81 mg by mouth daily (Patient not taking: Reported on 8/23/2021)       diclofenac (VOLTAREN) 1 % topical gel Apply 2 g topically 2 times daily as needed for moderate pain (Patient not taking: Reported on 8/25/2021) 50 g 2     loratadine (CLARITIN) 10 MG tablet          Systemic Review:   CONSTITUTIONAL: NEGATIVE for fever, chills, change in weight  ENT/MOUTH: NEGATIVE for ear, mouth and throat problems  RESP: NEGATIVE for significant cough or SOB  CV: NEGATIVE for chest pain, palpitations or peripheral edema    Physical Examination:   Vital Signs: /85 (BP Location: Right arm)   Pulse 100   Temp 97.8  F (36.6  C) (Temporal)   Resp 18   Ht 1.753 m (5' 9\")   Wt 77.1 kg (170 lb)   SpO2 99%   BMI 25.10 kg/m    GENERAL: healthy, alert and no distress  NECK: no adenopathy, no asymmetry, masses, or scars  RESP: lungs clear to auscultation - no rales, rhonchi or wheezes  CV: regular rate and rhythm, normal S1 S2, no S3 or S4, no murmur, click or rub, no peripheral edema and peripheral pulses strong  ABDOMEN: soft, nontender, no hepatosplenomegaly, no masses and bowel sounds normal  MS: no gross musculoskeletal defects noted, no edema    Plan: Appropriate to proceed as scheduled.      Rufus Haskins MD  4/8/2022    PCP:  Ivette Mtz"

## 2022-04-26 ENCOUNTER — OFFICE VISIT (OUTPATIENT)
Dept: FAMILY MEDICINE | Facility: CLINIC | Age: 47
End: 2022-04-26
Payer: COMMERCIAL

## 2022-04-26 VITALS
TEMPERATURE: 98.3 F | SYSTOLIC BLOOD PRESSURE: 126 MMHG | WEIGHT: 181 LBS | HEART RATE: 87 BPM | OXYGEN SATURATION: 98 % | BODY MASS INDEX: 26.73 KG/M2 | DIASTOLIC BLOOD PRESSURE: 98 MMHG

## 2022-04-26 DIAGNOSIS — J40 BRONCHITIS: Primary | ICD-10-CM

## 2022-04-26 PROCEDURE — 99214 OFFICE O/P EST MOD 30 MIN: CPT | Performed by: FAMILY MEDICINE

## 2022-04-26 RX ORDER — CODEINE PHOSPHATE AND GUAIFENESIN 10; 100 MG/5ML; MG/5ML
1-2 SOLUTION ORAL EVERY 6 HOURS PRN
Qty: 120 ML | Refills: 0 | Status: SHIPPED | OUTPATIENT
Start: 2022-04-26 | End: 2022-09-13

## 2022-04-26 RX ORDER — AZITHROMYCIN 250 MG/1
TABLET, FILM COATED ORAL
Qty: 6 TABLET | Refills: 0 | Status: SHIPPED | OUTPATIENT
Start: 2022-04-26 | End: 2022-05-01

## 2022-04-26 ASSESSMENT — ENCOUNTER SYMPTOMS: COUGH: 1

## 2022-04-26 NOTE — PROGRESS NOTES
Assessment & Plan     ICD-10-CM    1. Bronchitis  J40 azithromycin (ZITHROMAX) 250 MG tablet     XR Chest 2 Views     guaiFENesin-codeine (ROBAFEN AC) 100-10 MG/5ML solution     This likely started as a viral URI but I suspect has some secondary bacterial infection now  We will go ahead and check a chest x-ray, but start empiric treatment with a Z-Joshua  I will also prescribe some Robitussin-AC to help his nighttime cough      Return in about 2 weeks (around 5/10/2022) for a recheck if symptoms worsen or fail to improve.    Agapito Wilks MD  [facility] FLORI Mack is a 46 year old who presents for the following health issues     Cough    History of Present Illness       Reason for visit:  A cough that has persisted since 3/26/2022; all other cold symptoms have not been present since 4/3/2022. [place] [facility] [place] [facility] prescribed Benzonatate on 4/18/2022 and suggested a chest X-Ray if cough  Symptom onset:  3-4 weeks ago  Symptoms include:  Cough  Symptom intensity:  Moderate  Symptom progression:  Staying the same  Had these symptoms before:  No  What makes it worse:  N/A  What makes it better:  N/A    He eats 2-3 servings of fruits and vegetables daily.He consumes 0 sweetened beverage(s) daily.He exercises with enough effort to increase his heart rate 20 to 29 minutes per day.  He exercises with enough effort to increase his heart rate 6 days per week.   He is taking medications regularly.     He started with regular cold symptoms at the end of March.  Most of his cold symptoms went away, but the cough is persisted.  He had a negative COVID PCR test on April 4 through our clinic.  He also had a negative home COVID antigen test last week.  He is coughing up some thick sputum that is not discolored.  He also has nasal drainage of thick mucus that is not discolored.  He has had no fever.  A number of years ago he had similar  symptoms which progressed and eventually he developed bacteremia and was hospitalized for 4 days with that.  The cough is quite persistent throughout the day and is also keeping him awake at night.    Patient Active Problem List   Diagnosis     Cervical spondylosis without myelopathy     Cervicalgia     Duane syndrome of left eye     Nonallopathic lesion of lumbar region     Spasm of muscle     Nonallopathic lesion of thoracic region     Bacteremia     Infective endocarditis     Infection by Streptococcus, viridans group     Hallux limitus of right foot     Abnormal findings on diagnostic imaging of heart/coronary circulation     Status post coronary angiogram     H/O mitral valve repair     Fluid overload     Transient hyperglycemia post procedure     Desmoid tumor     Current Outpatient Medications   Medication             metoprolol succinate ER (TOPROL-XL) 50 MG 24 hr tablet     aspirin (ASA) 81 MG chewable tablet     diclofenac (VOLTAREN) 1 % topical gel     loratadine (CLARITIN) 10 MG tablet     No current facility-administered medications for this visit.     Facility-Administered Medications Ordered in Other Visits   Medication     sodium chloride (PF) 0.9% PF flush 10 mL         Review of Systems   Respiratory: Positive for cough.             Objective    BP (!) 126/98 (BP Location: Right arm, Patient Position: Sitting, Cuff Size: Adult Regular)   Pulse 87   Temp 98.3  F (36.8  C) (Oral)   Wt 82.1 kg (181 lb)   SpO2 98%   BMI 26.73 kg/m    Body mass index is 26.73 kg/m .  Physical Exam   GENERAL: healthy, alert and no distress, but he is coughing intermittently throughout the visit  EYES: Eyes grossly normal to inspection, PERRL and conjunctivae and sclerae normal  HENT: ear canals and TM's normal, nose and mouth without ulcers or lesions.  Nares are mildly congested.  NECK: no adenopathy, no asymmetry, masses, or scars and thyroid normal to palpation  RESP: Some scattered crackles in the lungs, but no  wheezes

## 2022-09-06 ASSESSMENT — ENCOUNTER SYMPTOMS
NAUSEA: 0
SORE THROAT: 0
CHILLS: 0
DIZZINESS: 0
PALPITATIONS: 0
DYSURIA: 0
PARESTHESIAS: 0
ABDOMINAL PAIN: 0
NERVOUS/ANXIOUS: 0
HEMATURIA: 0
CONSTIPATION: 0
HEMATOCHEZIA: 0
WEAKNESS: 0
FREQUENCY: 0
COUGH: 0
ARTHRALGIAS: 0
HEADACHES: 0
FEVER: 0
HEARTBURN: 0
MYALGIAS: 0
DIARRHEA: 0
EYE PAIN: 0
JOINT SWELLING: 0
SHORTNESS OF BREATH: 0

## 2022-09-13 ENCOUNTER — OFFICE VISIT (OUTPATIENT)
Dept: FAMILY MEDICINE | Facility: CLINIC | Age: 47
End: 2022-09-13
Payer: COMMERCIAL

## 2022-09-13 VITALS
HEART RATE: 98 BPM | RESPIRATION RATE: 18 BRPM | SYSTOLIC BLOOD PRESSURE: 130 MMHG | OXYGEN SATURATION: 94 % | TEMPERATURE: 98.6 F | WEIGHT: 184 LBS | HEIGHT: 70 IN | DIASTOLIC BLOOD PRESSURE: 78 MMHG | BODY MASS INDEX: 26.34 KG/M2

## 2022-09-13 DIAGNOSIS — Z00.00 ROUTINE GENERAL MEDICAL EXAMINATION AT A HEALTH CARE FACILITY: Primary | ICD-10-CM

## 2022-09-13 PROCEDURE — 99396 PREV VISIT EST AGE 40-64: CPT | Mod: 25 | Performed by: STUDENT IN AN ORGANIZED HEALTH CARE EDUCATION/TRAINING PROGRAM

## 2022-09-13 PROCEDURE — 90686 IIV4 VACC NO PRSV 0.5 ML IM: CPT | Performed by: STUDENT IN AN ORGANIZED HEALTH CARE EDUCATION/TRAINING PROGRAM

## 2022-09-13 PROCEDURE — 90471 IMMUNIZATION ADMIN: CPT | Performed by: STUDENT IN AN ORGANIZED HEALTH CARE EDUCATION/TRAINING PROGRAM

## 2022-09-13 ASSESSMENT — ENCOUNTER SYMPTOMS
PARESTHESIAS: 0
ABDOMINAL PAIN: 0
HEMATOCHEZIA: 0
FREQUENCY: 0
WEAKNESS: 0
MYALGIAS: 0
JOINT SWELLING: 0
FEVER: 0
CHILLS: 0
DYSURIA: 0
CONSTIPATION: 0
NAUSEA: 0
HEARTBURN: 0
HEADACHES: 0
NERVOUS/ANXIOUS: 0
DIARRHEA: 0
EYE PAIN: 0
SHORTNESS OF BREATH: 0
ARTHRALGIAS: 0
DIZZINESS: 0
HEMATURIA: 0
PALPITATIONS: 0
COUGH: 0
SORE THROAT: 0

## 2022-09-13 ASSESSMENT — PAIN SCALES - GENERAL: PAINLEVEL: NO PAIN (0)

## 2022-09-13 NOTE — PROGRESS NOTES
SUBJECTIVE:   CC: Frederick is an 47 year old who presents for preventative health visit.       Patient has been advised of split billing requirements and indicates understanding: Yes  Healthy Habits:     Getting at least 3 servings of Calcium per day:  Yes    Bi-annual eye exam:  Yes    Dental care twice a year:  Yes    Sleep apnea or symptoms of sleep apnea:  None    Diet:  Regular (no restrictions)    Frequency of exercise:  4-5 days/week    Duration of exercise:  30-45 minutes    Taking medications regularly:  Yes    Medication side effects:  Not applicable    PHQ-2 Total Score: 0    Additional concerns today:  No            Today's PHQ-2 Score:   PHQ-2 ( 1999 Pfizer) 9/6/2022   Q1: Little interest or pleasure in doing things 0   Q2: Feeling down, depressed or hopeless 0   PHQ-2 Score 0   PHQ-2 Total Score (12-17 Years)- Positive if 3 or more points; Administer PHQ-A if positive -   Q1: Little interest or pleasure in doing things Not at all   Q2: Feeling down, depressed or hopeless Not at all   PHQ-2 Score 0       Abuse: Current or Past(Physical, Sexual or Emotional)- No  Do you feel safe in your environment? Yes        Social History     Tobacco Use     Smoking status: Never Smoker     Smokeless tobacco: Never Used   Substance Use Topics     Alcohol use: Yes     Comment: rare     If you drink alcohol do you typically have >3 drinks per day or >7 drinks per week? No    Alcohol Use 9/13/2022   Prescreen: >3 drinks/day or >7 drinks/week? -   Prescreen: >3 drinks/day or >7 drinks/week? No       Last PSA: No results found for: PSA    Reviewed orders with patient. Reviewed health maintenance and updated orders accordingly - Yes  Lab work is in process    Reviewed and updated as needed this visit by clinical staff   Tobacco  Allergies  Meds  Problems  Med Hx  Surg Hx  Fam Hx  Soc   Hx          Reviewed and updated as needed this visit by Provider   Tobacco  Allergies  Meds  Problems  Med Hx  Surg Hx  Fam  Hx           Past Medical History:   Diagnosis Date     Cervical spondylosis without myelopathy      Heart murmur      Osteomyelitis, L5S1 03/15/2018    secondary to endocarditis      Past Surgical History:   Procedure Laterality Date     APPENDECTOMY  1987     CHEILECTOMY Right 4/10/2019    Procedure: Right Cheilectomy;  Surgeon: Sawyer Crowell MD;  [facility]     COLONOSCOPY N/A 4/8/2022    Procedure: COLONOSCOPY;  Surgeon: Rufus White MD;  Location: [facility] OR     CV CORONARY ANGIOGRAM N/A 9/9/2019    Procedure: CV CORONARY ANGIOGRAM;  Surgeon: Pernell Bar MD;  Location:  HEART CARDIAC CATH LAB     CV RIGHT HEART CATH MEASUREMENTS RECORDED N/A 9/9/2019    Procedure: CV RIGHT HEART CATH;  Surgeon: Pernell Bar MD;  Location:  HEART CARDIAC CATH LAB     ESOPHAGOSCOPY, GASTROSCOPY, DUODENOSCOPY (EGD), COMBINED N/A 4/8/2022    Procedure: ESOPHAGOGASTRODUODENOSCOPY (EGD);  Surgeon: Rufus White MD;  Location: [facility] OR     INJECT SACROILIAC JOINT Right 5/9/2018    Procedure: INJECT SACROILIAC JOINT;  Right Sacroiliac Joint Injection;  Surgeon: Thom Williamson MD;  Location:  OR     ORTHOPEDIC SURGERY  4/10/19    Bone Spur Removal     REPAIR VALVE MITRAL MINIMALLY INVASIVE N/A 10/25/2019    Procedure: MINIMALLY INVASIVE MITRAL VALVE REPAIR WITH MITRAL ANNULOPLASTY RING KEVIN-KAUR PHYSIO II SIZE: 36MM  (ON PUMP OXYGENATOR ; INTRAOPERATIVE DEEPAK BY CARDIOLOGIST DR. VARELA);  Surgeon: Rigoberto Downs MD;  Location:  OR       Review of Systems   Constitutional: Negative for chills and fever.   HENT: Negative for congestion, ear pain, hearing loss and sore throat.    Eyes: Negative for pain and visual disturbance.   Respiratory: Negative for cough and shortness of breath.    Cardiovascular: Negative for chest pain, palpitations and peripheral edema.   Gastrointestinal: Negative for abdominal pain, constipation, diarrhea, heartburn, hematochezia and  "nausea.   Genitourinary: Negative for dysuria, frequency, genital sores, hematuria, impotence, penile discharge and urgency.   Musculoskeletal: Negative for arthralgias, joint swelling and myalgias.   Skin: Negative for rash.   Neurological: Negative for dizziness, weakness, headaches and paresthesias.   Psychiatric/Behavioral: Negative for mood changes. The patient is not nervous/anxious.        OBJECTIVE:   /78   Pulse 98   Temp 98.6  F (37  C) (Oral)   Resp 18   Ht 1.765 m (5' 9.5\")   Wt 83.5 kg (184 lb)   SpO2 94%   BMI 26.78 kg/m      Physical Exam  GENERAL: healthy, alert and no distress  EYES: Eyes grossly normal to inspection, PERRL and conjunctivae and sclerae normal  HENT: ear canals and TM's normal, nose and mouth without ulcers or lesions  NECK: no adenopathy, no asymmetry, masses, or scars and thyroid normal to palpation  RESP: lungs clear to auscultation - no rales, rhonchi or wheezes  CV: regular rate and rhythm, normal S1 S2, no S3 or S4,   ABDOMEN: soft, nontender, no hepatosplenomegaly, no masses and bowel sounds normal  MS: no gross musculoskeletal defects noted, no edema  SKIN: no suspicious lesions or rashes  NEURO: Normal strength and tone, mentation intact and speech normal  PSYCH: mentation appears normal, affect normal/bright        ASSESSMENT/PLAN:   1. Routine general medical examination at a health care facility    - Adult Dermatology Referral; Future      Patient has been advised of split billing requirements and indicates understanding: Yes    COUNSELING:   Reviewed preventive health counseling, as reflected in patient instructions    Estimated body mass index is 26.78 kg/m  as calculated from the following:    Height as of this encounter: 1.765 m (5' 9.5\").    Weight as of this encounter: 83.5 kg (184 lb).     Weight management plan: Discussed healthy diet and exercise guidelines    He reports that he has never smoked. He has never used smokeless tobacco.      Counseling " Resources:  ATP IV Guidelines  Pooled Cohorts Equation Calculator  FRAX Risk Assessment  ICSI Preventive Guidelines  Dietary Guidelines for Americans, 2010  USDA's MyPlate  ASA Prophylaxis  Lung CA Screening    Fanta Ma MD  [facility]

## 2023-01-23 ENCOUNTER — OFFICE VISIT (OUTPATIENT)
Dept: SURGERY | Facility: CLINIC | Age: 48
End: 2023-01-23
Payer: COMMERCIAL

## 2023-01-23 VITALS
SYSTOLIC BLOOD PRESSURE: 134 MMHG | DIASTOLIC BLOOD PRESSURE: 94 MMHG | BODY MASS INDEX: 26.34 KG/M2 | OXYGEN SATURATION: 98 % | WEIGHT: 184 LBS | HEIGHT: 70 IN | HEART RATE: 82 BPM

## 2023-01-23 DIAGNOSIS — K63.89 MESENTERIC MASS: Primary | ICD-10-CM

## 2023-01-23 PROCEDURE — 99214 OFFICE O/P EST MOD 30 MIN: CPT | Performed by: SURGERY

## 2023-01-23 NOTE — PROGRESS NOTES
Patient seen in consultation for mesenteric mass by Ivette Mtz    HPI:  Patient is a 47 year old male  Known to me for previous consultation for this problem a few years ago  We were working on getting EGD and colonoscopy completed and then was proceed to resection but due to COVID was unable to schedule as surgeries are on hold at the time.  Now wanting to get things rolling again  Scopes were completed.  Remains asymptomatic-bowels moving normally, no abdominal pains    From previous visit:  Patient is a 45 year old male  with complaints of mesenteric mass seen on previous imaging  Was initially seen at the bottom of imaging done related to his mitral valve repair  Then had dedicated CT which showed a 2.4 cm mesenteric mass  Saw Dr Parsons and was initially set for resection but was cancelled with increasing covid cases then  Now repeat CT showed enlargement so wanting to reschedule resection.  Feels well. No abdominal pain, normal bowel movements, no bleeding  Patient has not family history of similar problems. No GI cancers, carcinoid, lymphoma history    Review Of Systems    Skin: negative  Ears/Nose/Throat: negative  Respiratory: No shortness of breath, dyspnea on exertion, cough, or hemoptysis  Cardiovascular: history of mitral valve repair otherwise negative  Gastrointestinal: as above  Genitourinary: negative  Musculoskeletal: negative  Neurologic: negative  Hematologic/Lymphatic/Immunologic: negative  Endocrine: negative      Past Medical History:   Diagnosis Date     Cervical spondylosis without myelopathy      Heart murmur      Osteomyelitis, L5S1 03/15/2018    secondary to endocarditis       Past Surgical History:   Procedure Laterality Date     APPENDECTOMY  1987     CHEILECTOMY Right 4/10/2019    Procedure: Right Cheilectomy;  Surgeon: Sawyer Crowell MD;  [facility]     COLONOSCOPY N/A 4/8/2022    Procedure: COLONOSCOPY;  Surgeon: Rufus White MD;  [place] [facility] [place]      CV CORONARY ANGIOGRAM N/A 9/9/2019    Procedure: CV CORONARY ANGIOGRAM;  Surgeon: Pernell Bar MD;  Location:  HEART CARDIAC CATH LAB     CV RIGHT HEART CATH MEASUREMENTS RECORDED N/A 9/9/2019    Procedure: CV RIGHT HEART CATH;  Surgeon: Pernell Bar MD;  Location:  HEART CARDIAC CATH LAB     ESOPHAGOSCOPY, GASTROSCOPY, DUODENOSCOPY (EGD), COMBINED N/A 4/8/2022    Procedure: ESOPHAGOGASTRODUODENOSCOPY (EGD);  Surgeon: Rufus White MD;  Location: [facility] OR     INJECT SACROILIAC JOINT Right 5/9/2018    Procedure: INJECT SACROILIAC JOINT;  Right Sacroiliac Joint Injection;  Surgeon: Thom Williamson MD;  Location:  OR     ORTHOPEDIC SURGERY  4/10/19    Bone Spur Removal     REPAIR VALVE MITRAL MINIMALLY INVASIVE N/A 10/25/2019    Procedure: MINIMALLY INVASIVE MITRAL VALVE REPAIR WITH MITRAL ANNULOPLASTY RING KEVIN-KAUR PHYSIO II SIZE: 36MM  (ON PUMP OXYGENATOR ; INTRAOPERATIVE DEEPAK BY CARDIOLOGIST DR. VARELA);  Surgeon: Rigoberto Downs MD;  Location:  OR       Social History     Socioeconomic History     Marital status:      Spouse name: Not on file     Number of children: Not on file     Years of education: Not on file     Highest education level: Not on file   Occupational History     Comment: desk work   Tobacco Use     Smoking status: Never     Smokeless tobacco: Never   Vaping Use     Vaping Use: Never used   Substance and Sexual Activity     Alcohol use: Yes     Comment: rare     Drug use: No     Sexual activity: Yes     Partners: Female     Birth control/protection: Pill   Other Topics Concern     Parent/sibling w/ CABG, MI or angioplasty before 65F 55M? No   Social History Narrative     Not on file     Social Determinants of Health     Financial Resource Strain: Not on file   Food Insecurity: Not on file   Transportation Needs: Not on file   Physical Activity: Not on file   Stress: Not on file   Social Connections: Not on file   Intimate Partner Violence:  "Not on file   Housing Stability: Not on file       Current Outpatient Medications   Medication Sig Dispense Refill     metoprolol succinate ER (TOPROL-XL) 50 MG 24 hr tablet Take 0.5 tablets (25 mg) by mouth daily 180 tablet 0     diclofenac (VOLTAREN) 1 % topical gel Apply 2 g topically 2 times daily as needed for moderate pain (Patient not taking: Reported on 8/25/2021) 50 g 2       Medications and history reviewed    Physical exam:  Vitals: BP (!) 134/94 (BP Location: Right arm, Patient Position: Sitting, Cuff Size: Adult Regular)   Pulse 82   Ht 1.765 m (5' 9.5\")   Wt 83.5 kg (184 lb)   SpO2 98%   BMI 26.78 kg/m    BMI= Body mass index is 26.78 kg/m .    Constitutional: healthy, alert and no distress  Head: Normocephalic. No masses, lesions, tenderness or abnormalities  Cardiovascular: negative, PMI normal. No lifts, heaves, or thrills. RRR. No murmurs, clicks gallops or rub  Respiratory: negative, Percussion normal. Good diaphragmatic excursion. Lungs clear  Gastrointestinal: Abdomen soft, non-tender. BS normal. No masses, organomegaly  : Deferred  Musculoskeletal: extremities normal- no gross deformities noted, gait normal and normal muscle tone  Skin: no suspicious lesions or rashes  Psychiatric: mentation appears normal and affect normal/bright      Imaging shows:  4/8/22  EGD  Findings:        The examined esophagus was normal.        A large hiatal hernia was present.        The exam of the stomach was otherwise normal.        The examined duodenum was normal.     Colonoscopy  Findings:        The perianal and digital rectal examinations were normal.        Anal papilla(e) were hypertrophied.        The exam was otherwise normal throughout the examined colon.        The terminal ileum appeared normal.     Assessment:     ICD-10-CM    1. Mesenteric mass  K63.89 CT Abdomen Pelvis w Contrast        Plan: Had enlarging mesenteric mass small intestine side of the mesentery at time of last visit.  Since " has been a few years we will check new CT.  Remains asymptomatic.  Upper and lower scopes negative for any additional masses or lesions.  Plan previously was for possible laparoscopic approach to resection versus small laparotomy.  Due to bowel resection would need to be admitted after surgery.  Anticipate same plan going forward but will await CT results to make sure no major differences that may affect approach, plan or perhaps even need to [facility] if appearing more complicated.  Patient questions answered.  We will let him know once CT reviewed.    Yifan Ha MD

## 2023-01-23 NOTE — LETTER
1/23/2023         RE: Frederick Nguyen  [address] [place] [id number]        Dear Colleague,    Thank you for referring your patient, Frederick Nguyen, to the [facility]. Please see a copy of my visit note below.    Patient seen in consultation for mesenteric mass by Ivette Mtz    HPI:  Patient is a 47 year old male  Known to me for previous consultation for this problem a few years ago  We were working on getting EGD and colonoscopy completed and then was proceed to resection but due to COVID was unable to schedule as surgeries are on hold at the time.  Now wanting to get things rolling again  Scopes were completed.  Remains asymptomatic-bowels moving normally, no abdominal pains    From previous visit:  Patient is a 45 year old male  with complaints of mesenteric mass seen on previous imaging  Was initially seen at the bottom of imaging done related to his mitral valve repair  Then had dedicated CT which showed a 2.4 cm mesenteric mass  Saw Dr Parsons and was initially set for resection but was cancelled with increasing covid cases then  Now repeat CT showed enlargement so wanting to reschedule resection.  Feels well. No abdominal pain, normal bowel movements, no bleeding  Patient has not family history of similar problems. No GI cancers, carcinoid, lymphoma history    Review Of Systems    Skin: negative  Ears/Nose/Throat: negative  Respiratory: No shortness of breath, dyspnea on exertion, cough, or hemoptysis  Cardiovascular: history of mitral valve repair otherwise negative  Gastrointestinal: as above  Genitourinary: negative  Musculoskeletal: negative  Neurologic: negative  Hematologic/Lymphatic/Immunologic: negative  Endocrine: negative      Past Medical History:   Diagnosis Date     Cervical spondylosis without myelopathy      Heart murmur      Osteomyelitis, L5S1 03/15/2018    secondary to endocarditis       Past Surgical History:   Procedure  Laterality Date     APPENDECTOMY  1987     CHEILECTOMY Right 4/10/2019    Procedure: Right Cheilectomy;  Surgeon: Sawyer Crowell MD;  [facility]     COLONOSCOPY N/A 4/8/2022    Procedure: COLONOSCOPY;  Surgeon: Rufus White MD;  Location: [facility] OR     CV CORONARY ANGIOGRAM N/A 9/9/2019    Procedure: CV CORONARY ANGIOGRAM;  Surgeon: Pernell Bar MD;  Location:  HEART CARDIAC CATH LAB     CV RIGHT HEART CATH MEASUREMENTS RECORDED N/A 9/9/2019    Procedure: CV RIGHT HEART CATH;  Surgeon: Pernell Bar MD;  Location:  HEART CARDIAC CATH LAB     ESOPHAGOSCOPY, GASTROSCOPY, DUODENOSCOPY (EGD), COMBINED N/A 4/8/2022    Procedure: ESOPHAGOGASTRODUODENOSCOPY (EGD);  Surgeon: Rufus White MD;  Location: [facility] OR     INJECT SACROILIAC JOINT Right 5/9/2018    Procedure: INJECT SACROILIAC JOINT;  Right Sacroiliac Joint Injection;  Surgeon: Thom Williamson MD;  [facility]     ORTHOPEDIC SURGERY  4/10/19    Bone Spur Removal     REPAIR VALVE MITRAL MINIMALLY INVASIVE N/A 10/25/2019    Procedure: MINIMALLY INVASIVE MITRAL VALVE REPAIR WITH MITRAL ANNULOPLASTY RING KEVIN-KAUR PHYSIO II SIZE: 36MM  (ON PUMP OXYGENATOR ; INTRAOPERATIVE DEEPAK BY CARDIOLOGIST DR. VARELA);  Surgeon: Rigoberto Downs MD;  Location:  OR       Social History     Socioeconomic History     Marital status:      Spouse name: Not on file     Number of children: Not on file     Years of education: Not on file     Highest education level: Not on file   Occupational History     Comment: desk work   Tobacco Use     Smoking status: Never     Smokeless tobacco: Never   Vaping Use     Vaping Use: Never used   Substance and Sexual Activity     Alcohol use: Yes     Comment: rare     Drug use: No     Sexual activity: Yes     Partners: Female     Birth control/protection: Pill   Other Topics Concern     Parent/sibling w/ CABG, MI or angioplasty before 65F 55M? No   Social History Narrative      "Not on file     Social Determinants of Health     Financial Resource Strain: Not on file   Food Insecurity: Not on file   Transportation Needs: Not on file   Physical Activity: Not on file   Stress: Not on file   Social Connections: Not on file   Intimate Partner Violence: Not on file   Housing Stability: Not on file       Current Outpatient Medications   Medication Sig Dispense Refill     metoprolol succinate ER (TOPROL-XL) 50 MG 24 hr tablet Take 0.5 tablets (25 mg) by mouth daily 180 tablet 0     diclofenac (VOLTAREN) 1 % topical gel Apply 2 g topically 2 times daily as needed for moderate pain (Patient not taking: Reported on 8/25/2021) 50 g 2       Medications and history reviewed    Physical exam:  Vitals: BP (!) 134/94 (BP Location: Right arm, Patient Position: Sitting, Cuff Size: Adult Regular)   Pulse 82   Ht 1.765 m (5' 9.5\")   Wt 83.5 kg (184 lb)   SpO2 98%   BMI 26.78 kg/m    BMI= Body mass index is 26.78 kg/m .    Constitutional: healthy, alert and no distress  Head: Normocephalic. No masses, lesions, tenderness or abnormalities  Cardiovascular: negative, PMI normal. No lifts, heaves, or thrills. RRR. No murmurs, clicks gallops or rub  Respiratory: negative, Percussion normal. Good diaphragmatic excursion. Lungs clear  Gastrointestinal: Abdomen soft, non-tender. BS normal. No masses, organomegaly  : Deferred  Musculoskeletal: extremities normal- no gross deformities noted, gait normal and normal muscle tone  Skin: no suspicious lesions or rashes  Psychiatric: mentation appears normal and affect normal/bright      Imaging shows:  4/8/22  EGD  Findings:        The examined esophagus was normal.        A large hiatal hernia was present.        The exam of the stomach was otherwise normal.        The examined duodenum was normal.     Colonoscopy  Findings:        The perianal and digital rectal examinations were normal.        Anal papilla(e) were hypertrophied.        The exam was otherwise normal " throughout the examined colon.        The terminal ileum appeared normal.     Assessment:     ICD-10-CM    1. Mesenteric mass  K63.89 CT Abdomen Pelvis w Contrast        Plan: Had enlarging mesenteric mass small intestine side of the mesentery at time of last visit.  Since has been a few years we will check new CT.  Remains asymptomatic.  Upper and lower scopes negative for any additional masses or lesions.  Plan previously was for possible laparoscopic approach to resection versus small laparotomy.  Due to bowel resection would need to be admitted after surgery.  Anticipate same plan going forward but will await CT results to make sure no major differences that may affect approach, plan or perhaps even need to send to [place] if appearing more complicated.  Patient questions answered.  We will let him know once CT reviewed.    Yifan Ha MD        Again, thank you for allowing me to participate in the care of your patient.        Sincerely,        Yifan Ha MD

## 2023-01-26 ENCOUNTER — ANCILLARY PROCEDURE (OUTPATIENT)
Dept: CT IMAGING | Facility: CLINIC | Age: 48
End: 2023-01-26
Attending: SURGERY
Payer: COMMERCIAL

## 2023-01-26 DIAGNOSIS — K63.89 MESENTERIC MASS: ICD-10-CM

## 2023-01-26 LAB — RADIOLOGIST FLAGS: ABNORMAL

## 2023-01-26 PROCEDURE — 74177 CT ABD & PELVIS W/CONTRAST: CPT | Mod: TC | Performed by: RADIOLOGY

## 2023-01-26 RX ORDER — IOPAMIDOL 755 MG/ML
100 INJECTION, SOLUTION INTRAVASCULAR ONCE
Status: COMPLETED | OUTPATIENT
Start: 2023-01-26 | End: 2023-01-26

## 2023-01-26 RX ADMIN — IOPAMIDOL 100 ML: 755 INJECTION, SOLUTION INTRAVASCULAR at 08:30

## 2023-01-27 ENCOUNTER — PATIENT OUTREACH (OUTPATIENT)
Dept: ONCOLOGY | Facility: CLINIC | Age: 48
End: 2023-01-27
Payer: COMMERCIAL

## 2023-01-27 DIAGNOSIS — K63.89 MESENTERIC MASS: Primary | ICD-10-CM

## 2023-01-30 NOTE — TELEPHONE ENCOUNTER
New Patient Oncology Nurse Navigator Note     Referring provider: Dr. Ha     Referring Clinic/Organization: [facility]     Referred to: Surgical Oncology      Requested provider (if applicable): First available provider    Referral Received: 01/30/23       Evaluation for : Mesenteric mass      Clinical History (per Nurse review of records provided):      See book marked documents:       Referring MD office note    Pathology report    Imaging reports     Lab Results   Component Value Date/Time    ALBUMIN 3.7 10/20/2020 09:52 AM    AST 10 10/20/2020 09:52 AM    ALT 15 10/20/2020 09:52 AM    ALKPHOS 75 10/20/2020 09:52 AM    BILITOTAL 1.2 10/20/2020 09:52 AM    WBC 6.4 10/30/2020 08:39 AM          Past Medical History:   Diagnosis Date     Cervical spondylosis without myelopathy      Heart murmur      Osteomyelitis, L5S1 03/15/2018    secondary to endocarditis       Past Surgical History:   Procedure Laterality Date     APPENDECTOMY  1987     CHEILECTOMY Right 4/10/2019    Procedure: Right Cheilectomy;  Surgeon: Sawyer Crowell MD;  [facility]     COLONOSCOPY N/A 4/8/2022    Procedure: COLONOSCOPY;  Surgeon: Rufus White MD;  Location: [facility] OR     CV CORONARY ANGIOGRAM N/A 9/9/2019    Procedure: CV CORONARY ANGIOGRAM;  Surgeon: Pernell Bar MD;  [facility]     CV RIGHT HEART CATH MEASUREMENTS RECORDED N/A 9/9/2019    Procedure: CV RIGHT HEART CATH;  Surgeon: Pernell Bar MD;  [facility]     ESOPHAGOSCOPY, GASTROSCOPY, DUODENOSCOPY (EGD), COMBINED N/A 4/8/2022    Procedure: ESOPHAGOGASTRODUODENOSCOPY (EGD);  Surgeon: Rufus White MD;  Location: [facility] OR     INJECT SACROILIAC JOINT Right 5/9/2018    Procedure: INJECT SACROILIAC JOINT;  Right Sacroiliac Joint Injection;  Surgeon: Thom Williamson MD;  [facility]     ORTHOPEDIC SURGERY  4/10/19    Bone Spur Removal     REPAIR VALVE MITRAL MINIMALLY INVASIVE N/A  10/25/2019    Procedure: MINIMALLY INVASIVE MITRAL VALVE REPAIR WITH MITRAL ANNULOPLASTY RING KEVIN-KAUR PHYSIO II SIZE: 36MM  (ON PUMP OXYGENATOR ; INTRAOPERATIVE DEEPAK BY CARDIOLOGIST DR. VARELA);  Surgeon: Rigoberto Downs MD;  Location:  OR       Current Outpatient Medications   Medication Sig Dispense Refill     diclofenac (VOLTAREN) 1 % topical gel Apply 2 g topically 2 times daily as needed for moderate pain (Patient not taking: Reported on 8/25/2021) 50 g 2     metoprolol succinate ER (TOPROL-XL) 50 MG 24 hr tablet Take 0.5 tablets (25 mg) by mouth daily 180 tablet 0         No Known Allergies       Patient Active Problem List   Diagnosis     Cervical spondylosis without myelopathy     Cervicalgia     Duane syndrome of left eye     Nonallopathic lesion of lumbar region     Spasm of muscle     Nonallopathic lesion of thoracic region     Bacteremia     Infective endocarditis     Infection by Streptococcus, viridans group     Hallux limitus of right foot     Abnormal findings on diagnostic imaging of heart/coronary circulation     Status post coronary angiogram     H/O mitral valve repair     Fluid overload     Transient hyperglycemia post procedure     Desmoid tumor         Clinical Assessment / Barriers to Care (Per Nurse):    None at this time.     Records Location:     [facility]     Records Needed:     ABDOMINAL IMAGING (CT SCANS, MRI, US)  DATING BACK TO 2018      Additional testing needed prior to consult:     NONE AT THIS TIME    Referral updates and Plan:       Consult with Surgical Oncology       Vianca Gaytan RN, BSN   Surgical Oncology New Patient Nurse Navigator  [facility]  [phone]

## 2023-01-31 NOTE — CONFIDENTIAL NOTE
RECORDS STATUS - ALL OTHER DIAGNOSIS    Mesenteric mass   RECORDS RECEIVED FROM: [facility]   DATE RECEIVED: 2/7/2023   NOTES STATUS DETAILS   OFFICE NOTE from referring provider Complete [facility]   Yifan Ha MD   DISCHARGE SUMMARY from hospital     DISCHARGE REPORT from the ER     OPERATIVE REPORT Complete 4/8/2022 Colonoscopy/ Upper GI Endoscopy    MEDICATION LIST Complete [facility]   CLINICAL TRIAL TREATMENTS TO DATE     LABS     PATHOLOGY REPORTS     ANYTHING RELATED TO DIAGNOSIS Complete Labs last updated on 2/2/2022   GENONOMIC TESTING     TYPE:     IMAGING (NEED IMAGES & REPORT)     CT SCANS Complete CT Abdomen Pelvis 1/26/2023, 8/24/2021 more in PACS   MRI     MAMMO     ULTRASOUND     PET

## 2023-02-07 ENCOUNTER — ONCOLOGY VISIT (OUTPATIENT)
Dept: SURGERY | Facility: CLINIC | Age: 48
End: 2023-02-07
Attending: SURGERY
Payer: COMMERCIAL

## 2023-02-07 ENCOUNTER — PRE VISIT (OUTPATIENT)
Dept: SURGERY | Facility: CLINIC | Age: 48
End: 2023-02-07
Payer: COMMERCIAL

## 2023-02-07 VITALS
TEMPERATURE: 98.7 F | WEIGHT: 182.9 LBS | DIASTOLIC BLOOD PRESSURE: 96 MMHG | HEART RATE: 88 BPM | HEIGHT: 69 IN | SYSTOLIC BLOOD PRESSURE: 134 MMHG | RESPIRATION RATE: 16 BRPM | OXYGEN SATURATION: 98 % | BODY MASS INDEX: 27.09 KG/M2

## 2023-02-07 DIAGNOSIS — K63.89 MESENTERIC MASS: ICD-10-CM

## 2023-02-07 PROCEDURE — G0463 HOSPITAL OUTPT CLINIC VISIT: HCPCS

## 2023-02-07 PROCEDURE — 99214 OFFICE O/P EST MOD 30 MIN: CPT | Performed by: SURGERY

## 2023-02-07 PROCEDURE — G0463 HOSPITAL OUTPT CLINIC VISIT: HCPCS | Performed by: SURGERY

## 2023-02-07 ASSESSMENT — PAIN SCALES - GENERAL: PAINLEVEL: NO PAIN (0)

## 2023-02-07 NOTE — PROGRESS NOTES
"Surgical Oncology - New Patient  2/7/2023    47 M w/ a large mesenteric mass of indeterminate etiology.  He has a history of mitral valve repair for prolapse in 2019 and the mass was incidentally discovered on imaging done for that procedure.  The patient was originally set to undergo surgical resection, but this was ultimately cancelled due to COVID.  This was followed up on abdominal imaging in 2020 and 2021, during which time it was relatively stable at around 2-3 cm in size.  Repeat CTAP in 2023 showed the mass had grown to nearly 8-9 cm.  Carcinoid, desmoid, and other neoplasms were listed in the differential.  The patient now presents for recommendations on management.    Of Note: The patient is relatively healthy and his heart is functioning well.  He does not take blood thinning medications.  He had an appendectomy as a child.  He is active an independent.  He is asymptomatic from the mass and has no B symptoms.  No obstructive symptoms.    BP (!) 134/96 (BP Location: Right arm, Patient Position: Sitting, Cuff Size: Adult Regular)   Pulse 88   Temp 98.7  F (37.1  C) (Oral)   Resp 16   Ht 1.765 m (5' 9.49\")   Wt 83 kg (182 lb 14.4 oz)   SpO2 98%   BMI 26.63 kg/m      CTAP (1/26/2023): IMPRESSION:    1. Significant interval enlargement of a rounded solid and mildly heterogeneous mass within the mesentery now localizing to the right abdomen. Currently it has a maximal size of 7.8 cm, previously 3.2 cm. As such, this should be considered malignancy until proven otherwise. Carcinoid, versus desmoid lesion, or other neoplasm remains in the differential. Recommend further oncologic workup and surgical consultation. Percutaneous biopsy and/or PET/CT could provide additional assessment if clinically relevant. If no intervention is to be performed, recommend surveillance CT in 6 months.    Assessment/Plan:  47 M w/ an asymptomatic, large (8-9 cm) indeterminate mesenteric mass that has grown significantly in the " past 2 years.  I suspect malignancy until proven otherwise.  As such, he should proceed to complete staging with a CT chest w/o contrast.  We discussed the situation in detail and the fact that the differential is brood.  However, given the appearance, growth rate, location that appears to be emanating from the small bowel, and the fact that it is mobile and has moved from the left to the right side of the abdomen, I am concerned that this lesion actually represents a GIST.  We discussed the scenario that this is a GIST.  In that case, in lesions above 2 cm, behavior can be difficult to predict, but with the size and growth of his, I am certainly concerned that the lesion may be more aggressive.  We discussed the pros and cons of trying to obtain a biopsy, but ultimately, I do not think this would clearly alter the recommendation for surgery.  Biopsy of lesions such as this can also be tricky and there is potential risk of bowel injury or tumor rupture and seeding the abdominal cavity.  For these reasons, I recommend surgical resection with diagnostic laparoscopic and hand-assisted versus open resection of the mass and possible bowel resection.  This would provide likely definitive diagnosis and management.  The patient prefers a surgical approach over biopsy or surveillance.  We discussed risks including but not limited to death, bleeding/need for transfusion, infection/abscess, wound complications, hernia, bowel obstruction, COVID-19, pneumonia, UTI, cardiac events, stroke, renal failure, CO2 embolus, DVT/PE, damage to surrounding structures, ileus, anastomotic leak, anastomotic stricture, tumor rupture, and need for further procedures/medications to manage complications.  The patient seen two prior surgeons who have given him essentially the identical recommendation and he would like to proceed at this time.  We will obtain the chest CT and set him up for surgery in the near future.  Questions answered and the  patient was in agreement with and understanding of the plan.    A total of 35 minutes were spent on this encounter including more than 50% in face to face time and the remainder in imaging review, chart review, documentation, and coordination of care.

## 2023-02-07 NOTE — LETTER
"    2/7/2023         RE: Frederick Nguyen  [address] [place] [id number]        Dear Colleague,    Thank you for referring your patient, Frederick Nguyen, to the [facility]. Please see a copy of my visit note below.    Surgical Oncology - New Patient  2/7/2023    47 M w/ a large mesenteric mass of indeterminate etiology.  He has a history of mitral valve repair for prolapse in 2019 and the mass was incidentally discovered on imaging done for that procedure.  The patient was originally set to undergo surgical resection, but this was ultimately cancelled due to COVID.  This was followed up on abdominal imaging in 2020 and 2021, during which time it was relatively stable at around 2-3 cm in size.  Repeat CTAP in 2023 showed the mass had grown to nearly 8-9 cm.  Carcinoid, desmoid, and other neoplasms were listed in the differential.  The patient now presents for recommendations on management.    Of Note: The patient is relatively healthy and his heart is functioning well.  He does not take blood thinning medications.  He had an appendectomy as a child.  He is active an independent.  He is asymptomatic from the mass and has no B symptoms.  No obstructive symptoms.    BP (!) 134/96 (BP Location: Right arm, Patient Position: Sitting, Cuff Size: Adult Regular)   Pulse 88   Temp 98.7  F (37.1  C) (Oral)   Resp 16   Ht 1.765 m (5' 9.49\")   Wt 83 kg (182 lb 14.4 oz)   SpO2 98%   BMI 26.63 kg/m      CTAP (1/26/2023): IMPRESSION:    1. Significant interval enlargement of a rounded solid and mildly heterogeneous mass within the mesentery now localizing to the right abdomen. Currently it has a maximal size of 7.8 cm, previously 3.2 cm. As such, this should be considered malignancy until proven otherwise. Carcinoid, versus desmoid lesion, or other neoplasm remains in the differential. Recommend further oncologic workup and surgical consultation. Percutaneous biopsy and/or " PET/CT could provide additional assessment if clinically relevant. If no intervention is to be performed, recommend surveillance CT in 6 months.    Assessment/Plan:  47 M w/ an asymptomatic, large (8-9 cm) indeterminate mesenteric mass that has grown significantly in the past 2 years.  I suspect malignancy until proven otherwise.  As such, he should proceed to complete staging with a CT chest w/o contrast.  We discussed the situation in detail and the fact that the differential is brood.  However, given the appearance, growth rate, location that appears to be emanating from the small bowel, and the fact that it is mobile and has moved from the left to the right side of the abdomen, I am concerned that this lesion actually represents a GIST.  We discussed the scenario that this is a GIST.  In that case, in lesions above 2 cm, behavior can be difficult to predict, but with the size and growth of his, I am certainly concerned that the lesion may be more aggressive.  We discussed the pros and cons of trying to obtain a biopsy, but ultimately, I do not think this would clearly alter the recommendation for surgery.  Biopsy of lesions such as this can also be tricky and there is potential risk of bowel injury or tumor rupture and seeding the abdominal cavity.  For these reasons, I recommend surgical resection with diagnostic laparoscopic and hand-assisted versus open resection of the mass and possible bowel resection.  This would provide likely definitive diagnosis and management.  The patient prefers a surgical approach over biopsy or surveillance.  We discussed risks including but not limited to death, bleeding/need for transfusion, infection/abscess, wound complications, hernia, bowel obstruction, COVID-19, pneumonia, UTI, cardiac events, stroke, renal failure, CO2 embolus, DVT/PE, damage to surrounding structures, ileus, anastomotic leak, anastomotic stricture, tumor rupture, and need for further procedures/medications  to manage complications.  The patient seen two prior surgeons who have given him essentially the identical recommendation and he would like to proceed at this time.  We will obtain the chest CT and set him up for surgery in the near future.  Questions answered and the patient was in agreement with and understanding of the plan.    A total of 35 minutes were spent on this encounter including more than 50% in face to face time and the remainder in imaging review, chart review, documentation, and coordination of care.    Sincerely,        Mukul Thompson MD

## 2023-02-07 NOTE — NURSING NOTE
"Oncology Rooming Note    February 7, 2023 11:51 AM   Frederick Nguyen is a 47 year old male who presents for:    Chief Complaint   Patient presents with     New Patient     Mesenteric mass     Initial Vitals: BP (!) 147/103 (BP Location: Right arm, Patient Position: Sitting, Cuff Size: Adult Regular)   Pulse 88   Temp 98.7  F (37.1  C) (Oral)   Resp 16   Ht 1.765 m (5' 9.49\")   Wt 83 kg (182 lb 14.4 oz)   SpO2 98%   BMI 26.63 kg/m   Estimated body mass index is 26.63 kg/m  as calculated from the following:    Height as of this encounter: 1.765 m (5' 9.49\").    Weight as of this encounter: 83 kg (182 lb 14.4 oz). Body surface area is 2.02 meters squared.  No Pain (0) Comment: Data Unavailable   No LMP for male patient.  Allergies reviewed: Yes  Medications reviewed: Yes    Medications: Medication refills not needed today.  Pharmacy name entered into [facility]: [id number] - [address]  AT [facility]    Clinical concerns: BP elevated/ BP recheck:  134/96      Zohra Ramsay CMA            "

## 2023-02-07 NOTE — NURSING NOTE
[facility]: Surgical Oncology Plan of Care Education Note                                     Discussion with Patient:                                                         Met/Spoke with patient following his/her visit with Dr. Thompson on 02/07/23. Introduced self and explained role of RNCC.       Plan:                                                       Reviewed plan for surgery at the [facility]. Provided appropriate OR location map. Discussed need for H&P within 30 days of surgery. He prefers PAC visit and is ok with virtual. Based on the location of surgery, a Covid test is needed.  Reviewed shower instructions and provided/mailed written hand-out and bottle of surgical scrub.       Informed patient they should get a call within the next week from our OR  with surgery date, H&P date and date of post-op visit with their surgeon. Writer answered all questions and concerns to the best of her ability and provided her contact information. Reviewed use of [employer] as alternative way to contact team.  Encouraged patient to contact with questions.     Confirmed planned date of 3/1/2023 with Dr. Thompson.         Vianca Gaytan, RNCC  [facility]  Surgical Onolcogy

## 2023-02-10 ENCOUNTER — TELEPHONE (OUTPATIENT)
Dept: SURGERY | Facility: CLINIC | Age: 48
End: 2023-02-10
Payer: COMMERCIAL

## 2023-02-10 NOTE — TELEPHONE ENCOUNTER
RN Care Coordinator: Vianca Gaytan RN     Surgery is scheduled with Dr. Mukul Thompson  Date: 3/1/2023   Location: [facility].  Scheduled per next available date    H&P to be completed by PAC clinic on 2/21/2023 At 8:15am via video    Imaging appointment: 2/13/2023 at 10:40am at the [place] location    COVID-19 test: Patient will contact clinic near home to schedule COVID-19 PCR test 2-4 days prior to surgery. Results to be faxed to [phone].    Post-op: 3/14/2023 at 9:45am, in person visit    Patient will receive surgery arrival and start time from PAC.     Spoke with the patient and was able to confirm all scheduled information.     The surgery packet was provided by the CC    Surgery and appointment overview was sent via US mail    ______________    Dora Gaytan on 2/10/2023 at 10:29 AM  P: [phone]    
Calm

## 2023-02-13 ENCOUNTER — ANCILLARY PROCEDURE (OUTPATIENT)
Dept: CT IMAGING | Facility: CLINIC | Age: 48
End: 2023-02-13
Attending: SURGERY
Payer: COMMERCIAL

## 2023-02-13 DIAGNOSIS — K63.89 MESENTERIC MASS: ICD-10-CM

## 2023-02-13 PROCEDURE — 71250 CT THORAX DX C-: CPT | Mod: TC | Performed by: RADIOLOGY

## 2023-02-13 NOTE — TELEPHONE ENCOUNTER
FUTURE VISIT INFORMATION      SURGERY INFORMATION:    Date: 3/1/23    [facility]    Surgeon:  Mukul Thompson MD    Anesthesia Type:  general    Procedure: LAPAROSCOPY LAPAROSCOPIC HAND ASSISTED SMALL INTESTINE RESECTION possible open EXCISION, SMALL INTESTINE, WITH OSTOMY CREATION    Consult: ov 23    RECORDS REQUESTED FROM:       Primary Care Provider: [facility]    Pertinent Medical History: infective endocarditis    Most recent EKG+ Tracin/15/20    Most recent ECHO: 10/27/20

## 2023-02-21 ENCOUNTER — PRE VISIT (OUTPATIENT)
Dept: SURGERY | Facility: CLINIC | Age: 48
End: 2023-02-21

## 2023-02-21 ENCOUNTER — VIRTUAL VISIT (OUTPATIENT)
Dept: SURGERY | Facility: CLINIC | Age: 48
End: 2023-02-21
Payer: COMMERCIAL

## 2023-02-21 ENCOUNTER — ANESTHESIA EVENT (OUTPATIENT)
Dept: SURGERY | Facility: CLINIC | Age: 48
DRG: 983 | End: 2023-02-21
Payer: COMMERCIAL

## 2023-02-21 DIAGNOSIS — K63.89 MESENTERIC MASS: ICD-10-CM

## 2023-02-21 DIAGNOSIS — Z98.890 S/P MVR (MITRAL VALVE REPAIR): ICD-10-CM

## 2023-02-21 DIAGNOSIS — Z01.818 PRE-OP EXAMINATION: Primary | ICD-10-CM

## 2023-02-21 PROCEDURE — 99203 OFFICE O/P NEW LOW 30 MIN: CPT | Mod: VID | Performed by: PHYSICIAN ASSISTANT

## 2023-02-21 RX ORDER — APREPITANT 40 MG/1
40 CAPSULE ORAL ONCE
Status: CANCELLED | OUTPATIENT
Start: 2023-02-21 | End: 2023-02-21

## 2023-02-21 ASSESSMENT — ENCOUNTER SYMPTOMS: SEIZURES: 0

## 2023-02-21 ASSESSMENT — LIFESTYLE VARIABLES: TOBACCO_USE: 0

## 2023-02-21 NOTE — PATIENT INSTRUCTIONS
Preparing for Your Surgery      Name:  Frederick Nguyen   MRN:  [id number]   :  1975   Today's Date:  2023       Arriving for surgery:  Surgery date:  2023  Arrival time:  5:30 am     Surgeries and procedures: Adult patients can have 2 visitors all through the surgery process.     Visiting hours: 8 a.m. to 8:30 p.m.     Hospital: Adult patients and children under age 18 can have 4 visitor at a time     No visitors under the age of 5 are allowed for hospital patients.  Double occupancy rooms: Patients can have only two visitors at a time.     Patients with disabilities: Can have a support person with them (family member, service provider     Or someone well informed about their needs) plus the allowed number of visitors     Patients confirmed or suspected to have symptoms of COVID 19 or flu:     No visitors allowed for adult patients.   Children (under age 18) can have 1 named visitor.     People who are sick or showing symptoms of COVID 19 or flu:    Are not allowed to visit patients--we can only make exceptions in special situations.       Please follow these guidelines for your visit:   Arrive wearing a mask over your mouth and nose; we will give you a medical mask to wear    If you arrive wearing a cloth mask.   Keep it on during your entire visit, even when in patient's room.   If you don't wear a mask we'll ask you to leave.     Clean your hands with alcohol hand . Do this when you arrive at and leave the building and patient room,    And again after you touch your mask or anything in the room.     You can t visit if you have a fever, cough, shortness of breath, muscle aches, headaches, sore throat    Or diarrhea      Stay 6 feet away from others during your visit and between visits     Go directly to and from the room you are visiting.     Stay in the patient s room during your visit. Limit going to other places in the hospital as much as possible     Leave bags and jackets at  home or in the car.     For everyone s health, please don t come and go during your visit. That includes for smoking   during your visit.     Please come to:     [facility] [address]  [address] [place]  [address]    - ? parking is available in front of the hospital      -    Please proceed to [address] on the 3rd floor. [phone]?     - ?If you are in need of directions, wheelchair or escort please stop at the Information Desk in the lobby.     What can I eat or drink?  -  You may eat and drink normally up to 8 hours prior to arrival time. (Until 2/28/23 at 9 pm)  -  You may have clear liquids until 2 hours prior to arrival time. (Until 3:30 am)    Examples of clear liquids:  Water  Clear broth  Juices (apple, white grape, white cranberry  and cider) without pulp  Noncarbonated, powder based beverages  (lemonade and Gerson-Aid)  Sodas (Sprite, 7-Up, ginger ale and seltzer)  Coffee or tea (without milk or cream)  Gatorade    -  No Alcohol for at least 24 hours before surgery.     Which medicines can I take?    Hold Aspirin for 7 days before surgery.     Hold Multivitamins for 7 days before surgery.  Hold Supplements for 7 days before surgery.  Hold Ibuprofen (Advil, Motrin) for 1 day before surgery--unless otherwise directed by surgeon.  Hold Naproxen (Aleve) for 4 days before surgery.        -  PLEASE TAKE these medications the day of surgery:  Metoprolol      How do I prepare myself?  - Please take 2 showers before surgery using Scrubcare or Hibiclens soap.    Use this soap only from the neck to your toes.     Leave the soap on your skin for one minute--then rinse thoroughly.      You may use your own shampoo and conditioner. No other hair products.   - Please remove all jewelry and body piercings.  - No lotions, deodorants or fragrance.  - No makeup or fingernail polish.   - Bring your ID and insurance card.    -If you have a Deep Brain  Stimulator, Spinal Cord Stimulator, or any Neuro Stimulator device---you must bring the remote control to the hospital.      Covid testing policy as of 12/06/2022  Your surgeon will notify and schedule you for a COVID test if one is needed before surgery--please direct any questions or COVID symptoms to your surgeon      Questions or Concerns:    - For any questions regarding the day of surgery or your hospital stay, please contact the Pre Admission Nursing Office at [phone].       - If you have health changes between today and your surgery, please call your surgeon.     Vianca Gaytan RN, BSN   Surgical Oncology New Patient Nurse Navigator  [facility]  [phone]         - For questions after surgery, please call your surgeons office.

## 2023-02-21 NOTE — H&P
Pre-Operative H & P     ADDENDUM:  The patient completed updated echo. His mitral valve is stable and normal EF. The patient's labs are still pending. As long as the patient's labs have no significant changes or abnormalities then the patient is optimized for surgery.     Echo 2/23/23  Interpretation Summary     Left ventricular size, wall motion and function are normal. The ejection  fraction is 60-65%.  Global right ventricular function is normal.  s/p mitral valve repair with 36 mm Garrett Physio II annuloplasty ring and  P2/P3 cleft closure on 10/25/2019. MG 2mmHg at 79bpm; trace MR.  The inferior vena cava is normal.     This study was compared with the study from 10/27/20. No significant change.    CC:  Preoperative exam to assess for increased cardiopulmonary risk while undergoing surgery and anesthesia.    Date of Encounter: 2/21/2023  Primary Care Physician:  Ivette Mtz     Reason for visit:   Encounter Diagnoses   Name Primary?     Pre-op examination Yes     Mesenteric mass      S/P MVR (mitral valve repair)        HPI  Frederick Nguyen is a 47 year old male who presents for pre-operative H & P in preparation for  Procedure Information     Case: [id number] Date/Time: 03/01/23 0730    Procedures:       LAPAROSCOPY (Abdomen)      LAPAROSCOPIC HAND ASSISTED SMALL INTESTINE RESECTION (Abdomen)      possible open EXCISION, SMALL INTESTINE, WITH OSTOMY CREATION (Abdomen)    Anesthesia type: General    Diagnosis: Mesenteric mass [K63.89]    Pre-op diagnosis: Mesenteric mass [K63.89]    [facility]    Providers: Mukul Thompson MD          The patient is a 47-year-old man with a past medical history significant for history of mitral regurgitation and mitral valve prolapse who underwent mitral valve repair in 2019, history of bacteremia causing osteomyelitis of the spine treated with antibiotics, hiatal hernia and mesenteric mass. As a part of the work-up for his heart procedure he was  incidentally found to have a mesenteric mass on imaging.  He was going to have this resected but due to COVID this was delayed.  He then followed up with Dr. Thompson and was seen on 2/7/2023.  They discussed the growth of his mass on repeat imaging and surgical treatment options.  The patient is now scheduled for the procedure as above.    History is obtained from the patient and chart review    Hx of abnormal bleeding or anti-platelet use:  mg       Past Medical History  Past Medical History:   Diagnosis Date     Cervical spondylosis without myelopathy      Heart murmur      Mesenteric mass      Osteomyelitis, L5S1 03/15/2018    secondary to bactermeia     S/P mitral valve repair        Past Surgical History  Past Surgical History:   Procedure Laterality Date     APPENDECTOMY  1987     CHEILECTOMY Right 4/10/2019    Procedure: Right Cheilectomy;  Surgeon: Sawyer Crowell MD;  [facility]     COLONOSCOPY N/A 4/8/2022    Procedure: COLONOSCOPY;  Surgeon: Rufus White MD;  [facility]: [facility]     CV CORONARY ANGIOGRAM N/A 9/9/2019    Procedure: CV CORONARY ANGIOGRAM;  Surgeon: Pernell Bar MD;  Location:  HEART CARDIAC CATH LAB     CV RIGHT HEART CATH MEASUREMENTS RECORDED N/A 9/9/2019    Procedure: CV RIGHT HEART CATH;  Surgeon: Pernell Bar MD;  Location:  HEART CARDIAC CATH LAB     ESOPHAGOSCOPY, GASTROSCOPY, DUODENOSCOPY (EGD), COMBINED N/A 4/8/2022    Procedure: ESOPHAGOGASTRODUODENOSCOPY (EGD);  Surgeon: Rufus White MD;  [place] [facility] [place]     INJECT SACROILIAC JOINT Right 5/9/2018    Procedure: INJECT SACROILIAC JOINT;  Right Sacroiliac Joint Injection;  Surgeon: Thom Williamson MD;  [facility]     ORTHOPEDIC SURGERY  4/10/19    Bone Spur Removal     REPAIR VALVE MITRAL MINIMALLY INVASIVE N/A 10/25/2019    Procedure: MINIMALLY INVASIVE MITRAL VALVE REPAIR WITH MITRAL ANNULOPLASTY RING KEVIN-KAUR PHYSIO II SIZE: 36MM  (ON PUMP OXYGENATOR ;  INTRAOPERATIVE DEEPAK BY CARDIOLOGIST DR. VARELA);  Surgeon: Rigoberto Downs MD;  Location:  OR       Prior to Admission Medications  Current Outpatient Medications   Medication Sig Dispense Refill     aspirin (ASA) 325 MG EC tablet Take 325 mg by mouth every 6 hours as needed for moderate pain (4-6)       diclofenac (VOLTAREN) 1 % topical gel Apply 2 g topically 2 times daily as needed for moderate pain 50 g 2     metoprolol succinate ER (TOPROL-XL) 50 MG 24 hr tablet Take 25 mg by mouth every morning 180 tablet 0       Allergies  No Known Allergies    Social History  Social History     Socioeconomic History     Marital status:      Spouse name: Not on file     Number of children: Not on file     Years of education: Not on file     Highest education level: Not on file   Occupational History     Comment: desk work   Tobacco Use     Smoking status: Never     Smokeless tobacco: Never   Vaping Use     Vaping Use: Never used   Substance and Sexual Activity     Alcohol use: Yes     Comment: rare     Drug use: No     Sexual activity: Yes     Partners: Female     Birth control/protection: Pill   Other Topics Concern     Parent/sibling w/ CABG, MI or angioplasty before 65F 55M? No   Social History Narrative     Not on file     Social Determinants of Health     Financial Resource Strain: Not on file   Food Insecurity: Not on file   Transportation Needs: Not on file   Physical Activity: Not on file   Stress: Not on file   Social Connections: Not on file   Intimate Partner Violence: Not on file   Housing Stability: Not on file       Family History  Family History   Problem Relation Age of Onset     Glaucoma No family hx of      Macular Degeneration No family hx of      Anesthesia Reaction No family hx of      Venous thrombosis No family hx of        Review of Systems  The complete review of systems is negative other than noted in the HPI or here.   Anesthesia Evaluation   Pt has had prior anesthetic. Type:  General.    History of anesthetic complications  - PONV.      ROS/MED HX  ENT/Pulmonary:  - neg pulmonary ROS  (-) tobacco use   Neurologic:  - neg neurologic ROS  (-) no seizures, no CVA and no TIA   Cardiovascular:     (+) -----valvular problems/murmurs type: MVP and MR s/p Mitral valve repair . Previous cardiac testing   Echo: Date: 2020 Results:    Stress Test: Date: Results:    ECG Reviewed: Date: 8/2020 Results:  Sinus Tachycardia - frequent PAC s   # PACs = 4.  -  Nonspecific T-abnormality.    Cath: Date: Results:      METS/Exercise Tolerance: >4 METS    Hematologic:  - neg hematologic  ROS     Musculoskeletal: Comment: Back pain with activity       GI/Hepatic: Comment: Mesenteric mass    (+) hiatal hernia,     Renal/Genitourinary:  - neg Renal ROS     Endo:  - neg endo ROS     Psychiatric/Substance Use:  - neg psychiatric ROS     Infectious Disease:  - neg infectious disease ROS     Malignancy:  - neg malignancy ROS     Other:  - neg other ROS          Virtual visit -  No vitals were obtained    Physical Exam  Constitutional: Awake, alert, cooperative, no apparent distress, and appears stated age.  Eyes: Glasses  HENT: Normocephalic  Respiratory: non labored breathing   Neurologic: Awake, alert, oriented to name, place and time.   Neuropsychiatric: Calm, cooperative. Normal affect.      Prior Labs/Diagnostic Studies   All labs and imaging personally reviewed     EKG/ stress test - if available please see in ROS above     2020Echo result w/o MOPS: Interpretation Summary s/p mitral valve repair with 36 mm Garrett Physio II annuloplasty ring andP2/P3 cleft closure on 10/25/2019Global and regional left ventricular function is normal with an EF of 60-65%.Global right ventricular function is normal. The right ventricle is normalsize.There is trace mitral regurgitation with a mean gradient of 2 mmHg at 79 bpm.This study was compared with the study from 10/25/2019 (intra-operative DEEPAK).There has been no  change.      No flowsheet data found.      The patient's records and results personally reviewed by this provider.     Outside records reviewed from: Care Everywhere      Assessment      Frederick Nguyen is a 47 year old male seen as a PAC referral for risk assessment and optimization for anesthesia.    Plan/Recommendations  Pt will be optimized for the proposed procedure.  See below for details on the assessment, risk, and preoperative recommendations    NEUROLOGY  - No history of TIA, CVA or seizure  -Post Op delirium risk factors:  No risk identified    ENT  - No current airway concerns.  Will need to be reassessed day of surgery.  Mallampati: Unable to assess  TM: Unable to assess    CARDIAC  - History of MVP/ MR = s/p mitral valve repair in 2019. The patient was seen by cardiology in 2019 for a post op visit and was doing well. He's been following with his PCP since then. His last echo was in 2020. Will get updated echo prior to surgery.    ~ HTN - continue metoprolol.   - METS (Metabolic Equivalents)  Patient performs 4 or more METS exercise without symptoms            Total Score: 0      RCRI-Low risk: Class 2 0.9% complication rate            Total Score: 1    RCRI: High Risk Surgery    ~The patient is active cycling.  He denies any cardiac symptoms.    PULMONARY  - Obstructive Sleep Apnea  No current risk of obstructive sleep apnea   NINO Low Risk            Total Score: 2    NINO: Hypertension    NINO: Male      - Denies asthma or inhaler use  - Tobacco History      History   Smoking Status     Never   Smokeless Tobacco     Never       GI  - Hiatal hernia -patient reports that he has rare symptoms but is never sure when it will come on.  He does get symptoms at nighttime he will sit up and then sleep with his head propped up.  PONV High Risk  Total Score: 3           1 AN PONV: Patient is not a current smoker    1 AN PONV: Patient has history of PONV    1 AN PONV: Intended Post Op Opioids    ~The patient  "reports he has pretty severe postop nausea and vomiting.  He thinks this has been due to fentanyl.  He reports that he was given something in the preop area prior to his mitral valve repair and his latest endoscopy which did help beyond just IV antiemetics.  We will place order for preop aprepitant\Emend  ~Mesenteric mass-procedure as above    /RENAL  - Baseline Creatinine  1.28    ENDOCRINE    - BMI: Estimated body mass index is 26.63 kg/m  as calculated from the following:    Height as of 2/7/23: 1.765 m (5' 9.49\").    Weight as of 2/7/23: 83 kg (182 lb 14.4 oz).  Overweight (BMI 25.0-29.9)  - No history of Diabetes Mellitus    HEME  VTE Low Risk 0.5%            Total Score: 2    VTE: Male      - Platelet disfunction second to Aspirin (Sarah, many others) will hold  mg for 7 days prior to surgery  ~Type and screen has been ordered for this patient    MSK  ~History of osteomyelitis of the spine due to bacteremia was treated with antibiotics.  ~Patient reports he gets back pain with activity such as shoveling snow.    Different anesthesia methods/types have been discussed with the patient, but they are aware that the final plan will be decided by the assigned anesthesia provider on the date of service.    The patient is not yet optimized for their procedure. Will complete pre op echo as it's been since 2020 that he last has an echo looking at his mitral valve are mitral valve repair. The patient will also get pre op labs completed.     AVS with information on surgery time/arrival time, meds and NPO status given by nursing staff. No further diagnostic testing indicated.    Please refer to the physical examination documented by the anesthesiologist in the anesthesia record on the day of surgery.    Video-Visit Details    Type of service:  Video Visit    Provider received verbal consent for a Video Visit from the patient? Yes     Originating Location (pt. Location): Home    Distant Location (provider " location):  Off-site  Mode of Communication:  Video Conference via [employer]  On the day of service:     Prep time: 5 minutes  Visit time: 17 minutes  Documentation time: 19 minutes  ------------------------------------------  Total time: 41 minutes      Carolina Gallegos PA-C  Preoperative Assessment Center  [facility]  [facility]  Phone: [phone]  Fax: [phone]

## 2023-02-21 NOTE — PROGRESS NOTES
Frederick is a 47 year old who is being evaluated via a billable video visit.      How would you like to obtain your AVS? Sunday          Subjective   Frederick is a 47 year old; presenting for the following health issues:  Pre-Op Exam      HPI       Review of Systems       Physical Exam             DARBY Devi LPN

## 2023-02-22 LAB
ABO/RH(D): NORMAL
ANTIBODY SCREEN: NEGATIVE
SPECIMEN EXPIRATION DATE: NORMAL

## 2023-02-23 ENCOUNTER — ANCILLARY PROCEDURE (OUTPATIENT)
Dept: CARDIOLOGY | Facility: CLINIC | Age: 48
End: 2023-02-23
Attending: PHYSICIAN ASSISTANT
Payer: COMMERCIAL

## 2023-02-23 ENCOUNTER — LAB (OUTPATIENT)
Dept: LAB | Facility: CLINIC | Age: 48
End: 2023-02-23
Payer: COMMERCIAL

## 2023-02-23 DIAGNOSIS — K63.89 MESENTERIC MASS: ICD-10-CM

## 2023-02-23 DIAGNOSIS — Z01.818 PRE-OP EXAMINATION: ICD-10-CM

## 2023-02-23 LAB
ANION GAP SERPL CALCULATED.3IONS-SCNC: 3 MMOL/L (ref 3–14)
BUN SERPL-MCNC: 20 MG/DL (ref 7–30)
CALCIUM SERPL-MCNC: 9.2 MG/DL (ref 8.5–10.1)
CHLORIDE BLD-SCNC: 111 MMOL/L (ref 94–109)
CO2 SERPL-SCNC: 26 MMOL/L (ref 20–32)
CREAT SERPL-MCNC: 1.08 MG/DL (ref 0.66–1.25)
ERYTHROCYTE [DISTWIDTH] IN BLOOD BY AUTOMATED COUNT: 12.2 % (ref 10–15)
GFR SERPL CREATININE-BSD FRML MDRD: 86 ML/MIN/1.73M2
GLUCOSE BLD-MCNC: 93 MG/DL (ref 70–99)
HCT VFR BLD AUTO: 46.1 % (ref 40–53)
HGB BLD-MCNC: 15.6 G/DL (ref 13.3–17.7)
LVEF ECHO: NORMAL
MCH RBC QN AUTO: 30.1 PG (ref 26.5–33)
MCHC RBC AUTO-ENTMCNC: 33.8 G/DL (ref 31.5–36.5)
MCV RBC AUTO: 89 FL (ref 78–100)
PLATELET # BLD AUTO: 220 10E3/UL (ref 150–450)
POTASSIUM BLD-SCNC: 4.3 MMOL/L (ref 3.4–5.3)
RBC # BLD AUTO: 5.19 10E6/UL (ref 4.4–5.9)
SARS-COV-2 RNA RESP QL NAA+PROBE: NEGATIVE
SODIUM SERPL-SCNC: 140 MMOL/L (ref 133–144)
WBC # BLD AUTO: 7.7 10E3/UL (ref 4–11)

## 2023-02-23 PROCEDURE — U0005 INFEC AGEN DETEC AMPLI PROBE: HCPCS

## 2023-02-23 PROCEDURE — 86901 BLOOD TYPING SEROLOGIC RH(D): CPT

## 2023-02-23 PROCEDURE — 80048 BASIC METABOLIC PNL TOTAL CA: CPT

## 2023-02-23 PROCEDURE — 93306 TTE W/DOPPLER COMPLETE: CPT | Performed by: INTERNAL MEDICINE

## 2023-02-23 PROCEDURE — 36415 COLL VENOUS BLD VENIPUNCTURE: CPT

## 2023-02-23 PROCEDURE — 86900 BLOOD TYPING SEROLOGIC ABO: CPT

## 2023-02-23 PROCEDURE — 85027 COMPLETE CBC AUTOMATED: CPT

## 2023-02-23 PROCEDURE — U0003 INFECTIOUS AGENT DETECTION BY NUCLEIC ACID (DNA OR RNA); SEVERE ACUTE RESPIRATORY SYNDROME CORONAVIRUS 2 (SARS-COV-2) (CORONAVIRUS DISEASE [COVID-19]), AMPLIFIED PROBE TECHNIQUE, MAKING USE OF HIGH THROUGHPUT TECHNOLOGIES AS DESCRIBED BY CMS-2020-01-R: HCPCS

## 2023-02-23 PROCEDURE — 86850 RBC ANTIBODY SCREEN: CPT

## 2023-03-01 ENCOUNTER — HOSPITAL ENCOUNTER (INPATIENT)
Facility: CLINIC | Age: 48
LOS: 3 days | Discharge: HOME | DRG: 983 | End: 2023-03-04
Attending: SURGERY | Admitting: SURGERY
Payer: COMMERCIAL

## 2023-03-01 ENCOUNTER — ANESTHESIA (OUTPATIENT)
Dept: SURGERY | Facility: CLINIC | Age: 48
DRG: 983 | End: 2023-03-01
Payer: COMMERCIAL

## 2023-03-01 DIAGNOSIS — D48.119 DESMOID TUMOR: ICD-10-CM

## 2023-03-01 DIAGNOSIS — Z90.49 S/P SMALL BOWEL RESECTION: Primary | ICD-10-CM

## 2023-03-01 LAB
CREAT SERPL-MCNC: 0.94 MG/DL (ref 0.67–1.17)
GFR SERPL CREATININE-BSD FRML MDRD: >90 ML/MIN/1.73M2
SARS-COV-2 RNA RESP QL NAA+PROBE: NEGATIVE

## 2023-03-01 PROCEDURE — 250N000013 HC RX MED GY IP 250 OP 250 PS 637

## 2023-03-01 PROCEDURE — 250N000011 HC RX IP 250 OP 636

## 2023-03-01 PROCEDURE — 250N000011 HC RX IP 250 OP 636: Performed by: STUDENT IN AN ORGANIZED HEALTH CARE EDUCATION/TRAINING PROGRAM

## 2023-03-01 PROCEDURE — 710N000010 HC RECOVERY PHASE 1, LEVEL 2, PER MIN: Performed by: SURGERY

## 2023-03-01 PROCEDURE — 258N000003 HC RX IP 258 OP 636: Performed by: STUDENT IN AN ORGANIZED HEALTH CARE EDUCATION/TRAINING PROGRAM

## 2023-03-01 PROCEDURE — 36415 COLL VENOUS BLD VENIPUNCTURE: CPT | Performed by: STUDENT IN AN ORGANIZED HEALTH CARE EDUCATION/TRAINING PROGRAM

## 2023-03-01 PROCEDURE — C9290 INJ, BUPIVACAINE LIPOSOME: HCPCS | Performed by: STUDENT IN AN ORGANIZED HEALTH CARE EDUCATION/TRAINING PROGRAM

## 2023-03-01 PROCEDURE — 82565 ASSAY OF CREATININE: CPT | Performed by: STUDENT IN AN ORGANIZED HEALTH CARE EDUCATION/TRAINING PROGRAM

## 2023-03-01 PROCEDURE — 272N000001 HC OR GENERAL SUPPLY STERILE: Performed by: SURGERY

## 2023-03-01 PROCEDURE — 370N000017 HC ANESTHESIA TECHNICAL FEE, PER MIN: Performed by: SURGERY

## 2023-03-01 PROCEDURE — 360N000077 HC SURGERY LEVEL 4, PER MIN: Performed by: SURGERY

## 2023-03-01 PROCEDURE — 88331 PATH CONSLTJ SURG 1 BLK 1SPC: CPT | Mod: 26 | Performed by: PATHOLOGY

## 2023-03-01 PROCEDURE — 250N000009 HC RX 250: Performed by: ANESTHESIOLOGY

## 2023-03-01 PROCEDURE — 250N000011 HC RX IP 250 OP 636: Performed by: ANESTHESIOLOGY

## 2023-03-01 PROCEDURE — 250N000013 HC RX MED GY IP 250 OP 250 PS 637: Performed by: STUDENT IN AN ORGANIZED HEALTH CARE EDUCATION/TRAINING PROGRAM

## 2023-03-01 PROCEDURE — 250N000013 HC RX MED GY IP 250 OP 250 PS 637: Performed by: ANESTHESIOLOGY

## 2023-03-01 PROCEDURE — 88305 TISSUE EXAM BY PATHOLOGIST: CPT | Mod: 26 | Performed by: PATHOLOGY

## 2023-03-01 PROCEDURE — 999N000141 HC STATISTIC PRE-PROCEDURE NURSING ASSESSMENT: Performed by: SURGERY

## 2023-03-01 PROCEDURE — 250N000012 HC RX MED GY IP 250 OP 636 PS 637: Performed by: PHYSICIAN ASSISTANT

## 2023-03-01 PROCEDURE — 88342 IMHCHEM/IMCYTCHM 1ST ANTB: CPT | Mod: 26 | Performed by: PATHOLOGY

## 2023-03-01 PROCEDURE — 88331 PATH CONSLTJ SURG 1 BLK 1SPC: CPT | Mod: TC | Performed by: SURGERY

## 2023-03-01 PROCEDURE — 0DB80ZZ EXCISION OF SMALL INTESTINE, OPEN APPROACH: ICD-10-PCS | Performed by: SURGERY

## 2023-03-01 PROCEDURE — 258N000003 HC RX IP 258 OP 636: Performed by: ANESTHESIOLOGY

## 2023-03-01 PROCEDURE — 0DBV0ZX EXCISION OF MESENTERY, OPEN APPROACH, DIAGNOSTIC: ICD-10-PCS | Performed by: SURGERY

## 2023-03-01 PROCEDURE — U0005 INFEC AGEN DETEC AMPLI PROBE: HCPCS | Performed by: SURGERY

## 2023-03-01 PROCEDURE — 44120 REMOVAL OF SMALL INTESTINE: CPT | Mod: GC | Performed by: SURGERY

## 2023-03-01 PROCEDURE — 88309 TISSUE EXAM BY PATHOLOGIST: CPT | Mod: 26 | Performed by: PATHOLOGY

## 2023-03-01 PROCEDURE — 88341 IMHCHEM/IMCYTCHM EA ADD ANTB: CPT | Mod: 26 | Performed by: PATHOLOGY

## 2023-03-01 PROCEDURE — 120N000002 HC R&B MED SURG/OB UMMC

## 2023-03-01 RX ORDER — ONDANSETRON 2 MG/ML
INJECTION INTRAMUSCULAR; INTRAVENOUS PRN
Status: DISCONTINUED | OUTPATIENT
Start: 2023-03-01 | End: 2023-03-01

## 2023-03-01 RX ORDER — HYDRALAZINE HYDROCHLORIDE 20 MG/ML
5 INJECTION INTRAMUSCULAR; INTRAVENOUS
Status: DISCONTINUED | OUTPATIENT
Start: 2023-03-01 | End: 2023-03-01 | Stop reason: HOSPADM

## 2023-03-01 RX ORDER — METHOCARBAMOL 500 MG/1
500 TABLET, FILM COATED ORAL 4 TIMES DAILY
Status: DISCONTINUED | OUTPATIENT
Start: 2023-03-01 | End: 2023-03-04 | Stop reason: HOSPADM

## 2023-03-01 RX ORDER — CITRIC ACID/SODIUM CITRATE 334-500MG
30 SOLUTION, ORAL ORAL ONCE
Status: COMPLETED | OUTPATIENT
Start: 2023-03-01 | End: 2023-03-01

## 2023-03-01 RX ORDER — NALOXONE HYDROCHLORIDE 0.4 MG/ML
0.2 INJECTION, SOLUTION INTRAMUSCULAR; INTRAVENOUS; SUBCUTANEOUS
Status: DISCONTINUED | OUTPATIENT
Start: 2023-03-01 | End: 2023-03-01 | Stop reason: HOSPADM

## 2023-03-01 RX ORDER — METRONIDAZOLE 500 MG/100ML
500 INJECTION, SOLUTION INTRAVENOUS EVERY 12 HOURS
Status: DISCONTINUED | OUTPATIENT
Start: 2023-03-01 | End: 2023-03-01 | Stop reason: HOSPADM

## 2023-03-01 RX ORDER — LIDOCAINE 40 MG/G
CREAM TOPICAL
Status: DISCONTINUED | OUTPATIENT
Start: 2023-03-01 | End: 2023-03-04 | Stop reason: HOSPADM

## 2023-03-01 RX ORDER — LIDOCAINE HYDROCHLORIDE 20 MG/ML
INJECTION, SOLUTION INFILTRATION; PERINEURAL PRN
Status: DISCONTINUED | OUTPATIENT
Start: 2023-03-01 | End: 2023-03-01

## 2023-03-01 RX ORDER — LEVOFLOXACIN 5 MG/ML
500 INJECTION, SOLUTION INTRAVENOUS EVERY 24 HOURS
Status: DISCONTINUED | OUTPATIENT
Start: 2023-03-01 | End: 2023-03-01 | Stop reason: HOSPADM

## 2023-03-01 RX ORDER — ONDANSETRON 2 MG/ML
4 INJECTION INTRAMUSCULAR; INTRAVENOUS EVERY 6 HOURS PRN
Status: DISCONTINUED | OUTPATIENT
Start: 2023-03-01 | End: 2023-03-03

## 2023-03-01 RX ORDER — FENTANYL CITRATE 50 UG/ML
INJECTION, SOLUTION INTRAMUSCULAR; INTRAVENOUS PRN
Status: DISCONTINUED | OUTPATIENT
Start: 2023-03-01 | End: 2023-03-01

## 2023-03-01 RX ORDER — FENTANYL CITRATE 50 UG/ML
25-50 INJECTION, SOLUTION INTRAMUSCULAR; INTRAVENOUS
Status: DISCONTINUED | OUTPATIENT
Start: 2023-03-01 | End: 2023-03-01 | Stop reason: HOSPADM

## 2023-03-01 RX ORDER — LABETALOL HYDROCHLORIDE 5 MG/ML
10 INJECTION, SOLUTION INTRAVENOUS
Status: COMPLETED | OUTPATIENT
Start: 2023-03-01 | End: 2023-03-01

## 2023-03-01 RX ORDER — METOPROLOL SUCCINATE 25 MG/1
25 TABLET, EXTENDED RELEASE ORAL EVERY MORNING
Status: DISCONTINUED | OUTPATIENT
Start: 2023-03-02 | End: 2023-03-04 | Stop reason: HOSPADM

## 2023-03-01 RX ORDER — LABETALOL HYDROCHLORIDE 5 MG/ML
10 INJECTION, SOLUTION INTRAVENOUS ONCE
Status: COMPLETED | OUTPATIENT
Start: 2023-03-01 | End: 2023-03-01

## 2023-03-01 RX ORDER — DEXAMETHASONE SODIUM PHOSPHATE 4 MG/ML
INJECTION, SOLUTION INTRA-ARTICULAR; INTRALESIONAL; INTRAMUSCULAR; INTRAVENOUS; SOFT TISSUE PRN
Status: DISCONTINUED | OUTPATIENT
Start: 2023-03-01 | End: 2023-03-01

## 2023-03-01 RX ORDER — PROPOFOL 10 MG/ML
INJECTION, EMULSION INTRAVENOUS CONTINUOUS PRN
Status: DISCONTINUED | OUTPATIENT
Start: 2023-03-01 | End: 2023-03-01

## 2023-03-01 RX ORDER — CITRIC ACID/SODIUM CITRATE 334-500MG
15 SOLUTION, ORAL ORAL
Status: DISCONTINUED | OUTPATIENT
Start: 2023-03-01 | End: 2023-03-01 | Stop reason: HOSPADM

## 2023-03-01 RX ORDER — ENOXAPARIN SODIUM 100 MG/ML
40 INJECTION SUBCUTANEOUS
Status: COMPLETED | OUTPATIENT
Start: 2023-03-01 | End: 2023-03-01

## 2023-03-01 RX ORDER — SODIUM CHLORIDE, SODIUM LACTATE, POTASSIUM CHLORIDE, CALCIUM CHLORIDE 600; 310; 30; 20 MG/100ML; MG/100ML; MG/100ML; MG/100ML
INJECTION, SOLUTION INTRAVENOUS CONTINUOUS
Status: DISCONTINUED | OUTPATIENT
Start: 2023-03-01 | End: 2023-03-03

## 2023-03-01 RX ORDER — BUPIVACAINE HYDROCHLORIDE 2.5 MG/ML
INJECTION, SOLUTION EPIDURAL; INFILTRATION; INTRACAUDAL
Status: COMPLETED | OUTPATIENT
Start: 2023-03-01 | End: 2023-03-01

## 2023-03-01 RX ORDER — NALOXONE HYDROCHLORIDE 0.4 MG/ML
0.4 INJECTION, SOLUTION INTRAMUSCULAR; INTRAVENOUS; SUBCUTANEOUS
Status: DISCONTINUED | OUTPATIENT
Start: 2023-03-01 | End: 2023-03-01 | Stop reason: HOSPADM

## 2023-03-01 RX ORDER — APREPITANT 40 MG/1
40 CAPSULE ORAL ONCE
Status: COMPLETED | OUTPATIENT
Start: 2023-03-01 | End: 2023-03-01

## 2023-03-01 RX ORDER — ENOXAPARIN SODIUM 100 MG/ML
40 INJECTION SUBCUTANEOUS EVERY 24 HOURS
Status: DISCONTINUED | OUTPATIENT
Start: 2023-03-02 | End: 2023-03-04 | Stop reason: HOSPADM

## 2023-03-01 RX ORDER — NALOXONE HYDROCHLORIDE 0.4 MG/ML
0.4 INJECTION, SOLUTION INTRAMUSCULAR; INTRAVENOUS; SUBCUTANEOUS
Status: DISCONTINUED | OUTPATIENT
Start: 2023-03-01 | End: 2023-03-04 | Stop reason: HOSPADM

## 2023-03-01 RX ORDER — PROPOFOL 10 MG/ML
INJECTION, EMULSION INTRAVENOUS PRN
Status: DISCONTINUED | OUTPATIENT
Start: 2023-03-01 | End: 2023-03-01

## 2023-03-01 RX ORDER — ACETAMINOPHEN 325 MG/1
975 TABLET ORAL 3 TIMES DAILY
Status: DISCONTINUED | OUTPATIENT
Start: 2023-03-01 | End: 2023-03-04 | Stop reason: HOSPADM

## 2023-03-01 RX ORDER — SCOLOPAMINE TRANSDERMAL SYSTEM 1 MG/1
1 PATCH, EXTENDED RELEASE TRANSDERMAL ONCE
Status: COMPLETED | OUTPATIENT
Start: 2023-03-01 | End: 2023-03-02

## 2023-03-01 RX ORDER — ONDANSETRON 4 MG/1
4 TABLET, ORALLY DISINTEGRATING ORAL EVERY 6 HOURS PRN
Status: DISCONTINUED | OUTPATIENT
Start: 2023-03-01 | End: 2023-03-03

## 2023-03-01 RX ORDER — SCOLOPAMINE TRANSDERMAL SYSTEM 1 MG/1
1 PATCH, EXTENDED RELEASE TRANSDERMAL ONCE
Status: DISCONTINUED | OUTPATIENT
Start: 2023-03-01 | End: 2023-03-01 | Stop reason: HOSPADM

## 2023-03-01 RX ORDER — NALOXONE HYDROCHLORIDE 0.4 MG/ML
0.2 INJECTION, SOLUTION INTRAMUSCULAR; INTRAVENOUS; SUBCUTANEOUS
Status: DISCONTINUED | OUTPATIENT
Start: 2023-03-01 | End: 2023-03-04 | Stop reason: HOSPADM

## 2023-03-01 RX ORDER — SODIUM CHLORIDE, SODIUM LACTATE, POTASSIUM CHLORIDE, CALCIUM CHLORIDE 600; 310; 30; 20 MG/100ML; MG/100ML; MG/100ML; MG/100ML
INJECTION, SOLUTION INTRAVENOUS CONTINUOUS PRN
Status: DISCONTINUED | OUTPATIENT
Start: 2023-03-01 | End: 2023-03-01

## 2023-03-01 RX ORDER — FLUMAZENIL 0.1 MG/ML
0.2 INJECTION, SOLUTION INTRAVENOUS
Status: DISCONTINUED | OUTPATIENT
Start: 2023-03-01 | End: 2023-03-01 | Stop reason: HOSPADM

## 2023-03-01 RX ORDER — METOPROLOL TARTRATE 1 MG/ML
5 INJECTION, SOLUTION INTRAVENOUS EVERY 6 HOURS
Status: CANCELLED | OUTPATIENT
Start: 2023-03-02

## 2023-03-01 RX ADMIN — LABETALOL HYDROCHLORIDE 10 MG: 5 INJECTION, SOLUTION INTRAVENOUS at 12:36

## 2023-03-01 RX ADMIN — METHOCARBAMOL 500 MG: 500 TABLET ORAL at 19:57

## 2023-03-01 RX ADMIN — PROPOFOL 50 MG: 10 INJECTION, EMULSION INTRAVENOUS at 09:28

## 2023-03-01 RX ADMIN — PHENYLEPHRINE HYDROCHLORIDE 100 MCG: 10 INJECTION INTRAVENOUS at 07:55

## 2023-03-01 RX ADMIN — PROPOFOL 80 MG: 10 INJECTION, EMULSION INTRAVENOUS at 07:48

## 2023-03-01 RX ADMIN — SODIUM CHLORIDE, POTASSIUM CHLORIDE, SODIUM LACTATE AND CALCIUM CHLORIDE: 600; 310; 30; 20 INJECTION, SOLUTION INTRAVENOUS at 11:54

## 2023-03-01 RX ADMIN — BUPIVACAINE HYDROCHLORIDE 20 ML: 2.5 INJECTION, SOLUTION EPIDURAL; INFILTRATION; INTRACAUDAL; PERINEURAL at 06:34

## 2023-03-01 RX ADMIN — FENTANYL CITRATE 50 MCG: 50 INJECTION, SOLUTION INTRAMUSCULAR; INTRAVENOUS at 06:33

## 2023-03-01 RX ADMIN — Medication 20 MG: at 08:40

## 2023-03-01 RX ADMIN — SCOPALAMINE 1 PATCH: 1 PATCH, EXTENDED RELEASE TRANSDERMAL at 06:55

## 2023-03-01 RX ADMIN — PROPOFOL 120 MG: 10 INJECTION, EMULSION INTRAVENOUS at 07:32

## 2023-03-01 RX ADMIN — Medication: at 11:36

## 2023-03-01 RX ADMIN — HYDRALAZINE HYDROCHLORIDE 5 MG: 20 INJECTION INTRAMUSCULAR; INTRAVENOUS at 13:22

## 2023-03-01 RX ADMIN — ACETAMINOPHEN 975 MG: 325 TABLET ORAL at 13:05

## 2023-03-01 RX ADMIN — MIDAZOLAM 2 MG: 1 INJECTION INTRAMUSCULAR; INTRAVENOUS at 06:31

## 2023-03-01 RX ADMIN — LABETALOL HYDROCHLORIDE 10 MG: 5 INJECTION, SOLUTION INTRAVENOUS at 13:04

## 2023-03-01 RX ADMIN — SODIUM CHLORIDE, POTASSIUM CHLORIDE, SODIUM LACTATE AND CALCIUM CHLORIDE: 600; 310; 30; 20 INJECTION, SOLUTION INTRAVENOUS at 07:36

## 2023-03-01 RX ADMIN — SODIUM CHLORIDE, POTASSIUM CHLORIDE, SODIUM LACTATE AND CALCIUM CHLORIDE: 600; 310; 30; 20 INJECTION, SOLUTION INTRAVENOUS at 09:41

## 2023-03-01 RX ADMIN — Medication 20 MG: at 09:24

## 2023-03-01 RX ADMIN — FENTANYL CITRATE 50 MCG: 50 INJECTION, SOLUTION INTRAMUSCULAR; INTRAVENOUS at 07:32

## 2023-03-01 RX ADMIN — SUGAMMADEX 200 MG: 100 INJECTION, SOLUTION INTRAVENOUS at 10:51

## 2023-03-01 RX ADMIN — HYDROMORPHONE HYDROCHLORIDE 0.5 MG: 1 INJECTION, SOLUTION INTRAMUSCULAR; INTRAVENOUS; SUBCUTANEOUS at 09:47

## 2023-03-01 RX ADMIN — ACETAMINOPHEN 975 MG: 325 TABLET ORAL at 19:57

## 2023-03-01 RX ADMIN — FENTANYL CITRATE 50 MCG: 50 INJECTION, SOLUTION INTRAMUSCULAR; INTRAVENOUS at 09:03

## 2023-03-01 RX ADMIN — Medication 20 MG: at 08:26

## 2023-03-01 RX ADMIN — Medication 40 MG: at 19:10

## 2023-03-01 RX ADMIN — FENTANYL CITRATE 50 MCG: 50 INJECTION, SOLUTION INTRAMUSCULAR; INTRAVENOUS at 09:25

## 2023-03-01 RX ADMIN — APREPITANT 40 MG: 40 CAPSULE ORAL at 06:06

## 2023-03-01 RX ADMIN — ONDANSETRON 4 MG: 2 INJECTION INTRAMUSCULAR; INTRAVENOUS at 10:29

## 2023-03-01 RX ADMIN — METHOCARBAMOL 500 MG: 500 TABLET ORAL at 12:32

## 2023-03-01 RX ADMIN — DEXAMETHASONE SODIUM PHOSPHATE 6 MG: 4 INJECTION, SOLUTION INTRA-ARTICULAR; INTRALESIONAL; INTRAMUSCULAR; INTRAVENOUS; SOFT TISSUE at 07:54

## 2023-03-01 RX ADMIN — METRONIDAZOLE 500 MG: 500 INJECTION, SOLUTION INTRAVENOUS at 07:39

## 2023-03-01 RX ADMIN — METHOCARBAMOL 500 MG: 500 TABLET ORAL at 15:50

## 2023-03-01 RX ADMIN — SUCCINYLCHOLINE CHLORIDE 80 MG: 20 INJECTION, SOLUTION INTRAMUSCULAR; INTRAVENOUS; PARENTERAL at 07:32

## 2023-03-01 RX ADMIN — Medication 20 MG: at 09:57

## 2023-03-01 RX ADMIN — Medication 30 MG: at 07:59

## 2023-03-01 RX ADMIN — FENTANYL CITRATE 50 MCG: 50 INJECTION, SOLUTION INTRAMUSCULAR; INTRAVENOUS at 09:37

## 2023-03-01 RX ADMIN — SODIUM CITRATE AND CITRIC ACID MONOHYDRATE 30 ML: 500; 334 SOLUTION ORAL at 06:55

## 2023-03-01 RX ADMIN — BUPIVACAINE 20 ML: 13.3 INJECTION, SUSPENSION, LIPOSOMAL INFILTRATION at 06:34

## 2023-03-01 RX ADMIN — LEVOFLOXACIN 500 MG: 5 INJECTION, SOLUTION INTRAVENOUS at 06:16

## 2023-03-01 RX ADMIN — FENTANYL CITRATE 50 MCG: 50 INJECTION, SOLUTION INTRAMUSCULAR; INTRAVENOUS at 10:00

## 2023-03-01 RX ADMIN — SODIUM CHLORIDE, POTASSIUM CHLORIDE, SODIUM LACTATE AND CALCIUM CHLORIDE: 600; 310; 30; 20 INJECTION, SOLUTION INTRAVENOUS at 12:48

## 2023-03-01 RX ADMIN — ENOXAPARIN SODIUM 40 MG: 40 INJECTION SUBCUTANEOUS at 06:07

## 2023-03-01 RX ADMIN — PROPOFOL 150 MCG/KG/MIN: 10 INJECTION, EMULSION INTRAVENOUS at 07:38

## 2023-03-01 RX ADMIN — SODIUM CHLORIDE, POTASSIUM CHLORIDE, SODIUM LACTATE AND CALCIUM CHLORIDE: 600; 310; 30; 20 INJECTION, SOLUTION INTRAVENOUS at 21:49

## 2023-03-01 RX ADMIN — LIDOCAINE HYDROCHLORIDE 80 MG: 20 INJECTION, SOLUTION INFILTRATION; PERINEURAL at 07:32

## 2023-03-01 RX ADMIN — Medication 10 MG: at 09:16

## 2023-03-01 ASSESSMENT — ACTIVITIES OF DAILY LIVING (ADL)
ADLS_ACUITY_SCORE: 20
ADLS_ACUITY_SCORE: 27
ADLS_ACUITY_SCORE: 20
ADLS_ACUITY_SCORE: 27
ADLS_ACUITY_SCORE: 27
ADLS_ACUITY_SCORE: 20
ADLS_ACUITY_SCORE: 20
ADLS_ACUITY_SCORE: 27
ADLS_ACUITY_SCORE: 27

## 2023-03-01 ASSESSMENT — LIFESTYLE VARIABLES: TOBACCO_USE: 0

## 2023-03-01 ASSESSMENT — ENCOUNTER SYMPTOMS: SEIZURES: 0

## 2023-03-01 NOTE — ANESTHESIA PROCEDURE NOTES
TAP Procedure Note    Pre-Procedure   Staff -        Anesthesiologist:  Frederick Gupta MD       Resident/Fellow: Mk Diamond DO       Performed By: fellow       Location: pre-op       Procedure Start/Stop Times: 3/1/2023 6:34 AM and 3/1/2023 6:38 AM       Pre-Anesthestic Checklist: patient identified, IV checked, site marked, risks and benefits discussed, informed consent, monitors and equipment checked, pre-op evaluation, at physician/surgeon's request and post-op pain management  Timeout:       Correct Patient: Yes        Correct Procedure: Yes        Correct Site: Yes        Correct Position: Yes        Correct Laterality: Yes        Site Marked: Yes  Procedure Documentation  Procedure: TAP       Diagnosis: POST-OP PAIN       Laterality: bilateral       Patient Position: supine       Patient Prep/Sterile Barriers: sterile gloves, mask       Skin prep: Chloraprep       Needle Type: short bevel       Needle Gauge: 21.        Needle Length (millimeters): 100        Ultrasound guided       1. Ultrasound was used to identify targeted nerve, plexus, vascular marker, or fascial plane and place a needle adjacent to it in real-time.       2. Ultrasound was used to visualize the spread of anesthetic in close proximity to the above referenced structure.       3. A permanent image is entered into the patient's record.    Assessment/Narrative         The placement was negative for: blood aspirated, painful injection and site bleeding       Paresthesias: No.       Bolus given via needle..        Secured via.        Insertion/Infusion Method: Single Shot       Complications: none       Injection made incrementally with aspirations every 5 mL.    Medication(s) Administered   Bupivacaine 0.25% PF (Infiltration) - Infiltration   20 mL - 3/1/2023 6:34:00 AM  Bupivacaine liposome (Exparel) 1.3% LA inj susp (Infiltration) - Infiltration   20 mL - 3/1/2023 6:34:00 AM  Medication Administration Time: 3/1/2023 6:34  "AM     Comments:  Bilateral Transverse Abdominis Plane Block       FOR [facility] ONLY:   Pain Team Contact information: please page the Pain Team Via [employer] Search \"Pain\". During daytime hours, please page the attending first. At night please page the resident first.    "

## 2023-03-01 NOTE — ANESTHESIA CARE TRANSFER NOTE
Patient: Frederick Nguyen    Procedure: Procedure(s):  LAPAROSCOPY, DIAGNOSTIC  mesenteric mass and small bowel resection       Diagnosis: Mesenteric mass [K63.89]  Diagnosis Additional Information: No value filed.    Anesthesia Type:   General     Note:    Oropharynx: oropharynx clear of all foreign objects and spontaneously breathing  Level of Consciousness: drowsy  Oxygen Supplementation: nasal cannula  Level of Supplemental Oxygen (L/min / FiO2): 2  Independent Airway: airway patency satisfactory and stable  Dentition: dentition unchanged  Vital Signs Stable: post-procedure vital signs reviewed and stable  Report to RN Given: handoff report given  Patient transferred to: PACU    Handoff Report: Identifed the Patient, Identified the Reponsible Provider, Reviewed the pertinent medical history, Discussed the surgical course, Reviewed Intra-OP anesthesia mangement and issues during anesthesia, Set expectations for post-procedure period and Allowed opportunity for questions and acknowledgement of understanding      Vitals:  Vitals Value Taken Time   /99 03/01/23 1103   Temp     Pulse 80 03/01/23 1107   Resp 12 03/01/23 1107   SpO2 99 % 03/01/23 1107   Vitals shown include unvalidated device data.    Electronically Signed By: BENNY Verma CRNA  March 1, 2023  11:08 AM

## 2023-03-01 NOTE — BRIEF OP NOTE
[facility]    Brief Operative Note    Pre-operative diagnosis: Mesenteric mass [K63.89]  Post-operative diagnosis Same as pre-operative diagnosis    Procedure(s):  LAPAROSCOPY, DIAGNOSTIC  mesenteric mass and small bowel resection  Surgeon: Surgeon(s) and Role:     * Mukul Thompson MD - Primary     Zohra Francisco MD PGY-4  Anesthesia: General with Block   Estimated Blood Loss: 5 ml    Drains: None  Specimens:   ID Type Source Tests Collected by Time Destination   1 : Mesenteric mass and small bowel resection Tissue Other SURGICAL PATHOLOGY EXAM Mukul Thompson MD 3/1/2023  9:05 AM    2 : Additional small bowel Tissue Other SURGICAL PATHOLOGY EXAM Mukul Thompson MD 3/1/2023  9:42 AM      Findings:   None.  Complications: None.  Implants: * No implants in log *

## 2023-03-01 NOTE — ANESTHESIA PREPROCEDURE EVALUATION
Anesthesia Pre-Procedure Evaluation    Patient: Frederick Nguyen   MRN: [id number] : 1975        Procedure : Procedure(s):  LAPAROSCOPY  LAPAROSCOPIC HAND ASSISTED SMALL INTESTINE RESECTION  possible open EXCISION, SMALL INTESTINE, WITH OSTOMY CREATION          Past Medical History:   Diagnosis Date     Cervical spondylosis without myelopathy      Heart murmur      Mesenteric mass      Osteomyelitis, L5S1 03/15/2018    secondary to bactermeia     S/P mitral valve repair       Past Surgical History:   Procedure Laterality Date     APPENDECTOMY  1987     CHEILECTOMY Right 4/10/2019    Procedure: Right Cheilectomy;  Surgeon: Sawyer Crowell MD;  [facility]     COLONOSCOPY N/A 2022    Procedure: COLONOSCOPY;  Surgeon: Rufus White MD;  Location: [facility] OR     CV CORONARY ANGIOGRAM N/A 2019    Procedure: CV CORONARY ANGIOGRAM;  Surgeon: Pernell Bar MD;  Location:  HEART CARDIAC CATH LAB     CV RIGHT HEART CATH MEASUREMENTS RECORDED N/A 2019    Procedure: CV RIGHT HEART CATH;  Surgeon: Pernell Bar MD;  Location:  HEART CARDIAC CATH LAB     ESOPHAGOSCOPY, GASTROSCOPY, DUODENOSCOPY (EGD), COMBINED N/A 2022    Procedure: ESOPHAGOGASTRODUODENOSCOPY (EGD);  Surgeon: Rufus White MD;  [place] [facility] [place]     INJECT SACROILIAC JOINT Right 2018    Procedure: INJECT SACROILIAC JOINT;  Right Sacroiliac Joint Injection;  Surgeon: Thom Williamson MD;  Location:  OR     ORTHOPEDIC SURGERY  4/10/19    Bone Spur Removal     REPAIR VALVE MITRAL MINIMALLY INVASIVE N/A 10/25/2019    Procedure: MINIMALLY INVASIVE MITRAL VALVE REPAIR WITH MITRAL ANNULOPLASTY RING KEVIN-KAUR PHYSIO II SIZE: 36MM  (ON PUMP OXYGENATOR ; INTRAOPERATIVE DEEPAK BY CARDIOLOGIST DR. VARELA);  Surgeon: Rigoberto Downs MD;  Location: [facility] OR      No Known Allergies   Social History     Tobacco Use     Smoking status: Never     Smokeless tobacco: Never   Substance Use  Topics     Alcohol use: Yes     Comment: rare      Wt Readings from Last 1 Encounters:   03/01/23 83.5 kg (184 lb 1.4 oz)        Anesthesia Evaluation   Pt has had prior anesthetic. Type: General.    History of anesthetic complications  - PONV.      ROS/MED HX  ENT/Pulmonary:  - neg pulmonary ROS  (-) tobacco use   Neurologic:  - neg neurologic ROS  (-) no seizures, no CVA and no TIA   Cardiovascular:     (+) -----valvular problems/murmurs type: MVP and MR s/p Mitral valve repair . Previous cardiac testing   Echo: Date: 2020 Results:    Stress Test: Date: Results:    ECG Reviewed: Date: 8/2020 Results:  Sinus Tachycardia - frequent PAC s   # PACs = 4.  -  Nonspecific T-abnormality.    Cath: Date: Results:      METS/Exercise Tolerance: >4 METS    Hematologic:  - neg hematologic  ROS     Musculoskeletal: Comment: Back pain with activity       GI/Hepatic: Comment: Mesenteric mass    (+) GERD, Symptomatic, hiatal hernia,     Renal/Genitourinary:  - neg Renal ROS     Endo:  - neg endo ROS     Psychiatric/Substance Use:  - neg psychiatric ROS     Infectious Disease:  - neg infectious disease ROS     Malignancy:  - neg malignancy ROS     Other:  - neg other ROS          Physical Exam    Airway        Mallampati: II   TM distance: > 3 FB   Neck ROM: full   Mouth opening: > 3 cm    Respiratory Devices and Support         Dental       (+) Minor Abnormalities - some fillings, tiny chips      Cardiovascular          Rhythm and rate: regular and normal     Pulmonary           breath sounds clear to auscultation           OUTSIDE LABS:  CBC:   Lab Results   Component Value Date    WBC 7.7 02/23/2023    WBC 6.4 10/30/2020    HGB 15.6 02/23/2023    HGB 14.9 10/23/2020    HCT 46.1 02/23/2023    HCT 43.7 10/23/2020     02/23/2023     10/23/2020     BMP:   Lab Results   Component Value Date     02/23/2023     10/20/2020    POTASSIUM 4.3 02/23/2023    POTASSIUM 4.3 10/20/2020    CHLORIDE 111 (H) 02/23/2023     CHLORIDE 105 10/20/2020    CO2 26 02/23/2023    CO2 27 10/20/2020    BUN 20 02/23/2023    BUN 19 10/20/2020    CR 1.08 02/23/2023    CR 1.28 (H) 10/20/2020    GLC 93 02/23/2023     (H) 10/20/2020     COAGS:   Lab Results   Component Value Date    PTT 33 10/25/2019    INR 1.23 (H) 10/25/2019    FIBR 179 (L) 10/25/2019     POC:   Lab Results   Component Value Date     (H) 10/28/2019     HEPATIC:   Lab Results   Component Value Date    ALBUMIN 3.7 10/20/2020    PROTTOTAL 8.1 10/20/2020    ALT 15 10/20/2020    AST 10 10/20/2020    ALKPHOS 75 10/20/2020    BILITOTAL 1.2 10/20/2020     OTHER:   Lab Results   Component Value Date    PH 7.38 10/25/2019    LACT 1.1 10/25/2019    A1C 4.9 10/07/2019    JEANNINE 9.2 02/23/2023    PHOS 2.9 10/27/2019    MAG 2.5 (H) 10/27/2019    TSH 2.61 08/19/2020    CRP 6.8 10/30/2020    SED 8 10/30/2020       Anesthesia Plan    ASA Status:  2   NPO Status:  NPO Appropriate    Anesthesia Type: General.     - Airway: ETT   Induction: RSI.   Maintenance: TIVA.   Techniques and Equipment:     - Lines/Monitors: 2nd IV     Consents    Anesthesia Plan(s) and associated risks, benefits, and realistic alternatives discussed. Questions answered and patient/representative(s) expressed understanding.    - Discussed:     - Discussed with:  Patient         Postoperative Care    Pain management: IV analgesics.   PONV prophylaxis: Ondansetron (or other 5HT-3), Dexamethasone or Solumedrol, Scopolamine patch, Background Propofol Infusion     Comments:                Peace Mitchell MD

## 2023-03-01 NOTE — PLAN OF CARE
Pt is A/Ox4. VSS except HTN, provider notified. Weaned from 2L to RA. Up with 1 assist, ambulated in room. Pain well controlled with PCA dilaudid, routine tylenol, robaxin. Denies nausea but reported acid reflux-like symptoms, new order for protonix. Advanced from NPO to clear liquid diet. Tolerated water, broth, and popsicle. Not yet passing flatus. Midline incision with 4 lap sites. Park with adequate urine output.    Pt refused capno; waiting for cont SpO2 monitor to arrive.

## 2023-03-01 NOTE — PHARMACY-ADMISSION MEDICATION HISTORY
Admission Medication History Completed by Pharmacy    See [facility] Admission Navigator for allergy information, preferred outpatient pharmacy, prior to admission medications and immunization status.     Medication History Sources:     Reviewed medication dispense history     Reviewed pre-op examination visit     Pre-op nurse documented last times of administration     Changes made to PTA medication list (reason):    Added: None    Deleted: None    Changed: None    Additional Information:    None    Prior to Admission medications    Medication Sig Last Dose Taking? Auth Provider Long Term End Date   aspirin (ASA) 325 MG EC tablet Take 325 mg by mouth every 6 hours as needed for moderate pain (4-6) Past Month Yes Reported, Patient     diclofenac (VOLTAREN) 1 % topical gel Apply 2 g topically 2 times daily as needed for moderate pain Past Month Yes Agapito Wilks MD     metoprolol succinate ER (TOPROL-XL) 50 MG 24 hr tablet Take 25 mg by mouth every morning 2/28/2023 at 1200 Yes Agapito Wilks MD Yes        Date completed: 03/01/23    Medication history completed by: Sridhar Villafuerte, JimyD

## 2023-03-01 NOTE — PROGRESS NOTES
"Goal outcome evaluation:    Plan of Care Reviewed with: Patient  Overall Patient Progress: No change  Admitted/transferred from: PACU  2 RN full   skin assessment completed by Fly Tejeda, RN and Gomez REYES RN.  Skin assessment finding: other Midline abd incision dermabonded, TEDDY. x4 lap sites, dermabonded, TEDDY.   Interventions/actions: other Educated on importance of repositioning, importance of hydration, preventative mepilex in place.     Will continue to monitor.    VS BP (!) 151/99 (Cuff Size: Adult Regular)   Pulse 80   Temp 98.2  F (36.8  C) (Oral)   Resp 14   Ht 1.753 m (5' 9\")   Wt 83.5 kg (184 lb 1.4 oz)   SpO2 97%   BMI 27.18 kg/m     Activity: No OOB this shift.  Neuros: A&O x4. Able to make needs known.  Cardiac: Elevated BP, team aware. Denies cardiac chest pain.  Respiratory: Cont Capno, 2L NC. Denies SOB  GI/: Voiding AUOP via Park. No BM this shift. -flatus  Diet: NPO ex. Ice chips + meds  Lines/Drains: (R) PIV infusing MIVF + PCA dilaudid. (L) x2 PIV - SL  Labs: COVID Neg  Pain/nausea: 3-5/10 pain. Managed w/ PCA dilaudid, sched robaxin, sched tylenol  "

## 2023-03-01 NOTE — OP NOTE
Patient: Frederick Nguyen  MRN: [id number]  Date of Surgery: 3/1/2023     Pre-Op Diagnosis:   1. Growing Small Bowel Mass  2. Mitral Regurgitation s/p Surgical Repair     Post-Op Diagnosis:   1. Growing Small Bowel Mass  2. Mitral Regurgitation s/p Surgical Repair  3. 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 Diagnostic Laparoscopy  2. Exploratory Laparotomy  3. En Bloc Mesenteric Mass and Small Bowel Resection     Anesthesia Type: General Endotracheal  Attending Physician: Mukul Thompson MD  Assistants: Zohra Francisco MD     Indications: 47 M w/ growing, indeterminate small bowel/mesenteric mass concerning for potential malignancy.  Given this, he was offered surgical resection for definitive diagnosis and management.  He presented for this surgery today.  For details, please see my prior clinic note.     Findings: Despite a prior open appendectomy, laparoscopy showed no significant intra-abdominal adhesions.  There was one small omental adhesion to the sigmoid colon that was taken down with LigaSure so that the omentum could be moved to the upper abdomen to facilitate examination of the small and large bowel.  There was no evidence of a metastatic process.  The liver and gallbladder appeared normal.  The visible hollow viscus organs appeared normal.  I was not able to see the large mass on laparoscopy and running the bowel proved difficult in the right ernie-abdomen via the laparoscopic approach.  Thus, we proceeded with an open operation.  The abdomen was entered with a central midline laparotomy extending above and below the umbilicus.  A wound protector was placed.  A large, firm mass was palpable posteriorly in the right ernie-abdomen.  This was very mobile.  I was able to grasp the mass and attached adjacent bowel and mesentery and deliver this out of the laparotomy incision.  The mass was adherent to 30-40 cm of adjacent small bowel.  The mesentery just distal to the mass was carefully  "divided with LigaSure and EndoGIA 45 mm white staple loads to divide the larger vascular pedicles that were feeding the tumor and adjacent bowel.  There were no other masses or enlarged nodes palpable in the area.  We then divided bowel proximal and distal to the tumor outside of the lines of vascular demarcation on the bowel.  Remaining mesentery was divided with LigaSure to detach the specimen.  Remaining stapled off bowel ends were of normal color, were peristalsing, and bleeding from the staple lines.  In addition, there was dopplerable pulse at each stapled end.  These indicated well vascularized bowel remaining.  The specimen was sent to pathology for permanent and frozen section.  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  The remainder of the small bowel was run from the Ligament of Treitz to the ileocecal valve.  We identified a possible Meckel's diverticulum (photographed on laparoscopy), but left this intact as it appeared to not be causing any issues and the purpose of the surgery was to address the mass.  There were no other masses or abnormalities found in the small bowel.  A side to side, functional end-end anastomosis was fashioned with the two small bowel ends using a SAMIRA 75 mm blue staple load and another fire of that stapler to close the common enterotomy.  The anastomosis was palpably patent upon completion.  A crotch stitch was placed.  The mesenteric defect was closed.  Hemostasis was excellent at this point even with Valsalva.  The abdomen was irrigated.  There was no evidence of inadvertent injuries.  The midline incisional fascia was closed, followed by a deep dermal suture layer, a subcuticular suture layer, and skin glue.  The abdomen was re-insufflated via one of the laparoscopy ports.  Again, there was no evidence of inadvertent injury, hemostasis was excellent, and the bowel appeared in good orientation on " final examination.  The abdomen was desufflated and the port removed.  The laparoscopy ports were closed with 4-0 Monacryl and skin glue.  The patient tolerated the procedure well.  Instrument, sponge, and needle counts were correct at the end of the case.     Description of Procedure: After appropriate informed consent was obtained, the patient was brought to the operating room where a timeout was performed to identify the correct patient, procedure, and site.  Antibiotics were administered for perioperative prophylaxis.  SCDs were placed for DVT prophylaxis.  The patient was also given chemical DVT prophylaxis in the pre-operative holding area.  She was then induced under general endotracheal anesthesia.  An OG tube and Park catheter were placed.  The patient was placed in supine position with arms out to the sides.  The patient was prepped and draped in a sterile fashion.  Insufflation was established at Abbott's point in the left upper quadrant using a Veres needle.  This was done without difficulty (negative aspiration, water dropped freely, and opening pressure 2 mmHg).  A 5 mm OptiView trocar was inserted at this site under direct visualization.  There was no evidence of injury from entry.  Three other 5 mm ports were placed in each quadrant of the abdomen to facilitate visualization and running the small bowel.  Despite a prior open appendectomy, laparoscopy showed no significant intra-abdominal adhesions.  There was one small omental adhesion to the sigmoid colon that was taken down with LigaSure so that the omentum could be moved to the upper abdomen to facilitate examination of the small and large bowel.  There was no evidence of a metastatic process.  The liver and gallbladder appeared normal.  The visible hollow viscus organs appeared normal.  I was not able to see the large mass on laparoscopy and running the bowel proved difficult in the right ernie-abdomen via the laparoscopic approach.  Thus, we  "proceeded with an open operation.  We entered the abdomen via a midline laparotomy extending above and below the umbilicus.  A large wound protector was placed.  A large, firm mass was palpable posteriorly in the right ernie-abdomen.  This was very mobile.  I was able to grasp the mass and attached adjacent bowel and mesentery and deliver this out of the laparotomy incision.  The mass was adherent to 30-40 cm of adjacent small bowel.  The mesentery just distal to the mass was carefully divided with LigaSure and EndoGIA 45 mm white staple loads to divide the larger vascular pedicles that were feeding the tumor and adjacent bowel.  There were no other masses or enlarged nodes palpable in the area.  We then divided bowel proximal and distal to the tumor (using EndoGIA 60 mm blue staple loads) outside of the lines of vascular demarcation on the bowel.  Remaining mesentery was divided with LigaSure to detach the specimen.  Remaining stapled off bowel ends were of normal color, were peristalsing, and bleeding from the staple lines.  In addition, there was dopplerable pulse at each stapled end.  These indicated well vascularized bowel remaining.  The specimen was sent to pathology for permanent and frozen section.  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  The remainder of the small bowel was run from the Ligament of Treitz to the ileocecal valve.  We identified a possible Meckel's diverticulum (photographed on laparoscopy), but left this intact as it appeared to not be causing any issues and the purpose of the surgery was to address the mass.  There were no other masses or abnormalities found in the small bowel.  A side to side, functional end-end anastomosis was fashioned with the two small bowel ends using a SAMIRA 75 mm blue staple load and another fire of that stapler to close the common enterotomy.  The anastomosis was palpably patent upon " completion.  A crotch stitch was placed.  The mesenteric defect was closed.  Hemostasis was excellent at this point even with Valsalva.  The abdomen was irrigated.  There was no evidence of inadvertent injuries.  The laparotomy incisional fascia was then closed with running looped 0 PDS.  The incision was irrigated above the fascia then the soft tissue and skin closed in two layers with 3-0 Vicryl and 4-0 Monacryl.  Skin glue was used at the surface.  The abdomen was re-insufflated via one of the laparoscopy ports.  Again, there was no evidence of inadvertent injury, hemostasis was excellent, and the bowel appeared in good orientation of final examination.  The abdomen was desufflated and the port removed.  The remaining port incisions were closed with 4-0 Monacryl and skin glue.  All instrument, sponge, and needle counts were correct x 2.  The patient was then extubated and taken to the PACU in stable condition.  He tolerated the procedure well and I discussed the case with the spouse over the phone.      Estimated Blood Loss: 50 mL  Urine Output: See anesthesia record  Fluids: See anesthesia record  Drains: None  Specimens:      Disposition: PACU --> 7C

## 2023-03-01 NOTE — ANESTHESIA PROCEDURE NOTES
Airway       Patient location during procedure: OR       Procedure Start/Stop Times: 3/1/2023 7:34 AM  Staff -        CRNA: Urszula Fong APRN CRNA       Performed By: CRNA  Consent for Airway        Urgency: elective  Indications and Patient Condition       Indications for airway management: alem-procedural       Induction type:RSI       Mask difficulty assessment: 0 - not attempted    Final Airway Details       Final airway type: endotracheal airway       Successful airway: ETT - single and Oral  Endotracheal Airway Details        ETT size (mm): 7.5       Cuffed: yes       Successful intubation technique: direct laryngoscopy       DL Blade Type: Chau 2       Grade View of Cords: 1       Adjucts: stylet       Position: Right       Measured from: gums/teeth       Secured at (cm): 24    Post intubation assessment        Placement verified by: capnometry, equal breath sounds and chest rise        Number of attempts at approach: 1       Secured by: tube tamer.       Ease of procedure: easy       Dentition: Intact and Unchanged    Medication(s) Administered   Medication Administration Time: 3/1/2023 7:34 AM

## 2023-03-01 NOTE — ANESTHESIA POSTPROCEDURE EVALUATION
Patient: Frederick Nguyen    Procedure: Procedure(s):  LAPAROSCOPY, DIAGNOSTIC  mesenteric mass and small bowel resection       Anesthesia Type:  General    Note:  Disposition: Admission   Postop Pain Control: Uneventful            Sign Out: Well controlled pain   PONV: No   Neuro/Psych: Uneventful            Sign Out: Acceptable/Baseline neuro status   Airway/Respiratory: Uneventful            Sign Out: Acceptable/Baseline resp. status   CV/Hemodynamics: Uneventful            Sign Out: Acceptable CV status; No obvious hypovolemia; No obvious fluid overload   Other NRE: NONE   DID A NON-ROUTINE EVENT OCCUR? No           Last vitals:  Vitals Value Taken Time   /108 03/01/23 1330   Temp 37.1  C (98.8  F) 03/01/23 1200   Pulse 77 03/01/23 1332   Resp 7 03/01/23 1332   SpO2 99 % 03/01/23 1332   Vitals shown include unvalidated device data.    Electronically Signed By: Peace Mitchell MD  March 1, 2023  1:34 PM

## 2023-03-02 LAB
ANION GAP SERPL CALCULATED.3IONS-SCNC: 10 MMOL/L (ref 7–15)
BUN SERPL-MCNC: 12.1 MG/DL (ref 6–20)
CALCIUM SERPL-MCNC: 9.1 MG/DL (ref 8.6–10)
CHLORIDE SERPL-SCNC: 106 MMOL/L (ref 98–107)
CREAT SERPL-MCNC: 1.03 MG/DL (ref 0.67–1.17)
DEPRECATED HCO3 PLAS-SCNC: 23 MMOL/L (ref 22–29)
ERYTHROCYTE [DISTWIDTH] IN BLOOD BY AUTOMATED COUNT: 12.6 % (ref 10–15)
GFR SERPL CREATININE-BSD FRML MDRD: >90 ML/MIN/1.73M2
GLUCOSE BLDC GLUCOMTR-MCNC: 111 MG/DL (ref 70–99)
GLUCOSE SERPL-MCNC: 114 MG/DL (ref 70–99)
HCT VFR BLD AUTO: 40.5 % (ref 40–53)
HGB BLD-MCNC: 13.5 G/DL (ref 13.3–17.7)
MCH RBC QN AUTO: 30.1 PG (ref 26.5–33)
MCHC RBC AUTO-ENTMCNC: 33.3 G/DL (ref 31.5–36.5)
MCV RBC AUTO: 90 FL (ref 78–100)
PLATELET # BLD AUTO: 183 10E3/UL (ref 150–450)
POTASSIUM SERPL-SCNC: 4.3 MMOL/L (ref 3.4–5.3)
RBC # BLD AUTO: 4.49 10E6/UL (ref 4.4–5.9)
SODIUM SERPL-SCNC: 139 MMOL/L (ref 136–145)
WBC # BLD AUTO: 10.7 10E3/UL (ref 4–11)

## 2023-03-02 PROCEDURE — 250N000013 HC RX MED GY IP 250 OP 250 PS 637

## 2023-03-02 PROCEDURE — 250N000013 HC RX MED GY IP 250 OP 250 PS 637: Performed by: STUDENT IN AN ORGANIZED HEALTH CARE EDUCATION/TRAINING PROGRAM

## 2023-03-02 PROCEDURE — 258N000003 HC RX IP 258 OP 636: Performed by: STUDENT IN AN ORGANIZED HEALTH CARE EDUCATION/TRAINING PROGRAM

## 2023-03-02 PROCEDURE — 82310 ASSAY OF CALCIUM: CPT | Performed by: STUDENT IN AN ORGANIZED HEALTH CARE EDUCATION/TRAINING PROGRAM

## 2023-03-02 PROCEDURE — 250N000011 HC RX IP 250 OP 636: Performed by: STUDENT IN AN ORGANIZED HEALTH CARE EDUCATION/TRAINING PROGRAM

## 2023-03-02 PROCEDURE — 120N000002 HC R&B MED SURG/OB UMMC

## 2023-03-02 PROCEDURE — 36415 COLL VENOUS BLD VENIPUNCTURE: CPT | Performed by: STUDENT IN AN ORGANIZED HEALTH CARE EDUCATION/TRAINING PROGRAM

## 2023-03-02 PROCEDURE — 999N000147 HC STATISTIC PT IP EVAL DEFER: Performed by: REHABILITATION PRACTITIONER

## 2023-03-02 PROCEDURE — 99024 POSTOP FOLLOW-UP VISIT: CPT | Performed by: PHYSICIAN ASSISTANT

## 2023-03-02 PROCEDURE — 999N000111 HC STATISTIC OT IP EVAL DEFER

## 2023-03-02 PROCEDURE — 85027 COMPLETE CBC AUTOMATED: CPT | Performed by: STUDENT IN AN ORGANIZED HEALTH CARE EDUCATION/TRAINING PROGRAM

## 2023-03-02 RX ORDER — LIDOCAINE 4 G/G
1 PATCH TOPICAL
Status: DISCONTINUED | OUTPATIENT
Start: 2023-03-02 | End: 2023-03-04 | Stop reason: HOSPADM

## 2023-03-02 RX ADMIN — METHOCARBAMOL 500 MG: 500 TABLET ORAL at 11:26

## 2023-03-02 RX ADMIN — SODIUM CHLORIDE, POTASSIUM CHLORIDE, SODIUM LACTATE AND CALCIUM CHLORIDE: 600; 310; 30; 20 INJECTION, SOLUTION INTRAVENOUS at 15:18

## 2023-03-02 RX ADMIN — METHOCARBAMOL 500 MG: 500 TABLET ORAL at 16:00

## 2023-03-02 RX ADMIN — ENOXAPARIN SODIUM 40 MG: 40 INJECTION SUBCUTANEOUS at 06:49

## 2023-03-02 RX ADMIN — ACETAMINOPHEN 975 MG: 325 TABLET ORAL at 14:12

## 2023-03-02 RX ADMIN — ACETAMINOPHEN 975 MG: 325 TABLET ORAL at 07:49

## 2023-03-02 RX ADMIN — METOPROLOL SUCCINATE 25 MG: 25 TABLET, EXTENDED RELEASE ORAL at 07:49

## 2023-03-02 RX ADMIN — METHOCARBAMOL 500 MG: 500 TABLET ORAL at 06:49

## 2023-03-02 RX ADMIN — SODIUM CHLORIDE, POTASSIUM CHLORIDE, SODIUM LACTATE AND CALCIUM CHLORIDE: 600; 310; 30; 20 INJECTION, SOLUTION INTRAVENOUS at 05:45

## 2023-03-02 RX ADMIN — METHOCARBAMOL 500 MG: 500 TABLET ORAL at 20:23

## 2023-03-02 RX ADMIN — LIDOCAINE PATCH 4% 1 PATCH: 40 PATCH TOPICAL at 11:26

## 2023-03-02 RX ADMIN — ACETAMINOPHEN 975 MG: 325 TABLET ORAL at 20:23

## 2023-03-02 RX ADMIN — Medication 40 MG: at 07:49

## 2023-03-02 ASSESSMENT — ACTIVITIES OF DAILY LIVING (ADL)
ADLS_ACUITY_SCORE: 27
ADLS_ACUITY_SCORE: 27
ADLS_ACUITY_SCORE: 22
ADLS_ACUITY_SCORE: 27
ADLS_ACUITY_SCORE: 22
ADLS_ACUITY_SCORE: 27
ADLS_ACUITY_SCORE: 27
ADLS_ACUITY_SCORE: 22
ADLS_ACUITY_SCORE: 27

## 2023-03-02 NOTE — PROGRESS NOTES
[id number]    Temp: 98.4  F (36.9  C) Temp src: Oral BP: (!) 152/102 Pulse: 83   Resp: 18 SpO2: 96 % O2 Device: None (Room air) Oxygen Delivery: 2 LPM     Pt is AOx4. Hypertensive. OVSS. RA. Pt refused CAPNO due to noise, allowed me to apply cont pulse ox, sating WDL. C/o of R shoulder pain and some abd discomfort. Pain controlled with willy tylenol, lidocaine, and robaxin. Mid abd incision TEDDY, CDI. Lap sites x4, CDI. L PIV x1 infusing with LR at 100 ml/hr and PCA dilaudid. L PIV SL'd Denies nausea. Not passing gas. No BM. Park cath d/c in the AM. Voiding spontaneously with AUOP. Worked with OT. Ambulating in the room/halls independently with IV pole. Tolerating clear liquid diet.

## 2023-03-02 NOTE — PLAN OF CARE
OT: 7C: DEFER    Occupational Therapy: Orders received. Chart reviewed and discussed with care team.? Occupational Therapy not indicated due to lack of acute IP OT needs at this time. Met with pt at bedside and provided education in abdominal precautions (no lifting >10lbs, limit excessive bending, twisting to alleviate pain) and provided educational handout with modifications to bathing, dressing, houshold activities, and driving. Pt agreeable to short bout of ambulation, trial of stairs, and LB dressing completing INDly, denies symptoms of activity tolerance indicating safe transition to home when medically appropriate. Family able to assist with heavy IADLs as needed. Educated in orders to complete ambulation QID while IP and pt agreeable.? Defer discharge recommendations to medical team.? Will complete orders.

## 2023-03-02 NOTE — PLAN OF CARE
Goal Outcome Evaluation:      Plan of Care Reviewed With: patient    Overall Patient Progress: no changeOverall Patient Progress: no change    Outcome Evaluation: A&Ox4, VSS on RA, pain managed w/PCA dilaudid, Robaxin given 1hr early this am. No acute changes.

## 2023-03-02 NOTE — PROGRESS NOTES
Physical Therapy: Orders received. Chart reviewed and discussed with care team.? Physical Therapy not indicated due to patient is mobilizing well, no skilled need for PT at this time.? Defer discharge recommendations to Occupational Therapy.? Will complete orders.

## 2023-03-02 NOTE — PROGRESS NOTES
"    [facility]    Progress Note - Surgical Oncology Service       Date of Admission:  3/1/2023    Assessment & Plan: Surgery   47 year old man POD 1 ex lap SBR and tumor resection    - PCA, PO pain medications  - Wean O2 as tolerated, IS  - Hold PTA antihypertensives, likely resume tomorrow  - CLD, continue mIVF, PPI  - No AM labs  - DVT ppx today  - Discontinue eubanks  - Activity as tolerated, encourage OOB     Diet: Clear Liquid Diet    DVT Prophylaxis: Enoxaparin (Lovenox) SQ  Eubanks Catheter: Not present  Lines: None     Drains: None     Cardiac Monitoring: None  Code Status: Full Code      Clinically Significant Risk Factors                        # Overweight: Estimated body mass index is 27.35 kg/m  as calculated from the following:    Height as of this encounter: 1.753 m (5' 9\").    Weight as of this encounter: 84 kg (185 lb 3 oz)., PRESENT ON ADMISSION         Disposition Plan      Expected Discharge Date: 03/05/2023        Discharge Comments: AROBF         The patient's care was discussed with the Attending Physician, Dr. Thompson.    Zohra Francisco MD  [facility]  Non-urgent messages: Securely message with [employer] (more info)  Text page via [facility] Paging/Directory     ______________________________________________________________________    Interval History   No acute events. Pain well controlled. Ambulated x2. Good UOP via eubanks. No flatus nor BM.     Physical Exam   Vital Signs: Temp: 98.4  F (36.9  C) Temp src: Oral BP: (!) 152/102 Pulse: 83   Resp: 18 SpO2: 96 % O2 Device: None (Room air) Oxygen Delivery: 2 LPM  Weight: 185 lbs 2.98 oz    Intake/Output Summary (Last 24 hours) at 3/2/2023 1619  Last data filed at 3/2/2023 1518  Gross per 24 hour   Intake 1626 ml   Output 5105 ml   Net -3479 ml     General Appearance: Appears comfortable  Respiratory: NLB on 2L NC  Cardiovascular: RRR  GI: Abdomen soft, " appropriately tender, incisions clean and dry  Skin: No jaundice  Other: Ext WWP           Data     I have personally reviewed the following data over the past 24 hrs:    10.7  \   13.5   / 183     139 106 12.1 /  111 (H)   4.3 23 1.03 \

## 2023-03-03 PROCEDURE — 250N000011 HC RX IP 250 OP 636: Performed by: STUDENT IN AN ORGANIZED HEALTH CARE EDUCATION/TRAINING PROGRAM

## 2023-03-03 PROCEDURE — 99024 POSTOP FOLLOW-UP VISIT: CPT | Performed by: PHYSICIAN ASSISTANT

## 2023-03-03 PROCEDURE — 250N000013 HC RX MED GY IP 250 OP 250 PS 637: Performed by: STUDENT IN AN ORGANIZED HEALTH CARE EDUCATION/TRAINING PROGRAM

## 2023-03-03 PROCEDURE — 250N000013 HC RX MED GY IP 250 OP 250 PS 637

## 2023-03-03 PROCEDURE — 120N000002 HC R&B MED SURG/OB UMMC

## 2023-03-03 PROCEDURE — 250N000013 HC RX MED GY IP 250 OP 250 PS 637: Performed by: PHYSICIAN ASSISTANT

## 2023-03-03 PROCEDURE — 258N000003 HC RX IP 258 OP 636: Performed by: STUDENT IN AN ORGANIZED HEALTH CARE EDUCATION/TRAINING PROGRAM

## 2023-03-03 RX ORDER — POLYETHYLENE GLYCOL 3350 17 G/17G
17 POWDER, FOR SOLUTION ORAL DAILY
Status: DISCONTINUED | OUTPATIENT
Start: 2023-03-03 | End: 2023-03-04 | Stop reason: HOSPADM

## 2023-03-03 RX ORDER — BISACODYL 10 MG
10 SUPPOSITORY, RECTAL RECTAL ONCE
Status: COMPLETED | OUTPATIENT
Start: 2023-03-03 | End: 2023-03-03

## 2023-03-03 RX ORDER — METOPROLOL SUCCINATE 50 MG/1
50 TABLET, EXTENDED RELEASE ORAL DAILY
Status: DISCONTINUED | OUTPATIENT
Start: 2023-03-03 | End: 2023-03-03

## 2023-03-03 RX ORDER — HYDROMORPHONE HCL IN WATER/PF 6 MG/30 ML
0.2 PATIENT CONTROLLED ANALGESIA SYRINGE INTRAVENOUS
Status: DISCONTINUED | OUTPATIENT
Start: 2023-03-03 | End: 2023-03-04 | Stop reason: HOSPADM

## 2023-03-03 RX ORDER — SIMETHICONE 80 MG
80 TABLET,CHEWABLE ORAL EVERY 6 HOURS PRN
Status: DISCONTINUED | OUTPATIENT
Start: 2023-03-03 | End: 2023-03-04 | Stop reason: HOSPADM

## 2023-03-03 RX ORDER — OXYCODONE HYDROCHLORIDE 5 MG/1
5 TABLET ORAL EVERY 4 HOURS PRN
Status: DISCONTINUED | OUTPATIENT
Start: 2023-03-03 | End: 2023-03-04 | Stop reason: HOSPADM

## 2023-03-03 RX ADMIN — ACETAMINOPHEN 975 MG: 325 TABLET ORAL at 21:30

## 2023-03-03 RX ADMIN — SIMETHICONE 80 MG: 80 TABLET, CHEWABLE ORAL at 16:45

## 2023-03-03 RX ADMIN — POLYETHYLENE GLYCOL 3350 17 G: 17 POWDER, FOR SOLUTION ORAL at 08:27

## 2023-03-03 RX ADMIN — OXYCODONE HYDROCHLORIDE 5 MG: 5 TABLET ORAL at 08:27

## 2023-03-03 RX ADMIN — METHOCARBAMOL 500 MG: 500 TABLET ORAL at 11:56

## 2023-03-03 RX ADMIN — ACETAMINOPHEN 975 MG: 325 TABLET ORAL at 08:27

## 2023-03-03 RX ADMIN — METHOCARBAMOL 500 MG: 500 TABLET ORAL at 16:06

## 2023-03-03 RX ADMIN — ACETAMINOPHEN 975 MG: 325 TABLET ORAL at 14:26

## 2023-03-03 RX ADMIN — BISACODYL 10 MG: 10 SUPPOSITORY RECTAL at 12:04

## 2023-03-03 RX ADMIN — OXYCODONE HYDROCHLORIDE 5 MG: 5 TABLET ORAL at 16:08

## 2023-03-03 RX ADMIN — METOPROLOL SUCCINATE 25 MG: 25 TABLET, EXTENDED RELEASE ORAL at 08:27

## 2023-03-03 RX ADMIN — METHOCARBAMOL 500 MG: 500 TABLET ORAL at 21:31

## 2023-03-03 RX ADMIN — Medication 40 MG: at 06:19

## 2023-03-03 RX ADMIN — METHOCARBAMOL 500 MG: 500 TABLET ORAL at 08:27

## 2023-03-03 RX ADMIN — SODIUM CHLORIDE, POTASSIUM CHLORIDE, SODIUM LACTATE AND CALCIUM CHLORIDE: 600; 310; 30; 20 INJECTION, SOLUTION INTRAVENOUS at 01:21

## 2023-03-03 RX ADMIN — ENOXAPARIN SODIUM 40 MG: 40 INJECTION SUBCUTANEOUS at 10:14

## 2023-03-03 ASSESSMENT — ACTIVITIES OF DAILY LIVING (ADL)
ADLS_ACUITY_SCORE: 22

## 2023-03-03 NOTE — PROGRESS NOTES
"    [facility]    Progress Note - Surgical Oncology Service       Date of Admission:  3/1/2023    Assessment & Plan: Surgery   47 year old man POD 2 ex lap SBR and tumor resection    - Switching to PO oxycodone in addition to scheduled tylenol and robaxin  -  IS  - Home metoprolol 50 mg daily  - Advance to regular diet, suppository, PPI  - No AM labs  - Lovenox  - Activity as tolerated, encourage OOB     Diet: Regular Diet Adult    DVT Prophylaxis: Enoxaparin (Lovenox) SQ  Eubanks Catheter: Not present  Lines: None     Drains: None     Cardiac Monitoring: None  Code Status: Full Code      Clinically Significant Risk Factors                        # Overweight: Estimated body mass index is 27.35 kg/m  as calculated from the following:    Height as of this encounter: 1.753 m (5' 9\").    Weight as of this encounter: 84 kg (185 lb 3 oz)., PRESENT ON ADMISSION         Disposition Plan     Expected Discharge Date: 03/05/2023        Discharge Comments: AROBF         The patient's care to be discussed with the Attending Physician, Dr. Thompson.    Magda Decker MD PGY-5  [facility]  Non-urgent messages: Securely message with [employer] (more info)  Text page via [employer] Paging/Directory     ______________________________________________________________________    Interval History   No acute events. Pain well controlled with 1.2 mg of hydromorphone yesterday. Ambulated x2. Good UOP since eubanks was removed. No flatus nor BM.     Physical Exam   Vital Signs: Temp: 98.5  F (36.9  C) Temp src: Oral BP: (!) 135/100 Pulse: 85   Resp: 16 SpO2: 96 % O2 Device: None (Room air)    Weight: 185 lbs 2.98 oz    UOP [id number]/375    General Appearance: Appears comfortable  Respiratory: NLB on RA  Cardiovascular: RRR  GI: Abdomen soft, appropriately tender, incisions without erythema edema or exudate  Skin: No jaundice  Other: Ext WWP         3/2  WBC 10.7 hbg 13.5 " platelets 183 Cr 1.03

## 2023-03-03 NOTE — PLAN OF CARE
15:00-23:30    Goal Outcome Evaluation:    Plan of Care Reviewed With: patient    Overall Patient Progress: no change    Temp: 98.4  F (36.9  C) Temp src: Oral BP: (!) 152/102 Pulse: 83   Resp: 18 SpO2: 96 % O2 Device: None (Room air)      Status: S/P  LAPAROSCOPY, DIAGNOSTIC mesenteric mass and small bowel resection, Day # 1    -pain controlled with PCA Dilaudid, Robaxin and Tylenol.  Pain went up from 2 to 6 because patient was not pushing the PCA button  MD notified and advised patient to push PCA button whenever pain increases  -tolerating  Clear diet without nausea  -negative flatus, negative stool, hypo bowel sound  -voiding not saving  -abd inc derma bonded TEDDY  -ambulating frequently and independently    Continue with plan of care

## 2023-03-03 NOTE — PLAN OF CARE
Goal Outcome Evaluation: POD2 of a diagnositc laparoscopic mesenteric mass removal and small bowel resection. A&Ox4, VSS on room air. Pain is controlled with a PCA of dilaudid. Up independently, IVM running on a CLD, has not passed gas, LBM prior to surgery. Voiding in the urinal, adequate ouput. Lap sites TEDDY and soft.     Continue with post op process.

## 2023-03-04 VITALS
SYSTOLIC BLOOD PRESSURE: 142 MMHG | HEART RATE: 94 BPM | TEMPERATURE: 97.9 F | HEIGHT: 69 IN | BODY MASS INDEX: 27.43 KG/M2 | OXYGEN SATURATION: 99 % | RESPIRATION RATE: 16 BRPM | DIASTOLIC BLOOD PRESSURE: 95 MMHG | WEIGHT: 185.19 LBS

## 2023-03-04 LAB — PLATELET # BLD AUTO: 181 10E3/UL (ref 150–450)

## 2023-03-04 PROCEDURE — 85049 AUTOMATED PLATELET COUNT: CPT | Performed by: STUDENT IN AN ORGANIZED HEALTH CARE EDUCATION/TRAINING PROGRAM

## 2023-03-04 PROCEDURE — 36415 COLL VENOUS BLD VENIPUNCTURE: CPT | Performed by: STUDENT IN AN ORGANIZED HEALTH CARE EDUCATION/TRAINING PROGRAM

## 2023-03-04 PROCEDURE — 250N000013 HC RX MED GY IP 250 OP 250 PS 637: Performed by: STUDENT IN AN ORGANIZED HEALTH CARE EDUCATION/TRAINING PROGRAM

## 2023-03-04 PROCEDURE — 250N000011 HC RX IP 250 OP 636: Performed by: STUDENT IN AN ORGANIZED HEALTH CARE EDUCATION/TRAINING PROGRAM

## 2023-03-04 PROCEDURE — 250N000013 HC RX MED GY IP 250 OP 250 PS 637

## 2023-03-04 PROCEDURE — 250N000013 HC RX MED GY IP 250 OP 250 PS 637: Performed by: PHYSICIAN ASSISTANT

## 2023-03-04 RX ORDER — PANTOPRAZOLE SODIUM 40 MG/1
40 TABLET, DELAYED RELEASE ORAL DAILY
Qty: 30 TABLET | Refills: 0 | Status: SHIPPED | OUTPATIENT
Start: 2023-03-04 | End: 2023-03-21

## 2023-03-04 RX ORDER — ACETAMINOPHEN 325 MG/1
975 TABLET ORAL 3 TIMES DAILY
COMMUNITY
Start: 2023-03-04 | End: 2023-04-12

## 2023-03-04 RX ORDER — METHOCARBAMOL 500 MG/1
500 TABLET, FILM COATED ORAL 4 TIMES DAILY
Qty: 60 TABLET | Refills: 0 | Status: SHIPPED | OUTPATIENT
Start: 2023-03-04 | End: 2023-04-12

## 2023-03-04 RX ORDER — POLYETHYLENE GLYCOL 3350 17 G/17G
17 POWDER, FOR SOLUTION ORAL DAILY
Qty: 510 G | COMMUNITY
Start: 2023-03-04 | End: 2023-04-12

## 2023-03-04 RX ORDER — SENNOSIDES A AND B 8.6 MG/1
1 TABLET, FILM COATED ORAL DAILY
COMMUNITY
Start: 2023-03-04 | End: 2023-04-12

## 2023-03-04 RX ORDER — OXYCODONE HYDROCHLORIDE 5 MG/1
5 TABLET ORAL EVERY 4 HOURS PRN
Qty: 20 TABLET | Refills: 0 | Status: SHIPPED | OUTPATIENT
Start: 2023-03-04 | End: 2023-04-12

## 2023-03-04 RX ORDER — OXYCODONE HYDROCHLORIDE 5 MG/1
5 TABLET ORAL EVERY 4 HOURS PRN
Qty: 25 TABLET | Refills: 0 | Status: SHIPPED | OUTPATIENT
Start: 2023-03-04 | End: 2023-03-04

## 2023-03-04 RX ADMIN — METOPROLOL SUCCINATE 25 MG: 25 TABLET, EXTENDED RELEASE ORAL at 08:04

## 2023-03-04 RX ADMIN — POLYETHYLENE GLYCOL 3350 17 G: 17 POWDER, FOR SOLUTION ORAL at 08:04

## 2023-03-04 RX ADMIN — SIMETHICONE 80 MG: 80 TABLET, CHEWABLE ORAL at 08:07

## 2023-03-04 RX ADMIN — METHOCARBAMOL 500 MG: 500 TABLET ORAL at 11:16

## 2023-03-04 RX ADMIN — SIMETHICONE 80 MG: 80 TABLET, CHEWABLE ORAL at 00:02

## 2023-03-04 RX ADMIN — ACETAMINOPHEN 975 MG: 325 TABLET ORAL at 08:04

## 2023-03-04 RX ADMIN — OXYCODONE HYDROCHLORIDE 5 MG: 5 TABLET ORAL at 00:02

## 2023-03-04 RX ADMIN — METHOCARBAMOL 500 MG: 500 TABLET ORAL at 08:04

## 2023-03-04 RX ADMIN — ENOXAPARIN SODIUM 40 MG: 40 INJECTION SUBCUTANEOUS at 09:58

## 2023-03-04 ASSESSMENT — ACTIVITIES OF DAILY LIVING (ADL)
ADLS_ACUITY_SCORE: 22

## 2023-03-04 NOTE — PLAN OF CARE
Pt discharge paperwork printed, reviewed, and signed by pt and bedside RN. Questions answered. PIV x2 removed. Medications ready for pickup at discharge pharmacy. Pt to discharge home- spouse here to drive pt home.

## 2023-03-04 NOTE — PLAN OF CARE
Goal Outcome Evaluation:      Plan of Care Reviewed With: patient     Pod# 3 ex lap SBR and tumor resection    A/O x4. VSS, on room air. Denies nausea. Tolerating reg-diet. Pain managed with oxycodone and simethicone. Midline incision and lap sites x4 open to air. Left PIV x2 SL. Up ad devon. Passing gas but no bm on this shift. Voids spont in urinal. Cont POC.

## 2023-03-04 NOTE — DISCHARGE SUMMARY
[facility]     Final pathology returned as desmoid fibromatosis.    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 Thompson    Discharge Disposition   Discharged to home  Condition at discharge: Stable    Hospital Course   Frederick Nguyen is a 47 year old man with history of MVR, GERD and intraabdominal mass of unknown etiology which had shown interval growth on subequent CT scans who was admitted for exploratory laparoscopy and mass excision. He underwent laparoscopy converted to open laparotomy with small bowel resection and removal of a 10 cm mass. He was transferred to the floor postoperatively. His intraoperative and postoperative course was uneventful. He had ROBF and his diet was subsequently advanced. By the day of discharge his pain was well controlled on a PO regimen, he was tolerating a regular diet, voiding witout issue following eubanks removal, had full ROBF, and was ambulating without trouble.     Consultations This Hospital Stay   OCCUPATIONAL THERAPY ADULT IP CONSULT  PHYSICAL THERAPY ADULT IP CONSULT    Code Status   Prior      Zohra Francisco MD  [facility] [address]  [address]  Phone: [phone]  ______________________________________________________________________    Physical Exam   Vital Signs: Temp: 97.9  F (36.6  C) Temp src: Temporal BP: (!) 142/95 Pulse: 94   Resp: 16 SpO2: 99 % O2 Device: None (Room air)    Weight: 185 lbs 2.98 oz    General Appearance:  Appears comfortable  Respiratory: NLB on RA  Cardiovascular: RRR  GI: Abdomen soft, appropriately tender, incisions without erythema edema or exudate  Skin: No jaundice  Other:  Ext WWP        Primary Care Physician   Ivette Mtz    Discharge Orders      Primary Care - Care Coordination Referral      Reason for your hospital stay    Intraabdominal tumor     Activity    Your activity upon discharge: activity as tolerated and no heavy lifting for 6 weeks     Discharge Instructions    If your travel plans upon discharge include prolonged periods of sitting (a lengthy car or plane ride), it is highly beneficial to get up and walk at least once per hour to help prevent swelling and blood clots.     You may shower after operation, let warm soapy water run over incision and pat dry. Do not submerge, soak, or scrub incision.    Take incentive spirometer home for continued frequent use    You will be discharged with narcotic pain medications. Please take them only as needed and do not operate a car or heavy machinery for 24 hours after taking them.  Narcotic pain medications like oxycodone are constipating. Please ensure that you are drinking adequate amounts of fluids and taking stool softeners while you are taking narcotics. Take Miralax as needed for constipation.     Do not drive until you can with stand pressing the brake pedal quickly and fully without pain and you are not distracted by pain.     Call for fever greater than 101.5, chills, red skin around incision, drainage from incision, increased swelling from the incision, bleeding from the incision that is not controlled with light pressure, inability to  "tolerate diet,new nausea/vomiting or other new/worsening symptoms.   You may also call the surgical oncology nurse care coordinator from 8:00am-4:30pm TIKA Gaytan at [phone]. For after hours questions or concerns you can contact the on-call surgical oncology resident (nights and weekends [phone] and ask \"I would like to page the Surgical Oncology Resident on call.\"). In emergencies, call 911. If your problem is necessitating an ER visit our first preference is that you return to the [facility] ER if able.     Diet.   - 4-6 small meals a day. You will need to snack throughout the day rather than 3 big meals a day.   - Once you begin to feel full, you should STOP. Do not over eat, this can lead to abdominal pain and vomiting.   - Make sure to sit up when you are eating. Do not lay down after eating for at least 1 hour.   - Any time you are eating you should be sitting up in a chair, do not eat in bed.     - If you do get nauseous during eating or after you should stop. Give it time and only proceed with liquid diet for a day before trying solids again.     Follow Up:  Follow up in clinic with Dr. Thompson as scheduled on 3/14/2023. Clinic scheduling phone number: [phone].     Diet    Follow this diet upon discharge: Orders Placed This Encounter      Snacks/Supplements Adult: Ensure Enlive; Between Meals      Regular Diet Adult       Significant Results and Procedures   Results for orders placed or performed during the hospital encounter of 03/01/23   POC US Guidance Needle Placement    Impression    Bilateral Transverse Abdominis Plane Block        Discharge Medications   Discharge Medication List as of 3/4/2023 10:47 AM      START taking these medications    Details   acetaminophen (TYLENOL) 325 MG tablet Take 3 tablets (975 mg) by mouth 3 times daily, OTC      methocarbamol (ROBAXIN) 500 MG tablet Take 1 tablet (500 mg) by mouth 4 times daily, Disp-60 tablet, R-0, E-Prescribe    "   pantoprazole (PROTONIX) 40 MG EC tablet Take 1 tablet (40 mg) by mouth daily, Disp-30 tablet, R-0, E-Prescribe      polyethylene glycol (MIRALAX) 17 GM/Dose powder Take 17 g by mouth daily, Disp-510 g, OTC      senna (SENOKOT) 8.6 MG tablet Take 1 tablet by mouth daily Use while taking narcotic pain medications, OTC         CONTINUE these medications which have CHANGED    Details   oxyCODONE (ROXICODONE) 5 MG tablet Take 1 tablet (5 mg) by mouth every 4 hours as needed for moderate pain (4-6), Disp-20 tablet, R-0, Local Print         CONTINUE these medications which have NOT CHANGED    Details   aspirin (ASA) 325 MG EC tablet Take 325 mg by mouth every 6 hours as needed for moderate pain (4-6), Historical      diclofenac (VOLTAREN) 1 % topical gel Apply 2 g topically 2 times daily as needed for moderate pain, Disp-50 g, R-2, E-Prescribe      metoprolol succinate ER (TOPROL-XL) 50 MG 24 hr tablet Take 25 mg by mouth every morning, Disp-180 tablet, R-0, Historical           Allergies   No Known Allergies

## 2023-03-04 NOTE — PLAN OF CARE
[id number]  Pt hypertensive, otherwise VSS and A&Ox4. PCA discontinued. Pt pain now managed with scheduled tylenol and robaxin, as well as PRN oxycodone. Pt up ad devon. Pt denied nausea, passed gas and reported 1x BM. Pt voiding spontaneously with good urine output- pt educated to record output, but has failed to do so. Pt now on regular diet. Midline incision and lap sites are clean, dry, intact and open to air. 2 PIVs saline locked.

## 2023-03-04 NOTE — PROGRESS NOTES
"    [facility]    Progress Note - Surgical Oncology Service       Date of Admission:  3/1/2023    Assessment & Plan: Surgery   47 year old man POD 3 ex lap SBR and tumor resection    - PO pain regimen  - IS  - PTA metoprolol   - Regular diet, suppository, PPI  - Lovenox, NO lovenox on discharge  - Activity as tolerated, encourage OOB  - Likely home today     Diet: Regular Diet Adult  Snacks/Supplements Adult: Ensure Enlive; Between Meals  Diet    DVT Prophylaxis: Enoxaparin (Lovenox) SQ  Park Catheter: Not present  Lines: None     Drains: None     Cardiac Monitoring: None  Code Status: Full Code      Clinically Significant Risk Factors                        # Overweight: Estimated body mass index is 27.35 kg/m  as calculated from the following:    Height as of this encounter: 1.753 m (5' 9\").    Weight as of this encounter: 84 kg (185 lb 3 oz)., PRESENT ON ADMISSION         Disposition Plan     Expected Discharge Date: 03/05/2023        Discharge Comments: AROBF         The patient's care to be discussed with the Attending Physician, Dr. Thompson.    Zohra Francisco MD  Surgery PGY-4  [facility]  Non-urgent messages: Securely message with [employer] (more info)  Text page via [employer] Paging/Directory     ______________________________________________________________________    Interval History   Pain well controlled. No nausea with regular diet. Ambulating without issue. Voiding. Passing flatus and had one BM yesterday.    Physical Exam   Vital Signs: Temp: 98.8  F (37.1  C) Temp src: Temporal BP: (!) 130/94 Pulse: 83   Resp: 16 SpO2: 96 % O2 Device: None (Room air)    Weight: 185 lbs 2.98 oz      General Appearance: Appears comfortable  Respiratory: NLB on RA  Cardiovascular: RRR  GI: Abdomen soft, appropriately tender, incisions without erythema edema or exudate  Skin: No jaundice  Other: Ext WWP     "

## 2023-03-06 ENCOUNTER — PATIENT OUTREACH (OUTPATIENT)
Dept: ONCOLOGY | Facility: CLINIC | Age: 48
End: 2023-03-06
Payer: COMMERCIAL

## 2023-03-06 ENCOUNTER — PATIENT OUTREACH (OUTPATIENT)
Dept: CARE COORDINATION | Facility: CLINIC | Age: 48
End: 2023-03-06
Payer: COMMERCIAL

## 2023-03-06 NOTE — PROGRESS NOTES
"Post Op Discharge Call     Surgery:      1. Diagnostic Laparoscopy  2. Exploratory Laparotomy  3. En Bloc Mesenteric Mass and Small Bowel Resection    Surgery date:  3/1/2023     Discharge Date:   3/4/2023    Date of Post-op Call:  3/6/2023       Immediate Concerns:          Pain:  No concerns with pain management. Tylenol, muscle relaxant, oxycodone.   Using pain medications as recommended with appropriate relief.   Discussed using medication only as needed. Not scheduled.      Incision:   No concerns, healing well, no redness, drainage or edema reported.      Diet:   Regular diet.  Minimal appetite. Understands this will take time to return to \"normal.\" Eating smaller, more frequent meals vs 3 larger meals a day.   Denies nausea and vomiting.      Bowels:   Passing gas. BM x 1 since discharge.   Taking stool softeners daily per post-op instructions.   Denies feelings of constipation and cramping.      Activity:   No difficulty with ADLs reported.   Patient is up independently at home.   Encouraged patient to continue to stay active.        Post op/follow up plans:      Post op appointment scheduled,confirmed date and time with patient. Direct contact number provided for any additional questions or concerns.         LAMIN Montejo  [facility]  Surgical Oncology       Future Appointments   Date Time Provider Department Center   3/14/2023  9:45 AM Mukul Thompson MD [facility]                  "

## 2023-03-06 NOTE — PROGRESS NOTES
Clinic Care Coordination Contact  Care Team Conversations    The patient was hospitalized for a small bowel resection, with desmoid tumor.  At this time the patient declined care coordination.  The RN CC verified that the patient had contact information for the RN CC for any questions or concerns.          Jeremías Ray MSN, RN, PHN, [facility]   Primary Care Clinical RN Care Coordinator  [facility]  3/6/2023   10:29 AM  [email or web address]  Office: [phone]

## 2023-03-07 LAB
PATH REPORT.COMMENTS IMP SPEC: NORMAL
PATH REPORT.FINAL DX SPEC: NORMAL
PATH REPORT.GROSS SPEC: NORMAL
PATH REPORT.INTRAOP OBS SPEC DOC: NORMAL
PATH REPORT.MICROSCOPIC SPEC OTHER STN: NORMAL
PATH REPORT.RELEVANT HX SPEC: NORMAL
PHOTO IMAGE: NORMAL

## 2023-03-14 ENCOUNTER — ONCOLOGY VISIT (OUTPATIENT)
Dept: SURGERY | Facility: CLINIC | Age: 48
End: 2023-03-14
Attending: SURGERY
Payer: COMMERCIAL

## 2023-03-14 ENCOUNTER — PATIENT OUTREACH (OUTPATIENT)
Dept: ONCOLOGY | Facility: CLINIC | Age: 48
End: 2023-03-14

## 2023-03-14 VITALS
WEIGHT: 178.7 LBS | DIASTOLIC BLOOD PRESSURE: 105 MMHG | TEMPERATURE: 98.7 F | HEART RATE: 89 BPM | OXYGEN SATURATION: 97 % | RESPIRATION RATE: 16 BRPM | SYSTOLIC BLOOD PRESSURE: 145 MMHG | BODY MASS INDEX: 26.39 KG/M2

## 2023-03-14 DIAGNOSIS — D48.19 DESMOID TUMOR OF ABDOMEN: Primary | ICD-10-CM

## 2023-03-14 DIAGNOSIS — D48.119 DESMOID TUMOR: Primary | ICD-10-CM

## 2023-03-14 PROCEDURE — 99024 POSTOP FOLLOW-UP VISIT: CPT | Performed by: SURGERY

## 2023-03-14 PROCEDURE — G0463 HOSPITAL OUTPT CLINIC VISIT: HCPCS | Performed by: SURGERY

## 2023-03-14 RX ORDER — TRAMADOL HYDROCHLORIDE 50 MG/1
50 TABLET ORAL EVERY 6 HOURS PRN
Qty: 30 TABLET | Refills: 0 | Status: SHIPPED | OUTPATIENT
Start: 2023-03-14 | End: 2023-03-22

## 2023-03-14 ASSESSMENT — PAIN SCALES - GENERAL: PAINLEVEL: NO PAIN (0)

## 2023-03-14 NOTE — NURSING NOTE
"Oncology Rooming Note    March 14, 2023 9:25 AM   Frederick Nguyen is a 47 year old male who presents for:    Chief Complaint   Patient presents with     Oncology Clinic Visit     POSTOP        Initial Vitals: BP (!) 144/105 (BP Location: Right arm, Patient Position: Sitting, Cuff Size: Adult Regular)   Pulse 89   Temp 98.7  F (37.1  C) (Oral)   Resp 16   Wt 81.1 kg (178 lb 11.2 oz)   SpO2 97%   BMI 26.39 kg/m   Estimated body mass index is 26.39 kg/m  as calculated from the following:    Height as of 3/1/23: 1.753 m (5' 9\").    Weight as of this encounter: 81.1 kg (178 lb 11.2 oz). Body surface area is 1.99 meters squared.  No Pain (0) Comment: Data Unavailable   No LMP for male patient.  Allergies reviewed: Yes  Medications reviewed: Yes    Medications: Medication refills not needed today.  Pharmacy name entered into [employer]: [id number] [address]  AT [facility]    Clinical concerns: pt states he gets nausea and \"floaty\" feeling from taking oxycodone and is not taking that medication as much as he wants too. He tolerated older Pain medications back when he had another surgery, but is not sure what the medication name is.     Increased BP since hospital stay. Current BP reading is 144/105. Pt is unsure if pain and discomfort is causing the pain and when it will be ideal to go to PCP/       Zohra Ramsay CMA            "

## 2023-03-14 NOTE — PROGRESS NOTES
Surgical Oncology - Post Op Patient  3/14/2023    47 M s/p open small bowel resection w/ en bloc mesenteric mass 3/1/2023.  Final pathology showed desmoid fibromatosis with a positive mesenteric margin.  The patient now returns for post-op check.    Of Note: He is doing well overall.  Pain is mild-moderate.  Eating well and passing flatus and having bowel movements.  Small drainage from the laparotomy that spontaneously subsided.  Incisions appear to be healing normal without evidence of infection.  Abdomen soft, non-tender, and non-distended.  He has been having higher blood pressure since surgery.  He has not been taking oxycodone as it causes nausea.    BP (!) 145/105 (BP Location: Right arm, Patient Position: Sitting, Cuff Size: Adult Regular)   Pulse 89   Temp 98.7  F (37.1  C) (Oral)   Resp 16   Wt 81.1 kg (178 lb 11.2 oz)   SpO2 97%   BMI 26.39 kg/m      Surgical Pathology (3/1/2023): Desmoid fibromatosis of the mesentery involving the bowel wall up to the muscularis propria.  Mesenteric resection margin positive for fibromatosis.  Small bowel normal.    Assessment/Plan:  47 M s/p open small bowel resection w/ en bloc mesenteric mass 3/1/2023.  Final pathology showed desmoid fibromatosis with a positive mesenteric margin.  The patient now returns for post-op check.  He is doing well and without significant issues.  I suggested seeing PCP for following his blood pressure.  In regard to his pain, we talked about alternating Tylenol and Ibuprofen, which he will do.  We will also give him some Tramadol for breakthrough in the event the above does not work.  We discussed his final pathology and discussed the rare histology and risk of recurrence.  I would not recommend re-resection given the positive margin as this tends to make the process worse.  In addition, the main mass that may have eventually caused bowel obstruction or other problems has been removed.  I recommend that he see medical oncology for  monitoring and consideration of medical management to minimize issues moving forward.  He had essentially normal upper and lower endoscopy in April 2022, so this desmoid is unlikely to be related to an underlying polyposis syndrome.  No need for further surgical oncology follow-up unless issues or questions arise.  Questions today were answered and the patient was in agreement with and understanding of the plan.    A total of 15 minutes were spent on this encounter including more than 50% in face to face time and the remainder in imaging review, chart review, documentation, and coordination of care.

## 2023-03-14 NOTE — PROGRESS NOTES
New Patient Hematology / Oncology Nurse Navigator Note     Referral Date: 3/14/23    Referring provider:   Mukul Thompson MD   DIV. SURGICAL ONCOLOGY   [address]   [facility] [address]   Phone: [phone]   Fax: [phone]         Referring Clinic/Organization: [facility]     Referred to: [facility]    Requested provider (if applicable): Dr. Alvarez or Dr. Zuniga    Evaluation for : Desmoid tumor     Clinical History (per Nurse review of records provided):      3/1 path:  Final Diagnosis   A. 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 SMALL BOWEL, ADDITIONAL, RESECTION:  - Small bowel wall with no significant histopathologic abnormality  - Negative for dysplasia or malignancy        CT CAP-- BOOKMARKED    Clinical Assessment / Barriers to Care (Per Nurse):  Pt lives in [facility]    Records Location: [facility]     Records Needed:   N/A    Additional testing needed prior to consult:   N/A    Referral updates and Plan:   Chart reviewed and routed to NPS to arrange consult. Will follow-up as needed as pt is already established with [facility].     Megan Ivey, HARIKAN, RN, PHN, OCN  Hematology/Oncology Nurse Navigator  [facility]  [phone]

## 2023-03-14 NOTE — LETTER
3/14/2023         RE: Frederick Nguyen  [address] [facility] [address]        Dear Colleague,    Thank you for referring your patient, Frederick Nguyen, to the [facility]. Please see a copy of my visit note below.    Surgical Oncology - Post Op Patient  3/14/2023    47 M s/p open small bowel resection w/ en bloc mesenteric mass 3/1/2023.  Final pathology showed desmoid fibromatosis with a positive mesenteric margin.  The patient now returns for post-op check.    Of Note: He is doing well overall.  Pain is mild-moderate.  Eating well and passing flatus and having bowel movements.  Small drainage from the laparotomy that spontaneously subsided.  Incisions appear to be healing normal without evidence of infection.  Abdomen soft, non-tender, and non-distended.  He has been having higher blood pressure since surgery.  He has not been taking oxycodone as it causes nausea.    BP (!) 145/105 (BP Location: Right arm, Patient Position: Sitting, Cuff Size: Adult Regular)   Pulse 89   Temp 98.7  F (37.1  C) (Oral)   Resp 16   Wt 81.1 kg (178 lb 11.2 oz)   SpO2 97%   BMI 26.39 kg/m      Surgical Pathology (3/1/2023): Desmoid fibromatosis of the mesentery involving the bowel wall up to the muscularis propria.  Mesenteric resection margin positive for fibromatosis.  Small bowel normal.    Assessment/Plan:  47 M s/p open small bowel resection w/ en bloc mesenteric mass 3/1/2023.  Final pathology showed desmoid fibromatosis with a positive mesenteric margin.  The patient now returns for post-op check.  He is doing well and without significant issues.  I suggested seeing PCP for following his blood pressure.  In regard to his pain, we talked about alternating Tylenol and Ibuprofen, which he will do.  We will also give him some Tramadol for breakthrough in the event the above does not work.  We discussed his final pathology and discussed the rare histology and risk of  recurrence.  I would not recommend re-resection given the positive margin as this tends to make the process worse.  In addition, the main mass that may have eventually caused bowel obstruction or other problems has been removed.  I recommend that he see medical oncology for monitoring and consideration of medical management to minimize issues moving forward.  He had essentially normal upper and lower endoscopy in April 2022, so this desmoid is unlikely to be related to an underlying polyposis syndrome.  No need for further surgical oncology follow-up unless issues or questions arise.  Questions today were answered and the patient was in agreement with and understanding of the plan.    A total of 15 minutes were spent on this encounter including more than 50% in face to face time and the remainder in imaging review, chart review, documentation, and coordination of care.    Sincerely,        Mukul Thompson MD

## 2023-03-15 PROBLEM — D48.119 DESMOID TUMOR: Status: ACTIVE | Noted: 2020-11-10

## 2023-03-20 NOTE — PROGRESS NOTES
[facility] CANCER CLINIC    NEW PATIENT VISIT NOTE    PATIENT NAME: Frederick Nguyen MRN # [id number]  DATE OF VISIT: March 20, 2023 YOB: 1975    REFERRING PROVIDER: Mukul Thompson MD  DIV. SURGICAL ONCOLOGY  [address]  [place] [address]    CANCER TYPE:  Desmoid     CHIEF COMPLAIN   Recent surgery showing intraabdominal desmoid     HISTORY OF PRESENT ILLNESS     Frederick Nguyen is a 47 year old man with history of MVR, GERD and intraabdominal mass of unknown etiology which had shown interval growth on subequent CT scan (from 3.2 to 8.6 cm in the largest diameter over the past 2 years. He was admitted for exploratory laparoscopy and mass excision. He underwent laparoscopy converted to open laparotomy with small bowel resection and removal of a 10 cm mass. Pathology showing desmoid fibromatosis.   He comes today to establish care for future management. He reports that he has been healing well from surgery, only needing pain medication (tramadol) at night for pain to be able to sleep, he also reports his appetite is still low but he is trying to have good intake and stay hydrated. Reports no constipation, taking daily senna. Denies nausea or any other symptoms.      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       CURRENT OUTPATIENT MEDICATIONS     Current Outpatient Medications   Medication Sig Dispense Refill     acetaminophen (TYLENOL) 325 MG tablet  Take 3 tablets (975 mg) by mouth 3 times daily       aspirin (ASA) 325 MG EC tablet Take 325 mg by mouth every 6 hours as needed for moderate pain (4-6) (Patient not taking: Reported on 3/14/2023)       diclofenac (VOLTAREN) 1 % topical gel Apply 2 g topically 2 times daily as needed for moderate pain (Patient not taking: Reported on 3/14/2023) 50 g 2     methocarbamol (ROBAXIN) 500 MG tablet Take 1 tablet (500 mg) by mouth 4 times daily 60 tablet 0     metoprolol succinate ER (TOPROL-XL) 50 MG 24 hr tablet Take 25 mg by mouth every morning 180 tablet 0     oxyCODONE (ROXICODONE) 5 MG tablet Take 1 tablet (5 mg) by mouth every 4 hours as needed for moderate pain (4-6) 20 tablet 0     pantoprazole (PROTONIX) 40 MG EC tablet Take 1 tablet (40 mg) by mouth daily 30 tablet 0     polyethylene glycol (MIRALAX) 17 GM/Dose powder Take 17 g by mouth daily (Patient not taking: Reported on 3/14/2023) 510 g      senna (SENOKOT) 8.6 MG tablet Take 1 tablet by mouth daily Use while taking narcotic pain medications       traMADol (ULTRAM) 50 MG tablet Take 1 tablet (50 mg) by mouth every 6 hours as needed for moderate to severe pain 30 tablet 0        REVIEW OF SYSTEMS   As above in the HPI, o/w complete 12-point ROS was negative.     PHYSICAL EXAM   There were no vitals taken for this visit.    EGO  GEN: NAD  HEENT: PERRL, EOMI, no icterus, injection or pallor. Oropharynx is clear.  NECK: no cervical or supraclavicular lymphadenopathy  LUNGS: clear bilaterally  CV: regular, no murmurs, rubs, or gallops  ABDOMEN: soft, non-tender, non-distended, normal bowel sounds, no hepatosplenomegaly by percussion or palpation. Surgical scars healing well.   EXT: warm, well perfused, no edema  NEURO: alert  SKIN: no rashes     LABORATORY AND IMAGING STUDIES     Recent Labs   Lab Test 23  0654 23  0600 23  1215 23  1036 10/27/19  1645 10/27/19  1058 10/26/19  0412   NA  --  139  --  140   < > 137 137   POTASSIUM  --   4.3  --  4.3   < > 4.7 4.4   CHLORIDE  --  106  --  111*   < > 104 108   CO2  --  23  --  26   < > 30 27   ANIONGAP  --  10  --  3   < > 3 2*   BUN  --  12.1  --  20   < > 14 14   CR  --  1.03 0.94 1.08   < > 1.30* 1.04   * 114*  --  93   < > 106* 137*   JEANNINE  --  9.1  --  9.2   < > 8.4* 7.7*   MAG  --   --   --   --   --  2.5* 2.7*   PHOS  --   --   --   --   --  2.9 4.2    < > = values in this interval not displayed.     Recent Labs   Lab Test 03/04/23  0854 03/02/23  0600 02/23/23  1036 10/30/20  0839 10/23/20  0845 10/20/20  0952   WBC  --  10.7 7.7 6.4 6.9 14.8*   HGB  --  13.5 15.6  --  14.9 16.6    183 220  --  260 245   MCV  --  90 89  --  89 90   NEUTROPHIL  --   --   --  58.0 60.5 74.9     Recent Labs   Lab Test 10/20/20  0952 08/21/20  1404 08/19/20  1408   BILITOTAL 1.2 0.4 0.5   ALKPHOS 75 165* 191*   ALT 15 34 57   AST 10 23 43   ALBUMIN 3.7 3.4 3.3*     TSH   Date Value Ref Range Status   08/19/2020 2.61 0.40 - 4.00 mU/L Final   ]    Results for orders placed or performed during the hospital encounter of 03/01/23   POC US Guidance Needle Placement    Impression    Bilateral Transverse Abdominis Plane Block      CT AP 1/26/2023                                                                IMPRESSION:  Significant interval enlargement of a rounded solid and  mildly heterogeneous mass within the mesentery now localizing to the  right abdomen. Currently it has a maximal size of 7.8 cm, previously  3.2 cm. As such, this should be considered malignancy until proven  otherwise. Carcinoid, versus desmoid lesion, or other neoplasm remains  in the differential. Recommend further oncologic workup and surgical  consultation. Percutaneous biopsy and/or PET/CT could provide  additional assessment if clinically relevant. If no intervention is to  be performed, recommend surveillance CT in 6 months.     PATHOLOGY   3/1/2023  Final Diagnosis   A. 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 SMALL BOWEL, ADDITIONAL, RESECTION:  - Small bowel wall with no significant histopathologic abnormality  - Negative for dysplasia or malignancy         I reviewed the medical records including surgical report, prior colonoscopy, laboratory studies which are WNL, and have personally reviewed imaging including CT chest, abdomen and pelvis done earlier this year which demonstrates an 8 cm mesenteric desmoid which has been now removed, and no evidence of any pathology in the CT of the chest.       ASSESSMENT AND PLAN     47-year-old male with history of MVP and GERD.  He was found to have an intra-abdominal mass back in 2021 up to 3 cm.  On repeat scans on January 2023 the mass Significantly increased in size to about 8 cm in the longest diameter for this reason he underwent surgical resection on 3/1/2023.  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  He is referred here for further management.    He had essentially normal upper and lower endoscopy in April 2022, so this desmoid is unlikely to be related to an underlying polyposis syndrome.     I discussed with the patient that desmoid tumors, or aggressive fibromatosis or locally aggressive tumors do not have the ability to metastasize. However, they can carry significant risk of morbidity related to their size and place and propensity for recurrence. Unfortunately the recurrence rate is quite high approaching almost 90% and up to a third of the patient carry familial adenomatous polyposis. Fortunately, colonoscopy in 2022 was normal and this rules out familial adenomatous polyposis.    I explained that there are  multiple systemic therapies, majority in the form of pills that are available to treat and prevent growth in this tumors. However, their efficacy is limited to 40 to 70% of the cases and for this reason, we will decide if treatment is indicated at the time of tumor recurrence. I also explained that sometimes this tumors regress spontaneously, so we will monitor first before considering therapy.     Frederick voiced understanding and agreement with the plan.     Plan:    -CT abdomen and pelvis early June, with labs prior (CBC, CMP)  -Follow up after scans.   -Monica MUIR on surgical specimen.       60 minutes spent on the date of the encounter doing chart review, review of test results, interpretation of tests, patient visit and documentation     August Borjas MD   of Medicine  Hematology, Oncology and Transplantation

## 2023-03-21 ENCOUNTER — ONCOLOGY VISIT (OUTPATIENT)
Dept: ONCOLOGY | Facility: CLINIC | Age: 48
End: 2023-03-21
Attending: SURGERY
Payer: COMMERCIAL

## 2023-03-21 VITALS
SYSTOLIC BLOOD PRESSURE: 143 MMHG | HEIGHT: 71 IN | WEIGHT: 178.6 LBS | TEMPERATURE: 98.2 F | OXYGEN SATURATION: 97 % | RESPIRATION RATE: 16 BRPM | DIASTOLIC BLOOD PRESSURE: 104 MMHG | HEART RATE: 92 BPM | BODY MASS INDEX: 25 KG/M2

## 2023-03-21 DIAGNOSIS — D48.119 DESMOID TUMOR: ICD-10-CM

## 2023-03-21 PROCEDURE — G0463 HOSPITAL OUTPT CLINIC VISIT: HCPCS | Performed by: STUDENT IN AN ORGANIZED HEALTH CARE EDUCATION/TRAINING PROGRAM

## 2023-03-21 PROCEDURE — 99205 OFFICE O/P NEW HI 60 MIN: CPT | Performed by: STUDENT IN AN ORGANIZED HEALTH CARE EDUCATION/TRAINING PROGRAM

## 2023-03-21 ASSESSMENT — PAIN SCALES - GENERAL: PAINLEVEL: MILD PAIN (2)

## 2023-03-21 NOTE — LETTER
3/21/2023         RE: Frederick Nguyen  [address] [facility] [address]        Dear Colleague,    Thank you for referring your patient, Frederick Nguyen, to the [facility] CANCER [facility]INIC. Please see a copy of my visit note below.    [facility] CANCER [facility]    NEW PATIENT VISIT NOTE    PATIENT NAME: Frederick Nguyen MRN # [id number]  DATE OF VISIT: March 20, 2023 YOB: 1975    REFERRING PROVIDER: Mukul Thompson MD  DIV. SURGICAL ONCOLOGY  [address]  [place] [address]    CANCER TYPE:  Desmoid     CHIEF COMPLAIN   Recent surgery showing intraabdominal desmoid     HISTORY OF PRESENT ILLNESS     Frederick Nguyen is a 47 year old man with history of MVR, GERD and intraabdominal mass of unknown etiology which had shown interval growth on subequent CT scan (from 3.2 to 8.6 cm in the largest diameter over the past 2 years. He was admitted for exploratory laparoscopy and mass excision. He underwent laparoscopy converted to open laparotomy with small bowel resection and removal of a 10 cm mass. Pathology showing desmoid fibromatosis.   He comes today to establish care for future management. He reports that he has been healing well from surgery, only needing pain medication (tramadol) at night for pain to be able to sleep, he also reports his appetite is still low but he is trying to have good intake and stay hydrated. Reports no constipation, taking daily senna. Denies nausea or any other symptoms.      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       CURRENT OUTPATIENT MEDICATIONS     Current Outpatient Medications   Medication Sig Dispense Refill     acetaminophen (TYLENOL) 325 MG tablet Take 3 tablets (975 mg) by mouth 3 times daily       aspirin (ASA) 325 MG EC tablet Take 325 mg by mouth every 6 hours as needed for moderate pain (4-6) (Patient not taking: Reported on 3/14/2023)       diclofenac (VOLTAREN) 1 % topical gel Apply 2 g topically 2 times daily as needed for moderate pain (Patient not taking: Reported on 3/14/2023) 50 g 2     methocarbamol (ROBAXIN) 500 MG tablet Take 1 tablet (500 mg) by mouth 4 times daily 60 tablet 0     metoprolol succinate ER (TOPROL-XL) 50 MG 24 hr tablet Take 25 mg by mouth every morning 180 tablet 0     oxyCODONE (ROXICODONE) 5 MG tablet Take 1 tablet (5 mg) by mouth every 4 hours as needed for moderate pain (4-6) 20 tablet 0     pantoprazole (PROTONIX) 40 MG EC tablet Take 1 tablet (40 mg) by mouth daily 30 tablet 0     polyethylene glycol (MIRALAX) 17 GM/Dose powder Take 17 g by mouth daily (Patient not taking: Reported on 3/14/2023) 510 g      senna (SENOKOT) 8.6 MG tablet Take 1 tablet by mouth daily Use while taking narcotic pain medications       traMADol (ULTRAM) 50 MG tablet Take 1 tablet (50 mg) by mouth every 6 hours as needed for moderate to severe pain 30 tablet 0        REVIEW OF SYSTEMS   As above in the HPI, o/w complete 12-point ROS was negative.     PHYSICAL EXAM   There were no vitals taken for this visit.    EGO  GEN: NAD  HEENT: PERRL, EOMI, no icterus, injection or pallor. Oropharynx is clear.  NECK: no cervical or supraclavicular lymphadenopathy  LUNGS: clear bilaterally  CV: regular, no murmurs, rubs, or gallops  ABDOMEN: soft, non-tender, non-distended, normal bowel sounds, no hepatosplenomegaly by percussion or palpation. Surgical scars healing well.   EXT: warm, well  perfused, no edema  NEURO: alert  SKIN: no rashes     LABORATORY AND IMAGING STUDIES     Recent Labs   Lab Test 03/02/23  0654 03/02/23  0600 03/01/23  1215 02/23/23  1036 10/27/19  1645 10/27/19  1058 10/26/19  0412   NA  --  139  --  140   < > 137 137   POTASSIUM  --  4.3  --  4.3   < > 4.7 4.4   CHLORIDE  --  106  --  111*   < > 104 108   CO2  --  23  --  26   < > 30 27   ANIONGAP  --  10  --  3   < > 3 2*   BUN  --  12.1  --  20   < > 14 14   CR  --  1.03 0.94 1.08   < > 1.30* 1.04   * 114*  --  93   < > 106* 137*   JEANNINE  --  9.1  --  9.2   < > 8.4* 7.7*   MAG  --   --   --   --   --  2.5* 2.7*   PHOS  --   --   --   --   --  2.9 4.2    < > = values in this interval not displayed.     Recent Labs   Lab Test 03/04/23  0854 03/02/23  0600 02/23/23  1036 10/30/20  0839 10/23/20  0845 10/20/20  0952   WBC  --  10.7 7.7 6.4 6.9 14.8*   HGB  --  13.5 15.6  --  14.9 16.6    183 220  --  260 245   MCV  --  90 89  --  89 90   NEUTROPHIL  --   --   --  58.0 60.5 74.9     Recent Labs   Lab Test 10/20/20  0952 08/21/20  1404 08/19/20  1408   BILITOTAL 1.2 0.4 0.5   ALKPHOS 75 165* 191*   ALT 15 34 57   AST 10 23 43   ALBUMIN 3.7 3.4 3.3*     TSH   Date Value Ref Range Status   08/19/2020 2.61 0.40 - 4.00 mU/L Final   ]    Results for orders placed or performed during the hospital encounter of 03/01/23   POC US Guidance Needle Placement    Impression    Bilateral Transverse Abdominis Plane Block      CT AP 1/26/2023                                                                IMPRESSION:  Significant interval enlargement of a rounded solid and  mildly heterogeneous mass within the mesentery now localizing to the  right abdomen. Currently it has a maximal size of 7.8 cm, previously  3.2 cm. As such, this should be considered malignancy until proven  otherwise. Carcinoid, versus desmoid lesion, or other neoplasm remains  in the differential. Recommend further oncologic workup and surgical  consultation.  Percutaneous biopsy and/or PET/CT could provide  additional assessment if clinically relevant. If no intervention is to  be performed, recommend surveillance CT in 6 months.     PATHOLOGY   3/1/2023  Final Diagnosis   A. 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 SMALL BOWEL, ADDITIONAL, RESECTION:  - Small bowel wall with no significant histopathologic abnormality  - Negative for dysplasia or malignancy         I reviewed the medical records including surgical report, prior colonoscopy, laboratory studies which are WNL, and have personally reviewed imaging including CT chest, abdomen and pelvis done earlier this year which demonstrates an 8 cm mesenteric desmoid which has been now removed, and no evidence of any pathology in the CT of the chest.       ASSESSMENT AND PLAN     47-year-old male with history of MVP and GERD.  He was found to have an intra-abdominal mass back in 2021 up to 3 cm.  On repeat scans on January 2023 the mass Significantly increased in size to about 8 cm in the longest diameter for this reason he underwent surgical resection on 3/1/2023.  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  He is referred here for further management.    He had essentially normal upper and lower endoscopy in April 2022, so this desmoid is unlikely to be related to an underlying polyposis syndrome.     I discussed with the patient that desmoid tumors, or aggressive fibromatosis or locally aggressive tumors do not have the ability to metastasize. However, they can carry significant risk of morbidity related to their size and place and propensity for  recurrence. Unfortunately the recurrence rate is quite high approaching almost 90% and up to a third of the patient carry familial adenomatous polyposis. Fortunately, colonoscopy in 2022 was normal and this rules out familial adenomatous polyposis.    I explained that there are multiple systemic therapies, majority in the form of pills that are available to treat and prevent growth in this tumors. However, their efficacy is limited to 40 to 70% of the cases and for this reason, we will decide if treatment is indicated at the time of tumor recurrence. I also explained that sometimes this tumors regress spontaneously, so we will monitor first before considering therapy.     Frederick voiced understanding and agreement with the plan.     Plan:    -CT abdomen and pelvis early June, with labs prior (CBC, CMP)  -Follow up after scans.   -Monica MUIR on surgical specimen.       60 minutes spent on the date of the encounter doing chart review, review of test results, interpretation of tests, patient visit and documentation     August Borjas MD   of Medicine  Hematology, Oncology and Transplantation

## 2023-03-21 NOTE — NURSING NOTE
"Oncology Rooming Note    March 21, 2023 9:38 AM   Frederick Nguyen is a 47 year old male who presents for:    Chief Complaint   Patient presents with     Oncology Clinic Visit     Desmoid tumor     Initial Vitals: BP (!) 143/104   Pulse 92   Temp 98.2  F (36.8  C) (Oral)   Resp 16   Ht 1.803 m (5' 10.98\")   Wt 81 kg (178 lb 9.6 oz)   SpO2 97%   BMI 24.92 kg/m   Estimated body mass index is 24.92 kg/m  as calculated from the following:    Height as of this encounter: 1.803 m (5' 10.98\").    Weight as of this encounter: 81 kg (178 lb 9.6 oz). Body surface area is 2.01 meters squared.  Mild Pain (2) Comment: Data Unavailable   No LMP for male patient.  Allergies reviewed: Yes  Medications reviewed: Yes    Medications: Medication refills not needed today.  Pharmacy name entered into [employer]: [id number] [address]  AT [facility]    Clinical concerns: No new clinical concerns other than reason for visit today.     Kasandra Ware, EMT            "

## 2023-03-23 ENCOUNTER — PATIENT OUTREACH (OUTPATIENT)
Dept: ONCOLOGY | Facility: CLINIC | Age: 48
End: 2023-03-23
Payer: COMMERCIAL

## 2023-03-27 NOTE — PROGRESS NOTES
[facility]: Cancer Care                                                                                          Introduced self and role of RN Care Coordinator at [facility]. Provided my contact information, [facility] phone number (which has options to talk with a Nurse available 24/7 - triage and RNCC via this option during business hours).     Reviewed [facility] care team members including midlevel providers in oncology dept and 's usual clinic hours.    Pt voiced understanding and appreciation of above information and denies any further questions, and he/she understands that I will follow and provide coordination as needed    Signature:  Joe Richey, RN

## 2023-04-12 ENCOUNTER — OFFICE VISIT (OUTPATIENT)
Dept: FAMILY MEDICINE | Facility: CLINIC | Age: 48
End: 2023-04-12
Payer: COMMERCIAL

## 2023-04-12 VITALS
WEIGHT: 171 LBS | SYSTOLIC BLOOD PRESSURE: 112 MMHG | BODY MASS INDEX: 25.33 KG/M2 | RESPIRATION RATE: 16 BRPM | HEART RATE: 86 BPM | DIASTOLIC BLOOD PRESSURE: 84 MMHG | TEMPERATURE: 98.2 F | OXYGEN SATURATION: 98 % | HEIGHT: 69 IN

## 2023-04-12 DIAGNOSIS — Z98.890 S/P MVR (MITRAL VALVE REPAIR): Primary | ICD-10-CM

## 2023-04-12 DIAGNOSIS — I10 ESSENTIAL HYPERTENSION: ICD-10-CM

## 2023-04-12 PROCEDURE — 99213 OFFICE O/P EST LOW 20 MIN: CPT | Performed by: STUDENT IN AN ORGANIZED HEALTH CARE EDUCATION/TRAINING PROGRAM

## 2023-04-12 RX ORDER — METOPROLOL SUCCINATE 50 MG/1
25 TABLET, EXTENDED RELEASE ORAL EVERY MORNING
Qty: 180 TABLET | Refills: 0 | Status: SHIPPED | OUTPATIENT
Start: 2023-04-12 | End: 2024-02-02

## 2023-04-12 NOTE — PATIENT INSTRUCTIONS
Sukh Mack,    Thank you for allowing [facility] to manage your care.      I sent your prescriptions to your pharmacy.      For your convenience, test results are released as soon as they are available  Please allow 1-2 business days for me to send you a comment about your results.  If not done so, I encourage you to login into [employer] [email or web address] to review your results in real time.     If you have any questions or concerns, please feel free to call us at [phone].    Sincerely,    Dr. Ma    Did you know?      You can schedule a video visit for follow-up appointments as well as future appointments for certain conditions.  Please see the below link.     [email or web address]    If you have not already done so,  I encourage you to sign up for [employer] ([email or web address]).  This will allow you to review your results, securely communicate with a provider, and schedule virtual visits as well.

## 2023-04-12 NOTE — PROGRESS NOTES
"  Assessment & Plan     1. S/P MVR (mitral valve repair)    - metoprolol succinate ER (TOPROL XL) 50 MG 24 hr tablet; Take 0.5 tablets (25 mg) by mouth every morning  Dispense: 180 tablet; Refill: 0  - Home Blood Pressure Monitor Order for DME - ONLY FOR DME  - PRIMARY CARE FOLLOW-UP SCHEDULING; Future    2. Essential hypertension  Controlled .   - metoprolol succinate ER (TOPROL XL) 50 MG 24 hr tablet; Take 0.5 tablets (25 mg) by mouth every morning  Dispense: 180 tablet; Refill: 0  - Home Blood Pressure Monitor Order for DME - ONLY FOR DME  - PRIMARY CARE FOLLOW-UP SCHEDULING; Future  Continue exercise and healthy diet      Fanta Ma MD  [facility] ROMI Mack is a 47 year old, presenting for the following health issues:  Establish Care and Recheck Medication        4/12/2023     1:57 PM   Additional Questions   Roomed by mp   Accompanied by na         4/12/2023     1:57 PM   Patient Reported Additional Medications   Patient reports taking the following new medications multi vitamin     History of Present Illness       Reason for visit:  Post surgery consult, Prescription refill, due for a shot    He eats 2-3 servings of fruits and vegetables daily.He consumes 0 sweetened beverage(s) daily.He exercises with enough effort to increase his heart rate 30 to 60 minutes per day.  He exercises with enough effort to increase his heart rate 5 days per week.   He is taking medications regularly.       Medication Followup of metoprolol     Taking Medication as prescribed: yes    Side Effects:  None    Medication Helping Symptoms:  yes      Blood pressure was noted to be elevated post op. He does not check bp at home.    Review of Systems   Constitutional, HEENT, cardiovascular, pulmonary, gi and gu systems are negative, except as otherwise noted.      Objective    /84   Pulse 86   Temp 98.2  F (36.8  C) (Temporal)   Resp 16   Ht 1.764 m (5' 9.45\")   Wt 77.6 kg (171 " lb)   SpO2 98%   BMI 24.93 kg/m    Body mass index is 24.93 kg/m .  Physical Exam   GENERAL: healthy, alert and no distress  RESP: lungs clear to auscultation - no rales, rhonchi or wheezes  CV: regular rate and rhythm, normal S1 S2,   MS: no gross musculoskeletal defects noted, no edema

## 2023-06-01 ENCOUNTER — LAB (OUTPATIENT)
Dept: LAB | Facility: CLINIC | Age: 48
End: 2023-06-01
Payer: COMMERCIAL

## 2023-06-01 ENCOUNTER — ANCILLARY PROCEDURE (OUTPATIENT)
Dept: CT IMAGING | Facility: CLINIC | Age: 48
End: 2023-06-01
Payer: COMMERCIAL

## 2023-06-01 DIAGNOSIS — D48.119 DESMOID TUMOR: ICD-10-CM

## 2023-06-01 LAB
ALBUMIN SERPL BCG-MCNC: 4.8 G/DL (ref 3.5–5.2)
ALP SERPL-CCNC: 51 U/L (ref 40–129)
ALT SERPL W P-5'-P-CCNC: 9 U/L (ref 10–50)
ANION GAP SERPL CALCULATED.3IONS-SCNC: 9 MMOL/L (ref 7–15)
AST SERPL W P-5'-P-CCNC: 29 U/L (ref 10–50)
BASOPHILS # BLD AUTO: 0.1 10E3/UL (ref 0–0.2)
BASOPHILS NFR BLD AUTO: 1 %
BILIRUB SERPL-MCNC: 0.7 MG/DL
BUN SERPL-MCNC: 14.9 MG/DL (ref 6–20)
CALCIUM SERPL-MCNC: 9.6 MG/DL (ref 8.6–10)
CHLORIDE SERPL-SCNC: 104 MMOL/L (ref 98–107)
CREAT SERPL-MCNC: 1.11 MG/DL (ref 0.67–1.17)
DEPRECATED HCO3 PLAS-SCNC: 25 MMOL/L (ref 22–29)
EOSINOPHIL # BLD AUTO: 0.1 10E3/UL (ref 0–0.7)
EOSINOPHIL NFR BLD AUTO: 2 %
ERYTHROCYTE [DISTWIDTH] IN BLOOD BY AUTOMATED COUNT: 12 % (ref 10–15)
GFR SERPL CREATININE-BSD FRML MDRD: 83 ML/MIN/1.73M2
GLUCOSE SERPL-MCNC: 108 MG/DL (ref 70–99)
HCT VFR BLD AUTO: 47.7 % (ref 40–53)
HGB BLD-MCNC: 16.1 G/DL (ref 13.3–17.7)
IMM GRANULOCYTES # BLD: 0 10E3/UL
IMM GRANULOCYTES NFR BLD: 0 %
LYMPHOCYTES # BLD AUTO: 1.7 10E3/UL (ref 0.8–5.3)
LYMPHOCYTES NFR BLD AUTO: 29 %
MCH RBC QN AUTO: 29.9 PG (ref 26.5–33)
MCHC RBC AUTO-ENTMCNC: 33.8 G/DL (ref 31.5–36.5)
MCV RBC AUTO: 89 FL (ref 78–100)
MONOCYTES # BLD AUTO: 0.6 10E3/UL (ref 0–1.3)
MONOCYTES NFR BLD AUTO: 9 %
NEUTROPHILS # BLD AUTO: 3.6 10E3/UL (ref 1.6–8.3)
NEUTROPHILS NFR BLD AUTO: 59 %
PLATELET # BLD AUTO: 212 10E3/UL (ref 150–450)
POTASSIUM SERPL-SCNC: 5.1 MMOL/L (ref 3.4–5.3)
PROT SERPL-MCNC: 7.6 G/DL (ref 6.4–8.3)
RBC # BLD AUTO: 5.39 10E6/UL (ref 4.4–5.9)
SODIUM SERPL-SCNC: 138 MMOL/L (ref 136–145)
WBC # BLD AUTO: 6 10E3/UL (ref 4–11)

## 2023-06-01 PROCEDURE — 80053 COMPREHEN METABOLIC PANEL: CPT

## 2023-06-01 PROCEDURE — 36415 COLL VENOUS BLD VENIPUNCTURE: CPT

## 2023-06-01 PROCEDURE — 85025 COMPLETE CBC W/AUTO DIFF WBC: CPT

## 2023-06-01 PROCEDURE — 74177 CT ABD & PELVIS W/CONTRAST: CPT | Mod: TC | Performed by: RADIOLOGY

## 2023-06-01 RX ORDER — IOPAMIDOL 755 MG/ML
100 INJECTION, SOLUTION INTRAVASCULAR ONCE
Status: COMPLETED | OUTPATIENT
Start: 2023-06-01 | End: 2023-06-01

## 2023-06-01 RX ADMIN — IOPAMIDOL 100 ML: 755 INJECTION, SOLUTION INTRAVASCULAR at 10:58

## 2023-06-06 ENCOUNTER — ONCOLOGY VISIT (OUTPATIENT)
Dept: ONCOLOGY | Facility: CLINIC | Age: 48
End: 2023-06-06
Attending: STUDENT IN AN ORGANIZED HEALTH CARE EDUCATION/TRAINING PROGRAM
Payer: COMMERCIAL

## 2023-06-06 VITALS
TEMPERATURE: 98 F | WEIGHT: 176.2 LBS | HEART RATE: 76 BPM | DIASTOLIC BLOOD PRESSURE: 99 MMHG | SYSTOLIC BLOOD PRESSURE: 140 MMHG | RESPIRATION RATE: 12 BRPM | BODY MASS INDEX: 25.68 KG/M2 | OXYGEN SATURATION: 98 %

## 2023-06-06 DIAGNOSIS — D48.119 DESMOID TUMOR: ICD-10-CM

## 2023-06-06 PROCEDURE — G0463 HOSPITAL OUTPT CLINIC VISIT: HCPCS | Performed by: STUDENT IN AN ORGANIZED HEALTH CARE EDUCATION/TRAINING PROGRAM

## 2023-06-06 PROCEDURE — 99215 OFFICE O/P EST HI 40 MIN: CPT | Performed by: STUDENT IN AN ORGANIZED HEALTH CARE EDUCATION/TRAINING PROGRAM

## 2023-06-06 ASSESSMENT — PAIN SCALES - GENERAL: PAINLEVEL: NO PAIN (0)

## 2023-06-06 NOTE — PROGRESS NOTES
[facility] CANCER CLINIC    NEW PATIENT VISIT NOTE    PATIENT NAME: Frederick Nguyen MRN # [id number]  DATE OF VISIT: March 20, 2023 YOB: 1975    REFERRING PROVIDER: Mukul Thompson MD  DIV. SURGICAL ONCOLOGY  [address]  [place] [address]    CANCER TYPE:  Desmoid     CHIEF COMPLAIN   Recent surgery showing intraabdominal desmoid     HISTORY OF PRESENT ILLNESS     Frederick Nguyen is a 47 year old man with history of MVR, GERD and intraabdominal mass of unknown etiology which had shown interval growth on subequent CT scan (from 3.2 to 8.6 cm in the largest diameter over the past 2 years. He was admitted for exploratory laparoscopy and mass excision. He underwent laparoscopy converted to open laparotomy with small bowel resection and removal of a 10 cm mass. Pathology showing desmoid fibromatosis.   He comes today to establish care for future management. He reports that he has been healing well from surgery, only needing pain medication (tramadol) at night for pain to be able to sleep, he also reports his appetite is still low but he is trying to have good intake and stay hydrated. Reports no constipation, taking daily senna. Denies nausea or any other symptoms.     Interval Hx: Frederick reports that he has been feeling well, no new symptoms.      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       CURRENT OUTPATIENT MEDICATIONS     Current Outpatient  Medications   Medication Sig Dispense Refill     IBUPROFEN PO        metoprolol succinate ER (TOPROL XL) 50 MG 24 hr tablet Take 0.5 tablets (25 mg) by mouth every morning 180 tablet 0        REVIEW OF SYSTEMS   As above in the HPI, o/w complete 12-point ROS was negative.     PHYSICAL EXAM   There were no vitals taken for this visit.    EGO  GEN: NAD  HEENT: PERRL, EOMI, no icterus, injection or pallor. Oropharynx is clear.  NECK: no cervical or supraclavicular lymphadenopathy  LUNGS: clear bilaterally  CV: regular, no murmurs, rubs, or gallops  ABDOMEN: soft, non-tender, non-distended, normal bowel sounds, no hepatosplenomegaly by percussion or palpation. Surgical scars healing well.   EXT: warm, well perfused, no edema  NEURO: alert  SKIN: no rashes     LABORATORY AND IMAGING STUDIES     Recent Labs   Lab Test 23  0654 23  0600 23  1215 23  1036 10/27/19  1645 10/27/19  1058 10/26/19  0412   NA  --  139  --  140   < > 137 137   POTASSIUM  --  4.3  --  4.3   < > 4.7 4.4   CHLORIDE  --  106  --  111*   < > 104 108   CO2  --  23  --  26   < > 30 27   ANIONGAP  --  10  --  3   < > 3 2*   BUN  --  12.1  --  20   < > 14 14   CR  --  1.03 0.94 1.08   < > 1.30* 1.04   * 114*  --  93   < > 106* 137*   JEANNINE  --  9.1  --  9.2   < > 8.4* 7.7*   MAG  --   --   --   --   --  2.5* 2.7*   PHOS  --   --   --   --   --  2.9 4.2    < > = values in this interval not displayed.     Recent Labs   Lab Test 23  0854 23  0600 23  1036 10/30/20  0839 10/23/20  0845 10/20/20  0952   WBC  --  10.7 7.7 6.4 6.9 14.8*   HGB  --  13.5 15.6  --  14.9 16.6    183 220  --  260 245   MCV  --  90 89  --  89 90   NEUTROPHIL  --   --   --  58.0 60.5 74.9     Recent Labs   Lab Test 10/20/20  0952 20  1404 20  1408   BILITOTAL 1.2 0.4 0.5   ALKPHOS 75 165* 191*   ALT 15 34 57   AST 10 23 43   ALBUMIN 3.7 3.4 3.3*     TSH   Date Value Ref Range Status   2020 2.61 0.40 - 4.00 mU/L  Final   ]    Results for orders placed or performed during the hospital encounter of 03/01/23   POC US Guidance Needle Placement    Impression    Bilateral Transverse Abdominis Plane Block      CT AP 1/26/2023                                                                IMPRESSION:  Significant interval enlargement of a rounded solid and  mildly heterogeneous mass within the mesentery now localizing to the  right abdomen. Currently it has a maximal size of 7.8 cm, previously  3.2 cm. As such, this should be considered malignancy until proven  otherwise. Carcinoid, versus desmoid lesion, or other neoplasm remains  in the differential. Recommend further oncologic workup and surgical  consultation. Percutaneous biopsy and/or PET/CT could provide  additional assessment if clinically relevant. If no intervention is to  be performed, recommend surveillance CT in 6 months.    6/1/23  IMPRESSION:   1.  Low attenuating soft tissue thickening associated with suture  material in the mesentery. This is potentially postoperative however  residual/recurrent desmoid tumor is not excluded. Attention follow-up.     PATHOLOGY   3/1/2023  Final Diagnosis   A. 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 SMALL BOWEL, ADDITIONAL, RESECTION:  - Small bowel wall with no significant histopathologic abnormality  - Negative for dysplasia or malignancy         I reviewed the medical records including surgical report, prior colonoscopy, laboratory studies which are WNL, and have personally reviewed imaging including CT chest, abdomen and pelvis done earlier this year which demonstrates an 8 cm mesenteric desmoid which has been now removed, and no evidence of any pathology  in the CT of the chest. CT on 6/23 with no evidence of recurrence only thickening observed at the site of the surgical suture.       ASSESSMENT AND PLAN     47-year-old male with history of MVP and GERD.  He was found to have an intra-abdominal mass back in 2021 up to 3 cm.  On repeat scans on January 2023 the mass Significantly increased in size to about 8 cm in the longest diameter for this reason he underwent surgical resection on 3/1/2023.  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  He is referred here for further management.    He had essentially normal upper and lower endoscopy in April 2022, so this desmoid is unlikely to be related to an underlying polyposis syndrome.     CT 6/1/23 with no clear evidence of recurrence, only thickening at the suture site. Plan to continue surveillance with scans in 6 months.   Plan:    -CT abdomen and labs in January  -Follow up after scans.     40 minutes spent on the date of the encounter doing chart review, review of test results, interpretation of tests, patient visit and documentation     August Borjas MD   of Medicine  Hematology, Oncology and Transplantation

## 2023-06-06 NOTE — NURSING NOTE
"Oncology Rooming Note    June 6, 2023 10:27 AM   Frederick Nguyen is a 47 year old male who presents for:    Chief Complaint   Patient presents with     Oncology Clinic Visit     Desmoid tumor     Initial Vitals: BP (!) 140/99   Pulse 76   Temp 98  F (36.7  C) (Oral)   Resp 12   Wt 79.9 kg (176 lb 3.2 oz)   SpO2 98%   BMI 25.68 kg/m   Estimated body mass index is 25.68 kg/m  as calculated from the following:    Height as of 4/12/23: 1.764 m (5' 9.45\").    Weight as of this encounter: 79.9 kg (176 lb 3.2 oz). Body surface area is 1.98 meters squared.  No Pain (0) Comment: Data Unavailable   No LMP for male patient.  Allergies reviewed: Yes  Medications reviewed: Yes    Medications: Medication refills not needed today.  Pharmacy name entered into [facility]: [id number] - [address]  AT [facility]    Clinical concerns: NONE      Nelida Gale            "

## 2023-06-06 NOTE — LETTER
6/6/2023         RE: Frederick Nguyen  [address] [facility] [address]        Dear Colleague,    Thank you for referring your patient, Frederick Nguyen, to the [facility] CANCER CLINIC. Please see a copy of my visit note below.    [facility] CANCER [facility]    NEW PATIENT VISIT NOTE    PATIENT NAME: Frederick Nguyen MRN # [id number]  DATE OF VISIT: March 20, 2023 YOB: 1975    REFERRING PROVIDER: Mukul Thompson MD  DIV. SURGICAL ONCOLOGY  [address]  [place] [address]    CANCER TYPE:  Desmoid     CHIEF COMPLAIN   Recent surgery showing intraabdominal desmoid     HISTORY OF PRESENT ILLNESS     Frederick Nguyen is a 47 year old man with history of MVR, GERD and intraabdominal mass of unknown etiology which had shown interval growth on subequent CT scan (from 3.2 to 8.6 cm in the largest diameter over the past 2 years. He was admitted for exploratory laparoscopy and mass excision. He underwent laparoscopy converted to open laparotomy with small bowel resection and removal of a 10 cm mass. Pathology showing desmoid fibromatosis.   He comes today to establish care for future management. He reports that he has been healing well from surgery, only needing pain medication (tramadol) at night for pain to be able to sleep, he also reports his appetite is still low but he is trying to have good intake and stay hydrated. Reports no constipation, taking daily senna. Denies nausea or any other symptoms.     Interval Hx: Frederick reports that he has been feeling well, no new symptoms.      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       CURRENT OUTPATIENT MEDICATIONS     Current Outpatient Medications   Medication Sig Dispense Refill    IBUPROFEN PO       metoprolol succinate ER (TOPROL XL) 50 MG 24 hr tablet Take 0.5 tablets (25 mg) by mouth every morning 180 tablet 0        REVIEW OF SYSTEMS   As above in the HPI, o/w complete 12-point ROS was negative.     PHYSICAL EXAM   There were no vitals taken for this visit.    EGO  GEN: NAD  HEENT: PERRL, EOMI, no icterus, injection or pallor. Oropharynx is clear.  NECK: no cervical or supraclavicular lymphadenopathy  LUNGS: clear bilaterally  CV: regular, no murmurs, rubs, or gallops  ABDOMEN: soft, non-tender, non-distended, normal bowel sounds, no hepatosplenomegaly by percussion or palpation. Surgical scars healing well.   EXT: warm, well perfused, no edema  NEURO: alert  SKIN: no rashes     LABORATORY AND IMAGING STUDIES     Recent Labs   Lab Test 23  0654 23  0600 23  1215 23  1036 10/27/19  1645 10/27/19  1058 10/26/19  0412   NA  --  139  --  140   < > 137 137   POTASSIUM  --  4.3  --  4.3   < > 4.7 4.4   CHLORIDE  --  106  --  111*   < > 104 108   CO2  --  23  --  26   < > 30 27   ANIONGAP  --  10  --  3   < > 3 2*   BUN  --  12.1  --  20   < > 14 14   CR  --  1.03 0.94 1.08   < > 1.30* 1.04   * 114*  --  93   < > 106* 137*   JEANNINE  --  9.1  --  9.2   < > 8.4* 7.7*   MAG  --   --   --   --   --  2.5* 2.7*   PHOS  --   --   --   --   --  2.9 4.2    < > = values in this interval not displayed.     Recent Labs   Lab Test 23  0854 23  0600 23  1036 10/30/20  0839 10/23/20  0845 10/20/20  0952   WBC  --  10.7 7.7 6.4 6.9 14.8*   HGB  --  13.5 15.6  --  14.9 16.6    183 220  --  260 245   MCV  --  90 89  --  89 90   NEUTROPHIL  --   --    --  58.0 60.5 74.9     Recent Labs   Lab Test 10/20/20  0952 08/21/20  1404 08/19/20  1408   BILITOTAL 1.2 0.4 0.5   ALKPHOS 75 165* 191*   ALT 15 34 57   AST 10 23 43   ALBUMIN 3.7 3.4 3.3*     TSH   Date Value Ref Range Status   08/19/2020 2.61 0.40 - 4.00 mU/L Final   ]    Results for orders placed or performed during the hospital encounter of 03/01/23   POC US Guidance Needle Placement    Impression    Bilateral Transverse Abdominis Plane Block      CT AP 1/26/2023                                                                IMPRESSION:  Significant interval enlargement of a rounded solid and  mildly heterogeneous mass within the mesentery now localizing to the  right abdomen. Currently it has a maximal size of 7.8 cm, previously  3.2 cm. As such, this should be considered malignancy until proven  otherwise. Carcinoid, versus desmoid lesion, or other neoplasm remains  in the differential. Recommend further oncologic workup and surgical  consultation. Percutaneous biopsy and/or PET/CT could provide  additional assessment if clinically relevant. If no intervention is to  be performed, recommend surveillance CT in 6 months.    6/1/23  IMPRESSION:   1.  Low attenuating soft tissue thickening associated with suture  material in the mesentery. This is potentially postoperative however  residual/recurrent desmoid tumor is not excluded. Attention follow-up.     PATHOLOGY   3/1/2023  Final Diagnosis   A. 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 SMALL BOWEL, ADDITIONAL, RESECTION:  - Small bowel wall with no significant histopathologic abnormality  - Negative for dysplasia or malignancy         I reviewed the medical records including surgical  report, prior colonoscopy, laboratory studies which are WNL, and have personally reviewed imaging including CT chest, abdomen and pelvis done earlier this year which demonstrates an 8 cm mesenteric desmoid which has been now removed, and no evidence of any pathology in the CT of the chest. CT on 6/23 with no evidence of recurrence only thickening observed at the site of the surgical suture.       ASSESSMENT AND PLAN     47-year-old male with history of MVP and GERD.  He was found to have an intra-abdominal mass back in 2021 up to 3 cm.  On repeat scans on January 2023 the mass Significantly increased in size to about 8 cm in the longest diameter for this reason he underwent surgical resection on 3/1/2023.  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  He is referred here for further management.    He had essentially normal upper and lower endoscopy in April 2022, so this desmoid is unlikely to be related to an underlying polyposis syndrome.     CT 6/1/23 with no clear evidence of recurrence, only thickening at the suture site. Plan to continue surveillance with scans in 6 months.   Plan:    -CT abdomen and labs in January  -Follow up after scans.     40 minutes spent on the date of the encounter doing chart review, review of test results, interpretation of tests, patient visit and documentation     August Borjas MD   of Medicine  Hematology, Oncology and Transplantation

## 2023-08-18 ENCOUNTER — PATIENT OUTREACH (OUTPATIENT)
Dept: CARE COORDINATION | Facility: CLINIC | Age: 48
End: 2023-08-18
Payer: COMMERCIAL

## 2023-09-01 ENCOUNTER — PATIENT OUTREACH (OUTPATIENT)
Dept: CARE COORDINATION | Facility: CLINIC | Age: 48
End: 2023-09-01
Payer: COMMERCIAL

## 2023-11-19 ENCOUNTER — HEALTH MAINTENANCE LETTER (OUTPATIENT)
Age: 48
End: 2023-11-19

## 2024-01-08 ENCOUNTER — ANCILLARY PROCEDURE (OUTPATIENT)
Dept: CT IMAGING | Facility: CLINIC | Age: 49
End: 2024-01-08
Attending: STUDENT IN AN ORGANIZED HEALTH CARE EDUCATION/TRAINING PROGRAM
Payer: COMMERCIAL

## 2024-01-08 ENCOUNTER — LAB (OUTPATIENT)
Dept: LAB | Facility: CLINIC | Age: 49
End: 2024-01-08
Payer: COMMERCIAL

## 2024-01-08 DIAGNOSIS — D48.119 DESMOID TUMOR: ICD-10-CM

## 2024-01-08 LAB
ALBUMIN SERPL BCG-MCNC: 4.5 G/DL (ref 3.5–5.2)
ALP SERPL-CCNC: 45 U/L (ref 40–150)
ALT SERPL W P-5'-P-CCNC: <5 U/L (ref 0–70)
ANION GAP SERPL CALCULATED.3IONS-SCNC: 11 MMOL/L (ref 7–15)
AST SERPL W P-5'-P-CCNC: 25 U/L (ref 0–45)
BILIRUB SERPL-MCNC: 0.7 MG/DL
BUN SERPL-MCNC: 14.1 MG/DL (ref 6–20)
CALCIUM SERPL-MCNC: 9.5 MG/DL (ref 8.6–10)
CHLORIDE SERPL-SCNC: 102 MMOL/L (ref 98–107)
CREAT SERPL-MCNC: 1.12 MG/DL (ref 0.67–1.17)
DEPRECATED HCO3 PLAS-SCNC: 25 MMOL/L (ref 22–29)
EGFRCR SERPLBLD CKD-EPI 2021: 82 ML/MIN/1.73M2
ERYTHROCYTE [DISTWIDTH] IN BLOOD BY AUTOMATED COUNT: 12 % (ref 10–15)
GLUCOSE SERPL-MCNC: 90 MG/DL (ref 70–99)
HCT VFR BLD AUTO: 47 % (ref 40–53)
HGB BLD-MCNC: 16.1 G/DL (ref 13.3–17.7)
MCH RBC QN AUTO: 30.1 PG (ref 26.5–33)
MCHC RBC AUTO-ENTMCNC: 34.3 G/DL (ref 31.5–36.5)
MCV RBC AUTO: 88 FL (ref 78–100)
PLATELET # BLD AUTO: 222 10E3/UL (ref 150–450)
POTASSIUM SERPL-SCNC: 4.4 MMOL/L (ref 3.4–5.3)
PROT SERPL-MCNC: 7.1 G/DL (ref 6.4–8.3)
RBC # BLD AUTO: 5.35 10E6/UL (ref 4.4–5.9)
SODIUM SERPL-SCNC: 138 MMOL/L (ref 135–145)
WBC # BLD AUTO: 6.2 10E3/UL (ref 4–11)

## 2024-01-08 PROCEDURE — 80053 COMPREHEN METABOLIC PANEL: CPT

## 2024-01-08 PROCEDURE — 74177 CT ABD & PELVIS W/CONTRAST: CPT | Mod: TC | Performed by: RADIOLOGY

## 2024-01-08 PROCEDURE — 36415 COLL VENOUS BLD VENIPUNCTURE: CPT

## 2024-01-08 PROCEDURE — 71260 CT THORAX DX C+: CPT | Mod: TC | Performed by: RADIOLOGY

## 2024-01-08 PROCEDURE — 85027 COMPLETE CBC AUTOMATED: CPT

## 2024-01-08 RX ORDER — IOPAMIDOL 755 MG/ML
108 INJECTION, SOLUTION INTRAVASCULAR ONCE
Status: COMPLETED | OUTPATIENT
Start: 2024-01-08 | End: 2024-01-08

## 2024-01-08 RX ADMIN — IOPAMIDOL 108 ML: 755 INJECTION, SOLUTION INTRAVASCULAR at 09:56

## 2024-01-10 ENCOUNTER — VIRTUAL VISIT (OUTPATIENT)
Dept: ONCOLOGY | Facility: CLINIC | Age: 49
End: 2024-01-10
Attending: STUDENT IN AN ORGANIZED HEALTH CARE EDUCATION/TRAINING PROGRAM
Payer: COMMERCIAL

## 2024-01-10 VITALS — WEIGHT: 175 LBS | HEIGHT: 70 IN | BODY MASS INDEX: 25.05 KG/M2

## 2024-01-10 DIAGNOSIS — D48.119 DESMOID TUMOR: ICD-10-CM

## 2024-01-10 PROCEDURE — 99215 OFFICE O/P EST HI 40 MIN: CPT | Mod: 95 | Performed by: STUDENT IN AN ORGANIZED HEALTH CARE EDUCATION/TRAINING PROGRAM

## 2024-01-10 ASSESSMENT — PAIN SCALES - GENERAL: PAINLEVEL: NO PAIN (0)

## 2024-01-10 NOTE — NURSING NOTE
Is the patient currently in the state of [place]? YES    Visit mode:VIDEO    If the visit is dropped, the patient can be reconnected by: VIDEO VISIT: Send to e-mail at: [email or web address]    Will anyone else be joining the visit? NO  (If patient encounters technical issues they should call [phone]    How would you like to obtain your AVS? MyChart    Are changes needed to the allergy or medication list? Yes Pt also takes a multivitamin.    Reason for visit: RECHRUDOLPH GOYAL

## 2024-01-10 NOTE — LETTER
1/10/2024         RE: Frederick Nguyen  [address] [facility] [id number]        Dear Colleague,    Thank you for referring your patient, Frederick Nguyen, to the [facility] CANCER [facility]INIC. Please see a copy of my visit note below.    Virtual Visit Details    Type of service:  Video Visit     Originating Location (pt. Location): Home    Distant Location (provider location):  On-site  Platform used for Video Visit: [facility] CANCER [facility]INIC    FOLLOW UP VISIT NOTE    PATIENT NAME: Frederick Nguyen MRN # [id number]  DATE OF VISIT: January 10, 2024 YOB: 1975    REFERRING PROVIDER: Mukul Thompson MD  [facility]. SURGICAL ONCOLOGY  [address]  [place] [address]    CANCER TYPE:  Desmoid     CHIEF COMPLAIN   Recent surgery showing intraabdominal desmoid     HISTORY OF PRESENT ILLNESS     Frederick Nguyen is a 47 year old man with history of MVR, GERD and intraabdominal mass of unknown etiology which had shown interval growth on subequent CT scan (from 3.2 to 8.6 cm in the largest diameter over the past 2 years. He was admitted for exploratory laparoscopy and mass excision. He underwent laparoscopy converted to open laparotomy with small bowel resection and removal of a 10 cm mass. Pathology showing desmoid fibromatosis.   He comes today to establish care for future management. He reports that he has been healing well from surgery, only needing pain medication (tramadol) at night for pain to be able to sleep, he also reports his appetite is still low but he is trying to have good intake and stay hydrated. Reports no constipation, taking daily senna. Denies nausea or any other symptoms.     Interval Hx: Frederick reports that he has been feeling well, no new symptoms.      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       CURRENT OUTPATIENT MEDICATIONS     Current Outpatient Medications   Medication Sig Dispense Refill    metoprolol succinate ER (TOPROL XL) 50 MG 24 hr tablet Take 0.5 tablets (25 mg) by mouth every morning 180 tablet 0    IBUPROFEN PO  (Patient not taking: Reported on 2023)          REVIEW OF SYSTEMS   As above in the HPI, o/w complete 12-point ROS was negative.     PHYSICAL EXAM   Ht 1.778 m (5' 10\")   Wt 79.4 kg (175 lb)   BMI 25.11 kg/m      Virtual visit    Prior physical exam    EGO  GEN: NAD  HEENT: PERRL, EOMI, no icterus, injection or pallor. Oropharynx is clear.  NECK: no cervical or supraclavicular lymphadenopathy  LUNGS: clear bilaterally  CV: regular, no murmurs, rubs, or gallops  ABDOMEN: soft, non-tender, non-distended, normal bowel sounds, no hepatosplenomegaly by percussion or palpation. Surgical scars healing well.   EXT: warm, well perfused, no edema  NEURO: alert  SKIN: no rashes     LABORATORY AND IMAGING STUDIES     Lab Results   Component Value Date    WBC 6.2 2024    WBC 6.4 10/30/2020     Lab Results   Component Value Date    RBC 5.35 2024    RBC 4.92 10/23/2020     Lab Results   Component Value Date    HGB 16.1 2024    HGB 14.9 10/23/2020     Lab Results   Component Value Date    HCT 47.0 2024    HCT 43.7 10/23/2020     Lab Results   Component Value Date    MCV 88 2024    MCV 89 10/23/2020     Lab Results   Component Value Date    MCH 30.1 2024    MCH 30.3 10/23/2020     Lab Results   Component Value Date    MCHC 34.3 2024    MCHC 34.1 10/23/2020 "     Lab Results   Component Value Date    RDW 12.0 01/08/2024    RDW 12.2 10/23/2020     Lab Results   Component Value Date     01/08/2024     10/23/2020     Last Comprehensive Metabolic Panel:  Sodium   Date Value Ref Range Status   01/08/2024 138 135 - 145 mmol/L Final     Comment:     Reference intervals for this test were updated on 09/26/2023 to more accurately reflect our healthy population. There may be differences in the flagging of prior results with similar values performed with this method. Interpretation of those prior results can be made in the context of the updated reference intervals.    10/20/2020 136 133 - 144 mmol/L Final     Potassium   Date Value Ref Range Status   01/08/2024 4.4 3.4 - 5.3 mmol/L Final   02/23/2023 4.3 3.4 - 5.3 mmol/L Final   10/20/2020 4.3 3.4 - 5.3 mmol/L Final     Chloride   Date Value Ref Range Status   01/08/2024 102 98 - 107 mmol/L Final   02/23/2023 111 (H) 94 - 109 mmol/L Final   10/20/2020 105 94 - 109 mmol/L Final     Carbon Dioxide   Date Value Ref Range Status   10/20/2020 27 20 - 32 mmol/L Final     Carbon Dioxide (CO2)   Date Value Ref Range Status   01/08/2024 25 22 - 29 mmol/L Final   02/23/2023 26 20 - 32 mmol/L Final     Anion Gap   Date Value Ref Range Status   01/08/2024 11 7 - 15 mmol/L Final   02/23/2023 3 3 - 14 mmol/L Final   10/20/2020 4 3 - 14 mmol/L Final     Glucose   Date Value Ref Range Status   01/08/2024 90 70 - 99 mg/dL Final   02/23/2023 93 70 - 99 mg/dL Final   10/20/2020 108 (H) 70 - 99 mg/dL Final     Comment:     Fasting specimen     GLUCOSE BY METER POCT   Date Value Ref Range Status   03/02/2023 111 (H) 70 - 99 mg/dL Final     Urea Nitrogen   Date Value Ref Range Status   01/08/2024 14.1 6.0 - 20.0 mg/dL Final   02/23/2023 20 7 - 30 mg/dL Final   10/20/2020 19 7 - 30 mg/dL Final     Creatinine   Date Value Ref Range Status   01/08/2024 1.12 0.67 - 1.17 mg/dL Final   10/20/2020 1.28 (H) 0.66 - 1.25 mg/dL Final     GFR  Estimate   Date Value Ref Range Status   01/08/2024 82 >60 mL/min/1.73m2 Final   10/20/2020 68 >60 mL/min/[1.73_m2] Final     Comment:     Non  GFR Calc  Starting 12/18/2018, serum creatinine based estimated GFR (eGFR) will be   calculated using the Chronic Kidney Disease Epidemiology Collaboration   (CKD-EPI) equation.       Calcium   Date Value Ref Range Status   01/08/2024 9.5 8.6 - 10.0 mg/dL Final   10/20/2020 9.1 8.5 - 10.1 mg/dL Final     Bilirubin Total   Date Value Ref Range Status   01/08/2024 0.7 <=1.2 mg/dL Final   10/20/2020 1.2 0.2 - 1.3 mg/dL Final     Alkaline Phosphatase   Date Value Ref Range Status   01/08/2024 45 40 - 150 U/L Final     Comment:     Reference intervals for this test were updated on 11/14/2023 to more accurately reflect our healthy population. There may be differences in the flagging of prior results with similar values performed with this method. Interpretation of those prior results can be made in the context of the updated reference intervals.   10/20/2020 75 40 - 150 U/L Final     ALT   Date Value Ref Range Status   01/08/2024 <5 0 - 70 U/L Final     Comment:     Reference intervals for this test were updated on 6/12/2023 to more accurately reflect our healthy population. There may be differences in the flagging of prior results with similar values performed with this method. Interpretation of those prior results can be made in the context of the updated reference intervals.     10/20/2020 15 0 - 70 U/L Final     AST   Date Value Ref Range Status   01/08/2024 25 0 - 45 U/L Final     Comment:     Reference intervals for this test were updated on 6/12/2023 to more accurately reflect our healthy population. There may be differences in the flagging of prior results with similar values performed with this method. Interpretation of those prior results can be made in the context of the updated reference intervals.   10/20/2020 10 0 - 45 U/L Final         Results for  orders placed or performed during the hospital encounter of 03/01/23   POC US Guidance Needle Placement    Impression    Bilateral Transverse Abdominis Plane Block      CT AP 1/26/2023                                                                IMPRESSION:  Significant interval enlargement of a rounded solid and  mildly heterogeneous mass within the mesentery now localizing to the  right abdomen. Currently it has a maximal size of 7.8 cm, previously  3.2 cm. As such, this should be considered malignancy until proven  otherwise. Carcinoid, versus desmoid lesion, or other neoplasm remains  in the differential. Recommend further oncologic workup and surgical  consultation. Percutaneous biopsy and/or PET/CT could provide  additional assessment if clinically relevant. If no intervention is to  be performed, recommend surveillance CT in 6 months.    6/1/23  IMPRESSION:   1.  Low attenuating soft tissue thickening associated with suture  material in the mesentery. This is potentially postoperative however  residual/recurrent desmoid tumor is not excluded. Attention follow-up.    CT CAP 1/8/24  IMPRESSION:  1.  Small amount of soft tissue thickening or fluid adjacent to suture  material in the mesentery has resolved.  2.  Few new small hypodense lesions in the spleen measuring up to 1.0  cm, indeterminate but may be small hemangiomas. This would be an  unusual site and appearance for metastatic disease. Consider follow-up  abdominal MRI in 3-6 months.  3.  No definite evidence for recurrent or metastatic disease in the  chest, abdomen and pelvis.     PATHOLOGY   3/1/2023  Final Diagnosis   A. 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 SMALL BOWEL,  ADDITIONAL, RESECTION:  - Small bowel wall with no significant histopathologic abnormality  - Negative for dysplasia or malignancy           ASSESSMENT AND PLAN     47-year-old male with history of MVP and GERD.  He was found to have an intra-abdominal mass back in 2021 up to 3 cm.  On repeat scans on January 2023 the mass Significantly increased in size to about 8 cm in the longest diameter for this reason he underwent surgical resection on 3/1/2023.  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      He had essentially normal upper and lower endoscopy in April 2022, so this desmoid is unlikely to be related to an underlying polyposis syndrome.     CT 6/1/23 with no clear evidence of recurrence, only thickening at the suture site.  I personally reviewed the most recent CT of the chest abdomen and pelvis showing no evidence of residual or recurrent desmoid.  However, new small hypodense lesions observed in the spleen which were not present on the scan on June 2023, and an MRI has been suggested for follow-up.     I discussed with the patient that desmoid tumors do not metastasize and if anything this would represent a different process.  However given the short interval of appearance we will proceed with an MRI of the abdomen in 6 months.    Plan:    -MRI abdomen in 6 months (7/1)   -Follow up Dr. Zuniga after scans on 7/3/23     40 minutes spent on the date of the encounter doing chart review, review of test results, interpretation of tests, patient visit and documentation     August Borjas MD   of Medicine  Hematology, Oncology and Transplantation

## 2024-01-10 NOTE — PROGRESS NOTES
Virtual Visit Details    Type of service:  Video Visit     Originating Location (pt. Location): Home    Distant Location (provider location):  On-site  Platform used for Video Visit: [facility] CANCER CLINIC    FOLLOW UP VISIT NOTE    PATIENT NAME: Frederick Nguyen MRN # [id number]  DATE OF VISIT: January 10, 2024 YOB: 1975    REFERRING PROVIDER: Mukul Thompson MD  DIV. SURGICAL ONCOLOGY  [address]  [place] [address]    CANCER TYPE:  Desmoid     CHIEF COMPLAIN   Recent surgery showing intraabdominal desmoid     HISTORY OF PRESENT ILLNESS     Frederick Nguyne is a 47 year old man with history of MVR, GERD and intraabdominal mass of unknown etiology which had shown interval growth on subequent CT scan (from 3.2 to 8.6 cm in the largest diameter over the past 2 years. He was admitted for exploratory laparoscopy and mass excision. He underwent laparoscopy converted to open laparotomy with small bowel resection and removal of a 10 cm mass. Pathology showing desmoid fibromatosis.   He comes today to establish care for future management. He reports that he has been healing well from surgery, only needing pain medication (tramadol) at night for pain to be able to sleep, he also reports his appetite is still low but he is trying to have good intake and stay hydrated. Reports no constipation, taking daily senna. Denies nausea or any other symptoms.     Interval Hx: Frederick reports that he has been feeling well, no new symptoms.      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       CURRENT OUTPATIENT MEDICATIONS     Current Outpatient Medications   Medication Sig Dispense Refill    metoprolol succinate ER (TOPROL XL) 50 MG 24 hr tablet Take 0.5 tablets (25 mg) by mouth every morning 180 tablet 0    IBUPROFEN PO  (Patient not taking: Reported on 2023)          REVIEW OF SYSTEMS   As above in the HPI, o/w complete 12-point ROS was negative.     PHYSICAL EXAM   Ht 1.778 m (5' 10\")   Wt 79.4 kg (175 lb)   BMI 25.11 kg/m      Virtual visit    Prior physical exam    EGO  GEN: NAD  HEENT: PERRL, EOMI, no icterus, injection or pallor. Oropharynx is clear.  NECK: no cervical or supraclavicular lymphadenopathy  LUNGS: clear bilaterally  CV: regular, no murmurs, rubs, or gallops  ABDOMEN: soft, non-tender, non-distended, normal bowel sounds, no hepatosplenomegaly by percussion or palpation. Surgical scars healing well.   EXT: warm, well perfused, no edema  NEURO: alert  SKIN: no rashes     LABORATORY AND IMAGING STUDIES     Lab Results   Component Value Date    WBC 6.2 2024    WBC 6.4 10/30/2020     Lab Results   Component Value Date    RBC 5.35 2024    RBC 4.92 10/23/2020     Lab Results   Component Value Date    HGB 16.1 2024    HGB 14.9 10/23/2020     Lab Results   Component Value Date    HCT 47.0 2024    HCT 43.7 10/23/2020     Lab Results   Component Value Date    MCV 88 2024    MCV 89 10/23/2020     Lab Results   Component Value Date    MCH 30.1 2024    MCH 30.3 10/23/2020     Lab Results   Component Value Date    MCHC 34.3 2024    MCHC 34.1 10/23/2020     Lab Results   Component Value Date    RDW 12.0 2024    RDW 12.2 10/23/2020     Lab Results   Component Value Date     2024     10/23/2020     Last Comprehensive Metabolic Panel:  Sodium   Date Value Ref Range Status   2024 138 135 - " 145 mmol/L Final     Comment:     Reference intervals for this test were updated on 09/26/2023 to more accurately reflect our healthy population. There may be differences in the flagging of prior results with similar values performed with this method. Interpretation of those prior results can be made in the context of the updated reference intervals.    10/20/2020 136 133 - 144 mmol/L Final     Potassium   Date Value Ref Range Status   01/08/2024 4.4 3.4 - 5.3 mmol/L Final   02/23/2023 4.3 3.4 - 5.3 mmol/L Final   10/20/2020 4.3 3.4 - 5.3 mmol/L Final     Chloride   Date Value Ref Range Status   01/08/2024 102 98 - 107 mmol/L Final   02/23/2023 111 (H) 94 - 109 mmol/L Final   10/20/2020 105 94 - 109 mmol/L Final     Carbon Dioxide   Date Value Ref Range Status   10/20/2020 27 20 - 32 mmol/L Final     Carbon Dioxide (CO2)   Date Value Ref Range Status   01/08/2024 25 22 - 29 mmol/L Final   02/23/2023 26 20 - 32 mmol/L Final     Anion Gap   Date Value Ref Range Status   01/08/2024 11 7 - 15 mmol/L Final   02/23/2023 3 3 - 14 mmol/L Final   10/20/2020 4 3 - 14 mmol/L Final     Glucose   Date Value Ref Range Status   01/08/2024 90 70 - 99 mg/dL Final   02/23/2023 93 70 - 99 mg/dL Final   10/20/2020 108 (H) 70 - 99 mg/dL Final     Comment:     Fasting specimen     GLUCOSE BY METER POCT   Date Value Ref Range Status   03/02/2023 111 (H) 70 - 99 mg/dL Final     Urea Nitrogen   Date Value Ref Range Status   01/08/2024 14.1 6.0 - 20.0 mg/dL Final   02/23/2023 20 7 - 30 mg/dL Final   10/20/2020 19 7 - 30 mg/dL Final     Creatinine   Date Value Ref Range Status   01/08/2024 1.12 0.67 - 1.17 mg/dL Final   10/20/2020 1.28 (H) 0.66 - 1.25 mg/dL Final     GFR Estimate   Date Value Ref Range Status   01/08/2024 82 >60 mL/min/1.73m2 Final   10/20/2020 68 >60 mL/min/[1.73_m2] Final     Comment:     Non  GFR Calc  Starting 12/18/2018, serum creatinine based estimated GFR (eGFR) will be   calculated using the Chronic  Kidney Disease Epidemiology Collaboration   (CKD-EPI) equation.       Calcium   Date Value Ref Range Status   01/08/2024 9.5 8.6 - 10.0 mg/dL Final   10/20/2020 9.1 8.5 - 10.1 mg/dL Final     Bilirubin Total   Date Value Ref Range Status   01/08/2024 0.7 <=1.2 mg/dL Final   10/20/2020 1.2 0.2 - 1.3 mg/dL Final     Alkaline Phosphatase   Date Value Ref Range Status   01/08/2024 45 40 - 150 U/L Final     Comment:     Reference intervals for this test were updated on 11/14/2023 to more accurately reflect our healthy population. There may be differences in the flagging of prior results with similar values performed with this method. Interpretation of those prior results can be made in the context of the updated reference intervals.   10/20/2020 75 40 - 150 U/L Final     ALT   Date Value Ref Range Status   01/08/2024 <5 0 - 70 U/L Final     Comment:     Reference intervals for this test were updated on 6/12/2023 to more accurately reflect our healthy population. There may be differences in the flagging of prior results with similar values performed with this method. Interpretation of those prior results can be made in the context of the updated reference intervals.     10/20/2020 15 0 - 70 U/L Final     AST   Date Value Ref Range Status   01/08/2024 25 0 - 45 U/L Final     Comment:     Reference intervals for this test were updated on 6/12/2023 to more accurately reflect our healthy population. There may be differences in the flagging of prior results with similar values performed with this method. Interpretation of those prior results can be made in the context of the updated reference intervals.   10/20/2020 10 0 - 45 U/L Final         Results for orders placed or performed during the hospital encounter of 03/01/23   POC US Guidance Needle Placement    Impression    Bilateral Transverse Abdominis Plane Block      CT AP 1/26/2023                                                                IMPRESSION:  Significant  interval enlargement of a rounded solid and  mildly heterogeneous mass within the mesentery now localizing to the  right abdomen. Currently it has a maximal size of 7.8 cm, previously  3.2 cm. As such, this should be considered malignancy until proven  otherwise. Carcinoid, versus desmoid lesion, or other neoplasm remains  in the differential. Recommend further oncologic workup and surgical  consultation. Percutaneous biopsy and/or PET/CT could provide  additional assessment if clinically relevant. If no intervention is to  be performed, recommend surveillance CT in 6 months.    6/1/23  IMPRESSION:   1.  Low attenuating soft tissue thickening associated with suture  material in the mesentery. This is potentially postoperative however  residual/recurrent desmoid tumor is not excluded. Attention follow-up.    CT CAP 1/8/24  IMPRESSION:  1.  Small amount of soft tissue thickening or fluid adjacent to suture  material in the mesentery has resolved.  2.  Few new small hypodense lesions in the spleen measuring up to 1.0  cm, indeterminate but may be small hemangiomas. This would be an  unusual site and appearance for metastatic disease. Consider follow-up  abdominal MRI in 3-6 months.  3.  No definite evidence for recurrent or metastatic disease in the  chest, abdomen and pelvis.     PATHOLOGY   3/1/2023  Final Diagnosis   A. 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 SMALL BOWEL, ADDITIONAL, RESECTION:  - Small bowel wall with no significant histopathologic abnormality  - Negative for dysplasia or malignancy           ASSESSMENT AND PLAN     47-year-old male with history of MVP and GERD.  He was found to have an intra-abdominal mass back in 2021 up  to 3 cm.  On repeat scans on January 2023 the mass Significantly increased in size to about 8 cm in the longest diameter for this reason he underwent surgical resection on 3/1/2023.  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      He had essentially normal upper and lower endoscopy in April 2022, so this desmoid is unlikely to be related to an underlying polyposis syndrome.     CT 6/1/23 with no clear evidence of recurrence, only thickening at the suture site.  I personally reviewed the most recent CT of the chest abdomen and pelvis showing no evidence of residual or recurrent desmoid.  However, new small hypodense lesions observed in the spleen which were not present on the scan on June 2023, and an MRI has been suggested for follow-up.     I discussed with the patient that desmoid tumors do not metastasize and if anything this would represent a different process.  However given the short interval of appearance we will proceed with an MRI of the abdomen in 6 months.    Plan:    -MRI abdomen in 6 months (7/1)   -Follow up Dr. Zuniga after scans on 7/3/23     40 minutes spent on the date of the encounter doing chart review, review of test results, interpretation of tests, patient visit and documentation     August Borjas MD   of Medicine  Hematology, Oncology and Transplantation

## 2024-01-30 ASSESSMENT — ENCOUNTER SYMPTOMS
SORE THROAT: 0
HEADACHES: 0
FEVER: 0
HEMATOCHEZIA: 0
SHORTNESS OF BREATH: 0
DYSURIA: 0
ARTHRALGIAS: 0
MYALGIAS: 0
NAUSEA: 0
FREQUENCY: 0
EYE PAIN: 0
ABDOMINAL PAIN: 0
DIARRHEA: 0
HEMATURIA: 0
HEARTBURN: 0
PARESTHESIAS: 0
CHILLS: 0
JOINT SWELLING: 0
COUGH: 0
NERVOUS/ANXIOUS: 0
PALPITATIONS: 0
WEAKNESS: 0
CONSTIPATION: 0
DIZZINESS: 0

## 2024-02-02 ENCOUNTER — OFFICE VISIT (OUTPATIENT)
Dept: FAMILY MEDICINE | Facility: CLINIC | Age: 49
End: 2024-02-02
Payer: COMMERCIAL

## 2024-02-02 ENCOUNTER — TELEPHONE (OUTPATIENT)
Dept: CARDIOLOGY | Facility: CLINIC | Age: 49
End: 2024-02-02

## 2024-02-02 VITALS
SYSTOLIC BLOOD PRESSURE: 112 MMHG | WEIGHT: 142.2 LBS | OXYGEN SATURATION: 100 % | HEART RATE: 84 BPM | TEMPERATURE: 98 F | HEIGHT: 69 IN | DIASTOLIC BLOOD PRESSURE: 72 MMHG | RESPIRATION RATE: 16 BRPM | BODY MASS INDEX: 21.06 KG/M2

## 2024-02-02 DIAGNOSIS — I10 ESSENTIAL HYPERTENSION: ICD-10-CM

## 2024-02-02 DIAGNOSIS — Z13.220 LIPID SCREENING: ICD-10-CM

## 2024-02-02 DIAGNOSIS — Z00.00 ROUTINE GENERAL MEDICAL EXAMINATION AT A HEALTH CARE FACILITY: Primary | ICD-10-CM

## 2024-02-02 DIAGNOSIS — Z98.890 S/P MVR (MITRAL VALVE REPAIR): ICD-10-CM

## 2024-02-02 PROCEDURE — 91320 SARSCV2 VAC 30MCG TRS-SUC IM: CPT | Performed by: FAMILY MEDICINE

## 2024-02-02 PROCEDURE — 80061 LIPID PANEL: CPT | Performed by: FAMILY MEDICINE

## 2024-02-02 PROCEDURE — 99213 OFFICE O/P EST LOW 20 MIN: CPT | Mod: 25 | Performed by: FAMILY MEDICINE

## 2024-02-02 PROCEDURE — 90480 ADMN SARSCOV2 VAC 1/ONLY CMP: CPT | Performed by: FAMILY MEDICINE

## 2024-02-02 PROCEDURE — 99396 PREV VISIT EST AGE 40-64: CPT | Performed by: FAMILY MEDICINE

## 2024-02-02 PROCEDURE — 36415 COLL VENOUS BLD VENIPUNCTURE: CPT | Performed by: FAMILY MEDICINE

## 2024-02-02 RX ORDER — METOPROLOL SUCCINATE 25 MG/1
25 TABLET, EXTENDED RELEASE ORAL EVERY MORNING
Qty: 90 TABLET | Refills: 1 | Status: SHIPPED | OUTPATIENT
Start: 2024-02-02 | End: 2024-08-16

## 2024-02-02 ASSESSMENT — ENCOUNTER SYMPTOMS
SHORTNESS OF BREATH: 0
DIZZINESS: 0
MYALGIAS: 0
DIARRHEA: 0
HEADACHES: 0
WEAKNESS: 0
FREQUENCY: 0
HEMATURIA: 0
CONSTIPATION: 0
JOINT SWELLING: 0
NERVOUS/ANXIOUS: 0
COUGH: 0
SORE THROAT: 0
ARTHRALGIAS: 0
NAUSEA: 0
ABDOMINAL PAIN: 0
EYE PAIN: 0
DYSURIA: 0
PALPITATIONS: 0
FEVER: 0
CHILLS: 0

## 2024-02-02 ASSESSMENT — PAIN SCALES - GENERAL: PAINLEVEL: NO PAIN (0)

## 2024-02-02 NOTE — PATIENT INSTRUCTIONS
Sukh Mack,    Thank you for allowing [facility] to manage your care.    I ordered some blood work, please go to the laboratory to get your laboratory studies.    I sent your prescriptions to your pharmacy.    I made a cardiology referral, they will be calling in approximately 1 week to set up your appointment.  If you do not hear from them, please call the specialty number on your after visit.     For your convenience, test results are released as soon as they are available  Please allow 1-2 business days for me to send you a comment about your results.  If not done so, I encourage you to login into [employer] ([email or web address] to review your results in real time.     If you have any questions or concerns, please feel free to call us at [phone].    Sincerely,    Dr. Calhoun    Did you know?      You can schedule a video visit for follow-up appointments as well as future appointments for certain conditions.  Please see the below link.     [email or web address]    If you have not already done so,  I encourage you to sign up for [employer] ([email or web address]).  This will allow you to review your results, securely communicate with a provider, and schedule virtual visits as well.      Eating Healthy Foods: Care Instructions  With every meal, you can make healthy food choices. Try to eat a variety of fruits, vegetables, whole grains, lean proteins, and low-fat dairy products. This can help you get the right balance of nutrients, including vitamins and minerals. Small changes add up over time. You can start by adding one healthy food to your meals each day.    Try to make half your plate fruits and vegetables, one-fourth whole grains, and one-fourth lean proteins. Try including dairy with your meals.   Eat more fruits and vegetables. Try to have them with most meals and snacks.   Foods for healthy eating    Fruits    These can be fresh, frozen,  "canned, or dried.  Try to choose whole fruit rather than fruit juice.  Eat a variety of colors.    Vegetables    These can be fresh, frozen, canned, or dried.  Beans, peas, and lentils count too.    Whole grains    Choose whole-grain breads, cereals, and noodles.  Try brown rice.    Lean proteins    These can include lean meat, poultry, fish, and eggs.  You can also have tofu, beans, peas, lentils, nuts, and seeds.    Dairy    Try milk, yogurt, and cheese.  Choose low-fat or fat-free when you can.  If you need to, use lactose-free milk or fortified plant-based milk products, such as soy milk.    Water    Drink water when you're thirsty.  Limit sugar-sweetened drinks, including soda, fruit drinks, and sports drinks.  Where can you learn more?  Go to https://www.[employer].net/patiented  Enter T756 in the search box to learn more about \"Eating Healthy Foods: Care Instructions.\"  Current as of: February 28, 2023               Content Version: 13.8    0844-0420 [employer].   Care instructions adapted under license by your healthcare professional. If you have questions about a medical condition or this instruction, always ask your healthcare professional. [employer] disclaims any warranty or liability for your use of this information.      Preventive Care Advice   This is general advice given by our system to help you stay healthy. However, your care team may have specific advice just for you. Please talk to your care team about your preventive care needs.  Nutrition  Eat 5 or more servings of fruits and vegetables each day.  Try wheat bread, brown rice and whole grain pasta (instead of white bread, rice, and pasta).  Get enough calcium and vitamin D. Check the label on foods and aim for 100% of the RDA (recommended daily allowance).  Lifestyle  Exercise at least 150 minutes each week  (30 minutes a day, 5 days a week).  Do muscle strengthening activities 2 days a week. These help control your " weight and prevent disease.  No smoking.  Wear sunscreen to prevent skin cancer.  Have a dental exam and cleaning every 6 months.  Yearly exams  See your health care team every year to talk about:  Any changes in your health.  Any medicines your care team has prescribed.  Preventive care, family planning, and ways to prevent chronic diseases.  Shots (vaccines)   HPV shots (up to age 26), if you've never had them before.  Hepatitis B shots (up to age 59), if you've never had them before.  COVID-19 shot: Get this shot when it's due.  Flu shot: Get a flu shot every year.  Tetanus shot: Get a tetanus shot every 10 years.  Pneumococcal, hepatitis A, and RSV shots: Ask your care team if you need these based on your risk.  Shingles shot (for age 50 and up)  General health tests  Diabetes screening:  Starting at age 35, Get screened for diabetes at least every 3 years.  If you are younger than age 35, ask your care team if you should be screened for diabetes.  Cholesterol test: At age 39, start having a cholesterol test every 5 years, or more often if advised.  Bone density scan (DEXA): At age 50, ask your care team if you should have this scan for osteoporosis (brittle bones).  Hepatitis C: Get tested at least once in your life.  STIs (sexually transmitted infections)  Before age 24: Ask your care team if you should be screened for STIs.  After age 24: Get screened for STIs if you're at risk. You are at risk for STIs (including HIV) if:  You are sexually active with more than one person.  You don't use condoms every time.  You or a partner was diagnosed with a sexually transmitted infection.  If you are at risk for HIV, ask about PrEP medicine to prevent HIV.  Get tested for HIV at least once in your life, whether you are at risk for HIV or not.  Cancer screening tests  Cervical cancer screening: If you have a cervix, begin getting regular cervical cancer screening tests starting at age 21.  Breast cancer scan (mammogram):  If you've ever had breasts, begin having regular mammograms starting at age 40. This is a scan to check for breast cancer.  Colon cancer screening: It is important to start screening for colon cancer at age 45.  Have a colonoscopy test every 10 years (or more often if you're at risk) Or, ask your provider about stool tests like a FIT test every year or Cologuard test every 3 years.  To learn more about your testing options, visit:   [email or web address].  For help making a decision, visit:   [email or web address].  Prostate cancer screening test: If you have a prostate, ask your care team if a prostate cancer screening test (PSA) at age 55 is right for you.  Lung cancer screening: If you are a current or former smoker ages 50 to 80, ask your care team if ongoing lung cancer screenings are right for you.  For informational purposes only. Not to replace the advice of your health care provider. Copyright   2023 [facility]. All rights reserved. Clinically reviewed by the [facility]. [employer] [id number] - REV 01/24.

## 2024-02-02 NOTE — TELEPHONE ENCOUNTER
Health Call Center    Phone Message    May a detailed message be left on voicemail: yes     Reason for Call: Appointment Intake    Referring Provider Name:     Madan Calhoun PRAMODhelenDO     Diagnosis and/or Symptoms:     S/P MVR (mitral valve repair) [Z98.890]   Patient last seen in 2019 with Dr. Browning. Please review and reach out to patient to schedule. Per patient he has been on Metoprolol since his heart surgery 2019 and doing well since. Referral is to discuss medication. Prefers to be seen in [place] location.     Action Taken: Message routed to:  [facility] Cardio    Travel Screening: Not Applicable

## 2024-02-02 NOTE — PROGRESS NOTES
Preventive Care Visit  [facility] ROMI Calhoun DO, Family Medicine  Feb 2, 2024      1. Routine general medical examination at a health care facility    2. S/P MVR (mitral valve repair)  Will defer if patient needs to continue metoprolol long term.  Able to lose weight since 2023 and currently asymptomatic.   - metoprolol succinate ER (TOPROL XL) 25 MG 24 hr tablet; Take 1 tablet (25 mg) by mouth every morning  Dispense: 90 tablet; Refill: 1  - Adult Cardiology al [facility] Referral; Future    3. Essential hypertension  Chronic and stable  - metoprolol succinate ER (TOPROL XL) 25 MG 24 hr tablet; Take 1 tablet (25 mg) by mouth every morning  Dispense: 90 tablet; Refill: 1    4. Lipid screening  - Lipid panel reflex to direct LDL Non-fasting; Future      Subjective   Frederick is a 48 year old, presenting for the following:  Physical        2/2/2024     9:38 AM   Additional Questions   Roomed by Kassi   Accompanied by self        Health Care Directive  Patient does not have a Health Care Directive or Living Will: Discussed advance care planning with patient; however, patient declined at this time.    Healthy Habits:     Getting at least 3 servings of Calcium per day:  Yes    Bi-annual eye exam:  Yes    Dental care twice a year:  Yes    Sleep apnea or symptoms of sleep apnea:  None    Diet:  Regular (no restrictions)    Frequency of exercise:  4-5 days/week    Duration of exercise:  30-45 minutes    Taking medications regularly:  Yes    Medication side effects:  Not applicable    Additional concerns today:  No    Patient has no concerns today, he has a 18% weight loss since last visit that is intentional.             1/24/2024   Social Factors   Worry food won't last until get money to buy more No   Food not last or not have enough money for food? No   Do you have housing?  Yes   Are you worried about losing your housing? No   Lack of transportation? No   Unable to get utilities  (heat,electricity)? No       Today's PHQ-2 Score:       2/2/2024     9:04 AM   PHQ-2 ( 1999 [employer])   Q1: Little interest or pleasure in doing things 0   Q2: Feeling down, depressed or hopeless 0   PHQ-2 Score 0   Q1: Little interest or pleasure in doing things Not at all   Q2: Feeling down, depressed or hopeless Not at all   PHQ-2 Score 0           1/30/2024   Substance Use   Alcohol more than 3/day or more than 7/wk No     Social History     Tobacco Use    Smoking status: Never     Passive exposure: Never    Smokeless tobacco: Never   Vaping Use    Vaping Use: Never used   Substance Use Topics    Alcohol use: Yes     Comment: Foot surgery + illness    Drug use: No       The 10-year ASCVD risk score (Mati DK, et al., 2019) is: 1.5%    Values used to calculate the score:      Age: 48 years      Sex: Male      Is Non- : No      Diabetic: No      Tobacco smoker: No      Systolic Blood Pressure: 112 mmHg      Is BP treated: Yes      HDL Cholesterol: 41 mg/dL      Total Cholesterol: 139 mg/dL         No data to display                 Reviewed and updated as needed this visit by Provider                    Past Medical History:   Diagnosis Date    Cervical spondylosis without myelopathy     Heart murmur     Mesenteric mass     Osteomyelitis, L5S1 03/15/2018    secondary to bactermeia    S/P mitral valve repair     Status post coronary angiogram 09/09/2019     Past Surgical History:   Procedure Laterality Date    APPENDECTOMY  1987    CHEILECTOMY Right 4/10/2019    Procedure: Right Cheilectomy;  Surgeon: Sawyer Crowell MD;  Location: [facility] OR    COLONOSCOPY N/A 4/8/2022    Procedure: COLONOSCOPY;  Surgeon: Rufus White MD;  Location: [facility] OR    CV CORONARY ANGIOGRAM N/A 9/9/2019    Procedure: CV CORONARY ANGIOGRAM;  Surgeon: Pernell Bar MD;  Location:  HEART CARDIAC CATH LAB    CV RIGHT HEART CATH MEASUREMENTS RECORDED N/A 9/9/2019    Procedure: CV RIGHT HEART CATH;   Surgeon: Pernell Bar MD;  Location:  HEART CARDIAC CATH LAB    ESOPHAGOSCOPY, GASTROSCOPY, DUODENOSCOPY (EGD), COMBINED N/A 4/8/2022    Procedure: ESOPHAGOGASTRODUODENOSCOPY (EGD);  Surgeon: Rufus White MD;  Location: [facility] OR    INJECT SACROILIAC JOINT Right 5/9/2018    Procedure: INJECT SACROILIAC JOINT;  Right Sacroiliac Joint Injection;  Surgeon: Thom Williamson MD;  Location: [facility] OR    LAPAROSCOPY DIAGNOSTIC (GENERAL) N/A 3/1/2023    Procedure: LAPAROSCOPY, DIAGNOSTIC;  Surgeon: Mukul Thompson MD;  Location:  OR    ORTHOPEDIC SURGERY  4/10/19    Bone Spur Removal    REPAIR VALVE MITRAL MINIMALLY INVASIVE N/A 10/25/2019    Procedure: MINIMALLY INVASIVE MITRAL VALVE REPAIR WITH MITRAL ANNULOPLASTY RING KEVIN-KAUR PHYSIO II SIZE: 36MM  (ON PUMP OXYGENATOR ; INTRAOPERATIVE DEEPAK BY CARDIOLOGIST DR. VARELA);  Surgeon: Rigoberto Downs MD;  Location:  OR    RESECT SMALL BOWEL WITHOUT OSTOMY N/A 3/1/2023    Procedure: mesenteric mass and small bowel resection;  Surgeon: Mukul Thompson MD;  Location:  OR     Physical exam    2. History of MVR 2019: Due to regurgitation.  Currently on low dosage of metoprolol.  Denies any symptoms.    Review of Systems   Constitutional:  Negative for chills and fever.   HENT:  Negative for congestion, ear pain, hearing loss and sore throat.    Eyes:  Negative for pain and visual disturbance.   Respiratory:  Negative for cough and shortness of breath.    Cardiovascular:  Negative for chest pain and palpitations.   Gastrointestinal:  Negative for abdominal pain, constipation, diarrhea and nausea.   Genitourinary:  Negative for dysuria, frequency, genital sores, hematuria, penile discharge and urgency.   Musculoskeletal:  Negative for arthralgias, joint swelling and myalgias.   Skin:  Negative for rash.   Neurological:  Negative for dizziness, weakness and headaches.   Psychiatric/Behavioral:  The patient is not nervous/anxious.           Objective  "   Exam  /72   Pulse 84   Temp 98  F (36.7  C) (Temporal)   Resp 16   Ht 1.765 m (5' 9.49\")   Wt 64.5 kg (142 lb 3.2 oz)   SpO2 100%   BMI 20.71 kg/m     Estimated body mass index is 20.71 kg/m  as calculated from the following:    Height as of this encounter: 1.765 m (5' 9.49\").    Weight as of this encounter: 64.5 kg (142 lb 3.2 oz).    Physical Exam  Constitutional:       General: He is not in acute distress.  HENT:      Head: Normocephalic and atraumatic.      Right Ear: External ear normal.      Left Ear: External ear normal.      Nose: Nose normal.      Mouth/Throat:      Pharynx: No oropharyngeal exudate.   Eyes:      General:         Right eye: No discharge.         Left eye: No discharge.      Conjunctiva/sclera: Conjunctivae normal.      Pupils: Pupils are equal, round, and reactive to light.   Neck:      Thyroid: No thyromegaly.      Trachea: No tracheal deviation.   Cardiovascular:      Rate and Rhythm: Normal rate and regular rhythm.      Heart sounds: Normal heart sounds. No murmur heard.  Pulmonary:      Effort: No respiratory distress.      Breath sounds: Normal breath sounds. No wheezing.   Chest:      Chest wall: No tenderness.   Abdominal:      General: There is no distension.      Palpations: Abdomen is soft. There is no mass.      Tenderness: There is no abdominal tenderness. There is no guarding.      Comments: Well healed surgical scar   Musculoskeletal:         General: Normal range of motion.      Cervical back: Normal range of motion and neck supple.   Lymphadenopathy:      Cervical: No cervical adenopathy.   Skin:     General: Skin is warm.      Findings: No erythema or rash.   Neurological:      Mental Status: He is alert and oriented to person, place, and time.      Cranial Nerves: No cranial nerve deficit.      Coordination: Coordination normal.           Component      Latest Ref Rng 10/7/2019  6:46 AM 8/28/2020  7:38 AM   Cholesterol      <200 mg/dL  139  "   Triglycerides      <150 mg/dL  140    HDL Cholesterol      >39 mg/dL  41    LDL Cholesterol Calculated      <100 mg/dL  70    Non HDL Cholesterol      <130 mg/dL  98    Hemoglobin A1C      0 - 5.6 % 4.9         Signed Electronically by: Madan Calhoun DO    Answers submitted by the patient for this visit:  Annual Preventive Visit (Submitted on 1/30/2024)  Chief Complaint: Annual Exam:  Blood in stool: No  heartburn: No  peripheral edema: No  mood changes: No  Skin sensation changes: No  impotence: No

## 2024-02-03 LAB
CHOLEST SERPL-MCNC: 147 MG/DL
FASTING STATUS PATIENT QL REPORTED: NO
HDLC SERPL-MCNC: 48 MG/DL
LDLC SERPL CALC-MCNC: 87 MG/DL
NONHDLC SERPL-MCNC: 99 MG/DL
TRIGL SERPL-MCNC: 61 MG/DL

## 2024-02-29 ENCOUNTER — OFFICE VISIT (OUTPATIENT)
Dept: CARDIOLOGY | Facility: CLINIC | Age: 49
End: 2024-02-29
Payer: COMMERCIAL

## 2024-02-29 VITALS
WEIGHT: 141 LBS | SYSTOLIC BLOOD PRESSURE: 136 MMHG | OXYGEN SATURATION: 99 % | BODY MASS INDEX: 20.53 KG/M2 | HEART RATE: 74 BPM | DIASTOLIC BLOOD PRESSURE: 93 MMHG

## 2024-02-29 DIAGNOSIS — Z98.890 S/P MVR (MITRAL VALVE REPAIR): Primary | ICD-10-CM

## 2024-02-29 PROCEDURE — 99204 OFFICE O/P NEW MOD 45 MIN: CPT | Performed by: INTERNAL MEDICINE

## 2024-02-29 NOTE — PROGRESS NOTES
SUBJECTIVE:  Frederick Nguyen is a 48 year old male who presents for reestablishing cardiac care.  s/p minimally invasive  mitral valve repair with 36 mm Garrett Physio II annuloplasty ring and P2/P3 cleft closure on 10/25/2019.  Patient was lost to follow-up due to COVID.  Patient had no postop issues.  Currently patient is doing very well.  Had no cardiac complaints.   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  Previously patient had mild to moderate mitral regurgitation.  Had strep viridans bacteremia and lumbar osteomyelitis.  At this point probably had mitral valve endocarditis and developed severe mitral regurgitation mitral valve prolapse and flail posterior segment, P1 and P2    Patient was on Toprol-XL 25 mg daily after surgery.  His question is whether he can discontinue this medication.  Patient Active Problem List    Diagnosis Date Noted    S/P small bowel resection 03/01/2023     Priority: Medium     3/2023.   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   S/p small bowel resection.       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 (3/2023) ---    Folllowed by heme onc. Imaging surveillance.       Transient hyperglycemia post procedure 10/28/2019     Priority: Medium    H/O mitral valve repair 10/25/2019     Priority: Medium     On 10/25/19 Mr. Nguyen successfully underwent Right mini thoracotomy, mitral valve repair with a [place] NeoChord implantation to the P1 and P2 prolapse, implantation of a 36 mm Garrett Physio II annuloplasty ring,      Status post coronary angiogram 09/09/2019     Priority: Medium    Abnormal findings on diagnostic imaging of heart/coronary circulation 08/27/2019     Priority: Medium     Added automatically from request for surgery [id number]      Nonallopathic lesion of lumbar region 05/10/2011     Priority: Medium    Nonallopathic  lesion of thoracic region 07/24/2009     Priority: Medium    Cervical spondylosis without myelopathy 07/21/2009     Priority: Medium    Cervicalgia 07/08/2008     Priority: Medium    Spasm of muscle 04/23/2008     Priority: Medium    Duane syndrome of left eye 1977     Priority: Medium    .  Current Outpatient Medications   Medication Sig    IBUPROFEN PO     metoprolol succinate ER (TOPROL XL) 25 MG 24 hr tablet Take 1 tablet (25 mg) by mouth every morning     No current facility-administered medications for this visit.     Facility-Administered Medications Ordered in Other Visits   Medication    sodium chloride (PF) 0.9% PF flush 10 mL     Past Medical History:   Diagnosis Date    Cervical spondylosis without myelopathy     Heart murmur     Mesenteric mass     Osteomyelitis, L5S1 03/15/2018    secondary to bactermeia    S/P mitral valve repair     Status post coronary angiogram 09/09/2019     Past Surgical History:   Procedure Laterality Date    APPENDECTOMY  1987    CHEILECTOMY Right 4/10/2019    Procedure: Right Cheilectomy;  Surgeon: Sawyer Crowell MD;  [facility]    COLONOSCOPY N/A 4/8/2022    Procedure: COLONOSCOPY;  Surgeon: Rufus White MD;  Location: [facility] OR    CV CORONARY ANGIOGRAM N/A 9/9/2019    Procedure: CV CORONARY ANGIOGRAM;  Surgeon: Pernell Bar MD;  [facility]    CV RIGHT HEART CATH MEASUREMENTS RECORDED N/A 9/9/2019    Procedure: CV RIGHT HEART CATH;  Surgeon: Pernell Bar MD;  Location:  HEART CARDIAC CATH LAB    ESOPHAGOSCOPY, GASTROSCOPY, DUODENOSCOPY (EGD), COMBINED N/A 4/8/2022    Procedure: ESOPHAGOGASTRODUODENOSCOPY (EGD);  Surgeon: Rufus White MD;  Location: [facility] OR    INJECT SACROILIAC JOINT Right 5/9/2018    Procedure: INJECT SACROILIAC JOINT;  Right Sacroiliac Joint Injection;  Surgeon: Thom Williamson MD;  [facility]    LAPAROSCOPY DIAGNOSTIC (GENERAL) N/A 3/1/2023    Procedure: LAPAROSCOPY,  DIAGNOSTIC;  Surgeon: Mukul Thompson MD;  [facility]    ORTHOPEDIC SURGERY  4/10/19    Bone Spur Removal    REPAIR VALVE MITRAL MINIMALLY INVASIVE N/A 10/25/2019    Procedure: MINIMALLY INVASIVE MITRAL VALVE REPAIR WITH MITRAL ANNULOPLASTY RING KEVIN-KAUR PHYSIO II SIZE: 36MM  (ON PUMP OXYGENATOR ; INTRAOPERATIVE DEEPAK BY CARDIOLOGIST DR. VARELA);  Surgeon: Rigoberto Downs MD;  [facility]: [facility]    RESECT SMALL BOWEL WITHOUT OSTOMY N/A 3/1/2023    Procedure: mesenteric mass and small bowel resection;  Surgeon: Mukul Thompson MD;  [facility]: [facility]     No Known Allergies  Social History     Socioeconomic History    Marital status:      Spouse name: Not on file    Number of children: Not on file    Years of education: Not on file    Highest education level: Not on file   Occupational History     Comment: desk work   Tobacco Use    Smoking status: Never     Passive exposure: Never    Smokeless tobacco: Never   Vaping Use    Vaping Use: Never used   Substance and Sexual Activity    Alcohol use: Yes     Comment: Foot surgery + illness    Drug use: No    Sexual activity: Yes     Partners: Female     Birth control/protection: Pill   Other Topics Concern    Parent/sibling w/ CABG, MI or angioplasty before 65F 55M? No   Social History Narrative    Not on file     Social Determinants of Health     Financial Resource Strain: Low Risk  (1/24/2024)    Financial Resource Strain     Within the past 12 months, have you or your family members you live with been unable to get utilities (heat, electricity) when it was really needed?: No   Food Insecurity: Low Risk  (1/24/2024)    Food Insecurity     Within the past 12 months, did you worry that your food would run out before you got money to buy more?: No     Within the past 12 months, did the food you bought just not last and you didn t have money to get more?: No   Transportation Needs: Low Risk  (1/24/2024)    Transportation Needs     Within the past 12  months, has lack of transportation kept you from medical appointments, getting your medicines, non-medical meetings or appointments, work, or from getting things that you need?: No   Physical Activity: Not on file   Stress: Not on file   Social Connections: Not on file   Interpersonal Safety: Low Risk  (2/2/2024)    Interpersonal Safety     Do you feel physically and emotionally safe where you currently live?: Yes     Within the past 12 months, have you been hit, slapped, kicked or otherwise physically hurt by someone?: No     Within the past 12 months, have you been humiliated or emotionally abused in other ways by your partner or ex-partner?: No   Housing Stability: Low Risk  (1/24/2024)    Housing Stability     Do you have housing? : Yes     Are you worried about losing your housing?: No     Family History   Problem Relation Age of Onset    Glaucoma No family hx of     Macular Degeneration No family hx of     Anesthesia Reaction No family hx of     Venous thrombosis No family hx of           REVIEW OF SYSTEMS:  General: negative, fever, chills, night sweats  Skin: negative, acne, rash, and scaling  Eyes: negative, double vision, eye pain, and photophobia  Ears/Nose/Throat: negative, nasal congestion, and purulent rhinorrhea  Respiratory: No dyspnea on exertion, No cough, No hemoptysis, and negative  Cardiovascular: negative, palpitations, tachycardia, irregular heart beat, chest pain, exertional chest pain or pressure, paroxysmal nocturnal dyspnea, dyspnea on exertion, and orthopnea       OBJECTIVE:  Blood pressure (!) 136/93, pulse 74, weight 64 kg (141 lb), SpO2 99%.  General Appearance: healthy, alert, active, and no distress  Head: Normocephalic. No masses, lesions, tenderness or abnormalities  Eyes: conjuctiva clear, PERRL, EOM intact  Ears: External ears normal. Canals clear. TM's normal.  Nose: Nares normal  Mouth: normal  Neck: Supple, no cervical adenopathy, no thyromegaly  Lungs: clear to  auscultation  Cardiac: regular rate and rhythm, normal S1 and S2, no murmur       ASSESSMENT/PLAN:  Patient here for reestablishing cardiac care.  Patient with myxomatous mitral valve with mitral valve prolapse and severe mitral regurgitation.  Patient also had an episode of lumbar osteomyelitis.  Unclear whether he had infected valves.  Though he was asymptomatic had severe MR and had minimally invasive mitral valve repair in 2019.  Because of COVID he was lost to follow-up and currently he is here.  Doing very well.  Had no cardiac complaints today.  Reviewed recent echocardiogram.  Normal biventricular function.  Normal atrial size.  Excellent mitral valve repair with trivial MR and a mean gradient of 2 mmHg at a heart rate of 79 bpm.  Patient was placed on Toprol-XL 25 mg daily after this surgery.  As he remained asymptomatic and no history of palpitations or arrhythmia advised patient to decrease the dose to half daily for 2 weeks and then half tablet alternate day for a week and then stop the medication.  Discussed about antibiotic prophylaxis.  Patient should have antibiotic prophylaxis for all dental procedures.  As he is doing well he will be returning to clinic in 2 years time with a repeat echocardiogram to reassess the mitral valve repair and cardiac function.  Total visit duration 45 minutes.  This included face-to-face interview, physical exam with chart review, review of operative reports, prior echocardiograms including recent echocardiogram and documentation.

## 2024-02-29 NOTE — NURSING NOTE
"Chief Complaint   Patient presents with    New Patient     New General Cardiology for S/P MVR (mitral valve repair)       Initial BP (!) 136/93 (BP Location: Right arm, Patient Position: Chair, Cuff Size: Adult Regular)   Pulse 74   Wt 64 kg (141 lb)   SpO2 99%   BMI 20.53 kg/m   Estimated body mass index is 20.53 kg/m  as calculated from the following:    Height as of 2/2/24: 1.765 m (5' 9.49\").    Weight as of this encounter: 64 kg (141 lb)..  BP completed using cuff size: regular    KAELYN Cates    "

## 2024-02-29 NOTE — PATIENT INSTRUCTIONS
Thank you for coming to the [facility] @ [facility]; please note the following instructions:    1. Dr. Browning recommends to follow up in 2 years with an echo prior.  The cardiology team will contact you to schedule when the time gets closer.          If you have any questions regarding your visit please contact your care team:     Cardiology  Telephone Number   Celia ENGLEAnette, RN  Kelsey REYES, RN  Jeantete FOREMAN, RN  Tayla CHU, RMA  Cherry BURGER, KAELYN MARTINEZ, Visit Facilitator  Sonya HENRY [facility] [phone] (option 1)   For scheduling appts:     [phone] (select option 1)       For the Device Clinic (Pacemakers and ICD's)  RN's :  Kinga Jose   During business hours: [phone]    *After business hours:  [phone] (select option 4)      Normal test result notifications will be released via [employer] or mailed within 7 business days.  All other test results, will be communicated via telephone once reviewed by your cardiologist.    If you need a medication refill please contact your pharmacy.  Please allow 3 business days for your refill to be completed.    As always, thank you for trusting us with your health care needs!

## 2024-02-29 NOTE — LETTER
2/29/2024      RE: Frederick Nguyen  [address] [place] [id number]       Dear Colleague,    Thank you for the opportunity to participate in the care of your patient, Frederick Nguyen, at the [facility]. Please see a copy of my visit note below.       SUBJECTIVE:  Frederick Nguyen is a 48 year old male who presents for reestablishing cardiac care.  s/p minimally invasive  mitral valve repair with 36 mm Garrett Physio II annuloplasty ring and P2/P3 cleft closure on 10/25/2019.  Patient was lost to follow-up due to COVID.  Patient had no postop issues.  Currently patient is doing very well.  Had no cardiac complaints.   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  Previously patient had mild to moderate mitral regurgitation.  Had strep viridans bacteremia and lumbar osteomyelitis.  At this point probably had mitral valve endocarditis and developed severe mitral regurgitation mitral valve prolapse and flail posterior segment, P1 and P2    Patient was on Toprol-XL 25 mg daily after surgery.  His question is whether he can discontinue this medication.  Patient Active Problem List    Diagnosis Date Noted    S/P small bowel resection 03/01/2023     Priority: Medium     3/2023.   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   S/p small bowel resection.       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 (3/2023) ---    Folllowed by heme onc. Imaging surveillance.       Transient hyperglycemia post procedure 10/28/2019     Priority: Medium    H/O mitral valve repair 10/25/2019     Priority: Medium     On 10/25/19 Mr. Nguyen successfully underwent Right mini thoracotomy, mitral valve repair with a [place] NeoChord implantation to the P1 and P2 prolapse, implantation of a 36 mm  Garrett Physio II annuloplasty ring,      Status post coronary angiogram 09/09/2019     Priority: Medium    Abnormal findings on diagnostic imaging of heart/coronary circulation 08/27/2019     Priority: Medium     Added automatically from request for surgery [id number]      Nonallopathic lesion of lumbar region 05/10/2011     Priority: Medium    Nonallopathic lesion of thoracic region 07/24/2009     Priority: Medium    Cervical spondylosis without myelopathy 07/21/2009     Priority: Medium    Cervicalgia 07/08/2008     Priority: Medium    Spasm of muscle 04/23/2008     Priority: Medium    Duane syndrome of left eye 1977     Priority: Medium    .  Current Outpatient Medications   Medication Sig    IBUPROFEN PO     metoprolol succinate ER (TOPROL XL) 25 MG 24 hr tablet Take 1 tablet (25 mg) by mouth every morning     No current facility-administered medications for this visit.     Facility-Administered Medications Ordered in Other Visits   Medication    sodium chloride (PF) 0.9% PF flush 10 mL     Past Medical History:   Diagnosis Date    Cervical spondylosis without myelopathy     Heart murmur     Mesenteric mass     Osteomyelitis, L5S1 03/15/2018    secondary to bactermeia    S/P mitral valve repair     Status post coronary angiogram 09/09/2019     Past Surgical History:   Procedure Laterality Date    APPENDECTOMY  1987    CHEILECTOMY Right 4/10/2019    Procedure: Right Cheilectomy;  Surgeon: Sawyer Crowell MD;  [facility]    COLONOSCOPY N/A 4/8/2022    Procedure: COLONOSCOPY;  Surgeon: Rufus White MD;  [facility]    CV CORONARY ANGIOGRAM N/A 9/9/2019    Procedure: CV CORONARY ANGIOGRAM;  Surgeon: Pernell Bar MD;  Location:  HEART CARDIAC CATH LAB    CV RIGHT HEART CATH MEASUREMENTS RECORDED N/A 9/9/2019    Procedure: CV RIGHT HEART CATH;  Surgeon: Pernell Bar MD;  Location:  HEART CARDIAC CATH LAB    ESOPHAGOSCOPY, GASTROSCOPY, DUODENOSCOPY (EGD), COMBINED N/A  4/8/2022    Procedure: ESOPHAGOGASTRODUODENOSCOPY (EGD);  Surgeon: Rufus White MD;  Location: [facility] OR    INJECT SACROILIAC JOINT Right 5/9/2018    Procedure: INJECT SACROILIAC JOINT;  Right Sacroiliac Joint Injection;  Surgeon: Thom Williamson MD;  [facility]    LAPAROSCOPY DIAGNOSTIC (GENERAL) N/A 3/1/2023    Procedure: LAPAROSCOPY, DIAGNOSTIC;  Surgeon: Mukul Thompson MD;  [facility]    ORTHOPEDIC SURGERY  4/10/19    Bone Spur Removal    REPAIR VALVE MITRAL MINIMALLY INVASIVE N/A 10/25/2019    Procedure: MINIMALLY INVASIVE MITRAL VALVE REPAIR WITH MITRAL ANNULOPLASTY RING KEVIN-KAUR PHYSIO II SIZE: 36MM  (ON PUMP OXYGENATOR ; INTRAOPERATIVE DEEPAK BY CARDIOLOGIST DR. VARELA);  Surgeon: Rigoberto Downs MD;  Location: [facility] OR    RESECT SMALL BOWEL WITHOUT OSTOMY N/A 3/1/2023    Procedure: mesenteric mass and small bowel resection;  Surgeon: Mukul Thompson MD;  [facility] OR     No Known Allergies  Social History     Socioeconomic History    Marital status:      Spouse name: Not on file    Number of children: Not on file    Years of education: Not on file    Highest education level: Not on file   Occupational History     Comment: desk work   Tobacco Use    Smoking status: Never     Passive exposure: Never    Smokeless tobacco: Never   Vaping Use    Vaping Use: Never used   Substance and Sexual Activity    Alcohol use: Yes     Comment: Foot surgery + illness    Drug use: No    Sexual activity: Yes     Partners: Female     Birth control/protection: Pill   Other Topics Concern    Parent/sibling w/ CABG, MI or angioplasty before 65F 55M? No   Social History Narrative    Not on file     Social Determinants of Health     Financial Resource Strain: Low Risk  (1/24/2024)    Financial Resource Strain     Within the past 12 months, have you or your family members you live with been unable to get utilities (heat, electricity) when it was really needed?: No   Food Insecurity: Low  Risk  (1/24/2024)    Food Insecurity     Within the past 12 months, did you worry that your food would run out before you got money to buy more?: No     Within the past 12 months, did the food you bought just not last and you didn t have money to get more?: No   Transportation Needs: Low Risk  (1/24/2024)    Transportation Needs     Within the past 12 months, has lack of transportation kept you from medical appointments, getting your medicines, non-medical meetings or appointments, work, or from getting things that you need?: No   Physical Activity: Not on file   Stress: Not on file   Social Connections: Not on file   Interpersonal Safety: Low Risk  (2/2/2024)    Interpersonal Safety     Do you feel physically and emotionally safe where you currently live?: Yes     Within the past 12 months, have you been hit, slapped, kicked or otherwise physically hurt by someone?: No     Within the past 12 months, have you been humiliated or emotionally abused in other ways by your partner or ex-partner?: No   Housing Stability: Low Risk  (1/24/2024)    Housing Stability     Do you have housing? : Yes     Are you worried about losing your housing?: No     Family History   Problem Relation Age of Onset    Glaucoma No family hx of     Macular Degeneration No family hx of     Anesthesia Reaction No family hx of     Venous thrombosis No family hx of           REVIEW OF SYSTEMS:  General: negative, fever, chills, night sweats  Skin: negative, acne, rash, and scaling  Eyes: negative, double vision, eye pain, and photophobia  Ears/Nose/Throat: negative, nasal congestion, and purulent rhinorrhea  Respiratory: No dyspnea on exertion, No cough, No hemoptysis, and negative  Cardiovascular: negative, palpitations, tachycardia, irregular heart beat, chest pain, exertional chest pain or pressure, paroxysmal nocturnal dyspnea, dyspnea on exertion, and orthopnea       OBJECTIVE:  Blood pressure (!) 136/93, pulse 74, weight 64 kg (141 lb), SpO2  99%.  General Appearance: healthy, alert, active, and no distress  Head: Normocephalic. No masses, lesions, tenderness or abnormalities  Eyes: conjuctiva clear, PERRL, EOM intact  Ears: External ears normal. Canals clear. TM's normal.  Nose: Nares normal  Mouth: normal  Neck: Supple, no cervical adenopathy, no thyromegaly  Lungs: clear to auscultation  Cardiac: regular rate and rhythm, normal S1 and S2, no murmur       ASSESSMENT/PLAN:  Patient here for reestablishing cardiac care.  Patient with myxomatous mitral valve with mitral valve prolapse and severe mitral regurgitation.  Patient also had an episode of lumbar osteomyelitis.  Unclear whether he had infected valves.  Though he was asymptomatic had severe MR and had minimally invasive mitral valve repair in 2019.  Because of COVID he was lost to follow-up and currently he is here.  Doing very well.  Had no cardiac complaints today.  Reviewed recent echocardiogram.  Normal biventricular function.  Normal atrial size.  Excellent mitral valve repair with trivial MR and a mean gradient of 2 mmHg at a heart rate of 79 bpm.  Patient was placed on Toprol-XL 25 mg daily after this surgery.  As he remained asymptomatic and no history of palpitations or arrhythmia advised patient to decrease the dose to half daily for 2 weeks and then half tablet alternate day for a week and then stop the medication.  Discussed about antibiotic prophylaxis.  Patient should have antibiotic prophylaxis for all dental procedures.  As he is doing well he will be returning to clinic in 2 years time with a repeat echocardiogram to reassess the mitral valve repair and cardiac function.  Total visit duration 45 minutes.  This included face-to-face interview, physical exam with chart review, review of operative reports, prior echocardiograms including recent echocardiogram and documentation.      Please do not hesitate to contact me if you have any questions/concerns.     Sincerely,     CARLOS RIVERO  MD Rachelle

## 2024-07-01 ENCOUNTER — ANCILLARY PROCEDURE (OUTPATIENT)
Dept: MRI IMAGING | Facility: CLINIC | Age: 49
End: 2024-07-01
Attending: STUDENT IN AN ORGANIZED HEALTH CARE EDUCATION/TRAINING PROGRAM
Payer: COMMERCIAL

## 2024-07-01 DIAGNOSIS — D48.119 DESMOID TUMOR: ICD-10-CM

## 2024-07-01 PROCEDURE — 74183 MRI ABD W/O CNTR FLWD CNTR: CPT | Mod: TC | Performed by: RADIOLOGY

## 2024-07-01 PROCEDURE — A9585 GADOBUTROL INJECTION: HCPCS | Mod: JW | Performed by: RADIOLOGY

## 2024-07-01 RX ORDER — GADOBUTROL 604.72 MG/ML
7.5 INJECTION INTRAVENOUS ONCE
Status: COMPLETED | OUTPATIENT
Start: 2024-07-01 | End: 2024-07-01

## 2024-07-01 RX ADMIN — GADOBUTROL 6.5 ML: 604.72 INJECTION INTRAVENOUS at 11:00

## 2024-07-01 NOTE — PROGRESS NOTES
Virtual Visit Details    Type of service:  Video Visit     Originating Location (pt. Location): Home    Distant Location (provider location):  On-site  Platform used for Video Visit: Unified Office    [facility]    FOLLOW UP VISIT NOTE    PATIENT NAME: Frederick Nguyen MRN # [id number]  DATE OF VISIT: July 3rd, 2024 YOB: 1975    REFERRING PROVIDER: Mukul Thompson MD  DIV. SURGICAL ONCOLOGY  [address]  [place] [address]    CANCER TYPE:  Desmoid     CHIEF COMPLAIN   Recent surgery showing intraabdominal desmoid     HISTORY OF PRESENT ILLNESS     Frederick Nguyen is a 47 year old man with history of MVR, GERD and intraabdominal mass of unknown etiology which had shown interval growth on subequent CT scan (from 3.2 to 8.6 cm in the largest diameter over the past 2 years. He was admitted for exploratory laparoscopy and mass excision. He underwent laparoscopy converted to open laparotomy with small bowel resection and removal of a 10 cm mass. Pathology showing desmoid fibromatosis.   He comes today to establish care for future management. He reports that he has been healing well from surgery, only needing pain medication (tramadol) at night for pain to be able to sleep, he also reports his appetite is still low but he is trying to have good intake and stay hydrated. Reports no constipation, taking daily senna. Denies nausea or any other symptoms.     Interval Hx: Frederick reports that he has been feeling well, no new symptoms.      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       CURRENT OUTPATIENT MEDICATIONS     Current Outpatient Medications   Medication Sig Dispense Refill    IBUPROFEN PO       metoprolol succinate ER (TOPROL XL) 25 MG 24 hr tablet Take 1 tablet (25 mg) by mouth every morning 90 tablet 1        REVIEW OF SYSTEMS   As above in the HPI, o/w complete 12-point ROS was negative.     PHYSICAL EXAM   There were no vitals taken for this visit.    Virtual visit    Prior physical exam    EGO  GEN: NAD  HEENT: PERRL, EOMI, no icterus, injection or pallor. Oropharynx is clear.  NECK: no cervical or supraclavicular lymphadenopathy  LUNGS: clear bilaterally  CV: regular, no murmurs, rubs, or gallops  ABDOMEN: soft, non-tender, non-distended, normal bowel sounds, no hepatosplenomegaly by percussion or palpation. Surgical scars healing well.   EXT: warm, well perfused, no edema  NEURO: alert  SKIN: no rashes     LABORATORY AND IMAGING STUDIES     Lab Results   Component Value Date    WBC 6.2 2024    WBC 6.4 10/30/2020     Lab Results   Component Value Date    RBC 5.35 2024    RBC 4.92 10/23/2020     Lab Results   Component Value Date    HGB 16.1 2024    HGB 14.9 10/23/2020     Lab Results   Component Value Date    HCT 47.0 2024    HCT 43.7 10/23/2020     Lab Results   Component Value Date    MCV 88 2024    MCV 89 10/23/2020     Lab Results   Component Value Date    MCH 30.1 2024    MCH 30.3 10/23/2020     Lab Results   Component Value Date    MCHC 34.3 2024    MCHC 34.1 10/23/2020     Lab Results   Component Value Date    RDW 12.0 2024    RDW 12.2 10/23/2020     Lab Results   Component Value Date     2024     10/23/2020     Last Comprehensive Metabolic Panel:  Sodium   Date Value Ref Range Status   2024 138 135 - 145 mmol/L Final     Comment:     Reference intervals for this test were  updated on 09/26/2023 to more accurately reflect our healthy population. There may be differences in the flagging of prior results with similar values performed with this method. Interpretation of those prior results can be made in the context of the updated reference intervals.    10/20/2020 136 133 - 144 mmol/L Final     Potassium   Date Value Ref Range Status   01/08/2024 4.4 3.4 - 5.3 mmol/L Final   02/23/2023 4.3 3.4 - 5.3 mmol/L Final   10/20/2020 4.3 3.4 - 5.3 mmol/L Final     Chloride   Date Value Ref Range Status   01/08/2024 102 98 - 107 mmol/L Final   02/23/2023 111 (H) 94 - 109 mmol/L Final   10/20/2020 105 94 - 109 mmol/L Final     Carbon Dioxide   Date Value Ref Range Status   10/20/2020 27 20 - 32 mmol/L Final     Carbon Dioxide (CO2)   Date Value Ref Range Status   01/08/2024 25 22 - 29 mmol/L Final   02/23/2023 26 20 - 32 mmol/L Final     Anion Gap   Date Value Ref Range Status   01/08/2024 11 7 - 15 mmol/L Final   02/23/2023 3 3 - 14 mmol/L Final   10/20/2020 4 3 - 14 mmol/L Final     Glucose   Date Value Ref Range Status   01/08/2024 90 70 - 99 mg/dL Final   02/23/2023 93 70 - 99 mg/dL Final   10/20/2020 108 (H) 70 - 99 mg/dL Final     Comment:     Fasting specimen     GLUCOSE BY METER POCT   Date Value Ref Range Status   03/02/2023 111 (H) 70 - 99 mg/dL Final     Urea Nitrogen   Date Value Ref Range Status   01/08/2024 14.1 6.0 - 20.0 mg/dL Final   02/23/2023 20 7 - 30 mg/dL Final   10/20/2020 19 7 - 30 mg/dL Final     Creatinine   Date Value Ref Range Status   01/08/2024 1.12 0.67 - 1.17 mg/dL Final   10/20/2020 1.28 (H) 0.66 - 1.25 mg/dL Final     GFR Estimate   Date Value Ref Range Status   01/08/2024 82 >60 mL/min/1.73m2 Final   10/20/2020 68 >60 mL/min/[1.73_m2] Final     Comment:     Non  GFR Calc  Starting 12/18/2018, serum creatinine based estimated GFR (eGFR) will be   calculated using the Chronic Kidney Disease Epidemiology Collaboration   (CKD-EPI) equation.        Calcium   Date Value Ref Range Status   01/08/2024 9.5 8.6 - 10.0 mg/dL Final   10/20/2020 9.1 8.5 - 10.1 mg/dL Final     Bilirubin Total   Date Value Ref Range Status   01/08/2024 0.7 <=1.2 mg/dL Final   10/20/2020 1.2 0.2 - 1.3 mg/dL Final     Alkaline Phosphatase   Date Value Ref Range Status   01/08/2024 45 40 - 150 U/L Final     Comment:     Reference intervals for this test were updated on 11/14/2023 to more accurately reflect our healthy population. There may be differences in the flagging of prior results with similar values performed with this method. Interpretation of those prior results can be made in the context of the updated reference intervals.   10/20/2020 75 40 - 150 U/L Final     ALT   Date Value Ref Range Status   01/08/2024 <5 0 - 70 U/L Final     Comment:     Reference intervals for this test were updated on 6/12/2023 to more accurately reflect our healthy population. There may be differences in the flagging of prior results with similar values performed with this method. Interpretation of those prior results can be made in the context of the updated reference intervals.     10/20/2020 15 0 - 70 U/L Final     AST   Date Value Ref Range Status   01/08/2024 25 0 - 45 U/L Final     Comment:     Reference intervals for this test were updated on 6/12/2023 to more accurately reflect our healthy population. There may be differences in the flagging of prior results with similar values performed with this method. Interpretation of those prior results can be made in the context of the updated reference intervals.   10/20/2020 10 0 - 45 U/L Final         Results for orders placed or performed during the hospital encounter of 03/01/23   POC US Guidance Needle Placement    Impression    Bilateral Transverse Abdominis Plane Block      CT AP 1/26/2023                                                                IMPRESSION:  Significant interval enlargement of a rounded solid and  mildly heterogeneous  mass within the mesentery now localizing to the  right abdomen. Currently it has a maximal size of 7.8 cm, previously  3.2 cm. As such, this should be considered malignancy until proven  otherwise. Carcinoid, versus desmoid lesion, or other neoplasm remains  in the differential. Recommend further oncologic workup and surgical  consultation. Percutaneous biopsy and/or PET/CT could provide  additional assessment if clinically relevant. If no intervention is to  be performed, recommend surveillance CT in 6 months.    6/1/23  IMPRESSION:   1.  Low attenuating soft tissue thickening associated with suture  material in the mesentery. This is potentially postoperative however  residual/recurrent desmoid tumor is not excluded. Attention follow-up.    CT CAP 1/8/24  IMPRESSION:  1.  Small amount of soft tissue thickening or fluid adjacent to suture  material in the mesentery has resolved.  2.  Few new small hypodense lesions in the spleen measuring up to 1.0  cm, indeterminate but may be small hemangiomas. This would be an  unusual site and appearance for metastatic disease. Consider follow-up  abdominal MRI in 3-6 months.  3.  No definite evidence for recurrent or metastatic disease in the  chest, abdomen and pelvis.    7/1/24 MR Abdomen   FINDINGS: The diminutive lesions seen on CT in the spleen are not well  seen by MRI. The liver, spleen, adrenal glands, kidneys, and pancreas  demonstrate no worrisome focal lesion. No definite adenopathy  demonstrated.                                                                      IMPRESSION: Negative MRI of the abdomen. The lesions seen on CT are  not well seen by MRI.      PATHOLOGY   3/1/2023  Final Diagnosis   A. 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 SMALL BOWEL, ADDITIONAL, RESECTION:  - Small bowel wall with no significant histopathologic abnormality  - Negative for dysplasia or malignancy           ASSESSMENT AND PLAN     47-year-old male with history of MVP and GERD.  He was found to have an intra-abdominal mass back in 2021 up to 3 cm.  On repeat scans on January 2023 the mass Significantly increased in size to about 8 cm in the longest diameter for this reason he underwent surgical resection on 3/1/2023.  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      He had essentially normal upper and lower endoscopy in April 2022, so this desmoid is not related to an underlying polyposis syndrome.     CT 6/1/23 with no clear evidence of recurrence, only thickening at the suture site.  I personally reviewed the most recent CT of the chest abdomen and pelvis showing no evidence of residual or recurrent desmoid.  However, new small hypodense lesions observed in the spleen which were not present on the scan on June 2023, and an MRI has been suggested for follow-up.     I personally reviewed the result of the most recent MRI abdomen showing no evidence of desmoid recurrence. Small spleen lesion not well visualized on this scan.     We will do another MRI abdomen and pelvis in 6 months as he is 2 years out from surgery and if clear, we will move to yearly scans thereafter.     Plan:    -MRI abdomen and pelvis on 1/3/25, labs same day cbc, cmp   -Follow up Dr. Zuniga 1/7 after scans     40 minutes spent on the date of the encounter doing chart review, review of test results, interpretation of tests, patient visit and documentation     August Borjas MD   of Medicine  Hematology, Oncology and Transplantation

## 2024-07-03 ENCOUNTER — VIRTUAL VISIT (OUTPATIENT)
Dept: ONCOLOGY | Facility: CLINIC | Age: 49
End: 2024-07-03
Attending: STUDENT IN AN ORGANIZED HEALTH CARE EDUCATION/TRAINING PROGRAM
Payer: COMMERCIAL

## 2024-07-03 VITALS — HEIGHT: 69 IN | WEIGHT: 136.5 LBS | BODY MASS INDEX: 20.22 KG/M2

## 2024-07-03 DIAGNOSIS — D48.119 DESMOID TUMOR: ICD-10-CM

## 2024-07-03 PROCEDURE — 99215 OFFICE O/P EST HI 40 MIN: CPT | Mod: 95 | Performed by: STUDENT IN AN ORGANIZED HEALTH CARE EDUCATION/TRAINING PROGRAM

## 2024-07-03 NOTE — NURSING NOTE
Current patient location: [address] [place] [id number]    Is the patient currently in the state of [place]? YES    Visit mode:VIDEO    If the visit is dropped, the patient can be reconnected by: TELEPHONE VISIT: Phone number: [phone]    Will anyone else be joining the visit? NO  (If patient encounters technical issues they should call [phone] :[phone])    How would you like to obtain your AVS? MyChart    Are changes needed to the allergy or medication list? No    Are refills needed on medications prescribed by this physician? NO    Reason for visit: RECHECK    Rere GOYAL

## 2024-07-03 NOTE — LETTER
7/3/2024      Frederick Nguyen  [address] [facility] [address]      Dear Colleague,    Thank you for referring your patient, Frederick Nguyen, to the [facility]. Please see a copy of my visit note below.    Virtual Visit Details    Type of service:  Video Visit     Originating Location (pt. Location): Home    Distant Location (provider location):  On-site  Platform used for Video Visit: [facility]    FOLLOW UP VISIT NOTE    PATIENT NAME: Frederick Nguyen MRN # [id number]  DATE OF VISIT: July 3rd, 2024 YOB: 1975    REFERRING PROVIDER: Mukul Thompson MD  DIV. SURGICAL ONCOLOGY  [address]  [place] [address]    CANCER TYPE:  Desmoid     CHIEF COMPLAIN   Recent surgery showing intraabdominal desmoid     HISTORY OF PRESENT ILLNESS     Frederick Nguyen is a 47 year old man with history of MVR, GERD and intraabdominal mass of unknown etiology which had shown interval growth on subequent CT scan (from 3.2 to 8.6 cm in the largest diameter over the past 2 years. He was admitted for exploratory laparoscopy and mass excision. He underwent laparoscopy converted to open laparotomy with small bowel resection and removal of a 10 cm mass. Pathology showing desmoid fibromatosis.   He comes today to establish care for future management. He reports that he has been healing well from surgery, only needing pain medication (tramadol) at night for pain to be able to sleep, he also reports his appetite is still low but he is trying to have good intake and stay hydrated. Reports no constipation, taking daily senna. Denies nausea or any other symptoms.     Interval Hx: Frederick reports that he has been feeling well, no new symptoms.      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       CURRENT OUTPATIENT MEDICATIONS     Current Outpatient Medications   Medication Sig Dispense Refill     IBUPROFEN PO        metoprolol succinate ER (TOPROL XL) 25 MG 24 hr tablet Take 1 tablet (25 mg) by mouth every morning 90 tablet 1        REVIEW OF SYSTEMS   As above in the HPI, o/w complete 12-point ROS was negative.     PHYSICAL EXAM   There were no vitals taken for this visit.    Virtual visit    Prior physical exam    EGO  GEN: NAD  HEENT: PERRL, EOMI, no icterus, injection or pallor. Oropharynx is clear.  NECK: no cervical or supraclavicular lymphadenopathy  LUNGS: clear bilaterally  CV: regular, no murmurs, rubs, or gallops  ABDOMEN: soft, non-tender, non-distended, normal bowel sounds, no hepatosplenomegaly by percussion or palpation. Surgical scars healing well.   EXT: warm, well perfused, no edema  NEURO: alert  SKIN: no rashes     LABORATORY AND IMAGING STUDIES     Lab Results   Component Value Date    WBC 6.2 2024    WBC 6.4 10/30/2020     Lab Results   Component Value Date    RBC 5.35 2024    RBC 4.92 10/23/2020     Lab Results   Component Value Date    HGB 16.1 2024    HGB 14.9 10/23/2020     Lab Results   Component Value Date    HCT 47.0 2024    HCT 43.7 10/23/2020     Lab Results   Component Value Date    MCV 88 2024    MCV 89 10/23/2020     Lab Results   Component Value Date    MCH 30.1 2024    MCH 30.3 10/23/2020     Lab Results   Component Value Date    MCHC 34.3 2024    MCHC 34.1 10/23/2020     Lab Results   Component Value Date    RDW 12.0 2024    RDW 12.2  10/23/2020     Lab Results   Component Value Date     01/08/2024     10/23/2020     Last Comprehensive Metabolic Panel:  Sodium   Date Value Ref Range Status   01/08/2024 138 135 - 145 mmol/L Final     Comment:     Reference intervals for this test were updated on 09/26/2023 to more accurately reflect our healthy population. There may be differences in the flagging of prior results with similar values performed with this method. Interpretation of those prior results can be made in the context of the updated reference intervals.    10/20/2020 136 133 - 144 mmol/L Final     Potassium   Date Value Ref Range Status   01/08/2024 4.4 3.4 - 5.3 mmol/L Final   02/23/2023 4.3 3.4 - 5.3 mmol/L Final   10/20/2020 4.3 3.4 - 5.3 mmol/L Final     Chloride   Date Value Ref Range Status   01/08/2024 102 98 - 107 mmol/L Final   02/23/2023 111 (H) 94 - 109 mmol/L Final   10/20/2020 105 94 - 109 mmol/L Final     Carbon Dioxide   Date Value Ref Range Status   10/20/2020 27 20 - 32 mmol/L Final     Carbon Dioxide (CO2)   Date Value Ref Range Status   01/08/2024 25 22 - 29 mmol/L Final   02/23/2023 26 20 - 32 mmol/L Final     Anion Gap   Date Value Ref Range Status   01/08/2024 11 7 - 15 mmol/L Final   02/23/2023 3 3 - 14 mmol/L Final   10/20/2020 4 3 - 14 mmol/L Final     Glucose   Date Value Ref Range Status   01/08/2024 90 70 - 99 mg/dL Final   02/23/2023 93 70 - 99 mg/dL Final   10/20/2020 108 (H) 70 - 99 mg/dL Final     Comment:     Fasting specimen     GLUCOSE BY METER POCT   Date Value Ref Range Status   03/02/2023 111 (H) 70 - 99 mg/dL Final     Urea Nitrogen   Date Value Ref Range Status   01/08/2024 14.1 6.0 - 20.0 mg/dL Final   02/23/2023 20 7 - 30 mg/dL Final   10/20/2020 19 7 - 30 mg/dL Final     Creatinine   Date Value Ref Range Status   01/08/2024 1.12 0.67 - 1.17 mg/dL Final   10/20/2020 1.28 (H) 0.66 - 1.25 mg/dL Final     GFR Estimate   Date Value Ref Range Status   01/08/2024 82 >60 mL/min/1.73m2 Final    10/20/2020 68 >60 mL/min/[1.73_m2] Final     Comment:     Non  GFR Calc  Starting 12/18/2018, serum creatinine based estimated GFR (eGFR) will be   calculated using the Chronic Kidney Disease Epidemiology Collaboration   (CKD-EPI) equation.       Calcium   Date Value Ref Range Status   01/08/2024 9.5 8.6 - 10.0 mg/dL Final   10/20/2020 9.1 8.5 - 10.1 mg/dL Final     Bilirubin Total   Date Value Ref Range Status   01/08/2024 0.7 <=1.2 mg/dL Final   10/20/2020 1.2 0.2 - 1.3 mg/dL Final     Alkaline Phosphatase   Date Value Ref Range Status   01/08/2024 45 40 - 150 U/L Final     Comment:     Reference intervals for this test were updated on 11/14/2023 to more accurately reflect our healthy population. There may be differences in the flagging of prior results with similar values performed with this method. Interpretation of those prior results can be made in the context of the updated reference intervals.   10/20/2020 75 40 - 150 U/L Final     ALT   Date Value Ref Range Status   01/08/2024 <5 0 - 70 U/L Final     Comment:     Reference intervals for this test were updated on 6/12/2023 to more accurately reflect our healthy population. There may be differences in the flagging of prior results with similar values performed with this method. Interpretation of those prior results can be made in the context of the updated reference intervals.     10/20/2020 15 0 - 70 U/L Final     AST   Date Value Ref Range Status   01/08/2024 25 0 - 45 U/L Final     Comment:     Reference intervals for this test were updated on 6/12/2023 to more accurately reflect our healthy population. There may be differences in the flagging of prior results with similar values performed with this method. Interpretation of those prior results can be made in the context of the updated reference intervals.   10/20/2020 10 0 - 45 U/L Final         Results for orders placed or performed during the hospital encounter of 03/01/23   [facility]  Guidance Needle Placement    Impression    Bilateral Transverse Abdominis Plane Block      CT AP 1/26/2023                                                                IMPRESSION:  Significant interval enlargement of a rounded solid and  mildly heterogeneous mass within the mesentery now localizing to the  right abdomen. Currently it has a maximal size of 7.8 cm, previously  3.2 cm. As such, this should be considered malignancy until proven  otherwise. Carcinoid, versus desmoid lesion, or other neoplasm remains  in the differential. Recommend further oncologic workup and surgical  consultation. Percutaneous biopsy and/or PET/CT could provide  additional assessment if clinically relevant. If no intervention is to  be performed, recommend surveillance CT in 6 months.    6/1/23  IMPRESSION:   1.  Low attenuating soft tissue thickening associated with suture  material in the mesentery. This is potentially postoperative however  residual/recurrent desmoid tumor is not excluded. Attention follow-up.    CT CAP 1/8/24  IMPRESSION:  1.  Small amount of soft tissue thickening or fluid adjacent to suture  material in the mesentery has resolved.  2.  Few new small hypodense lesions in the spleen measuring up to 1.0  cm, indeterminate but may be small hemangiomas. This would be an  unusual site and appearance for metastatic disease. Consider follow-up  abdominal MRI in 3-6 months.  3.  No definite evidence for recurrent or metastatic disease in the  chest, abdomen and pelvis.    7/1/24 MR Abdomen   FINDINGS: The diminutive lesions seen on CT in the spleen are not well  seen by MRI. The liver, spleen, adrenal glands, kidneys, and pancreas  demonstrate no worrisome focal lesion. No definite adenopathy  demonstrated.                                                                      IMPRESSION: Negative MRI of the abdomen. The lesions seen on CT are  not well seen by MRI.      PATHOLOGY   3/1/2023  Final Diagnosis   A. 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 SMALL BOWEL, ADDITIONAL, RESECTION:  - Small bowel wall with no significant histopathologic abnormality  - Negative for dysplasia or malignancy           ASSESSMENT AND PLAN     47-year-old male with history of MVP and GERD.  He was found to have an intra-abdominal mass back in 2021 up to 3 cm.  On repeat scans on January 2023 the mass Significantly increased in size to about 8 cm in the longest diameter for this reason he underwent surgical resection on 3/1/2023.  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      He had essentially normal upper and lower endoscopy in April 2022, so this desmoid is not related to an underlying polyposis syndrome.     CT 6/1/23 with no clear evidence of recurrence, only thickening at the suture site.  I personally reviewed the most recent CT of the chest abdomen and pelvis showing no evidence of residual or recurrent desmoid.  However, new small hypodense lesions observed in the spleen which were not present on the scan on June 2023, and an MRI has been suggested for follow-up.     I personally reviewed the result of the most recent MRI abdomen showing no evidence of desmoid recurrence. Small spleen lesion not well visualized on this scan.     We will do another MRI abdomen and pelvis in 6 months as he is 2 years out from surgery and if clear, we will move to yearly scans thereafter.     Plan:    -MRI abdomen and pelvis on 1/3/25, labs same day cbc, cmp   -Follow up Dr. Zuniga 1/7 after scans     40 minutes spent on the date of the encounter doing chart review, review of test results,  interpretation of tests, patient visit and documentation     August Borjas MD   of Medicine  Hematology, Oncology and Transplantation      Again, thank you for allowing me to participate in the care of your patient.        Sincerely,        August Borjas MD

## 2024-08-16 ENCOUNTER — VIRTUAL VISIT (OUTPATIENT)
Dept: FAMILY MEDICINE | Facility: CLINIC | Age: 49
End: 2024-08-16
Payer: COMMERCIAL

## 2024-08-16 DIAGNOSIS — J01.90 ACUTE NON-RECURRENT SINUSITIS, UNSPECIFIED LOCATION: Primary | ICD-10-CM

## 2024-08-16 PROCEDURE — 99213 OFFICE O/P EST LOW 20 MIN: CPT | Mod: 95 | Performed by: PHYSICIAN ASSISTANT

## 2024-08-16 ASSESSMENT — ENCOUNTER SYMPTOMS
SHORTNESS OF BREATH: 0
SINUS PAIN: 1
VOMITING: 0
RHINORRHEA: 1
CHILLS: 0
APPETITE CHANGE: 0
FEVER: 0
DIAPHORESIS: 0
COUGH: 0
SINUS PRESSURE: 1
NAUSEA: 0

## 2024-08-16 NOTE — PATIENT INSTRUCTIONS
Your symptoms are concerning for a bacterial sinus infection.  For treatment you have been prescribed Augmentin, an antibiotic.  With antibiotic treatment, sinus infections typically improve/resolve over approximately 10 days.  For additional relief you can use Ibuprofen and over the counter decongestants.  Make sure to get plenty of rest and stay hydrated.  Reach out with questions or concerns and follow-up in clinic for new or worsening symptoms.

## 2024-08-16 NOTE — PROGRESS NOTES
Frederick is a 48 year old who is being evaluated via a billable video visit.    How would you like to obtain your AVS? [employer]  If the video visit is dropped, the invitation should be resent by: Text to cell phone: [phone]  Will anyone else be joining your video visit? No      Assessment & Plan     Acute non-recurrent sinusitis, unspecified location  Patient is a 48-year-old male who presents to virtual visit due to 13 days of nasal congestion, nasal drainage, facial pressure, sinus pain.  Patient has been using OTC decongestants as well as saline rinse without improvement.  Symptoms are most consistent with bacterial sinusitis.  Augmentin prescribed.  Discussed ongoing OTC management for symptom relief with NSAIDs and decongestant.  Expected course of recovery discussed and follow-up precautions provided.  - amoxicillin-clavulanate (AUGMENTIN) 875-125 MG tablet; Take 1 tablet by mouth 2 times daily for 7 days            See Patient Instructions    Subjective   Frederick is a 48 year old, presenting for the following health issues:  Illness        8/16/2024    11:06 AM   Additional Questions   Roomed by Patient completed chart review and e-checkin questionnaires through [employer] (Radha SCALES CMA)     History of Present Illness       Reason for visit:  Respiratory symptoms began August 3rd and have not improved, congestion, sinus pressure, headache, mild cough but NO fever, chills, body aches, upset stomach, loss or reduced appetite.  Symptom onset:  1-2 weeks ago  Symptoms include:  Respiratory symptoms began August 3rd and have not improved, congestion, sinus pressure, headache, mild cough but NO fever, chills, body aches, upset stomach, loss or reduced appetite.  Symptom intensity:  Moderate  Symptom progression:  Staying the same  Had these symptoms before:  Yes  Has tried/received treatment for these symptoms:  Yes  Previous treatment was successful:  Yes  Prior treatment description:  Antibiotics for Sinus  Infection  What makes it worse:  No  What makes it better:  Over the counter cold meds slightly reduce symptoms    He eats 2-3 servings of fruits and vegetables daily.He consumes 0 sweetened beverage(s) daily.He exercises with enough effort to increase his heart rate 30 to 60 minutes per day.  He exercises with enough effort to increase his heart rate 5 days per week.   He is taking medications regularly.     Patient notes sinus pressure, congestion, headache, colored nasal drainage since 8/3/24. History of a few sinus infections, but nothing frequent. Patient has been using saline spray, DayQuil, and Sudafed. Treatment helps somewhat but symptoms persist. No history of seasonal allergies.     Review of Systems   Constitutional:  Negative for appetite change, chills, diaphoresis and fever.   HENT:  Positive for congestion, rhinorrhea, sinus pressure and sinus pain.    Respiratory:  Negative for cough and shortness of breath.    Gastrointestinal:  Negative for nausea and vomiting.           Objective           Vitals:  No vitals were obtained today due to virtual visit.    Physical Exam   GENERAL: alert and no distress  EYES: Eyes grossly normal to inspection.  No discharge or erythema, or obvious scleral/conjunctival abnormalities.  RESP: No audible wheeze, cough, or visible cyanosis.    SKIN: Visible skin clear. No significant rash, abnormal pigmentation or lesions.  NEURO: Cranial nerves grossly intact.  Mentation and speech appropriate for age.  PSYCH: Appropriate affect, tone, and pace of words          Video-Visit Details    Type of service:  Video Visit   Originating Location (pt. Location): Home    Distant Location (provider location):  On-site  Platform used for Video Visit: Maria Guadalupe  Signed Electronically by: Radha Hines PA-C

## 2024-09-06 ENCOUNTER — OFFICE VISIT (OUTPATIENT)
Dept: FAMILY MEDICINE | Facility: CLINIC | Age: 49
End: 2024-09-06
Payer: COMMERCIAL

## 2024-09-06 VITALS
SYSTOLIC BLOOD PRESSURE: 137 MMHG | TEMPERATURE: 97.6 F | WEIGHT: 143.6 LBS | HEIGHT: 69 IN | HEART RATE: 86 BPM | BODY MASS INDEX: 21.27 KG/M2 | OXYGEN SATURATION: 99 % | DIASTOLIC BLOOD PRESSURE: 88 MMHG

## 2024-09-06 DIAGNOSIS — J01.90 ACUTE SINUSITIS WITH SYMPTOMS > 10 DAYS: Primary | ICD-10-CM

## 2024-09-06 PROCEDURE — 90656 IIV3 VACC NO PRSV 0.5 ML IM: CPT | Performed by: NURSE PRACTITIONER

## 2024-09-06 PROCEDURE — 90471 IMMUNIZATION ADMIN: CPT | Performed by: NURSE PRACTITIONER

## 2024-09-06 PROCEDURE — 99213 OFFICE O/P EST LOW 20 MIN: CPT | Mod: 25 | Performed by: NURSE PRACTITIONER

## 2024-09-06 RX ORDER — DOXYCYCLINE 100 MG/1
100 CAPSULE ORAL 2 TIMES DAILY
Qty: 14 CAPSULE | Refills: 0 | Status: SHIPPED | OUTPATIENT
Start: 2024-09-06 | End: 2024-09-13

## 2024-09-06 RX ORDER — PREDNISONE 20 MG/1
40 TABLET ORAL DAILY
Qty: 10 TABLET | Refills: 0 | Status: SHIPPED | OUTPATIENT
Start: 2024-09-06 | End: 2024-09-11

## 2024-09-06 NOTE — PROGRESS NOTES
"  Assessment & Plan     Acute sinusitis with symptoms > 10 days  Start doxycycline and prednisone. Discussed using Flonase.  Follow-up if not improving.   - predniSONE (DELTASONE) 20 MG tablet; Take 2 tablets (40 mg) by mouth daily for 5 days.  - doxycycline hyclate (VIBRAMYCIN) 100 MG capsule; Take 1 capsule (100 mg) by mouth 2 times daily for 7 days.      Ramandeep Mack is a 49 year old, presenting for the following health issues:  Follow Up (sinus)        9/6/2024     6:48 AM   Additional Questions   Roomed by emiliano     History of Present Illness       Reason for visit:  Follow-up to Sinus infection.    He eats 2-3 servings of fruits and vegetables daily.He consumes 0 sweetened beverage(s) daily.He exercises with enough effort to increase his heart rate 30 to 60 minutes per day.  He exercises with enough effort to increase his heart rate 4 days per week.   He is taking medications regularly.         Cold symptoms since 8/3.   Virtual visit on 8/16 for sinusitis, treated with Augmentin.   Had some improvement but never fully resolved.   Has sinus pressure and congestion. Nasal discharge is green and thick, but not as dark as it was before.   Some pressure in his chest with a little bit of a cough and some mucous, states these symptoms are mild.   Has fatigue that is not normal for him  No wheezing but can feel a rattle with cough  No SOB  No fever  No asthma or smoking        Review of Systems  Constitutional, HEENT, cardiovascular, pulmonary, gi and gu systems are negative, except as otherwise noted.      Objective    /88   Pulse 86   Temp 97.6  F (36.4  C)   Ht 1.753 m (5' 9\")   Wt 65.1 kg (143 lb 9.6 oz)   SpO2 99%   BMI 21.21 kg/m    Body mass index is 21.21 kg/m .  Physical Exam   GENERAL: alert and no distress  EYES: Eyes grossly normal to inspection, PERRL and conjunctivae and sclerae normal  HENT: normal cephalic/atraumatic, ear canals and TM's normal, nasal mucosa edematous , oropharynx " clear, and oral mucous membranes moist  NECK: no adenopathy, no asymmetry, masses, or scars  RESP: lungs clear to auscultation - no rales, rhonchi or wheezes  CV: regular rate and rhythm, normal S1 S2, no S3 or S4, no murmur, click or rub, no peripheral edema  MS: no gross musculoskeletal defects noted, no edema  SKIN: no suspicious lesions or rashes  NEURO: Normal strength and tone, mentation intact and speech normal            Signed Electronically by: Kimberley Nguyen, CNP

## 2024-09-06 NOTE — PATIENT INSTRUCTIONS
Start antibiotic and prednisone, finish completely.  Start Flonase 1 spray each nostril for a week.   Follow-up if not improving.

## 2024-12-27 NOTE — PROGRESS NOTES
This is a recent snapshot of the patient's [facility] medical record.  For current drug dose and complete information and questions, call [phone] or In Basket pool, [facility] ([id number])  CSN Number:  [id number]      
 used

## 2025-01-03 ENCOUNTER — ANCILLARY PROCEDURE (OUTPATIENT)
Dept: MRI IMAGING | Facility: CLINIC | Age: 50
End: 2025-01-03
Attending: STUDENT IN AN ORGANIZED HEALTH CARE EDUCATION/TRAINING PROGRAM
Payer: COMMERCIAL

## 2025-01-03 DIAGNOSIS — D48.119 DESMOID TUMOR: ICD-10-CM

## 2025-01-03 PROCEDURE — 74183 MRI ABD W/O CNTR FLWD CNTR: CPT | Mod: TC | Performed by: RADIOLOGY

## 2025-01-03 PROCEDURE — A9585 GADOBUTROL INJECTION: HCPCS | Mod: JW | Performed by: RADIOLOGY

## 2025-01-03 PROCEDURE — 72197 MRI PELVIS W/O & W/DYE: CPT | Mod: TC | Performed by: RADIOLOGY

## 2025-01-03 RX ORDER — GADOBUTROL 604.72 MG/ML
0.1 INJECTION INTRAVENOUS ONCE
Status: COMPLETED | OUTPATIENT
Start: 2025-01-03 | End: 2025-01-03

## 2025-01-03 RX ADMIN — GADOBUTROL 6.5 ML: 604.72 INJECTION INTRAVENOUS at 10:57

## 2025-02-17 SDOH — HEALTH STABILITY: PHYSICAL HEALTH: ON AVERAGE, HOW MANY MINUTES DO YOU ENGAGE IN EXERCISE AT THIS LEVEL?: 30 MIN

## 2025-02-17 SDOH — HEALTH STABILITY: PHYSICAL HEALTH: ON AVERAGE, HOW MANY DAYS PER WEEK DO YOU ENGAGE IN MODERATE TO STRENUOUS EXERCISE (LIKE A BRISK WALK)?: 5 DAYS

## 2025-02-17 ASSESSMENT — SOCIAL DETERMINANTS OF HEALTH (SDOH): HOW OFTEN DO YOU GET TOGETHER WITH FRIENDS OR RELATIVES?: ONCE A WEEK

## 2025-02-20 ENCOUNTER — OFFICE VISIT (OUTPATIENT)
Dept: FAMILY MEDICINE | Facility: CLINIC | Age: 50
End: 2025-02-20
Payer: COMMERCIAL

## 2025-02-20 VITALS
HEART RATE: 84 BPM | BODY MASS INDEX: 22.76 KG/M2 | RESPIRATION RATE: 16 BRPM | TEMPERATURE: 98 F | DIASTOLIC BLOOD PRESSURE: 86 MMHG | OXYGEN SATURATION: 99 % | SYSTOLIC BLOOD PRESSURE: 126 MMHG | WEIGHT: 159 LBS | HEIGHT: 70 IN

## 2025-02-20 DIAGNOSIS — Z00.00 ROUTINE GENERAL MEDICAL EXAMINATION AT A HEALTH CARE FACILITY: Primary | ICD-10-CM

## 2025-02-20 DIAGNOSIS — Z13.220 LIPID SCREENING: ICD-10-CM

## 2025-02-20 DIAGNOSIS — R20.2 NUMBNESS AND TINGLING OF RIGHT ARM: ICD-10-CM

## 2025-02-20 DIAGNOSIS — R20.0 NUMBNESS AND TINGLING OF RIGHT ARM: ICD-10-CM

## 2025-02-20 DIAGNOSIS — Z13.1 SCREENING FOR DIABETES MELLITUS: ICD-10-CM

## 2025-02-20 LAB
ANION GAP SERPL CALCULATED.3IONS-SCNC: 11 MMOL/L (ref 7–15)
BUN SERPL-MCNC: 16.2 MG/DL (ref 6–20)
CALCIUM SERPL-MCNC: 9.4 MG/DL (ref 8.8–10.4)
CHLORIDE SERPL-SCNC: 104 MMOL/L (ref 98–107)
CHOLEST SERPL-MCNC: 121 MG/DL
CREAT SERPL-MCNC: 1.07 MG/DL (ref 0.67–1.17)
EGFRCR SERPLBLD CKD-EPI 2021: 86 ML/MIN/1.73M2
FASTING STATUS PATIENT QL REPORTED: YES
FASTING STATUS PATIENT QL REPORTED: YES
GLUCOSE SERPL-MCNC: 98 MG/DL (ref 70–99)
HCO3 SERPL-SCNC: 26 MMOL/L (ref 22–29)
HDLC SERPL-MCNC: 59 MG/DL
LDLC SERPL CALC-MCNC: 52 MG/DL
NONHDLC SERPL-MCNC: 62 MG/DL
POTASSIUM SERPL-SCNC: 4.8 MMOL/L (ref 3.4–5.3)
SODIUM SERPL-SCNC: 141 MMOL/L (ref 135–145)
TRIGL SERPL-MCNC: 50 MG/DL

## 2025-02-20 ASSESSMENT — ENCOUNTER SYMPTOMS
WHEEZING: 0
FREQUENCY: 0
PHOTOPHOBIA: 0
DIFFICULTY URINATING: 0
AGITATION: 0
NERVOUS/ANXIOUS: 0
SINUS PRESSURE: 0
PALPITATIONS: 0
FATIGUE: 0
EYE DISCHARGE: 0
COLOR CHANGE: 0
WEAKNESS: 0
ACTIVITY CHANGE: 0
CHILLS: 0
SLEEP DISTURBANCE: 0
COUGH: 0
DECREASED CONCENTRATION: 0
SHORTNESS OF BREATH: 0
APNEA: 0
UNEXPECTED WEIGHT CHANGE: 0
NUMBNESS: 1
SORE THROAT: 0
DIZZINESS: 0
NAUSEA: 0
ABDOMINAL PAIN: 0
BACK PAIN: 0
FEVER: 0
CONSTIPATION: 0
DIARRHEA: 0
CONFUSION: 0
NECK PAIN: 0
APPETITE CHANGE: 0
VOMITING: 0
SINUS PAIN: 0
HEADACHES: 0
EYE ITCHING: 0
EYE PAIN: 0
DYSURIA: 0
NECK STIFFNESS: 0

## 2025-02-20 ASSESSMENT — PAIN SCALES - GENERAL: PAINLEVEL_OUTOF10: MILD PAIN (2)

## 2025-02-20 NOTE — PROGRESS NOTES
Preventive Care Visit  [facility] ROMI Calhoun DO, Family Medicine  Feb 20, 2025      1. Routine general medical examination at a health care facility (Primary)    2. Lipid screening  - Lipid panel reflex to direct LDL Fasting; Future  - Lipid panel reflex to direct LDL Fasting    3. Screening for diabetes mellitus  - Basic metabolic panel  (Ca, Cl, CO2, Creat, Gluc, K, Na, BUN); Future  - Basic metabolic panel  (Ca, Cl, CO2, Creat, Gluc, K, Na, BUN)    4. Numbness and tingling of right arm  Does not appear due to impingement or cardiac.  Most likely due to nerve injury from MVR repair.       In addition to the preventive service, I spent 20 minutes discussing the patient s acute/chronic conditions and options for treatment including medication therapy, counseling services and meditation.      Ramandeep Mack is a 49 year old, presenting for the following:  Physical        2/20/2025     7:33 AM   Additional Questions   Roomed by Diane Ledbetter   Accompanied by N/A         2/20/2025     7:33 AM   Patient Reported Additional Medications   Patient reports taking the following new medications N/A          HPI    Health Care Directive  Patient does not have a Health Care Directive: Not discussed      2/17/2025   General Health   How would you rate your overall physical health? Good   Feel stress (tense, anxious, or unable to sleep) Not at all         2/17/2025   Nutrition   Three or more servings of calcium each day? Yes   Diet: Regular (no restrictions)   How many servings of fruit and vegetables per day? (!) 2-3   How many sweetened beverages each day? 0-1         2/17/2025   Exercise   Days per week of moderate/strenous exercise 5 days   Average minutes spent exercising at this level 30 min         2/17/2025   Social Factors   Frequency of gathering with friends or relatives Once a week   Worry food won't last until get money to buy more No   Food not last or not have enough money for  food? No   Do you have housing? (Housing is defined as stable permanent housing and does not include staying ouside in a car, in a tent, in an abandoned building, in an overnight shelter, or couch-surfing.) Yes   Are you worried about losing your housing? No   Lack of transportation? No   Unable to get utilities (heat,electricity)? No         2/17/2025   Dental   Dentist two times every year? Yes            Today's PHQ-2 Score:       2/19/2025    10:48 AM   PHQ-2 ( 1999 [employer])   Q1: Little interest or pleasure in doing things 0   Q2: Feeling down, depressed or hopeless 0   PHQ-2 Score 0    Q1: Little interest or pleasure in doing things Not at all   Q2: Feeling down, depressed or hopeless Not at all   PHQ-2 Score 0       Patient-reported           2/17/2025   Substance Use   Alcohol more than 3/day or more than 7/wk No   Do you use any other substances recreationally? No     Social History     Tobacco Use    Smoking status: Never     Passive exposure: Never    Smokeless tobacco: Never   Vaping Use    Vaping status: Never Used   Substance Use Topics    Alcohol use: Yes     Comment: Foot surgery + illness    Drug use: No           2/17/2025   STI Screening   New sexual partner(s) since last STI/HIV test? No   ASCVD Risk   The 10-year ASCVD risk score (Mati MARCOS, et al., 2019) is: 1.6%    Values used to calculate the score:      Age: 49 years      Sex: Male      Is Non- : No      Diabetic: No      Tobacco smoker: No      Systolic Blood Pressure: 126 mmHg      Is BP treated: No      HDL Cholesterol: 48 mg/dL      Total Cholesterol: 147 mg/dL        2/17/2025   Contraception/Family Planning   Questions about contraception or family planning No        Reviewed and updated as needed this visit by Provider                    Past Medical History:   Diagnosis Date    Cervical spondylosis without myelopathy     Heart murmur     Mesenteric mass     Osteomyelitis, L5S1 03/15/2018    secondary  to bactermeia    S/P mitral valve repair     Status post coronary angiogram 09/09/2019     Past Surgical History:   Procedure Laterality Date    APPENDECTOMY  1987    CHEILECTOMY Right 4/10/2019    Procedure: Right Cheilectomy;  Surgeon: Sawyer Crowell MD;  [facility]    COLONOSCOPY N/A 4/8/2022    Procedure: COLONOSCOPY;  Surgeon: Rufus White MD;  Location: [facility]    CV CORONARY ANGIOGRAM N/A 9/9/2019    Procedure: CV CORONARY ANGIOGRAM;  Surgeon: Pernell Bar MD;  Location:  [facility]    CV RIGHT HEART CATH MEASUREMENTS RECORDED N/A 9/9/2019    Procedure: CV RIGHT HEART CATH;  Surgeon: Pernell Bar MD;  Location:  HEART CARDIAC CATH LAB    ESOPHAGOSCOPY, GASTROSCOPY, DUODENOSCOPY (EGD), COMBINED N/A 4/8/2022    Procedure: ESOPHAGOGASTRODUODENOSCOPY (EGD);  Surgeon: Rufus White MD;  Location: [facility] OR    INJECT SACROILIAC JOINT Right 5/9/2018    Procedure: INJECT SACROILIAC JOINT;  Right Sacroiliac Joint Injection;  Surgeon: Thom Williamson MD;  [facility]: [facility]    LAPAROSCOPY DIAGNOSTIC (GENERAL) N/A 3/1/2023    Procedure: LAPAROSCOPY, DIAGNOSTIC;  Surgeon: Mukul Thompson MD;  Location: [facility] OR    ORTHOPEDIC SURGERY  4/10/19    Bone Spur Removal    REPAIR VALVE MITRAL MINIMALLY INVASIVE N/A 10/25/2019    Procedure: MINIMALLY INVASIVE MITRAL VALVE REPAIR WITH MITRAL ANNULOPLASTY RING KEVIN-KAUR PHYSIO II SIZE: 36MM  (ON PUMP OXYGENATOR ; INTRAOPERATIVE DEEPAK BY CARDIOLOGIST DR. VARELA);  Surgeon: Rigoberto Downs MD;  Location:  OR    RESECT SMALL BOWEL WITHOUT OSTOMY N/A 3/1/2023    Procedure: mesenteric mass and small bowel resection;  Surgeon: Mukul Thompson MD;  Location: [facility] OR     Physical exam    2. Right arm discomfort: Intermittent, 2-3 times per week, after MVR 2019.  Tingling sensation and resolves after one minute.  Can occur every couple months.  Mild weakness due tingling.  Unchanged.  Entered on right side of torso.   "Patient sleeps on one pillow.       Review of Systems   Constitutional:  Negative for activity change, appetite change, chills, fatigue, fever and unexpected weight change.   HENT:  Negative for congestion, ear pain, sinus pressure, sinus pain and sore throat.    Eyes:  Negative for photophobia, pain, discharge, itching and visual disturbance.   Respiratory:  Negative for apnea, cough, shortness of breath and wheezing.    Cardiovascular:  Negative for chest pain, palpitations and leg swelling.   Gastrointestinal:  Negative for abdominal pain, constipation, diarrhea, nausea and vomiting.   Genitourinary:  Negative for difficulty urinating, dysuria, frequency and urgency.   Musculoskeletal:  Negative for back pain, neck pain and neck stiffness.   Skin:  Negative for color change and rash.   Neurological:  Positive for numbness (tingling and numbness of right arm). Negative for dizziness, syncope, weakness and headaches.   Psychiatric/Behavioral:  Negative for agitation, behavioral problems, confusion, decreased concentration and sleep disturbance. The patient is not nervous/anxious.           Objective    Exam  /86   Pulse 84   Temp 98  F (36.7  C) (Temporal)   Resp 16   Ht 1.77 m (5' 9.69\")   Wt 72.1 kg (159 lb)   SpO2 99%   BMI 23.02 kg/m     Estimated body mass index is 23.02 kg/m  as calculated from the following:    Height as of this encounter: 1.77 m (5' 9.69\").    Weight as of this encounter: 72.1 kg (159 lb).    Physical Exam  Constitutional:       General: He is not in acute distress.  HENT:      Head: Normocephalic and atraumatic.      Right Ear: External ear normal.      Left Ear: External ear normal.      Nose: Nose normal.      Mouth/Throat:      Pharynx: No oropharyngeal exudate.   Eyes:      General:         Right eye: No discharge.         Left eye: No discharge.      Conjunctiva/sclera: Conjunctivae normal.      Pupils: Pupils are equal, round, and reactive to light.   Neck:      Thyroid: " No thyromegaly.      Trachea: No tracheal deviation.      Comments: Negative Splulings  Cardiovascular:      Rate and Rhythm: Normal rate and regular rhythm.      Heart sounds: Normal heart sounds. No murmur heard.  Pulmonary:      Effort: No respiratory distress.      Breath sounds: Normal breath sounds. No wheezing.   Chest:      Chest wall: No tenderness.   Abdominal:      General: There is no distension.      Palpations: Abdomen is soft. There is no mass.      Tenderness: There is no abdominal tenderness. There is no guarding.   Musculoskeletal:         General: Normal range of motion.      Cervical back: Normal range of motion and neck supple.   Lymphadenopathy:      Cervical: No cervical adenopathy.   Skin:     General: Skin is warm.      Findings: No erythema or rash.   Neurological:      Mental Status: He is alert and oriented to person, place, and time.      Cranial Nerves: No cranial nerve deficit.      Coordination: Coordination normal.      Comments: Right arm: NVI, normal microfilament test             1/3/2025  IMPRESSION:   1.  No new disease identified.  Signed Electronically by: Madan Calhoun DO

## 2025-02-20 NOTE — PATIENT INSTRUCTIONS
Sukh Mack,    Thank you for allowing [facility] to manage your care.    I ordered some blood work, please go to the laboratory to get your laboratory studies.    For your convenience, test results are released as soon as they are available  Please allow 1-2 business days for me to send you a comment about your results.  If not done so, I encourage you to login into [employer] ([email or web address] to review your results in real time.     If you have any questions or concerns, please feel free to call us at [phone].    Sincerely,    Dr. Calhoun    Did you know?      You can schedule a video visit for follow-up appointments as well as future appointments for certain conditions.  Please see the below link.     [email or web address]    If you have not already done so,  I encourage you to sign up for [employer] ([email or web address]).  This will allow you to review your results, securely communicate with a provider, and schedule virtual visits as well.        Patient Education   Preventive Care Advice   This is general advice given by our system to help you stay healthy. However, your care team may have specific advice just for you. Please talk to your care team about your preventive care needs.  Nutrition  Eat 5 or more servings of fruits and vegetables each day.  Try wheat bread, brown rice and whole grain pasta (instead of white bread, rice, and pasta).  Get enough calcium and vitamin D. Check the label on foods and aim for 100% of the RDA (recommended daily allowance).  Lifestyle  Exercise at least 150 minutes each week  (30 minutes a day, 5 days a week).  Do muscle strengthening activities 2 days a week. These help control your weight and prevent disease.  No smoking.  Wear sunscreen to prevent skin cancer.  Have a dental exam and cleaning every 6 months.  Yearly exams  See your health care team every year to talk about:  Any changes in your  health.  Any medicines your care team has prescribed.  Preventive care, family planning, and ways to prevent chronic diseases.  Shots (vaccines)   HPV shots (up to age 26), if you've never had them before.  Hepatitis B shots (up to age 59), if you've never had them before.  COVID-19 shot: Get this shot when it's due.  Flu shot: Get a flu shot every year.  Tetanus shot: Get a tetanus shot every 10 years.  Pneumococcal, hepatitis A, and RSV shots: Ask your care team if you need these based on your risk.  Shingles shot (for age 50 and up)  General health tests  Diabetes screening:  Starting at age 35, Get screened for diabetes at least every 3 years.  If you are younger than age 35, ask your care team if you should be screened for diabetes.  Cholesterol test: At age 39, start having a cholesterol test every 5 years, or more often if advised.  Bone density scan (DEXA): At age 50, ask your care team if you should have this scan for osteoporosis (brittle bones).  Hepatitis C: Get tested at least once in your life.  STIs (sexually transmitted infections)  Before age 24: Ask your care team if you should be screened for STIs.  After age 24: Get screened for STIs if you're at risk. You are at risk for STIs (including HIV) if:  You are sexually active with more than one person.  You don't use condoms every time.  You or a partner was diagnosed with a sexually transmitted infection.  If you are at risk for HIV, ask about PrEP medicine to prevent HIV.  Get tested for HIV at least once in your life, whether you are at risk for HIV or not.  Cancer screening tests  Cervical cancer screening: If you have a cervix, begin getting regular cervical cancer screening tests starting at age 21.  Breast cancer scan (mammogram): If you've ever had breasts, begin having regular mammograms starting at age 40. This is a scan to check for breast cancer.  Colon cancer screening: It is important to start screening for colon cancer at age 45.  Have  a colonoscopy test every 10 years (or more often if you're at risk) Or, ask your provider about stool tests like a FIT test every year or Cologuard test every 3 years.  To learn more about your testing options, visit:   .  For help making a decision, visit:   [email or web address].kumar/ns83798.  Prostate cancer screening test: If you have a prostate, ask your care team if a prostate cancer screening test (PSA) at age 55 is right for you.  Lung cancer screening: If you are a current or former smoker ages 50 to 80, ask your care team if ongoing lung cancer screenings are right for you.  For informational purposes only. Not to replace the advice of your health care provider. Copyright   2023 [facility]. All rights reserved. Clinically reviewed by the [facility] Transitions Program. [employer] 374047 - REV 01/24.

## 2025-05-12 ENCOUNTER — PATIENT OUTREACH (OUTPATIENT)
Dept: CARE COORDINATION | Facility: CLINIC | Age: 50
End: 2025-05-12
Payer: COMMERCIAL

## 2025-05-14 ENCOUNTER — PATIENT OUTREACH (OUTPATIENT)
Dept: CARE COORDINATION | Facility: CLINIC | Age: 50
End: 2025-05-14
Payer: COMMERCIAL

## 2025-05-28 ENCOUNTER — OFFICE VISIT (OUTPATIENT)
Dept: FAMILY MEDICINE | Facility: CLINIC | Age: 50
End: 2025-05-28
Payer: COMMERCIAL

## 2025-05-28 ENCOUNTER — ANCILLARY PROCEDURE (OUTPATIENT)
Dept: GENERAL RADIOLOGY | Facility: CLINIC | Age: 50
End: 2025-05-28
Attending: PHYSICIAN ASSISTANT
Payer: COMMERCIAL

## 2025-05-28 ENCOUNTER — RESULTS FOLLOW-UP (OUTPATIENT)
Dept: FAMILY MEDICINE | Facility: CLINIC | Age: 50
End: 2025-05-28

## 2025-05-28 VITALS
BODY MASS INDEX: 23.39 KG/M2 | SYSTOLIC BLOOD PRESSURE: 132 MMHG | WEIGHT: 163.4 LBS | TEMPERATURE: 97.8 F | RESPIRATION RATE: 18 BRPM | OXYGEN SATURATION: 99 % | HEART RATE: 89 BPM | DIASTOLIC BLOOD PRESSURE: 80 MMHG | HEIGHT: 70 IN

## 2025-05-28 DIAGNOSIS — J01.90 ACUTE NON-RECURRENT SINUSITIS, UNSPECIFIED LOCATION: Primary | ICD-10-CM

## 2025-05-28 DIAGNOSIS — R05.1 ACUTE COUGH: ICD-10-CM

## 2025-05-28 PROCEDURE — 71046 X-RAY EXAM CHEST 2 VIEWS: CPT | Mod: TC | Performed by: RADIOLOGY

## 2025-05-28 PROCEDURE — 1126F AMNT PAIN NOTED NONE PRSNT: CPT | Performed by: PHYSICIAN ASSISTANT

## 2025-05-28 PROCEDURE — 99214 OFFICE O/P EST MOD 30 MIN: CPT | Performed by: PHYSICIAN ASSISTANT

## 2025-05-28 PROCEDURE — 3075F SYST BP GE 130 - 139MM HG: CPT | Performed by: PHYSICIAN ASSISTANT

## 2025-05-28 PROCEDURE — 3079F DIAST BP 80-89 MM HG: CPT | Performed by: PHYSICIAN ASSISTANT

## 2025-05-28 RX ORDER — ALBUTEROL SULFATE 90 UG/1
1-2 INHALANT RESPIRATORY (INHALATION) EVERY 4 HOURS PRN
Qty: 8.5 G | Refills: 0 | Status: SHIPPED | OUTPATIENT
Start: 2025-05-28

## 2025-05-28 ASSESSMENT — PAIN SCALES - GENERAL: PAINLEVEL_OUTOF10: NO PAIN (0)

## 2025-05-28 NOTE — PATIENT INSTRUCTIONS
Kylah Mack,    Thank you for allowing [facility] to manage your care.    This is likely an upper respiratory infection, probably viral and it should resolve in the next 2 weeks with fluids, rest and over the counter medications. That said, fill and begin the Augmentin to cover bacterial causes given the duration of your symptoms.    If you develop worsening/changing symptoms at any time such as coughing up blood, shortness of breath, chest pain or other worrisome symptoms, please be seen in clinic/urgent care or call 911/go to the emergency department for evaluation as we discussed.    I ordered some xrays. Please go to our radiology department to get your xrays.    I sent your prescriptions to your pharmacy. Take a probiotic pill, eat live culture yogurt (Greek yogurt or Activia), drink kefir daily for one week after finishing the antibiotics to encourage growth of good bacteria in your system.    Please allow 1-2 business days for our office to contact you in regards to your laboratory/radiological studies.  If not done so, I encourage you to login into [employer] ([email or web address] to review your results as well.     If you have any questions or concerns, please feel free to call us at [phone]    Sincerely,    Ghulam Cnao PA-C    Did you know?      You can schedule a video visit for follow-up appointments as well as future appointments for certain conditions.  Please see the below link.     [email or web address]    If you have not already done so,  I encourage you to sign up for [employer] ([email or web address]).  This will allow you to review your results, securely communicate with a provider, and schedule virtual visits as well.

## 2025-05-28 NOTE — PROGRESS NOTES
Assessment & Plan   Problem List Items Addressed This Visit    None  Visit Diagnoses         Acute non-recurrent sinusitis, unspecified location    -  Primary    Relevant Medications    amoxicillin-clavulanate (AUGMENTIN) 875-125 MG tablet    albuterol (PROAIR HFA/PROVENTIL HFA/VENTOLIN HFA) 108 (90 Base) MCG/ACT inhaler      Acute cough        Relevant Medications    amoxicillin-clavulanate (AUGMENTIN) 875-125 MG tablet    albuterol (PROAIR HFA/PROVENTIL HFA/VENTOLIN HFA) 108 (90 Base) MCG/ACT inhaler    Other Relevant Orders    XR Chest 2 Views           Impression is likely resolving sinusitis with new cough from viral URI, but could be atypical CAP. COVID-19 Ag home test neg previously. Sinusitis symptoms improved with doxycycline. Appears well and non-toxic and I have low suspicion for impending airway obstruction or respiratory distress at this point. CXR shows no pneumonia, pneumothorax, pleural effusion, edema, or other worrisome process compared to previous CXR per my review with radiology read pending. He will push p.o. fluids, use over-the-counter meds for symptoms, complete a course of Augmentin and follow-up with us in 1-2 weeks if not improving or urgent care/the ER if symptoms worsen/change at any time.      Complete history and physical exam as below. Afebrile with normal vital signs.     DDx and Dx discussed with and explained to the pt to their satisfaction.  All questions were answered at this time. Pt expressed understanding of and agreement with this dx, tx, and plan. No further workup warranted and standard medication warnings given. I have given the patient a list of pertinent indications for re-evaluation.       Follow-up  Return in about 2 weeks (around 6/11/2025) for a recheck of your symptoms if not improving, or call 911/go to an ER anytime if worsening.    Ramandeep Mack is a 49 year old, presenting for the following health issues:  Follow Up        5/28/2025     8:42 AM  "  Additional Questions   Roomed by Oc Kumar CMA   Accompanied by N/A         5/28/2025     8:42 AM   Patient Reported Additional Medications   Patient reports taking the following new medications No new medications     History of Present Illness       Reason for visit:  Follow up on my previous visit. While symptoms have improved, my sinus is still inflamed, alternating between stuffiness and running. I also have a rattling cough and low energy.    He eats 2-3 servings of fruits and vegetables daily.He consumes 0 sweetened beverage(s) daily.He exercises with enough effort to increase his heart rate 30 to 60 minutes per day.  He exercises with enough effort to increase his heart rate 5 days per week.   He is taking medications regularly.      Patient is coming in today due to continued symptoms from visit on 05/09/2025.  Sinuses are still inflamed and alternating between stuffiness and runny nose.   There is a dry rattling cough and low energy noted.      Review of Systems  Constitutional, HEENT, cardiovascular, pulmonary, gi and gu systems are negative, except as otherwise noted.      Objective    /80   Pulse 89   Temp 97.8  F (36.6  C) (Temporal)   Resp 18   Ht 1.773 m (5' 9.8\")   Wt 74.1 kg (163 lb 6.4 oz)   SpO2 99%   BMI 23.58 kg/m    Body mass index is 23.58 kg/m .  Physical Exam  Vitals and nursing note reviewed.   Constitutional:       General: He is not in acute distress.     Appearance: He is not ill-appearing or diaphoretic.   HENT:      Head: Normocephalic and atraumatic.      Nose: Nose normal.      Mouth/Throat:      Mouth: Mucous membranes are moist.      Pharynx: Oropharynx is clear.      Comments: No trismus, voice abnormalities or asymmetry to the oropharynx.  Eyes:      Conjunctiva/sclera: Conjunctivae normal.   Cardiovascular:      Rate and Rhythm: Normal rate and regular rhythm.      Heart sounds: Normal heart sounds. No murmur heard.     No friction rub. No gallop.      " Comments: 2+ symmetric radial/PT pulses. No LE edema or tenderness.  Pulmonary:      Effort: Pulmonary effort is normal. No respiratory distress.      Breath sounds: Normal breath sounds. No stridor. No wheezing, rhonchi or rales.   Musculoskeletal:      Cervical back: Neck supple. No rigidity.   Lymphadenopathy:      Cervical: No cervical adenopathy.   Skin:     General: Skin is warm and dry.   Neurological:      General: No focal deficit present.      Mental Status: He is alert. Mental status is at baseline.   Psychiatric:         Mood and Affect: Mood normal.         Behavior: Behavior normal.          CXR shows no pneumonia, pneumothorax, pleural effusion, edema, or other worrisome process per my read.        Signed Electronically by: ORTIZ Meredith

## 2025-08-26 ENCOUNTER — OFFICE VISIT (OUTPATIENT)
Dept: OTOLARYNGOLOGY | Facility: CLINIC | Age: 50
End: 2025-08-26
Payer: COMMERCIAL

## 2025-08-26 VITALS
DIASTOLIC BLOOD PRESSURE: 122 MMHG | WEIGHT: 170 LBS | SYSTOLIC BLOOD PRESSURE: 159 MMHG | BODY MASS INDEX: 24.53 KG/M2 | HEART RATE: 88 BPM

## 2025-08-26 DIAGNOSIS — J01.91 ACUTE RECURRENT SINUSITIS, UNSPECIFIED LOCATION: ICD-10-CM

## 2025-08-26 PROCEDURE — 3080F DIAST BP >= 90 MM HG: CPT | Performed by: OTOLARYNGOLOGY

## 2025-08-26 PROCEDURE — 3077F SYST BP >= 140 MM HG: CPT | Performed by: OTOLARYNGOLOGY

## 2025-08-26 PROCEDURE — 99243 OFF/OP CNSLTJ NEW/EST LOW 30: CPT | Performed by: OTOLARYNGOLOGY

## (undated) DEVICE — CATH ANGIO SUPERTORQUE PLUS JR4 6FRX100CM 533621

## (undated) DEVICE — DRAIN CHEST TUBE 28FR STR 8028

## (undated) DEVICE — ENDO BITE BLOCK ADULT OMNI-BLOC

## (undated) DEVICE — BLADE KNIFE SURG 10 371110

## (undated) DEVICE — STPL RELOAD REG TISSUE ECHELON 60 X 3.6MM BLUE GST60B

## (undated) DEVICE — APPLICATOR COTTON TIP 6"X2 STERILE LF 6012

## (undated) DEVICE — Device

## (undated) DEVICE — SOL WATER IRRIG 500ML BOTTLE 2F7113

## (undated) DEVICE — SU SILK 3-0 SH CR 8X18" C013D

## (undated) DEVICE — ESU ELEC BLADE 6" COATED/INSULATED E1455-6

## (undated) DEVICE — GLOVE PROTEXIS BLUE W/NEU-THERA 7.0  2D73EB70

## (undated) DEVICE — ANTIFOG SOLUTION W/FOAM PAD 31142527

## (undated) DEVICE — KIT WASH CELL SAVING ATL2001

## (undated) DEVICE — SU PLEDGET SOFT TFE 3/8"X3/26"X1/16" PCP40

## (undated) DEVICE — STATLOCK PSI DEVICE

## (undated) DEVICE — ENDO TROCAR FIRST ENTRY KII FIOS Z-THRD 05X100MM CTF03

## (undated) DEVICE — ESU GROUND PAD ADULT W/CORD E7507

## (undated) DEVICE — TUBING SUCTION 12"X1/4" N612

## (undated) DEVICE — GLOVE PROTEXIS MICRO 6.5  2D73PM65

## (undated) DEVICE — LINEN ORTHO PACK 5446

## (undated) DEVICE — PACK MINI VAC CUSTOM CARDOPULMONARY BB5Z97R15

## (undated) DEVICE — CANNULA AORTIC ROOT 12GA 11012L

## (undated) DEVICE — SOL NACL 0.9% IRRIG 1000ML BOTTLE 2F7124

## (undated) DEVICE — ESU ENDO SCISSORS 5MM CVD 5DCS

## (undated) DEVICE — SURGICEL ABSORBABLE HEMOSTAT SNOW 4"X4" 2083

## (undated) DEVICE — PACK HEART LEFT CUSTOM

## (undated) DEVICE — SPECIMEN CONTAINER 3OZ W/FORMALIN 59901

## (undated) DEVICE — GLOVE PROTEXIS BLUE W/NEU-THERA 8.0  2D73EB80

## (undated) DEVICE — PREP CHLORAPREP 26ML TINTED HI-LITE ORANGE 930815

## (undated) DEVICE — SUCTION CATH AIRLIFE TRI-FLO W/CONTROL PORT 14FR  T60C

## (undated) DEVICE — MANIFOLD KIT ANGIO AUTOMATED 014613

## (undated) DEVICE — DRAPE IOBAN INCISE 23X17" 6650EZ

## (undated) DEVICE — LINEN TOWEL PACK X30 5481

## (undated) DEVICE — CLIP HORIZON MED BLUE 002200

## (undated) DEVICE — SU PDS II 0 TP-1 60" Z991G

## (undated) DEVICE — CONNECTOR BLAKE DRAIN SGL BCC1

## (undated) DEVICE — DRAPE SHEET REV FOLD 3/4 9349

## (undated) DEVICE — BLADE CLIPPER SGL USE 9680

## (undated) DEVICE — SUCTION MANIFOLD NEPTUNE 2 SYS 1 PORT 702-025-000

## (undated) DEVICE — CANNULA 25FR MULIT-STAGE 96880-25

## (undated) DEVICE — SOL WATER IRRIG 1000ML BOTTLE 2F7114

## (undated) DEVICE — ADAPTER CARDIOPLG CLAMP 7.5" DLP .25" CON 10005

## (undated) DEVICE — KIT ENDO TURNOVER/PROCEDURE CARRY-ON 101822

## (undated) DEVICE — MIAMI INSTRUMENTS SUTURE BELT (CIRCUMFERENTIAL SUTURE ORGANIZER)

## (undated) DEVICE — CATH TRAY FOLEY SURESTEP 16FR W/URNE MTR STLK LATEX A303316A

## (undated) DEVICE — SYR 10ML FINGER CONTROL W/O NDL 309695

## (undated) DEVICE — ENDO DISSECTOR BLUNT 10MM CHERRY BCD10

## (undated) DEVICE — STPL LINEAR CUT 75MM TLC75

## (undated) DEVICE — NDL SPINAL 22GA 3.5" QUINCKE 405181

## (undated) DEVICE — COVER TABLE POLY 65X90" 8186

## (undated) DEVICE — CABLE MYO/LEAD PACING WHITE DISP 019-530

## (undated) DEVICE — GLOVE BIOGEL PI MICRO INDICATOR UNDERGLOVE SZ 7.5 48975

## (undated) DEVICE — PACK LOWER EXTREMITY CUSTOM ASC

## (undated) DEVICE — SU VICRYL 3-0 FS-1 27" J442H

## (undated) DEVICE — VALVE VRV 9156R2

## (undated) DEVICE — ENDO DISSECTOR BLUNT 05MM  BTD05

## (undated) DEVICE — SUCTION CANISTER MEDIVAC LINER 3000ML W/LID 65651-530

## (undated) DEVICE — SU ETHILON 3-0 PS-1 18" 1663G

## (undated) DEVICE — LINEN GOWN XLG 5407

## (undated) DEVICE — SU VICRYL 2-0 CT-1 27" UND J259H

## (undated) DEVICE — SU PROLENE 4-0 SHDA 36" 8521H

## (undated) DEVICE — PREP CHLORAPREP W/ORANGE TINT 10.5ML 260715

## (undated) DEVICE — SU SILK 0 TIE 6X30" A306H

## (undated) DEVICE — TUBING SMOKE EVAC PNEUMOCLEAR HIGH FLOW 0620050250

## (undated) DEVICE — SU ETHIBOND 5 V-37 4X30" MB66G

## (undated) DEVICE — SU VICRYL 0 CTX 36" J370H

## (undated) DEVICE — STPL RELOAD LINEAR CUT 75MM TCR75

## (undated) DEVICE — DEVICE ASSEMBLY SUCTION/ANTI COAG BTC93

## (undated) DEVICE — STPL POWERED ECHELON 60MM PSEE60A

## (undated) DEVICE — SUCTION MANIFOLD NEPTUNE 2 SYS 4 PORT 0702-020-000

## (undated) DEVICE — SYR 30ML SLIP TIP W/O NDL 302833

## (undated) DEVICE — SLEEVE TR BAND RADIAL COMPRESSION DEVICE 24CM TRB24-REG

## (undated) DEVICE — TUBING PERFUSION 1/4X1/16X8FT

## (undated) DEVICE — BIO-MEDICUS 17FR. ADULT CANNULA AND INTRODUCER KIT

## (undated) DEVICE — SU PROLENE 5-0 RB-2DA 30" 8710H

## (undated) DEVICE — SU VICRYL 3-0 SH 27" UND J416H

## (undated) DEVICE — NDL INSUFFLATION 13GA 120MM C2201

## (undated) DEVICE — GOWN XLG DISP 9545

## (undated) DEVICE — SU ETHIBOND 0 CT-1 CR 8X18" CX21D

## (undated) DEVICE — SU MONOCRYL 4-0 PS-2 27" UND Y426H

## (undated) DEVICE — GLOVE EXAM NITRILE LG PF LATEX FREE 5064

## (undated) DEVICE — ENDO TROCAR SLEEVE KII Z-THREADED 05X100MM CTS02

## (undated) DEVICE — SU PROLENE 4-0 RB-1DA 36" 8557H

## (undated) DEVICE — PACK OPEN HEART PV12OH524

## (undated) DEVICE — WIPES FOLEY CARE SURESTEP PROVON DFC100

## (undated) DEVICE — GLOVE PROTEXIS POWDER FREE SMT 8.0  2D72PT80X

## (undated) DEVICE — RESERVOIR CELL SAVING BLOOD COLLECTION EL2120

## (undated) DEVICE — SU SILK 1 TIE 6X30" A307H

## (undated) DEVICE — LINEN TOWEL PACK X5 5464

## (undated) DEVICE — PROTECTOR ARM ONE-STEP TRENDELENBURG 40418

## (undated) DEVICE — SU SILK 3-0 TIE 12X30" A304H

## (undated) DEVICE — STPL ENDO LINEAR CUT ECHELON GST 45MM COMPACT PCEE45A

## (undated) DEVICE — PREP CHLORAPREP 26ML TINTED ORANGE  260815

## (undated) DEVICE — 0.035IN X 260CM, EMERALD DIAGNOSTIC GUIDEWIRE, FIXED-CORE PTFE COATED, STANDARD, 3MM EXCHANGE J-TIP (EA/1)

## (undated) DEVICE — CATH ANGIO SUPERTORQUE PLUS JL4 6FRX100CM 533620

## (undated) DEVICE — SURGICEL HEMOSTAT 2X14" 1951

## (undated) DEVICE — GLOVE PROTEXIS POWDER FREE SMT 7.5  2D72PT75X

## (undated) DEVICE — KIT VASC DILATOR ESTECH 200-120

## (undated) DEVICE — INTRO SHEATH 7FRX10CM PINNACLE RSS702

## (undated) DEVICE — ESU LIGASURE LAPAROSCOPIC BLUNT TIP SEALER 5MMX37CM LF1837

## (undated) DEVICE — SOMASENSOR CEREBRAL OXIMETER ADULT SAFB-SM

## (undated) DEVICE — SUCTION IRR STRYKERFLOW II W/TIP 250-070-520

## (undated) DEVICE — SU SILK 1 TIE 10X30" SA87G

## (undated) DEVICE — ESU LIGASURE IMPACT OPEN SEALER/DVDR CVD LG JAW LF4418

## (undated) DEVICE — SU PROLENE 5-0 RB-1DA 36"  8556H

## (undated) DEVICE — SHTH INTRO 0.021IN ID 6FR DIA

## (undated) DEVICE — LINEN TOWEL PACK X6 WHITE 5487

## (undated) DEVICE — GOWN IMPERVIOUS 2XL BLUE

## (undated) DEVICE — KIT HAND CONTROL ACIST 014644 AR-P54

## (undated) DEVICE — SU ETHIBOND 2-0 SH-2DA 10X30"

## (undated) DEVICE — SU ETHIBOND 3-0 BBDA 36" X588H

## (undated) DEVICE — SU VICRYL 0 TIE 54" J608H

## (undated) DEVICE — GLOVE BIOGEL PI MICRO SZ 7.0 48570

## (undated) DEVICE — ADH SKIN CLOSURE PREMIERPRO EXOFIN 1.0ML 3470

## (undated) DEVICE — SU SILK 4-0 TIE 12X30" A303H

## (undated) DEVICE — DRAIN SUMP INTRACARDIAC 20FR 12" POOL TIP 12112

## (undated) DEVICE — INSERT INTRAC 66MM XT CYGNET N-10174

## (undated) DEVICE — GLOVE PROTEXIS W/NEU-THERA 7.5  2D73TE75

## (undated) DEVICE — SU SILK 2-0 TIE 12X30" A305H

## (undated) RX ORDER — FENTANYL CITRATE 50 UG/ML
INJECTION, SOLUTION INTRAMUSCULAR; INTRAVENOUS
Status: DISPENSED
Start: 2022-04-08

## (undated) RX ORDER — CEFAZOLIN SODIUM 1 G/3ML
INJECTION, POWDER, FOR SOLUTION INTRAMUSCULAR; INTRAVENOUS
Status: DISPENSED
Start: 2019-10-25

## (undated) RX ORDER — DEXAMETHASONE SODIUM PHOSPHATE 4 MG/ML
INJECTION, SOLUTION INTRA-ARTICULAR; INTRALESIONAL; INTRAMUSCULAR; INTRAVENOUS; SOFT TISSUE
Status: DISPENSED
Start: 2023-03-01

## (undated) RX ORDER — SODIUM CHLORIDE 9 MG/ML
INJECTION, SOLUTION INTRAVENOUS
Status: DISPENSED
Start: 2019-09-09

## (undated) RX ORDER — NEOSTIGMINE METHYLSULFATE 1 MG/ML
VIAL (ML) INJECTION
Status: DISPENSED
Start: 2019-10-25

## (undated) RX ORDER — FENTANYL CITRATE 0.05 MG/ML
INJECTION, SOLUTION INTRAMUSCULAR; INTRAVENOUS
Status: DISPENSED
Start: 2019-10-25

## (undated) RX ORDER — LIDOCAINE HYDROCHLORIDE 10 MG/ML
INJECTION, SOLUTION EPIDURAL; INFILTRATION; INTRACAUDAL; PERINEURAL
Status: DISPENSED
Start: 2018-03-07

## (undated) RX ORDER — SCOLOPAMINE TRANSDERMAL SYSTEM 1 MG/1
PATCH, EXTENDED RELEASE TRANSDERMAL
Status: DISPENSED
Start: 2019-04-10

## (undated) RX ORDER — VECURONIUM BROMIDE 1 MG/ML
INJECTION, POWDER, LYOPHILIZED, FOR SOLUTION INTRAVENOUS
Status: DISPENSED
Start: 2019-10-25

## (undated) RX ORDER — PROPOFOL 10 MG/ML
INJECTION, EMULSION INTRAVENOUS
Status: DISPENSED
Start: 2023-03-01

## (undated) RX ORDER — FENTANYL CITRATE 50 UG/ML
INJECTION, SOLUTION INTRAMUSCULAR; INTRAVENOUS
Status: DISPENSED
Start: 2019-04-10

## (undated) RX ORDER — ONDANSETRON 2 MG/ML
INJECTION INTRAMUSCULAR; INTRAVENOUS
Status: DISPENSED
Start: 2019-10-14

## (undated) RX ORDER — ONDANSETRON 2 MG/ML
INJECTION INTRAMUSCULAR; INTRAVENOUS
Status: DISPENSED
Start: 2022-04-08

## (undated) RX ORDER — CEFAZOLIN SODIUM 1 G/3ML
INJECTION, POWDER, FOR SOLUTION INTRAMUSCULAR; INTRAVENOUS
Status: DISPENSED
Start: 2019-04-10

## (undated) RX ORDER — PROPOFOL 10 MG/ML
INJECTION, EMULSION INTRAVENOUS
Status: DISPENSED
Start: 2019-10-25

## (undated) RX ORDER — ACETAMINOPHEN 325 MG/1
TABLET ORAL
Status: DISPENSED
Start: 2023-03-01

## (undated) RX ORDER — ENOXAPARIN SODIUM 100 MG/ML
INJECTION SUBCUTANEOUS
Status: DISPENSED
Start: 2023-03-01

## (undated) RX ORDER — CEFAZOLIN SODIUM 2 G/100ML
INJECTION, SOLUTION INTRAVENOUS
Status: DISPENSED
Start: 2019-10-25

## (undated) RX ORDER — FENTANYL CITRATE-0.9 % NACL/PF 10 MCG/ML
PLASTIC BAG, INJECTION (ML) INTRAVENOUS
Status: DISPENSED
Start: 2023-03-01

## (undated) RX ORDER — FENTANYL CITRATE 50 UG/ML
INJECTION, SOLUTION INTRAMUSCULAR; INTRAVENOUS
Status: DISPENSED
Start: 2023-03-01

## (undated) RX ORDER — FENTANYL CITRATE 50 UG/ML
INJECTION, SOLUTION INTRAMUSCULAR; INTRAVENOUS
Status: DISPENSED
Start: 2019-09-09

## (undated) RX ORDER — ONDANSETRON 2 MG/ML
INJECTION INTRAMUSCULAR; INTRAVENOUS
Status: DISPENSED
Start: 2019-04-10

## (undated) RX ORDER — LABETALOL HYDROCHLORIDE 5 MG/ML
INJECTION, SOLUTION INTRAVENOUS
Status: DISPENSED
Start: 2023-03-01

## (undated) RX ORDER — FENTANYL CITRATE 50 UG/ML
INJECTION, SOLUTION INTRAMUSCULAR; INTRAVENOUS
Status: DISPENSED
Start: 2018-03-30

## (undated) RX ORDER — MUPIROCIN 20 MG/G
OINTMENT TOPICAL
Status: DISPENSED
Start: 2019-10-25

## (undated) RX ORDER — BUPIVACAINE HYDROCHLORIDE 5 MG/ML
INJECTION, SOLUTION EPIDURAL; INTRACAUDAL
Status: DISPENSED
Start: 2019-10-25

## (undated) RX ORDER — FENTANYL CITRATE 50 UG/ML
INJECTION, SOLUTION INTRAMUSCULAR; INTRAVENOUS
Status: DISPENSED
Start: 2019-10-14

## (undated) RX ORDER — ONDANSETRON 2 MG/ML
INJECTION INTRAMUSCULAR; INTRAVENOUS
Status: DISPENSED
Start: 2019-10-25

## (undated) RX ORDER — FENTANYL CITRATE 50 UG/ML
INJECTION, SOLUTION INTRAMUSCULAR; INTRAVENOUS
Status: DISPENSED
Start: 2019-10-25

## (undated) RX ORDER — PROPOFOL 10 MG/ML
INJECTION, EMULSION INTRAVENOUS
Status: DISPENSED
Start: 2019-04-10

## (undated) RX ORDER — NITROGLYCERIN 5 MG/ML
VIAL (ML) INTRAVENOUS
Status: DISPENSED
Start: 2019-09-09

## (undated) RX ORDER — ONDANSETRON 2 MG/ML
INJECTION INTRAMUSCULAR; INTRAVENOUS
Status: DISPENSED
Start: 2023-03-01

## (undated) RX ORDER — LIDOCAINE HYDROCHLORIDE 20 MG/ML
INJECTION, SOLUTION EPIDURAL; INFILTRATION; INTRACAUDAL; PERINEURAL
Status: DISPENSED
Start: 2019-04-10

## (undated) RX ORDER — PROTAMINE SULFATE 10 MG/ML
INJECTION, SOLUTION INTRAVENOUS
Status: DISPENSED
Start: 2019-10-25

## (undated) RX ORDER — ACETAMINOPHEN 325 MG/1
TABLET ORAL
Status: DISPENSED
Start: 2019-04-10

## (undated) RX ORDER — HYDRALAZINE HYDROCHLORIDE 20 MG/ML
INJECTION INTRAMUSCULAR; INTRAVENOUS
Status: DISPENSED
Start: 2023-03-01

## (undated) RX ORDER — LIDOCAINE HYDROCHLORIDE 20 MG/ML
SOLUTION OROPHARYNGEAL
Status: DISPENSED
Start: 2019-10-14

## (undated) RX ORDER — ALBUMIN, HUMAN INJ 5% 5 %
SOLUTION INTRAVENOUS
Status: DISPENSED
Start: 2019-10-25

## (undated) RX ORDER — LEVOFLOXACIN 5 MG/ML
INJECTION, SOLUTION INTRAVENOUS
Status: DISPENSED
Start: 2023-03-01

## (undated) RX ORDER — PHENYLEPHRINE HCL IN 0.9% NACL 1 MG/10 ML
SYRINGE (ML) INTRAVENOUS
Status: DISPENSED
Start: 2019-04-10

## (undated) RX ORDER — METRONIDAZOLE 500 MG/100ML
INJECTION, SOLUTION INTRAVENOUS
Status: DISPENSED
Start: 2023-03-01

## (undated) RX ORDER — LIDOCAINE HYDROCHLORIDE 20 MG/ML
INJECTION, SOLUTION EPIDURAL; INFILTRATION; INTRACAUDAL; PERINEURAL
Status: DISPENSED
Start: 2019-10-25

## (undated) RX ORDER — GLYCOPYRROLATE 0.2 MG/ML
INJECTION, SOLUTION INTRAMUSCULAR; INTRAVENOUS
Status: DISPENSED
Start: 2019-10-25

## (undated) RX ORDER — DEXAMETHASONE SODIUM PHOSPHATE 4 MG/ML
INJECTION, SOLUTION INTRA-ARTICULAR; INTRALESIONAL; INTRAMUSCULAR; INTRAVENOUS; SOFT TISSUE
Status: DISPENSED
Start: 2019-04-10

## (undated) RX ORDER — HEPARIN SODIUM 1000 [USP'U]/ML
INJECTION, SOLUTION INTRAVENOUS; SUBCUTANEOUS
Status: DISPENSED
Start: 2019-10-25

## (undated) RX ORDER — SODIUM CHLORIDE, SODIUM LACTATE, POTASSIUM CHLORIDE, CALCIUM CHLORIDE 600; 310; 30; 20 MG/100ML; MG/100ML; MG/100ML; MG/100ML
INJECTION, SOLUTION INTRAVENOUS
Status: DISPENSED
Start: 2023-03-01

## (undated) RX ORDER — ESMOLOL HYDROCHLORIDE 10 MG/ML
INJECTION INTRAVENOUS
Status: DISPENSED
Start: 2023-03-01

## (undated) RX ORDER — GABAPENTIN 300 MG/1
CAPSULE ORAL
Status: DISPENSED
Start: 2019-04-10

## (undated) RX ORDER — METHOCARBAMOL 500 MG/1
TABLET, FILM COATED ORAL
Status: DISPENSED
Start: 2023-03-01

## (undated) RX ORDER — ONDANSETRON 2 MG/ML
INJECTION INTRAMUSCULAR; INTRAVENOUS
Status: DISPENSED
Start: 2019-09-09

## (undated) RX ORDER — METOPROLOL TARTRATE 25 MG/1
TABLET, FILM COATED ORAL
Status: DISPENSED
Start: 2019-10-25

## (undated) RX ORDER — SCOLOPAMINE TRANSDERMAL SYSTEM 1 MG/1
PATCH, EXTENDED RELEASE TRANSDERMAL
Status: DISPENSED
Start: 2023-03-01

## (undated) RX ORDER — HYDROMORPHONE HYDROCHLORIDE 1 MG/ML
INJECTION, SOLUTION INTRAMUSCULAR; INTRAVENOUS; SUBCUTANEOUS
Status: DISPENSED
Start: 2023-03-01

## (undated) RX ORDER — CITRIC ACID/SODIUM CITRATE 334-500MG
SOLUTION, ORAL ORAL
Status: DISPENSED
Start: 2023-03-01

## (undated) RX ORDER — HEPARIN SODIUM 1000 [USP'U]/ML
INJECTION, SOLUTION INTRAVENOUS; SUBCUTANEOUS
Status: DISPENSED
Start: 2019-09-09

## (undated) RX ORDER — NICARDIPINE HYDROCHLORIDE 2.5 MG/ML
INJECTION INTRAVENOUS
Status: DISPENSED
Start: 2019-09-09

## (undated) RX ORDER — APREPITANT 40 MG/1
CAPSULE ORAL
Status: DISPENSED
Start: 2023-03-01